# Patient Record
Sex: MALE | Race: ASIAN | NOT HISPANIC OR LATINO | Employment: UNEMPLOYED | URBAN - METROPOLITAN AREA
[De-identification: names, ages, dates, MRNs, and addresses within clinical notes are randomized per-mention and may not be internally consistent; named-entity substitution may affect disease eponyms.]

---

## 2017-02-05 ENCOUNTER — TRANSFERRED RECORDS (OUTPATIENT)
Dept: HEALTH INFORMATION MANAGEMENT | Facility: CLINIC | Age: 42
End: 2017-02-05

## 2017-02-08 ENCOUNTER — TRANSFERRED RECORDS (OUTPATIENT)
Dept: HEALTH INFORMATION MANAGEMENT | Facility: CLINIC | Age: 42
End: 2017-02-08

## 2017-02-08 LAB
ALT SERPL-CCNC: 24 U/L (ref 5–50)
AST SERPL-CCNC: 33 U/L (ref 9–50)
CHOLESTEROL (EXTERNAL): 153 MG/DL
CREATININE (EXTERNAL): 0.93 MG/DL (ref 0.8–1.4)
GLUCOSE (EXTERNAL): 86 MG/DL (ref 70–99)
HDLC SERPL-MCNC: 44 MG/DL
LDL CHOLESTEROL CALCULATED (EXTERNAL): 97 MG/DL
POTASSIUM (EXTERNAL): 4.2 MEQ/L (ref 3.5–5.4)
TRIGLYCERIDES (EXTERNAL): 61 MG/DL
TSH SERPL-ACNC: 1.6 UIU/ML (ref 0.5–4.7)

## 2019-04-01 LAB
ALT SERPL-CCNC: 15 U/L (ref 5–50)
AST SERPL-CCNC: 17 U/L (ref 9–50)
CREATININE (EXTERNAL): 1.09 MG/DL (ref 0.8–1.4)
GFR ESTIMATED (EXTERNAL): 82 ML/MIN/1.73M2
GFR ESTIMATED (IF AFRICAN AMERICAN) (EXTERNAL): 95 ML/MIN/1.73M2
GLUCOSE (EXTERNAL): 93 MG/DL (ref 70–99)
HBA1C MFR BLD: 5.5 % (ref 4.2–5.6)
POTASSIUM (EXTERNAL): 4.6 MEQ/L (ref 3.5–5.4)
TSH SERPL-ACNC: 1.6 UIU/ML (ref 0.4–4.1)

## 2021-03-09 ENCOUNTER — TRANSFERRED RECORDS (OUTPATIENT)
Dept: HEALTH INFORMATION MANAGEMENT | Facility: CLINIC | Age: 46
End: 2021-03-09

## 2021-03-09 LAB
ALT SERPL-CCNC: 16 U/L (ref 5–50)
AST SERPL-CCNC: 19 U/L (ref 9–50)
CHOLESTEROL (EXTERNAL): 152 MG/DL
CREATININE (EXTERNAL): 1.04 MG/DL (ref 0.8–1.4)
GFR ESTIMATED (EXTERNAL): 86 ML/MIN/1.73
GFR ESTIMATED (IF AFRICAN AMERICAN) (EXTERNAL): 99 ML/MIN/1.73
GLUCOSE (EXTERNAL): 91 MG/DL (ref 70–99)
HBA1C MFR BLD: 5.7 % (ref 4.2–5.6)
HDLC SERPL-MCNC: 59 MG/DL
LDL CHOLESTEROL CALCULATED (EXTERNAL): 78 MG/DL
POTASSIUM (EXTERNAL): 4.1 MEQ/L (ref 3.5–5.4)
TRIGLYCERIDES (EXTERNAL): 69 MG/DL
TSH SERPL-ACNC: 1.17 UIU/ML (ref 0.4–4.1)

## 2021-08-06 ENCOUNTER — TRANSFERRED RECORDS (OUTPATIENT)
Dept: HEALTH INFORMATION MANAGEMENT | Facility: CLINIC | Age: 46
End: 2021-08-06

## 2022-05-04 ENCOUNTER — TRANSFERRED RECORDS (OUTPATIENT)
Dept: HEALTH INFORMATION MANAGEMENT | Facility: CLINIC | Age: 47
End: 2022-05-04

## 2022-12-27 ENCOUNTER — OFFICE VISIT (OUTPATIENT)
Dept: FAMILY MEDICINE | Facility: CLINIC | Age: 47
End: 2022-12-27
Payer: MEDICAID

## 2022-12-27 VITALS
RESPIRATION RATE: 18 BRPM | TEMPERATURE: 98 F | OXYGEN SATURATION: 98 % | HEART RATE: 70 BPM | WEIGHT: 108 LBS | BODY MASS INDEX: 20.39 KG/M2 | DIASTOLIC BLOOD PRESSURE: 82 MMHG | SYSTOLIC BLOOD PRESSURE: 126 MMHG | HEIGHT: 61 IN

## 2022-12-27 DIAGNOSIS — Z60.3 LANGUAGE BARRIER: ICD-10-CM

## 2022-12-27 DIAGNOSIS — Z11.4 SCREENING FOR HIV (HUMAN IMMUNODEFICIENCY VIRUS): ICD-10-CM

## 2022-12-27 DIAGNOSIS — Z00.00 ROUTINE GENERAL MEDICAL EXAMINATION AT A HEALTH CARE FACILITY: Primary | ICD-10-CM

## 2022-12-27 DIAGNOSIS — Z12.11 SCREEN FOR COLON CANCER: ICD-10-CM

## 2022-12-27 DIAGNOSIS — R76.8 HEPATITIS C ANTIBODY TEST POSITIVE: ICD-10-CM

## 2022-12-27 DIAGNOSIS — F03.90 DEMENTIA, UNSPECIFIED DEMENTIA SEVERITY, UNSPECIFIED DEMENTIA TYPE, UNSPECIFIED WHETHER BEHAVIORAL, PSYCHOTIC, OR MOOD DISTURBANCE OR ANXIETY (H): ICD-10-CM

## 2022-12-27 DIAGNOSIS — Z11.59 NEED FOR HEPATITIS C SCREENING TEST: ICD-10-CM

## 2022-12-27 DIAGNOSIS — R74.8 ELEVATED CK: ICD-10-CM

## 2022-12-27 DIAGNOSIS — Z13.220 SCREENING FOR HYPERLIPIDEMIA: ICD-10-CM

## 2022-12-27 DIAGNOSIS — Z13.1 SCREENING FOR DIABETES MELLITUS: ICD-10-CM

## 2022-12-27 DIAGNOSIS — B19.20 PCR POSITIVE FOR HEPATITIS C VIRUS: ICD-10-CM

## 2022-12-27 DIAGNOSIS — Z75.8 LANGUAGE BARRIER: ICD-10-CM

## 2022-12-27 DIAGNOSIS — R56.9 SEIZURES (H): ICD-10-CM

## 2022-12-27 DIAGNOSIS — D64.9 ANEMIA, UNSPECIFIED TYPE: ICD-10-CM

## 2022-12-27 LAB
ALBUMIN SERPL BCG-MCNC: 4.5 G/DL (ref 3.5–5.2)
ALP SERPL-CCNC: 111 U/L (ref 40–129)
ALT SERPL W P-5'-P-CCNC: 22 U/L (ref 10–50)
ANION GAP SERPL CALCULATED.3IONS-SCNC: 13 MMOL/L (ref 7–15)
AST SERPL W P-5'-P-CCNC: 35 U/L (ref 10–50)
BILIRUB SERPL-MCNC: 0.4 MG/DL
BUN SERPL-MCNC: 16.5 MG/DL (ref 6–20)
CALCIUM SERPL-MCNC: 9.5 MG/DL (ref 8.6–10)
CHLORIDE SERPL-SCNC: 101 MMOL/L (ref 98–107)
CHOLEST SERPL-MCNC: 172 MG/DL
CK SERPL-CCNC: 72 U/L (ref 39–308)
CREAT SERPL-MCNC: 0.86 MG/DL (ref 0.67–1.17)
DEPRECATED HCO3 PLAS-SCNC: 23 MMOL/L (ref 22–29)
ERYTHROCYTE [DISTWIDTH] IN BLOOD BY AUTOMATED COUNT: 12.5 % (ref 10–15)
GFR SERPL CREATININE-BSD FRML MDRD: >90 ML/MIN/1.73M2
GLUCOSE SERPL-MCNC: 86 MG/DL (ref 70–99)
HBA1C MFR BLD: 5 % (ref 0–5.6)
HCT VFR BLD AUTO: 45.4 % (ref 40–53)
HDLC SERPL-MCNC: 62 MG/DL
HGB BLD-MCNC: 14.3 G/DL (ref 13.3–17.7)
HIV 1+2 AB+HIV1 P24 AG SERPL QL IA: NONREACTIVE
LDLC SERPL CALC-MCNC: 99 MG/DL
MCH RBC QN AUTO: 27.2 PG (ref 26.5–33)
MCHC RBC AUTO-ENTMCNC: 31.5 G/DL (ref 31.5–36.5)
MCV RBC AUTO: 87 FL (ref 78–100)
NONHDLC SERPL-MCNC: 110 MG/DL
PLATELET # BLD AUTO: 168 10E3/UL (ref 150–450)
POTASSIUM SERPL-SCNC: 4.2 MMOL/L (ref 3.4–5.3)
PROT SERPL-MCNC: 8.1 G/DL (ref 6.4–8.3)
RBC # BLD AUTO: 5.25 10E6/UL (ref 4.4–5.9)
SODIUM SERPL-SCNC: 137 MMOL/L (ref 136–145)
TRIGL SERPL-MCNC: 53 MG/DL
WBC # BLD AUTO: 8.2 10E3/UL (ref 4–11)

## 2022-12-27 PROCEDURE — 99386 PREV VISIT NEW AGE 40-64: CPT | Performed by: STUDENT IN AN ORGANIZED HEALTH CARE EDUCATION/TRAINING PROGRAM

## 2022-12-27 PROCEDURE — 80053 COMPREHEN METABOLIC PANEL: CPT | Performed by: STUDENT IN AN ORGANIZED HEALTH CARE EDUCATION/TRAINING PROGRAM

## 2022-12-27 PROCEDURE — 85027 COMPLETE CBC AUTOMATED: CPT | Performed by: STUDENT IN AN ORGANIZED HEALTH CARE EDUCATION/TRAINING PROGRAM

## 2022-12-27 PROCEDURE — 87902 NFCT AGT GNTYP ALYS HEP C: CPT | Performed by: STUDENT IN AN ORGANIZED HEALTH CARE EDUCATION/TRAINING PROGRAM

## 2022-12-27 PROCEDURE — 99000 SPECIMEN HANDLING OFFICE-LAB: CPT | Performed by: STUDENT IN AN ORGANIZED HEALTH CARE EDUCATION/TRAINING PROGRAM

## 2022-12-27 PROCEDURE — 36415 COLL VENOUS BLD VENIPUNCTURE: CPT | Performed by: STUDENT IN AN ORGANIZED HEALTH CARE EDUCATION/TRAINING PROGRAM

## 2022-12-27 PROCEDURE — 82550 ASSAY OF CK (CPK): CPT | Performed by: STUDENT IN AN ORGANIZED HEALTH CARE EDUCATION/TRAINING PROGRAM

## 2022-12-27 PROCEDURE — 87902 NFCT AGT GNTYP ALYS HEP C: CPT | Mod: 90 | Performed by: STUDENT IN AN ORGANIZED HEALTH CARE EDUCATION/TRAINING PROGRAM

## 2022-12-27 PROCEDURE — 80061 LIPID PANEL: CPT | Performed by: STUDENT IN AN ORGANIZED HEALTH CARE EDUCATION/TRAINING PROGRAM

## 2022-12-27 PROCEDURE — 87389 HIV-1 AG W/HIV-1&-2 AB AG IA: CPT | Performed by: STUDENT IN AN ORGANIZED HEALTH CARE EDUCATION/TRAINING PROGRAM

## 2022-12-27 PROCEDURE — 86803 HEPATITIS C AB TEST: CPT | Performed by: STUDENT IN AN ORGANIZED HEALTH CARE EDUCATION/TRAINING PROGRAM

## 2022-12-27 PROCEDURE — 83036 HEMOGLOBIN GLYCOSYLATED A1C: CPT | Performed by: STUDENT IN AN ORGANIZED HEALTH CARE EDUCATION/TRAINING PROGRAM

## 2022-12-27 PROCEDURE — 99214 OFFICE O/P EST MOD 30 MIN: CPT | Mod: 25 | Performed by: STUDENT IN AN ORGANIZED HEALTH CARE EDUCATION/TRAINING PROGRAM

## 2022-12-27 SDOH — SOCIAL STABILITY - SOCIAL INSECURITY: ACCULTURATION DIFFICULTY: Z60.3

## 2022-12-27 ASSESSMENT — ENCOUNTER SYMPTOMS
HEARTBURN: 1
PALPITATIONS: 1
ABDOMINAL PAIN: 1
CHILLS: 0
HEMATOCHEZIA: 0
NERVOUS/ANXIOUS: 1
HEMATURIA: 0
ARTHRALGIAS: 1
CONSTIPATION: 0
SORE THROAT: 0
NAUSEA: 0
WEAKNESS: 1
EYE PAIN: 0
MYALGIAS: 1
FREQUENCY: 0
COUGH: 1
SHORTNESS OF BREATH: 1
PARESTHESIAS: 0
DIZZINESS: 1
DIARRHEA: 0
FEVER: 0
DYSURIA: 0
HEADACHES: 1
JOINT SWELLING: 1

## 2022-12-27 NOTE — PROGRESS NOTES
"SUBJECTIVE:   CC: Heath is an 47 year old who presents for preventative health visit.     Patient has been advised of split billing requirements and indicates understanding: Yes  Healthy Habits:     Getting at least 3 servings of Calcium per day:  Yes    Bi-annual eye exam:  NO    Dental care twice a year:  NO    Sleep apnea or symptoms of sleep apnea:  None    Diet:  Regular (no restrictions)    Frequency of exercise:  None    Taking medications regularly:  Yes    Medication side effects:  Not applicable    PHQ-2 Total Score: 0    Additional concerns today:  No      - accompanied of wife and sister.   - Call wife Maine Fajardo [phone] or sister King [phone]  - Pt just moved here from [place], signed NIK. Doesn't feel well today. He did not sleep and wondered off for the whole week, outside the house. He aleays needs help to be near him and take care of him. When he was not supervised, he wondered off and ended up in the hospital. Was notified by police of his location. This is the second time, the first time he broke his left leg.   - He is not aware of anything that he needs to do, including ADLs, needs help with everything.   - He doesn't eat either.   - Has been like this for 3-4 years. Before that, he was \"normal\". Reports that he had a seizure 3-4 years ago, became unconscious, and then became like this, \"unaware\". Sometimes they would take him to the hospital, sometimes they didn't. Reports that he often would have seizures with a lot of foam in his mouth.  - Not sure if he has ever seen a neurologist.   - Last seizure: unsure  - Was on medications before, not taking them anymore because he ran out. Not able to sleep without medications. Does not know medications.   - Form for medicare (?) and food stamp.   - Would like referral to CHW    - Note from  Region 11/2022 reviewed - dementia unspecified    PSH:  - Had surgeries 3 on back, 2 on abdomen, and 1 on left leg (S/p left klaus).   - Right arm amputated " 2/2 tarah as a teenager.     Today's PHQ-2 Score:   PHQ-2 ( 1999 [employer]) 12/27/2022   Q1: Little interest or pleasure in doing things 0   Q2: Feeling down, depressed or hopeless 0   PHQ-2 Score 0   Q1: Little interest or pleasure in doing things Not at all   Q2: Feeling down, depressed or hopeless Not at all   PHQ-2 Score 0       Have you ever done Advance Care Planning? (For example, a Health Directive, POLST, or a discussion with a medical provider or your loved ones about your wishes): No, advance care planning information given to patient to review.  Patient declined advance care planning discussion at this time.    Social History     Tobacco Use     Smoking status: Not on file     Smokeless tobacco: Not on file   Substance Use Topics     Alcohol use: Not on file     If you drink alcohol do you typically have >3 drinks per day or >7 drinks per week? No    Alcohol Use 12/27/2022   Prescreen: >3 drinks/day or >7 drinks/week? No   Prescreen: >3 drinks/day or >7 drinks/week? -       Last PSA: No results found for: PSA    Reviewed orders with patient. Reviewed health maintenance and updated orders accordingly - Yes  Lab work is in process  Labs reviewed in EPIC    Reviewed and updated as needed this visit by clinical staff    Allergies  Meds   Med Hx            Reviewed and updated as needed this visit by Provider       Med Hx             Review of Systems   Constitutional: Negative for chills and fever.   HENT: Negative for congestion, ear pain, hearing loss and sore throat.    Eyes: Negative for pain and visual disturbance.   Respiratory: Positive for cough and shortness of breath.    Cardiovascular: Positive for palpitations. Negative for chest pain and peripheral edema.   Gastrointestinal: Positive for abdominal pain and heartburn. Negative for constipation, diarrhea, hematochezia and nausea.   Genitourinary: Negative for dysuria, frequency, genital sores, hematuria, impotence, penile discharge and urgency.  "  Musculoskeletal: Positive for arthralgias, joint swelling and myalgias.   Skin: Negative for rash.   Neurological: Positive for dizziness, weakness and headaches. Negative for paresthesias.   Psychiatric/Behavioral: Positive for mood changes. The patient is nervous/anxious.           OBJECTIVE:   /82 (BP Location: Left arm, Patient Position: Sitting, Cuff Size: Adult Small)   Pulse 70   Temp 98  F (36.7  C) (Temporal)   Resp 18   Ht 1.553 m (5' 1.14\")   Wt 49 kg (108 lb)   SpO2 98%   BMI 20.31 kg/m      Physical Exam  General Appearance:  Alert, cooperative, no distress, appears stated age.  Head:  Normocephalic, without obvious abnormality, atraumatic.  Eyes:  Conjunctivae/corneas clear, extraocular movements intact both eyes.  Lungs:  Clear to auscultation bilaterally, respirations unlabored.  Heart:  Regular rate and rhythm, S1 and S2 normal, no murmur, rub or gallop.  Abdomen:  Soft, non-tender, bowel sounds active all four quadrants.   Extremities:  Atraumatic, no cyanosis or edema.  Skin:  Skin color, texture, turgor normal, no rashes or lesions.  Neurologic: No focal deficits.      ASSESSMENT/PLAN:   Heath was seen today for physical.    Diagnoses and all orders for this visit:    Routine general medical examination at a health care facility  -     REVIEW OF HEALTH MAINTENANCE PROTOCOL ORDERS  -     Comprehensive metabolic panel (BMP + Alb, Alk Phos, ALT, AST, Total. Bili, TP); Future  -     Comprehensive metabolic panel (BMP + Alb, Alk Phos, ALT, AST, Total. Bili, TP)    Screen for colon cancer  -     TRESA(EXACT SCIENCES); Future  -     Cancel: Primary Care - Care Coordination Referral; Future  -     Primary Care - Care Coordination Referral; Future    Screening for HIV (human immunodeficiency virus)  -     HIV Antigen Antibody Combo; Future  -     HIV Antigen Antibody Combo    Need for hepatitis C screening test  -     Hepatitis C Screen Reflex to HCV RNA Quant and Genotype; Future  -     " "Hepatitis C Screen Reflex to HCV RNA Quant and Genotype  -     Hepatitis C RNA, Quantitative by PCR with Confirmatory Reflex to Genotyping  -     Hepatitis C High Resolution Genotype    Screening for hyperlipidemia  -     Lipid panel reflex to direct LDL Non-fasting; Future  -     Lipid panel reflex to direct LDL Non-fasting    Elevated CK  Noted in ED visit, recheck today  -     CK total; Future  -     CK total    Anemia, unspecified type  -     CBC with platelets; Future  -     CBC with platelets    Screening for diabetes mellitus  -     Hemoglobin A1c; Future  -     Hemoglobin A1c    Language barrier  -     Cancel: Primary Care - Care Coordination Referral; Future  -     Primary Care - Care Coordination Referral; Future    Dementia, unspecified dementia severity, unspecified dementia type, unspecified whether behavioral, psychotic, or mood disturbance or anxiety (H)  Seizures (H)  Unclear history, language barrier, and poor historians. Pt is not verbal.  Has been like this for 3-4 years. Before that, he was \"normal\". Reports that he had a seizure 3-4 years ago, became unconscious, and then became like this, \"unaware\". Sometimes they would take him to the hospital, sometimes they didn't. Reports that he often would have seizures with a lot of foam in his mouth. Not sure if he has ever seen a neurologist. Last seizure: unsure. Was on medications before, not taking them anymore because he ran out. Not able to sleep without medications. Does not know medications.   - Will bring bottles of meds for me to review  - Likely need referral to Neurologist    Hepatitis C antibody test positive  PCR positive for hepatitis C virus  Detected on labs today. Will refer to GI.   -     Adult GI  Referral - Consult Only; Future        Patient has been advised of split billing requirements and indicates understanding: Yes    Health Maintenance:    STD Screening: declined    Immunizations: defer until records btained    HIV " screening ages 15-65 (USPSTF guidelines, CDC guidelines are 13-64): ordered    Advance Directive:  not on file. Information provided 12/27/2022.    Colorectal Cancer: cologuard ordered    Hep C screening 18-79: ordered    COUNSELING:   Reviewed preventive health counseling, as reflected in patient instructions       Regular exercise       Healthy diet/nutrition       Vision screening       Hearing screening       Advance Care Planning        He has no history on file for tobacco use.            Diana Arceo MD  [facility]

## 2022-12-28 ENCOUNTER — TRANSFERRED RECORDS (OUTPATIENT)
Dept: HEALTH INFORMATION MANAGEMENT | Facility: CLINIC | Age: 47
End: 2022-12-28

## 2022-12-28 ENCOUNTER — PATIENT OUTREACH (OUTPATIENT)
Dept: CARE COORDINATION | Facility: CLINIC | Age: 47
End: 2022-12-28

## 2022-12-28 LAB — HCV AB SERPL QL IA: REACTIVE

## 2022-12-28 ASSESSMENT — ACTIVITIES OF DAILY LIVING (ADL): DEPENDENT_IADLS:: CLEANING;COOKING;MEAL PREPARATION;TRANSPORTATION

## 2022-12-28 NOTE — PROGRESS NOTES
"Clinic Care Coordination Contact:  Community Health Worker Initial Outreach    Reason: CCC CHW Initial Outreach- referral to [facility] service  Referral provider: Dr. Arceo     ID#: [id number]  Agency: [employer]    CHW Initial Information Gathering:  Referral Source: PCP  Preferred Hospital: [facility]  [phone]  Preferred Urgent Care: [facility], [phone]  Current living arrangement:: I live in a private home with family  Type of residence:: Apartment  Informal Support system:: Family, Spouse  Transportation means:: Family, Regular car (Son bring my  to medical appt per pt's wife Maine shared on 12/28/2022.)    Care Mgmt (GEN) screening:   Per questionnaires:   -Fallen 2 or more times in the past years? No per pt's spouse.   -Any fall with injury in the past year? Yes per pt's spouse.     Potential patient outreach discussion:   Attempted #1: Patient was identified as a potential candidate and referred to Clinic Care Coordination Service. CHW call and spoke with patient's wife Maine today, 12- to discuss possible clinic care coordination enrollment. No consent to communication found. Asked to speak with patient.     Maine confirmed that pt have \"speech and mental health problem.\"    Have introduced myself to patient. Clinic care coordination service was described to the patient and immediate needs were discussed. The patient's wife agreed enrollment into [facility] service for patient. CHW explained [facility] monthly outreach follow up and [facility] initial assessment. Pt's wife is aware [facility] team includes CHW, SW, RN and FRW and to connect with [facility] for any additional resources need and RSC with any provider scheduling appt or questions.     No patient medical info discussed today. CHW completed the CHW initial screening and Care mgnt (GEN) screening above with pt's wife through . Pt's wife do not have a pen and unable to write down CHW phone " "number today. Wife confirmed that she will asked [facility] ED for [facility] contact info on 12/30/2022.     Patient's wife stated;   -Household size: 3 people (1 child; 2 adults- pt and wife).  -Heath Crawley, me/wife and child live with one of my son's family at this time. Do not pay for rent due to no income. But all eat separately.   -I (wife) do all the cleaning, cooking, meal preparation and pretty much everything for him.   -My son bring him to his medical appt.   - Heath Crawley been missing \"12/20/2022 and police found in on 12/25/2022. We was injured and we picked him up from the hospital.\"   -I unemployed due to taking care of  Heath.   -Heath Crawley have no pca service.   -Need help apply food stamp program.     Patient accepts CC: Yes. Patient scheduled for assessment with [facility]  [place] on 12/30/2022 at 11:00AM via phone visit. Patient noted desire to discuss apply food stamp program. Note/comment: Pt's spouse is aware [facility] ED will connect with pt at appt date and time. Spouse confirmed.     CHW Plan:   -Pt/spouse attend initial assessment appt on 12/30/2022 with [facility] ED [place] via phone visit.   -[facility] ED assess patient need and place [facility] FRW referral/screening.        "

## 2022-12-29 LAB
HCV RNA SERPL NAA+PROBE-ACNC: ABNORMAL IU/ML
HCV RNA SERPL NAA+PROBE-LOG IU: 5.5 {LOG_IU}/ML

## 2023-01-02 ENCOUNTER — PATIENT OUTREACH (OUTPATIENT)
Dept: NURSING | Facility: CLINIC | Age: 48
End: 2023-01-02
Payer: MEDICAID

## 2023-01-02 ENCOUNTER — TELEPHONE (OUTPATIENT)
Dept: FAMILY MEDICINE | Facility: CLINIC | Age: 48
End: 2023-01-02

## 2023-01-02 ASSESSMENT — ACTIVITIES OF DAILY LIVING (ADL): DEPENDENT_IADLS:: CLEANING;COOKING;MEAL PREPARATION;TRANSPORTATION

## 2023-01-02 NOTE — TELEPHONE ENCOUNTER
RECORDS RECEIVED FROM: Adis Olivera   Appt Date: 2/9/2023   NOTES STATUS DETAILS   OFFICE NOTE from referring provider Internal 12/27/2022 Office visit with Adis   OFFICE NOTES from other specialists N/A    DISCHARGE SUMMARY from hospital N/A    MEDICATION LIST Internal    LIVER BIOSPY (IF APPLICABLE)      PATHOLOGY REPORTS  N/A    IMAGING     ENDOSCOPY (IF AVAILABLE) N/A    COLONOSCOPY (IF AVAILABLE) N/A    ULTRASOUND LIVER N/A    CT OF ABDOMEN N/A    MRI OF LIVER N/A    FIBROSCAN, US ELASTOGRAPHY, FIBROSIS SCAN, MR ELASTOGRAPHY N/A    LABS     HEPATIC PANEL (LIVER PANEL) N/A    BASIC METABOLIC PANEL N/A    COMPLETE METABOLIC PANEL N/A    COMPLETE BLOOD COUNT (CBC) Internal 12/27/2022   INTERNATIONAL NORMALIZED RATIO (INR) N/A    HEPATITIS C ANTIBODY Internal 12/27/2022   HEPATITIS C VIRAL LOAD/PCR N/A 12/27/2022   HEPATITIS C GENOTYPE N/A    HEPATITIS B SURFACE ANTIGEN N/A    HEPATITIS B SURFACE ANTIBODY N/A    HEPATITIS B DNA QUANT LEVEL N/A    HEPATITIS B CORE ANTIBODY N/A

## 2023-01-02 NOTE — TELEPHONE ENCOUNTER
RN called patient's wife via [employer]  to relay 's message below.    Patient's wife verbalized understanding.    RN found patient has upcoming appointment with liver doctor. RN provided the date and time and location to patient's wife.    Appointments in Next    Jan 02, 2023 11:00 AM  Phone Visit with AN CCC SW, VIDEO,   [facility] ([facility] ) [phone]   Jan 24, 2023  1:30 PM  (Arrive by 1:10 PM)  Provider Visit with Diana Arceo MD  [facility] ([facility] ) [phone]   Feb 09, 2023 11:45 AM  LAB with CS LAB  [facility] ([facility] ) [phone]   Feb 09, 2023  1:15 PM  [facility] Liver with Kaleigh Emery PA-C  [facility] ([facility] ) [phone]        Keisha Gatica RN  [facility]

## 2023-01-02 NOTE — TELEPHONE ENCOUNTER
----- Message from Diana Arceo MD sent at 12/29/2022  5:06 PM CST -----  Please call pt's wife or sister (wife Maine Fajardo [phone] or sister King [phone]) about results below:    For hepatitis C was positive.  Will refer him to the liver but this to be treated. They should get a call to schedule this in 1 to 2 days.    For HIV screening.  Cholesterol levels are normal.,  Screening for diabetes, electrolytes, kidney function, liver function were all normal. CK, which is screening to look for muscle breakdown, which was high when pt was seen in the ED, is now normal.

## 2023-01-02 NOTE — PROGRESS NOTES
Clinic Care Coordination Contact    Clinic Care Coordination Contact  OUTREACH    Referral Information:  Referral Source: PCP    Primary Diagnosis: Psychosocial    Chief Complaint   Patient presents with     Clinic Care Coordination - Initial     [facility]     Utilization    Hospital Admissions  0             ED Visits  0             No Show Count (past year)  0                Current as of: 12/30/2022  3:13 PM              Clinical Concerns:  Current Medical Concerns:  There is no problem list on file for this patient.        Current Behavioral Concerns: None listed, however, chart notes indicate that patient was brought to ED at the end of November after being found wandering in [place]. He appeared confused. Unclear if mood disturbance, versus dementia, versus something else.     Education Provided to patient: Introduced self and role.       Health Maintenance Reviewed: Due/Overdue   Health Maintenance Due   Topic Date Due     HEPATITIS B IMMUNIZATION (1 of 3 - 3-dose series) Never done     COVID-19 Vaccine (1) Never done     COLORECTAL CANCER SCREENING  Never done     DTAP/TDAP/TD IMMUNIZATION (1 - Tdap) Never done     INFLUENZA VACCINE (1) Never done     PHQ-2 (once per calendar year)  01/01/2023       Clinical Pathway: None    Medication Management:  Did not review medications.    Functional Status:  Dependent IADLs:: Cleaning, Cooking, Meal Preparation, Transportation (Per pt's wife shared on 12/28/2022; I do all the cleaning, coooking and meal preparation. My son bring my  to medical appt.)    Living Situation:  Current living arrangement:: I live in a private home with family    Lifestyle & Psychosocial Needs: [facility] contacted patient's wife, [place], using [employer] . Introduced self and role. Did not discuss PHI due to no consent to communicate on file. Patient's wife stated that they want SNAP benefits. Provided her the contact information  to [facility] [employer]. One of her children wrote the number down and will provide assistance with calling. Patient's wife then said that they want rent support. Psychiatric asked if they are paying rent at this time. She stated they are not, as they are living with children. Psychiatric explained that they would need to be paying rent to get rent assistance. Wife stated that they want to move to a place on their own eventually, so they will be paying rent at some point. Psychiatric stated they will need to contact the county at that time and advised them that they would not get their rent paid each month, but did state they can apply for section 8 housing if the waitlist is open in their county ([facility] [place] [facility] Provided child with link to check the waitlist. No further questions.      Social Determinants of Health     Tobacco Use: Not on file   Alcohol Use: Not on file   Financial Resource Strain: Not on file   Food Insecurity: Not on file   Transportation Needs: Not on file   Physical Activity: Not on file   Stress: Not on file   Social Connections: Not on file   Intimate Partner Violence: Not on file   Depression: Not at risk     PHQ-2 Score: 0   Housing Stability: Not on file        Transportation means:: Family, Regular car (Son bring my  to medical appt per pt's wife Maine shared on 12/28/2022.)       Resources and Interventions:  Current Resources:               Employment Status: unemployed     Care Plan: No care plan created. Patient's wife declined needing further outreach      Patient/Caregiver understanding: Yes       Future Appointments              In 3 weeks Diana Arceo MD [facility]    In 1 month CS LAB [facility] [place], CS    In 1 month Kaleigh Emery PA-C [facility]          Plan: Patient was not enrolled in Care Coordination due to wife declining needing further outreach at this time. Encouraged  wife to contact care team if additional needs arise.    URSULA Polanco  Primary Care Clinic- Social Work Care Coordinator  [facility] [place] [place] [facility]  Ph: [phone]  1/2/2023 11:59 AM

## 2023-01-03 LAB — HCV GENTYP SERPL NAA+PROBE: NORMAL

## 2023-01-05 ENCOUNTER — TELEPHONE (OUTPATIENT)
Dept: NURSING | Facility: CLINIC | Age: 48
End: 2023-01-05

## 2023-01-05 NOTE — TELEPHONE ENCOUNTER
Please call pt's wife or sister to inform them that I reviewed his medications and that he no longer needs to take aspirin daily.  This was only prescribed because of his hip surgery to prevent blood clots.  Given that he does not have any diabetes or high cholesterol, this is no longer needed.

## 2023-01-06 PROBLEM — S72.002A LEFT DISPLACED FEMORAL NECK FRACTURE (H): Status: ACTIVE | Noted: 2022-09-16

## 2023-01-09 PROBLEM — G40.909 SEIZURE DISORDER (H): Status: ACTIVE | Noted: 2023-01-09

## 2023-01-09 PROBLEM — F02.818 DEMENTIA DUE TO MEDICAL CONDITION WITH BEHAVIORAL DISTURBANCE (H): Status: ACTIVE | Noted: 2023-01-09

## 2023-01-09 PROBLEM — F81.89 NON-VERBAL LEARNING DISORDER: Status: ACTIVE | Noted: 2023-01-09

## 2023-01-09 NOTE — TELEPHONE ENCOUNTER
Contacted patient's wife as patient is non verbal.  Relayed message below from Dr Arceo    Message routed to Dr Arceo to add that patient is non verbal on problem list.    Erlinda Ya RN  [facility]            Dr Arceo  Please call pt's wife or sister to inform them that I reviewed his medications and that he no longer needs to take aspirin daily.  This was only prescribed because of his hip surgery to prevent blood clots.  Given that he does not have any diabetes or high cholesterol, this is no longer needed.

## 2023-01-09 NOTE — TELEPHONE ENCOUNTER
Dx of non-verbal added to problem list, wife and sister reported pt become non-verbal 2/2 long seizure a few years ago. Unclear history.

## 2023-01-13 ENCOUNTER — TELEPHONE (OUTPATIENT)
Dept: FAMILY MEDICINE | Facility: CLINIC | Age: 48
End: 2023-01-13

## 2023-01-13 DIAGNOSIS — R56.9 SEIZURES (H): Primary | ICD-10-CM

## 2023-01-13 DIAGNOSIS — F81.89 NON-VERBAL LEARNING DISORDER: ICD-10-CM

## 2023-01-13 DIAGNOSIS — F03.90 DEMENTIA, UNSPECIFIED DEMENTIA SEVERITY, UNSPECIFIED DEMENTIA TYPE, UNSPECIFIED WHETHER BEHAVIORAL, PSYCHOTIC, OR MOOD DISTURBANCE OR ANXIETY (H): ICD-10-CM

## 2023-01-13 NOTE — TELEPHONE ENCOUNTER
Patient's wife brought in his medications, however medications were the ones that he received from the emergency room, including Tylenol and thiamine.  Medications did not include any seizure medications.  On further discussion with wife, she reports that  patient did have seizure medications, however she does not recall what they are and does not have any access to records from previous doctor, does not remember the name or phone number of previous doctor as well.  Discussed with wife that we will refer patient to neurology. Wife was agreeable.

## 2023-01-17 DIAGNOSIS — B19.20 HEPATITIS C VIRUS INFECTION WITHOUT HEPATIC COMA, UNSPECIFIED CHRONICITY: Primary | ICD-10-CM

## 2023-01-24 ENCOUNTER — LAB (OUTPATIENT)
Dept: FAMILY MEDICINE | Facility: CLINIC | Age: 48
End: 2023-01-24

## 2023-01-24 ENCOUNTER — OFFICE VISIT (OUTPATIENT)
Dept: FAMILY MEDICINE | Facility: CLINIC | Age: 48
End: 2023-01-24
Payer: MEDICAID

## 2023-01-24 VITALS
WEIGHT: 118 LBS | HEIGHT: 61 IN | DIASTOLIC BLOOD PRESSURE: 82 MMHG | TEMPERATURE: 97.3 F | SYSTOLIC BLOOD PRESSURE: 124 MMHG | OXYGEN SATURATION: 98 % | HEART RATE: 80 BPM | BODY MASS INDEX: 22.28 KG/M2 | RESPIRATION RATE: 16 BRPM

## 2023-01-24 DIAGNOSIS — F03.90 DEMENTIA, UNSPECIFIED DEMENTIA SEVERITY, UNSPECIFIED DEMENTIA TYPE, UNSPECIFIED WHETHER BEHAVIORAL, PSYCHOTIC, OR MOOD DISTURBANCE OR ANXIETY (H): Primary | ICD-10-CM

## 2023-01-24 DIAGNOSIS — Z23 NEED FOR DIPHTHERIA-TETANUS-PERTUSSIS (TDAP) VACCINE: ICD-10-CM

## 2023-01-24 DIAGNOSIS — F51.01 PRIMARY INSOMNIA: ICD-10-CM

## 2023-01-24 DIAGNOSIS — R56.9 SEIZURES (H): ICD-10-CM

## 2023-01-24 DIAGNOSIS — Z23 NEEDS FLU SHOT: ICD-10-CM

## 2023-01-24 DIAGNOSIS — F81.89 NON-VERBAL LEARNING DISORDER: ICD-10-CM

## 2023-01-24 DIAGNOSIS — Z23 NEED FOR PROPHYLACTIC VACCINATION AGAINST STREPTOCOCCUS PNEUMONIAE (PNEUMOCOCCUS): ICD-10-CM

## 2023-01-24 DIAGNOSIS — S48.911D AMPUTATION OF RIGHT UPPER EXTREMITY, SUBSEQUENT ENCOUNTER: ICD-10-CM

## 2023-01-24 DIAGNOSIS — Z12.11 SCREEN FOR COLON CANCER: ICD-10-CM

## 2023-01-24 PROCEDURE — 90715 TDAP VACCINE 7 YRS/> IM: CPT | Performed by: STUDENT IN AN ORGANIZED HEALTH CARE EDUCATION/TRAINING PROGRAM

## 2023-01-24 PROCEDURE — 90677 PCV20 VACCINE IM: CPT | Performed by: STUDENT IN AN ORGANIZED HEALTH CARE EDUCATION/TRAINING PROGRAM

## 2023-01-24 PROCEDURE — 90471 IMMUNIZATION ADMIN: CPT | Performed by: STUDENT IN AN ORGANIZED HEALTH CARE EDUCATION/TRAINING PROGRAM

## 2023-01-24 PROCEDURE — 90686 IIV4 VACC NO PRSV 0.5 ML IM: CPT | Performed by: STUDENT IN AN ORGANIZED HEALTH CARE EDUCATION/TRAINING PROGRAM

## 2023-01-24 PROCEDURE — 90472 IMMUNIZATION ADMIN EACH ADD: CPT | Performed by: STUDENT IN AN ORGANIZED HEALTH CARE EDUCATION/TRAINING PROGRAM

## 2023-01-24 PROCEDURE — 99214 OFFICE O/P EST MOD 30 MIN: CPT | Mod: 25 | Performed by: STUDENT IN AN ORGANIZED HEALTH CARE EDUCATION/TRAINING PROGRAM

## 2023-01-24 RX ORDER — TRAZODONE HYDROCHLORIDE 50 MG/1
50 TABLET, FILM COATED ORAL AT BEDTIME
Qty: 90 TABLET | Refills: 3 | Status: SHIPPED | OUTPATIENT
Start: 2023-01-24 | End: 2023-02-27

## 2023-01-24 NOTE — Clinical Note
Please help pt make appt with Neuro for dementia and seizure. Per neuro, Our preferred partners are [facility] Neurology at [phone] and [facility] at [phone].

## 2023-01-24 NOTE — PROGRESS NOTES
Assessment & Plan     Dementia, unspecified dementia severity, unspecified dementia type, unspecified whether behavioral, psychotic, or mood disturbance or anxiety (H)  Seizures (H)  Previously referred to neurology.  We will have team coordinators to help schedule neurology appointment.  -Needs form for [facility] to be completed for benefits.  We will complete and mail to patient or call to .    Screen for colon cancer  - COLOGUARD(EXACT SCIENCES)    Amputation of right upper extremity, subsequent encounter  Chronic, stable.  [place]    Primary insomnia  Chronic, uncontrolled as patient is off this medication.  Wife and sister report that patient was previously on a medication for insomnia, however does not have any records and does not recall what medication this was.  Reports that is becoming a problem as patient needs constant supervision, without falling asleep, wife has to provide constant supervision and not able to sleep.  We will trial trazodone 50 mg nightly.  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  Secondary to a prolonged seizure per wife and sister.  -Completed form to allow speaking to wife and sister regarding patient's health concerns.    Needs flu shot  - INFLUENZA VACCINE IM > 6 MONTHS VALENT IIV4 (AFLURIA/FLUZONE)    Need for prophylactic vaccination against Streptococcus pneumoniae (pneumococcus)  - Pneumococcal 20 Valent Conjugate (Prevnar 20)    Need for diphtheria-tetanus-pertussis (Tdap) vaccine  - TDAP VACCINE (Adacel, Boostrix)      Review of external notes as documented elsewhere in note      Return in about 4 weeks (around 2/21/2023) for Follow up dementia.    Diana Arceo MD  [facility]    Michel Joe is a 48 year old accompanied by his wife and sister, presenting for the following health issues:  office visit (Follow up)      History of  "Present Illness       Reason for visit:  Follow up    He eats 4 or more servings of fruits and vegetables daily.He consumes 0 sweetened beverage(s) daily.He exercises with enough effort to increase his heart rate 9 or less minutes per day.  He exercises with enough effort to increase his heart rate 3 or less days per week.   He is taking medications regularly.       Dementia  Seizure  Insomnia  - Pt is still not doing well, but still chronic problems. Wife not able to rest of take care of herself.   - Pt does not sleep and requires constant supervision. Broke TV recently.   - Was referred to Neurology, has not been able to make an appt yet.     Forms  - Has forms from [facility] to confirm that pt is not able to work.       Review of Systems   Constitutional: Negative for fever and chills.   Respiratory: Negative for cough or shortness of breath.    Cardiovascular: Negative for chest pain or chest pressure.   Gastrointestinal: Negative for nausea, vomiting, diarrhea or abdominal pain.        Objective    /82 (BP Location: Left arm, Patient Position: Sitting, Cuff Size: Adult Regular)   Pulse 80   Temp 97.3  F (36.3  C) (Temporal)   Resp 16   Ht 1.553 m (5' 1.14\")   Wt 53.5 kg (118 lb)   SpO2 98%   BMI 22.19 kg/m    Body mass index is 22.19 kg/m .  Physical Exam   PHYSICAL EXAM  General: Well developed, well nourished.  Skin:  Dry without rash.    Head:  Normocephalic-atraumatic.    Eye:  Normal conjunctivae.     Respiratory:  Normal respiratory effort.   Gastrointestinal:  Non-distended.    Musculoskeletal:  No deformity or edema.  Neurologic: No focal deficits.              "

## 2023-01-31 ENCOUNTER — TELEPHONE (OUTPATIENT)
Dept: FAMILY MEDICINE | Facility: CLINIC | Age: 48
End: 2023-01-31
Payer: MEDICAID

## 2023-02-06 NOTE — TELEPHONE ENCOUNTER
Action 2/6/23 MV 11.49am   Action Taken Imaging request faxed to [facility]    3/1/23 MV 11.04am  Images resolved in PACS         RECORDS RECEIVED FROM: internal   REASON FOR VISIT: seizures   Date of Appt: 3/28/23   NOTES (FOR ALL VISITS) STATUS DETAILS   OFFICE NOTE from referring provider Internal Dr Diana Arceo @ [facility]:  1/24/23 12/27/22   MEDICATION LIST Internal    IMAGING  (FOR ALL VISITS)     EEG Internal [facility]:  3/28/22   CT (HEAD, NECK, SPINE) PACS [facility]:  CT Head 11/2/22

## 2023-02-09 ENCOUNTER — PRE VISIT (OUTPATIENT)
Dept: GASTROENTEROLOGY | Facility: CLINIC | Age: 48
End: 2023-02-09

## 2023-02-09 ENCOUNTER — LAB (OUTPATIENT)
Dept: LAB | Facility: CLINIC | Age: 48
End: 2023-02-09
Payer: COMMERCIAL

## 2023-02-09 DIAGNOSIS — B19.20 HEPATITIS C VIRUS INFECTION WITHOUT HEPATIC COMA, UNSPECIFIED CHRONICITY: ICD-10-CM

## 2023-02-09 LAB
ALBUMIN SERPL BCG-MCNC: 4.5 G/DL (ref 3.5–5.2)
ALP SERPL-CCNC: 102 U/L (ref 40–129)
ALT SERPL W P-5'-P-CCNC: 34 U/L (ref 10–50)
ANION GAP SERPL CALCULATED.3IONS-SCNC: 12 MMOL/L (ref 7–15)
AST SERPL W P-5'-P-CCNC: 38 U/L (ref 10–50)
BILIRUB DIRECT SERPL-MCNC: <0.2 MG/DL (ref 0–0.3)
BILIRUB SERPL-MCNC: 0.4 MG/DL
BUN SERPL-MCNC: 6.9 MG/DL (ref 6–20)
CALCIUM SERPL-MCNC: 8.8 MG/DL (ref 8.6–10)
CHLORIDE SERPL-SCNC: 97 MMOL/L (ref 98–107)
CREAT SERPL-MCNC: 0.95 MG/DL (ref 0.67–1.17)
DEPRECATED HCO3 PLAS-SCNC: 22 MMOL/L (ref 22–29)
ERYTHROCYTE [DISTWIDTH] IN BLOOD BY AUTOMATED COUNT: 12.8 % (ref 10–15)
GFR SERPL CREATININE-BSD FRML MDRD: >90 ML/MIN/1.73M2
GLUCOSE SERPL-MCNC: 132 MG/DL (ref 70–99)
HBV CORE AB SERPL QL IA: NONREACTIVE
HBV SURFACE AB SERPL IA-ACNC: 18.02 M[IU]/ML
HBV SURFACE AB SERPL IA-ACNC: REACTIVE M[IU]/ML
HBV SURFACE AG SERPL QL IA: NONREACTIVE
HCT VFR BLD AUTO: 45.1 % (ref 40–53)
HGB BLD-MCNC: 14.8 G/DL (ref 13.3–17.7)
INR PPP: 1.21 (ref 0.85–1.15)
MCH RBC QN AUTO: 26.7 PG (ref 26.5–33)
MCHC RBC AUTO-ENTMCNC: 32.8 G/DL (ref 31.5–36.5)
MCV RBC AUTO: 81 FL (ref 78–100)
PLATELET # BLD AUTO: 219 10E3/UL (ref 150–450)
POTASSIUM SERPL-SCNC: 4.6 MMOL/L (ref 3.4–5.3)
PROT SERPL-MCNC: 7.6 G/DL (ref 6.4–8.3)
RBC # BLD AUTO: 5.54 10E6/UL (ref 4.4–5.9)
SODIUM SERPL-SCNC: 131 MMOL/L (ref 136–145)
WBC # BLD AUTO: 6.7 10E3/UL (ref 4–11)

## 2023-02-09 PROCEDURE — 36415 COLL VENOUS BLD VENIPUNCTURE: CPT

## 2023-02-09 PROCEDURE — 87340 HEPATITIS B SURFACE AG IA: CPT

## 2023-02-09 PROCEDURE — 86706 HEP B SURFACE ANTIBODY: CPT

## 2023-02-09 PROCEDURE — 80053 COMPREHEN METABOLIC PANEL: CPT

## 2023-02-09 PROCEDURE — 85610 PROTHROMBIN TIME: CPT

## 2023-02-09 PROCEDURE — 85027 COMPLETE CBC AUTOMATED: CPT

## 2023-02-09 PROCEDURE — 86704 HEP B CORE ANTIBODY TOTAL: CPT

## 2023-02-09 PROCEDURE — 82248 BILIRUBIN DIRECT: CPT

## 2023-02-13 NOTE — TELEPHONE ENCOUNTER
DIAGNOSIS: PCR positive for hepatitis C virus    Appt Date: 04.04.2023    NOTES STATUS DETAILS   OFFICE NOTE from referring provider Internal 12.27.2022 Diana Arceo MD   OFFICE NOTES from other specialists     DISCHARGE SUMMARY from hospital     MEDICATION LIST Internal    LIVER BIOSPY (IF APPLICABLE)      PATHOLOGY REPORTS      IMAGING     ENDOSCOPY (IF AVAILABLE)     COLONOSCOPY (IF AVAILABLE)     ULTRASOUND LIVER     CT OF ABDOMEN     MRI OF LIVER     FIBROSCAN, US ELASTOGRAPHY, FIBROSIS SCAN, MR ELASTOGRAPHY     LABS     HEPATIC PANEL (LIVER PANEL) Internal 02.09.2023   BASIC METABOLIC PANEL Internal 02.09.2023   COMPLETE METABOLIC PANEL Internal 02.09.2023   COMPLETE BLOOD COUNT (CBC) Internal 02.09.2023   INTERNATIONAL NORMALIZED RATIO (INR) Internal 02.09.2023   HEPATITIS C ANTIBODY Internal 12.27.2022   HEPATITIS C VIRAL LOAD/PCR     HEPATITIS C GENOTYPE     HEPATITIS B SURFACE ANTIGEN Internal 02.09.2023   HEPATITIS B SURFACE ANTIBODY Internal 02.09.2023   HEPATITIS B DNA QUANT LEVEL     HEPATITIS B CORE ANTIBODY Internal 02.09.2023

## 2023-02-20 ENCOUNTER — PATIENT OUTREACH (OUTPATIENT)
Dept: CARE COORDINATION | Facility: CLINIC | Age: 48
End: 2023-02-20
Payer: COMMERCIAL

## 2023-02-20 NOTE — PROGRESS NOTES
Clinic Care Coordination Contact    Clinic Care Coordination Contact  OUTREACH    Referral Information:  Referral Source: PCP     Clinical Concerns:  Current Medical Concerns:   Patient Active Problem List   Diagnosis     Left displaced femoral neck fracture (H)     Non-verbal learning disorder     Dementia due to medical condition with behavioral disturbance     Seizure disorder (H)     Amputation of right upper extremity, subsequent encounter   Living Situation:  Current living arrangement:: I live in a private home with family      Message received from PCP to consider family support - wife is enrolled with CC  RN CC wasn't clear on pt details, confirmed id during wife call  Pt is schedule to see PCP next week  RN CC will outreach after visit to complete assessment and enroll in program       Patient/Caregiver understanding: Patient verbalized understanding via interpretive services. Engaged in AIDET communication during visit      Outreach Frequency: monthly  Future Appointments              In 1 week Diana Arceo MD [facility]    In 2 weeks SPRS CCC RN [facility]    In 1 month [id number] EEG [facility] Outpatient Clinic [facility]    In 1 month Mitchell Naik MD [facility] Neurology Clinic [facility]    In 1 month Leventhal, Thomas Michael, MD [facility] Hepatology Clinic [facility]          Plan: Patient will schedule and attend recommended follow up visits with speciality providers and primary care provider.  RN CC will call afer visit to assess for CCC support needs

## 2023-02-27 ENCOUNTER — ANCILLARY PROCEDURE (OUTPATIENT)
Dept: GENERAL RADIOLOGY | Facility: CLINIC | Age: 48
End: 2023-02-27
Attending: STUDENT IN AN ORGANIZED HEALTH CARE EDUCATION/TRAINING PROGRAM
Payer: COMMERCIAL

## 2023-02-27 ENCOUNTER — LAB (OUTPATIENT)
Dept: FAMILY MEDICINE | Facility: CLINIC | Age: 48
End: 2023-02-27

## 2023-02-27 ENCOUNTER — OFFICE VISIT (OUTPATIENT)
Dept: FAMILY MEDICINE | Facility: CLINIC | Age: 48
End: 2023-02-27
Payer: COMMERCIAL

## 2023-02-27 VITALS
HEART RATE: 95 BPM | SYSTOLIC BLOOD PRESSURE: 121 MMHG | DIASTOLIC BLOOD PRESSURE: 82 MMHG | RESPIRATION RATE: 16 BRPM | BODY MASS INDEX: 21.72 KG/M2 | WEIGHT: 115.5 LBS | TEMPERATURE: 98.4 F | OXYGEN SATURATION: 97 %

## 2023-02-27 DIAGNOSIS — Z12.11 SCREEN FOR COLON CANCER: ICD-10-CM

## 2023-02-27 DIAGNOSIS — R05.3 CHRONIC COUGH: ICD-10-CM

## 2023-02-27 DIAGNOSIS — R05.3 CHRONIC COUGH: Primary | ICD-10-CM

## 2023-02-27 DIAGNOSIS — F51.01 PRIMARY INSOMNIA: ICD-10-CM

## 2023-02-27 DIAGNOSIS — B19.20 HEPATITIS C VIRUS INFECTION, UNSPECIFIED CHRONICITY: ICD-10-CM

## 2023-02-27 LAB
FLUAV AG SPEC QL IA: NEGATIVE
FLUBV AG SPEC QL IA: NEGATIVE

## 2023-02-27 PROCEDURE — U0003 INFECTIOUS AGENT DETECTION BY NUCLEIC ACID (DNA OR RNA); SEVERE ACUTE RESPIRATORY SYNDROME CORONAVIRUS 2 (SARS-COV-2) (CORONAVIRUS DISEASE [COVID-19]), AMPLIFIED PROBE TECHNIQUE, MAKING USE OF HIGH THROUGHPUT TECHNOLOGIES AS DESCRIBED BY CMS-2020-01-R: HCPCS | Performed by: STUDENT IN AN ORGANIZED HEALTH CARE EDUCATION/TRAINING PROGRAM

## 2023-02-27 PROCEDURE — 87804 INFLUENZA ASSAY W/OPTIC: CPT | Performed by: STUDENT IN AN ORGANIZED HEALTH CARE EDUCATION/TRAINING PROGRAM

## 2023-02-27 PROCEDURE — 99214 OFFICE O/P EST MOD 30 MIN: CPT | Mod: CS | Performed by: STUDENT IN AN ORGANIZED HEALTH CARE EDUCATION/TRAINING PROGRAM

## 2023-02-27 PROCEDURE — U0005 INFEC AGEN DETEC AMPLI PROBE: HCPCS | Performed by: STUDENT IN AN ORGANIZED HEALTH CARE EDUCATION/TRAINING PROGRAM

## 2023-02-27 PROCEDURE — 71046 X-RAY EXAM CHEST 2 VIEWS: CPT | Mod: TC | Performed by: RADIOLOGY

## 2023-02-27 RX ORDER — TRAZODONE HYDROCHLORIDE 50 MG/1
50 TABLET, FILM COATED ORAL AT BEDTIME
Qty: 90 TABLET | Refills: 3 | Status: SHIPPED | OUTPATIENT
Start: 2023-02-27 | End: 2023-04-03

## 2023-02-27 NOTE — PROGRESS NOTES
Assessment & Plan     Chronic cough  Patient's wife reports a chronic cough, usually dry cough.  Vital stable, no fevers, however wife does report that patient sweats a lot at night.  Unclear if this is secondary to temperature.  We will check for COVID flu, TB, x-ray.  - Symptomatic COVID-19 Virus (Coronavirus) by PCR Nose  - Influenza A/B antigen  - XR Chest 2 Views  - Quantiferon TB Gold Plus    Primary insomnia  Chronic, wife reports that patient was on a previous medication, however did not recall the name.  Patient was started on trazodone 50 mg nightly, however wife reports that they were not able to  medication/likely went to the wrong pharmacy.  We will represcribe trazodone and clarified which pharmacy would be going to.  Wife reports understanding.  - traZODone (DESYREL) 50 MG tablet  Dispense: 90 tablet; Refill: 3    Screen for colon cancer  - TRESA(EXACT SCIENCES)    Hepatitis C virus infection, unspecified chronicity  Detected at recent visit.  Patient was referred to GI, however no-show to appointment.  Discussed with wife importance of going to appointment, wife was agreeable.  - Follow-up with GI      Review of external notes as documented elsewhere in note      Return in about 4 weeks (around 3/27/2023) for Follow up cough, insomnia.    Diana Arceo MD  [facility]    Michel Joe is a 48 year old accompanied by his spouse, presenting for the following health issues:  office visit (Follow up on dementia. Sleep issues. Cough.)      History of Present Illness       Reason for visit:  Follow up on demetia    He eats 2-3 servings of fruits and vegetables daily.He consumes 0 sweetened beverage(s) daily.He exercises with enough effort to increase his heart rate 9 or less minutes per day.  He exercises with enough effort to increase his heart rate 3 or less days per week.   He is taking medications regularly.     Cough  - chronic since before coming to [place]  -  cough is non-productive  - night sweats on chest every night  - denies weight loss  - Not sure if he has ever been tested for TB    Review of Systems   Constitutional: Negative for fever and chills.   Respiratory: Negative for cough or shortness of breath.    Cardiovascular: Negative for chest pain or chest pressure.   Gastrointestinal: Negative for nausea, vomiting, diarrhea or abdominal pain.        Objective    /82 (BP Location: Left arm, Patient Position: Sitting, Cuff Size: Adult Regular)   Pulse 95   Temp 98.4  F (36.9  C) (Temporal)   Resp 16   Wt 52.4 kg (115 lb 8 oz)   SpO2 97%   BMI 21.72 kg/m    Body mass index is 21.72 kg/m .  Physical Exam   PHYSICAL EXAM  General: Well developed, well nourished.  Skin:  Dry without rash.    Head:  Normocephalic-atraumatic.    Eye:  Normal conjunctivae.     Respiratory:  Normal respiratory effort.  Lungs clear to auscultation bilaterally.  Gastrointestinal:  Non-distended.    Musculoskeletal:  No deformity or edema.  Right arm S/P amputation.  Neurologic: No focal deficits.

## 2023-02-27 NOTE — PROGRESS NOTES
Dementia, unspecified dementia severity, unspecified dementia type, unspecified whether behavioral, psychotic, or mood disturbance or anxiety (H)  Seizures (H)  Previously referred to neurology.  We will have team coordinators to help schedule neurology appointment.  -Needs form for [facility] to be completed for benefits.  We will complete and mail to patient or call to .     Screen for colon cancer  - COLOGOGRD(EXACT SCIENCES)     Amputation of right upper extremity, subsequent encounter  Chronic, stable.  [place]     Primary insomnia  Chronic, uncontrolled as patient is off this medication.  Wife and sister report that patient was previously on a medication for insomnia, however does not have any records and does not recall what medication this was.  Reports that is becoming a problem as patient needs constant supervision, without falling asleep, wife has to provide constant supervision and not able to sleep.  We will trial trazodone 50 mg nightly.  - traZODone (DESYREL) 50 MG tablet  Dispense: 90 tablet; Refill: 3

## 2023-02-28 LAB — SARS-COV-2 RNA RESP QL NAA+PROBE: NEGATIVE

## 2023-03-01 ENCOUNTER — TELEPHONE (OUTPATIENT)
Dept: FAMILY MEDICINE | Facility: CLINIC | Age: 48
End: 2023-03-01
Payer: COMMERCIAL

## 2023-03-01 NOTE — TELEPHONE ENCOUNTER
----- Message from Diana Arceo MD sent at 2/28/2023  5:15 PM CST -----  Chest x-ray did not show any signs of infection.

## 2023-03-01 NOTE — TELEPHONE ENCOUNTER
----- Message from Diana Arceo MD sent at 2/28/2023  5:13 PM CST -----  COVID and flu test were negative.

## 2023-03-06 ENCOUNTER — PATIENT OUTREACH (OUTPATIENT)
Dept: NURSING | Facility: CLINIC | Age: 48
End: 2023-03-06
Payer: COMMERCIAL

## 2023-03-06 NOTE — LETTER
[facility]  Patient Centered Plan of Care  About Me:        Patient Name:  Heath Crawley    YOB: 1975  Age:         48 year old   Jayesh MRN:    [id number] Telephone Information:  Home Phone [phone]   Mobile Not on file.       Address:  [address] [place] [address] Email address:  No e-mail address on record      Emergency Contact(s)    Name Relationship Lgl Grd Work Phone Home Phone Mobile Phone           Primary language:  [place]     needed? Yes   [place] [employer] [facility]:  [phone] op. 1  Other communication barriers:Language barrier    Preferred Method of Communication:     Current living arrangement: I live in a private home with family    Mobility Status/ Medical Equipment: No data recorded      Health Maintenance  Health Maintenance Reviewed: Due/Overdue        My Access Plan  Medical Emergency 911   Primary Clinic Line [facility] - [phone]   24 Hour Appointment Line [phone] or  [phone]) (toll-free)   24 Hour Nurse Line [phone] (toll-free)   Preferred Urgent Care [facility], [phone]     Preferred Hospital [facility]  [phone]     Preferred Pharmacy [id number] - [place], [place] - [address] AT [facility]     Behavioral Health Crisis Line The National Suicide Prevention Lifeline at 1-895.317.4981 or Text/Call 998             My Care Team Members  Patient Care Team       Relationship Specialty Notifications Start End    Diana Arceo MD PCP - General Family Medicine  12/27/22     Phone: [phone] Fax: [phone]         [address] [place] [address]    Diana Arceo MD Assigned PCP   12/14/22     Phone: [phone] Fax: [phone] [address] [place] [address]    Diana Arceo MD MD Family Medicine  12/30/22     Phone: [phone] Fax: [phone]         [address] [place] [address]  [place] [address]    Kaleigh Emery PA-C Physician Assistant Gastroenterology  12/30/22     Phone: [phone] Fax: [phone]         [address] [facility] [address]    Leventhal, Thomas Michael, MD MD Gastroenterology  2/13/23     Phone: [phone] Fax: [phone]         [address] [facility] [address]    Ronit Sheikh RN Lead Care Coordinator Primary Care - CC Admissions 2/20/23     Lory Acharya Community Health Worker Primary Care - CC Admissions 2/20/23     Phone: [phone] Fax: [phone]         [address] [place] [address]            My Care Plans  Self Management and Treatment Plan  Care Plan  Care Plan: Medical     Problem: HP GENERAL PROBLEM     Goal: I will have a better understanding and plan of care for seizure. congnitive deficit  within 3-4 months     Start Date: 3/6/2023    This Visit's Progress: 20%    Note:        Barriers: language, knowledge deficit   Strengths: family support  Date to Achieve By:  Patient expressed understanding of goal: yes  Action steps to achieve this goal:  1. I will  attend visit with Neurology   Future Appointments   Date Time Provider Department Center          3/28/2023 11:30 AM [facility]   3/28/2023  2:30 PM Mitchell Naik MD [facility]   2. I will update Care coordination team during next outreach  3. I will report to my Community Health Worker if any additional resources or support needed.              Goal: I will have a better understanding and plan of care for liver within 3-4 months     Start Date: 3/6/2023    This Visit's Progress: 20%    Note:        Barriers: language, knowledge deficit   Strengths: family support  Date to Achieve By:  Patient expressed understanding of goal: yes  Action steps to achieve this goal:  1. I will  attend visit    Future Appointments   Date Time Provider Department Center                               4/4/2023  8:30 AM Leventhal, Thomas Michael, MD [facility]   4/11/2023  11:00 AM SPRS CCC RN [facility]       2. I will update Care coordination team during next outreach  3. I will report to my Community Health Worker if any additional resources or support needed.              Goal: I  will complete my overdue health maintenance. - cologard test     Start Date: 3/6/2023    This Visit's Progress: 10%    Note:         Patient expressed understanding of goal: Yes  Action steps to achieve this goal:  1. II will complete cologard kit when I receive in mail and return   2. I will notify my care team once health maintenance item(s) are completed.   3. I will contact my Care Coordination staff or care team with any questions or concerns I may have.                        Care Plan: Transportation     Problem: Lack of transportation     Goal: Establish reliable transportation     Note:      Goal Statement- I would like to learn how to schedule transportation for my medical visit within 3-4 months     Action steps to achieve this goal  1.  I will work with the Community Health Worker to learn the process and support to empowering me and my family to scheduling transportation for upcoming medical visits  2.I will report to my Community Health Worker if any additional resources or support needed.        Date goal set:                        Care Plan: County Support     Problem: HP GENERAL PROBLEM     Goal: I will apply for [facility] for PCA and other services and have determination of beneifts with in 3-4 months     Start Date: 3/6/2023    This Visit's Progress: 10%    Note:     Barriers: language, access  Strengths: family support   Patient expressed understanding of goal: yes  Action steps to achieve this goal:  1. I will complete application and assessment with [facility]   [facility]. [facility] follow up or next steps   3. I will report to my Community Health Worker if any additional resources or support needed.                             Action Plans on File:                        Advance Care Plans/Directives Type:   No data recorded    My Medical and Care Information  Problem List   Patient Active Problem List   Diagnosis     Left displaced femoral neck fracture (H)     Non-verbal learning disorder     Dementia due to medical condition with behavioral disturbance     Seizure disorder (H)     Amputation of right upper extremity, subsequent encounter     Hepatitis C virus infection, unspecified chronicity      Current Medications and Allergies:  ,  Current Outpatient Medications   Medication     traZODone (DESYREL) 50 MG tablet     No current facility-administered medications for this visit.         Care Coordination Start Date: 2/20/2023   Frequency of Care Coordination: monthly     Form Last Updated: 03/06/2023

## 2023-03-06 NOTE — PROGRESS NOTES
Clinic Care Coordination Contact    Follow Up Progress Note      Assessment:  RN CC outreach with support of interpretive services,  spoke with patient wife    for status update    Wife notes that he continues to have sleep concern, has follow up with PCP to address - note that visit is scheduled on same day as neurology visit , RN CC rescheduled visit to support compliance     Family has not received cologard kit in mail , will discuss at next outreach if not completed and if any questions     Spouse states that they completed application for [facility], but have not heard about when they are coming out to assess patient. Set up call next week with [facility] to support next steps    Patient has several upcoming visit, spouse notes that they ask around for rides, put do not have any reliable transportation - set goal   Will have [facility] outreach to review ride set up coverage and teaching for set up        Care Gaps:    Health Maintenance Due   Topic Date Due     HEPATITIS B IMMUNIZATION (1 of 3 - 3-dose series) Never done     COVID-19 Vaccine (1) Never done     COLORECTAL CANCER SCREENING  Never done       Care Gap Goal set: Yes    Care Plans  Care Plan: Medical     Problem: HP GENERAL PROBLEM     Goal: I will have a better understanding and plan of care for seizure. congnitive deficit  within 3-4 months     Start Date: 3/6/2023    This Visit's Progress: 20%    Note:        Barriers: language, knowledge deficit   Strengths: family support  Date to Achieve By:  Patient expressed understanding of goal: yes  Action steps to achieve this goal:  1. I will  attend visit with Neurology   Future Appointments   Date Time Provider Department Center          3/28/2023 11:30 AM [facility]   3/28/2023  2:30 PM Mitchell Naik MD [facility]   2. I will update Care coordination team during next outreach  3. I will report to my Community Health Worker if any additional resources or support needed.               Goal: I will have a better understanding and plan of care for liver within 3-4 months     Start Date: 3/6/2023    This Visit's Progress: 20%    Note:        Barriers: language, knowledge deficit   Strengths: family support  Date to Achieve By:  Patient expressed understanding of goal: yes  Action steps to achieve this goal:  1. I will  attend visit    Future Appointments   Date Time Provider Department Center                               4/4/2023  8:30 AM Leventhal, Thomas Michael, MD [facility]   4/11/2023 11:00 AM SPRS CCC RN [facility]       2. I will update Care coordination team during next outreach  3. I will report to my Community Health Worker if any additional resources or support needed.              Goal: I  will complete my overdue health maintenance. - cologard test     Start Date: 3/6/2023    This Visit's Progress: 10%    Note:         Patient expressed understanding of goal: Yes  Action steps to achieve this goal:  1. II will complete cologard kit when I receive in mail and return   2. I will notify my care team once health maintenance item(s) are completed.   3. I will contact my Care Coordination staff or care team with any questions or concerns I may have.                        Care Plan: Transportation     Problem: Lack of transportation     Goal: Establish reliable transportation     Note:      Goal Statement- I would like to learn how to schedule transportation for my medical visit within 3-4 months     Action steps to achieve this goal  1.  I will work with the Community Health Worker to learn the process and support to empowering me and my family to scheduling transportation for upcoming medical visits  2.I will report to my Community Health Worker if any additional resources or support needed.        Date goal set:                        Care Plan: [facility] Support     Problem: HP GENERAL PROBLEM     Goal: I will apply for [facility] support for PCA and other services and have  determination of beneifts with in 3-4 months     Start Date: 3/6/2023    This Visit's Progress: 10%    Note:     Barriers: language, access  Strengths: family support   Patient expressed understanding of goal: yes  Action steps to achieve this goal:  1. I will complete application and assessment with county   2. I will work withSW CC for any follow up or next steps   3. I will report to my Community Health Worker if any additional resources or support needed.                            Intervention/Education provided during outreach: reschedule PCP visit and set up visit and task for CCC team follow up      Plan: Patient will schedule and attend recommended follow up visits with speciality providers and primary care provider.  Community Health Worker to outreach per standard work and updated on goal progression      RN CC will outreach in 4-6 weeks to support ongoing recommendations and plan of care will be available sooner if needed.

## 2023-03-08 ENCOUNTER — PATIENT OUTREACH (OUTPATIENT)
Dept: CARE COORDINATION | Facility: CLINIC | Age: 48
End: 2023-03-08
Payer: COMMERCIAL

## 2023-03-08 NOTE — PROGRESS NOTES
3/8/2023  Clinic Care Coordination Contact  Care Team Conversations    Call: [employer]  Member Line:[phone]  Provider Line [phone]  RE: Appts 3-28-23, 4-3-23, 4-4-23        Rep: Vikki                Provided member ID, verified patient demographics, type of appt, destination address appointment date and time.    appt on 3-28-23 at 11:15am arrival time for 11:30apm appt to [address] [place] [address]   [employer] [phone]   between: 10:45am-10:55am  2 passengers  Will call   -----------------------------------------------------------------------------------------  appt on 4-3-23 at 2:40pm  arrival time for 3pm appt to [facility]  [employer] [phone]   between:2pm -2:35pm  2 passengers  Will call   -----------------------------------------------------------------------------------------  appt on 4-4-23 at 8:15am arrival time for 8:30am appt to [address] [place] [address]  [employer] [phone]   between: 7:45am -8am  2 passengers  Will call     Routed to CC RN review action steps and PCP as DAVID    CC RN follow up on 4-11-23  CHW Follow up: Monthly  CHW Plan: Follow up on goals  CHW Next Follow Up: 4-5-23    Lory Acharya  Community Health Worker  [facility] Care Coordination  [email or web address]  [email or web address]   Office: [phone]  Fax: [phone]

## 2023-03-14 ENCOUNTER — PATIENT OUTREACH (OUTPATIENT)
Dept: NURSING | Facility: CLINIC | Age: 48
End: 2023-03-14
Payer: COMMERCIAL

## 2023-03-14 NOTE — PROGRESS NOTES
Clinic Care Coordination Contact    Follow Up Progress Note      Assessment: CC SW assist pt with calling and connecting with [facility] to check on PCA application status    Care Gaps:    Health Maintenance Due   Topic Date Due     HEPATITIS B IMMUNIZATION (1 of 3 - 3-dose series) Never done     COVID-19 Vaccine (1) Never done     COLORECTAL CANCER SCREENING  Never done       Postponed to a later date     Care Plans  Care Plan: Medical     Problem: HP GENERAL PROBLEM     Goal: I will have a better understanding and plan of care for seizure. congnitive deficit  within 3-4 months     Start Date: 3/6/2023    This Visit's Progress: 20%    Note:        Barriers: language, knowledge deficit   Strengths: family support  Date to Achieve By:  Patient expressed understanding of goal: yes  Action steps to achieve this goal:  1. I will  attend visit with Neurology   Future Appointments   Date Time Provider Department [place]          3/28/2023 11:30 AM [id number]   3/28/2023  2:30 PM Mitchell Naik MD [facility]   2. I will update Care coordination team during next outreach  3. I will report to my Community Health Worker if any additional resources or support needed.              Goal: I will have a better understanding and plan of care for liver within 3-4 months     Start Date: 3/6/2023    This Visit's Progress: 20%    Note:        Barriers: language, knowledge deficit   Strengths: family support  Date to Achieve By:  Patient expressed understanding of goal: yes  Action steps to achieve this goal:  1. I will  attend visit    Future Appointments   Date Time Provider Department [place]                               4/4/2023  8:30 AM Leventhal, Thomas Michael, MD [facility]   4/11/2023 11:00 AM [facility] CCC RN [facility]       2. I will update Care coordination team during next outreach  3. I will report to my Community Health Worker if any additional resources or support needed.              Goal: I  will  complete my overdue health maintenance. - cologard test     Start Date: 3/6/2023    This Visit's Progress: 10%    Note:         Patient expressed understanding of goal: Yes  Action steps to achieve this goal:  1. II will complete cologard kit when I receive in mail and return   2. I will notify my care team once health maintenance item(s) are completed.   3. I will contact my Care Coordination staff or care team with any questions or concerns I may have.                        Care Plan: Transportation     Problem: Lack of transportation     Goal: I want to attend 80% of scheduled medical appointments in month of March and April 2023.     Start Date: 3/8/2023 Expected End Date: 4/28/2023    This Visit's Progress: 90%    Note:     Action steps to achieve this goal  1.  I will wait for [employer] to  for appt on 3-28-23 at 11:15am arrival time for 11:30apm appt to [address] [place] [address]   [employer] [phone]   between: 10:45am-10:55am  2 passengers  Will call   -----------------------------------------------------------------------------------------  appt on 4-3-23 at 2:40pm  arrival time for 3pm appt to [facility]  [employer] [phone]   between:2pm -2:35pm  2 passengers  Will call   -----------------------------------------------------------------------------------------  appt on 4-4-23 at 8:15am arrival time for 8:30am appt to [address] [place] [address] [facility]  [employer] [phone]   between: 7:45am -8am  2 passengers  Will call     Unable to schedule for 4-11-23 have to wait until end of March or April to call.                          Care Plan: [facility] Support     Problem: HP GENERAL PROBLEM     Goal: I will apply for [facility] support for PCA and other services and have determination of beneifts with in 3-4 months     Start Date: 3/6/2023    This Visit's Progress: 10%    Note:     Barriers: language, access  Strengths: family support   Patient  "expressed understanding of goal: yes  Action steps to achieve this goal:  1. I will complete application and assessment with [facility]   2. I will work withSW CC for any follow up or next steps   3. I will report to my Community Health Worker if any additional resources or support needed.                          CC ED called and spoke to pt and his spouse this morning via  line. SW introduced herself and explained that she was calling to follow up with them regarding PCA services. Pt's spouse confirmed and explained that a referral was placed for PCA services, but they had not heard anything from the [facility] and wanted to make sure that they received the application. ED dialed out and connected the family with the [facility] and spoke with Tawn. Tawn looked up pt in the [facility] system and notified the family that they did not have an application in for PCA services. Tawn let them know that they could complete the intake with her on the phone and spouse confirmed that they would like to do that. Tawn and pt's spouse completed the intake and Tawn let her know that the was going to put them down on the waitlist for CADI & PCA. Stacie explained that CADI waiver would involve pt being \"SMRTd\" (his case would be reviewed by the [facility] Medical Review Team to determine if he would be certified disabled). Stacie passed her phone number to the family and let them know that if they had any questions they could give her a call.    Intervention/Education provided during outreach: CADI waiver, PCA, SMRT    Plan: Pt and family to wait to hear from [facility] regarding his PCA/CADI services    Care Coordinator will follow up in 4 weeks per standard pt outreach    URSULA Wei   Social Work Care Coordinator   [facility]    [phone]       "

## 2023-03-28 ENCOUNTER — PRE VISIT (OUTPATIENT)
Dept: NEUROLOGY | Facility: CLINIC | Age: 48
End: 2023-03-28

## 2023-04-03 ENCOUNTER — OFFICE VISIT (OUTPATIENT)
Dept: FAMILY MEDICINE | Facility: CLINIC | Age: 48
End: 2023-04-03
Payer: COMMERCIAL

## 2023-04-03 VITALS
DIASTOLIC BLOOD PRESSURE: 63 MMHG | RESPIRATION RATE: 16 BRPM | HEIGHT: 61 IN | OXYGEN SATURATION: 96 % | WEIGHT: 109.5 LBS | HEART RATE: 64 BPM | SYSTOLIC BLOOD PRESSURE: 98 MMHG | TEMPERATURE: 97.8 F | BODY MASS INDEX: 20.67 KG/M2

## 2023-04-03 DIAGNOSIS — R45.1 AGITATION: Primary | ICD-10-CM

## 2023-04-03 DIAGNOSIS — R05.3 CHRONIC COUGH: ICD-10-CM

## 2023-04-03 DIAGNOSIS — F51.01 PRIMARY INSOMNIA: ICD-10-CM

## 2023-04-03 PROCEDURE — 99214 OFFICE O/P EST MOD 30 MIN: CPT | Performed by: STUDENT IN AN ORGANIZED HEALTH CARE EDUCATION/TRAINING PROGRAM

## 2023-04-03 RX ORDER — HYDROCHLOROTHIAZIDE 25 MG/1
25 TABLET ORAL 3 TIMES DAILY PRN
Qty: 60 TABLET | Refills: 1 | Status: SHIPPED | OUTPATIENT
Start: 2023-04-03 | End: 2023-05-16

## 2023-04-03 RX ORDER — TRAZODONE HYDROCHLORIDE 50 MG/1
100 TABLET, FILM COATED ORAL AT BEDTIME
Qty: 90 TABLET | Refills: 3 | Status: SHIPPED | OUTPATIENT
Start: 2023-04-03 | End: 2023-04-25

## 2023-04-03 NOTE — PROGRESS NOTES
Assessment & Plan       Primary insomnia  Chronic, uncontrolled.  We will increase trazodone to 100 mg nightly.  Refer to mental health.  - Adult Mental Health  Referral  - traZODone (DESYREL) 50 MG tablet  Dispense: 90 tablet; Refill: 3    Agitation  Psychosis/hallucinations?  Poor historian, unclear if patient is having hallucinations or psychosis.  Wife reports that patient will wake up in the middle of the night and start screaming and yelling, being agitated.  Hard for her and his sister to calm her down given that it seems that he does understand that there is seen.  Patient appears very calm in clinic as usual.  Will prescribe Atarax for agitation as needed.  - hydrochlorothiazide (HYDRODIURIL) 25 MG tablet  Dispense: 60 tablet; Refill: 1  -Referred to mental    Chronic cough  Chronic, chest x-ray did not show any signs of infection.  Negative COVID and flu test.  TB test was somehow not completed.  We will recheck today.  Offered referral to pulmonology, wife declined at this time given that patient has multiple concerns and she is feeling very overwhelmed.  Vitals are stable.  Lungs are clear to auscultation.  - Quantiferon TB Gold Plus  - Quantiferon TB Gold Plus      Review of external notes as documented elsewhere in note      Diana Arceo MD  [facility]    Michel Joe is a 48 year old, presenting for the following health issues:  office visit (Follow up on cough and insomnia. )        4/3/2023     2:46 PM   Additional Questions   Roomed by    Accompanied by wife     History of Present Illness       Reason for visit:  Follow up on cough and isomnia    He eats 2-3 servings of fruits and vegetables daily.He consumes 0 sweetened beverage(s) daily.He exercises with enough effort to increase his heart rate 9 or less minutes per day.  He exercises with enough effort to increase his heart rate 3 or less days per week.   He is taking medications regularly.       Chronic  cough  - Patient's wife reports a chronic cough, usually dry cough.  wife does report that patient sweats a lot at night.  Unclear if this is secondary to temperature.  - Symptomatic COVID-19 Virus (Coronavirus) by PCR Nose - negative  - Influenza A/B antigen - negative  - XR Chest 2- no sign of active infection  - Quantiferon TB Gold Plus - was not done?  - Does not want to see Pulm yet, has a lot to do.      Primary insomnia  Psychosis?  - Chronic, wife reports that patient was on a previous medication, however did not recall the name.  Patient was started on trazodone 50 mg nightly reports that has not improved at all.   - when wakes up, screams, yelling laughing, gets irritated. Very difficult to deal with. Does not seem to understand.   - Applying for PCA, still waiting on approval. Is wondering if there is anything else she can do.   - Not a threat to himself or anyone else.      Screen for colon cancer  - declined      Answers for HPI/ROS submitted by the patient on 4/3/2023  What is the reason for your visit today? : follow up on cough and isomnia  How many servings of fruits and vegetables do you eat daily?: 2-3  On average, how many sweetened beverages do you drink each day (Examples: soda, juice, sweet tea, etc.  Do NOT count diet or artificially sweetened beverages)?: 0  How many minutes a day do you exercise enough to make your heart beat faster?: 9 or less  How many days a week do you exercise enough to make your heart beat faster?: 3 or less  How many days per week do you miss taking your medication?: 0          Review of Systems   Constitutional: Negative for fever and chills.   Respiratory: Negative for cough or shortness of breath.    Cardiovascular: Negative for chest pain or chest pressure.   Gastrointestinal: Negative for nausea, vomiting, diarrhea or abdominal pain.        Objective    BP 98/63 (BP Location: Left arm, Patient Position: Sitting, Cuff Size: Adult Regular)   Pulse 64   Temp 97.8  " F (36.6  C) (Temporal)   Resp 16   Ht 1.553 m (5' 1.14\")   Wt 49.7 kg (109 lb 8 oz)   SpO2 96%   BMI 20.59 kg/m    Body mass index is 20.59 kg/m .  Physical Exam   PHYSICAL EXAM  General: Well developed, well nourished.  Skin:  Dry without rash.    Head:  Normocephalic-atraumatic.    Eye:  Normal conjunctivae.     Respiratory:  Normal respiratory effort.  Lungs clear to auscultation bilaterally.  Gastrointestinal:  Non-distended.    Musculoskeletal:  No deformity or edema.  Neurologic: No focal deficits.          "

## 2023-04-04 ENCOUNTER — PRE VISIT (OUTPATIENT)
Dept: GASTROENTEROLOGY | Facility: CLINIC | Age: 48
End: 2023-04-04
Payer: COMMERCIAL

## 2023-04-04 ENCOUNTER — PATIENT OUTREACH (OUTPATIENT)
Dept: CARE COORDINATION | Facility: CLINIC | Age: 48
End: 2023-04-04
Payer: COMMERCIAL

## 2023-04-04 ENCOUNTER — OFFICE VISIT (OUTPATIENT)
Dept: GASTROENTEROLOGY | Facility: CLINIC | Age: 48
End: 2023-04-04
Attending: STUDENT IN AN ORGANIZED HEALTH CARE EDUCATION/TRAINING PROGRAM
Payer: COMMERCIAL

## 2023-04-04 VITALS
HEART RATE: 58 BPM | DIASTOLIC BLOOD PRESSURE: 75 MMHG | OXYGEN SATURATION: 96 % | BODY MASS INDEX: 20.91 KG/M2 | WEIGHT: 111.2 LBS | SYSTOLIC BLOOD PRESSURE: 108 MMHG

## 2023-04-04 DIAGNOSIS — B19.20 PCR POSITIVE FOR HEPATITIS C VIRUS: ICD-10-CM

## 2023-04-04 DIAGNOSIS — F10.21 ALCOHOL USE DISORDER, SEVERE, IN SUSTAINED REMISSION (H): ICD-10-CM

## 2023-04-04 DIAGNOSIS — B18.2 CHRONIC HEPATITIS C WITHOUT HEPATIC COMA (H): Primary | ICD-10-CM

## 2023-04-04 PROCEDURE — G0463 HOSPITAL OUTPT CLINIC VISIT: HCPCS | Performed by: INTERNAL MEDICINE

## 2023-04-04 PROCEDURE — 99204 OFFICE O/P NEW MOD 45 MIN: CPT | Mod: GC | Performed by: INTERNAL MEDICINE

## 2023-04-04 NOTE — PROGRESS NOTES
4/4/2023  Clinic Care Coordination Contact  [facility]/Eloy Haney : Remigio ID# [id number]    Clinical Data: Care Coordinator Outreach  Outreach attempted x 1. No VM on patient's voicemail with call back information and requested return call.  Plan: Care Coordinator  will try to reach patient again in 10 business days.    CHW follow up: 4-12-23    Lory Acharya  Community Health Worker  [facility] Care Coordination  [email or web address]   Office: [phone]  Fax: [phone]
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
## 2023-04-04 NOTE — Clinical Note
Gila,  Saw Heath in clinic with his wife today.  We will get an ultrasound to assess for cirrhosis and plan on treatment of chronic hep C  Wife seems VERY overwhelmed with his needs.  I know he has a lot of pending assessments, but he and his wife may benefit from SW working to get him a [facility]-based .  I dont know - just a challenging case.  TL
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
## 2023-04-04 NOTE — PROGRESS NOTES
Date of Service: 4/4/2023     Subjective:            Heath Crawley is a 48 year old male presenting for evaluation of liver disease    History of Present Illness   Heath Crawley is a 48 year old male with past medical history of poly trauma in , history of severe alcohol use disorder, history of seizures with resultant change in baseline cognition who was found to have chronic hepatitis C.     Due to language barrier, an  was present during the history-taking and subsequent discussion (and for part of the physical exam) with this patient.     Per review of the chart and limited history we were able to obtain: no overt noted risk factors for infection with hepatitis C. Not aware of blood transfusions with war injuries, but given extent, would not be surprising.   Noted history of heavy daily alcohol consumption until 2013, but none since that time.    He is minimally verbal at baseline, no overt edema    PMHx, SurgHx, FamHx, Med History reviewed and agree with resident note.    Exam: no asterixis, no palmar erythema, no spider angiomas    A/P:  - Abd US complete with elastography to evaluate for cirrhosis and changes of portal hypertension  - Based on this result will order treatment for chronic hepatitis C    Follow Up:  To be determined     Thank you very much for the opportunity to participate in the care of this patient.  If you have any further questions, please don't hesitate to contact our office.    Thomas M. Leventhal, M.D.   of Medicine  Advanced & Transplant Hepatology  [facility]

## 2023-04-10 NOTE — PROGRESS NOTES
Clinic Care Coordination Contact    Follow Up Progress Note      Assessment: RN CC outreach with support of interpretive services,  spoke with patient for status update  Patient recently was seen by PCP and Gastro with recommendations for ultrasound  RN CC outreach to imaging scheduling to support set up visit for next week  Wife will work with PCA for visit details - will  Send message to support transportation set up if needed     Future Appointments   Date Time Provider Department [place]   4/17/2023  6:50 AM [facility]   4/25/2023 12:00 PM Adrian Dyson MD [facility]   5/16/2023 11:30 AM [address]   5/16/2023  4:30 PM Mitchell Naik MD [facility]         Care Gaps:    Health Maintenance Due   Topic Date Due     HEPATITIS B IMMUNIZATION (1 of 3 - 3-dose series) Never done     COVID-19 Vaccine (1) Never done     COLORECTAL CANCER SCREENING  Never done        Care Plans  Care Plan: Medical     Problem: HP GENERAL PROBLEM     Goal: I will have a better understanding and plan of care for seizure. congnitive deficit  within 3-4 months     Start Date: 3/6/2023    This Visit's Progress: 20%    Note:        Barriers: language, knowledge deficit   Strengths: family support  Date to Achieve By:  Patient expressed understanding of goal: yes  Action steps to achieve this goal:  1. I will  attend visit with Neurology   Future Appointments   Date Time Provider Department [place]          3/28/2023 11:30 AM [address]   3/28/2023  2:30 PM Mitchell Naik MD [facility]   2. I will update Care coordination team during next outreach  3. I will report to my Community Health Worker if any additional resources or support needed.              Goal: I will have a better understanding and plan of care for liver within 3-4 months     Start Date: 3/6/2023    This Visit's Progress: 20%    Note:        Barriers: language, knowledge deficit   Strengths: family support  Date to Achieve  By:  Patient expressed understanding of goal: yes  Action steps to achieve this goal:  1. I will  attend visit    Future Appointments   Date Time Provider Department Center                               4/4/2023  8:30 AM Leventhal, Thomas Michael, MD [facility]   4/11/2023 11:00 AM [facility] CCC RN [facility]       2. I will update Care coordination team during next outreach  3. I will report to my Community Health Worker if any additional resources or support needed.              Goal: I  will complete my overdue health maintenance. - cologard test     Start Date: 3/6/2023    This Visit's Progress: 10%    Note:         Patient expressed understanding of goal: Yes  Action steps to achieve this goal:  1. II will complete cologard kit when I receive in mail and return   2. I will notify my care team once health maintenance item(s) are completed.   3. I will contact my Care Coordination staff or care team with any questions or concerns I may have.                        Care Plan: Transportation     Problem: Lack of transportation     Goal: I want to attend 80% of scheduled medical appointments in month of March and April 2023.     Start Date: 3/8/2023 Expected End Date: 4/28/2023    This Visit's Progress: 90%    Note:     Action steps to achieve this goal  1.  I will wait for [employer] to  for appt on 3-28-23 at 11:15am arrival time for 11:30apm appt to [address] [place] [address]   [employer] [phone]   between: 10:45am-10:55am  2 passengers  Will call   -----------------------------------------------------------------------------------------  appt on 4-3-23 at 2:40pm  arrival time for 3pm appt to [facility]  [employer] [phone]   between:2pm -2:35pm  2 passengers  Will call   -----------------------------------------------------------------------------------------  appt on 4-4-23 at 8:15am arrival time for 8:30am appt to [facility]  [employer] [phone]   between: 7:45am -8am  2 passengers  Will call     Unable to schedule for 4-11-23 have to wait until end of March or April to call.                          Care Plan: County Support     Problem: HP GENERAL PROBLEM     Goal: I will apply for [facility] for PCA and other services and have determination of beneifts with in 3-4 months     Start Date: 3/6/2023    This Visit's Progress: 10%    Note:     Barriers: language, access  Strengths: family support   Patient expressed understanding of goal: yes  Action steps to achieve this goal:  1. I will complete application and assessment with [facility]   [facility]. [facility]   3. I will report to my Community Health Worker if any additional resources or support needed.                            Intervention/Education provided during outreach: schedule imaging          Plan: Patient will schedule and attend recommended follow up visits with speciality providers and primary care provider.  report to my Community Health Worker if any additional resources or support needed.  Community Health Worker to outreach per standard work and updated on goal progression

## 2023-04-11 ENCOUNTER — PATIENT OUTREACH (OUTPATIENT)
Dept: NURSING | Facility: CLINIC | Age: 48
End: 2023-04-11
Payer: COMMERCIAL

## 2023-04-12 ENCOUNTER — PATIENT OUTREACH (OUTPATIENT)
Dept: CARE COORDINATION | Facility: CLINIC | Age: 48
End: 2023-04-12
Payer: COMMERCIAL

## 2023-04-12 NOTE — PROGRESS NOTES
4/12/2023  Clinic Care Coordination Contact  Community Health Worker Follow Up    Intervention and Education during outreach:   [place] :  Way   ID: [phone]  Called and spoke to patient's wife and informed of transportation pickup on 4-17-23 and 4-25-23   Wife confirmed rides for appt 4-17-23   [employer] [phone]   between: 5:50am-6am  2 passengers  Will call   --------------------------------------------------  appt for 4-25-23 at 11:20am to [facility]  [employer] [phone]   between: 11:00 11:15am   2 passengers  Will call     Wife reported:  -still waiting to hear from [facility] to schedule PCA assessment    Routed to CC SW and CC RN review action steps and goals    CHW Follow up: Monthly  CHW Plan: Follow up on goal (s)  CHW Next Follow Up: 5-19-23    Lory Acharya  Community Health Worker  [facility] Care Coordination  [email or web address]   Office: [phone]  Fax: [phone]  Clinic Care Coordination Contact    Community Health Worker Follow Up    Care Gaps:     Health Maintenance Due   Topic Date Due     HEPATITIS B IMMUNIZATION (1 of 3 - 3-dose series) Never done     COVID-19 Vaccine (1) Never done     COLORECTAL CANCER SCREENING  Never done       Care Gap Goal set: Yes and Patient has appt with PCP on 4-25-23 will discuss and review care gaps.     Care Plan:   Care Plan: Medical     Problem: HP GENERAL PROBLEM     Goal: I will have a better understanding and plan of care for seizure. congnitive deficit  within 3-4 months     Start Date: 3/6/2023    This Visit's Progress: 20%    Note:        Barriers: language, knowledge deficit   Strengths: family support  Date to Achieve By:  Patient expressed understanding of goal: yes  Action steps to achieve this goal:  1. I will  attend visit with Neurology   Future Appointments   Date Time Provider Department Center          3/28/2023 11:30 AM  [id number]   3/28/2023  2:30 PM Mitchell Naik MD [facility]   2. I will update Care coordination team during next outreach  3. I will report to my Community Health Worker if any additional resources or support needed.              Goal: I will have a better understanding and plan of care for liver within 3-4 months     Start Date: 3/6/2023    This Visit's Progress: 60% Recent Progress: 20%    Note:        Barriers: language, knowledge deficit   Strengths: family support  Date to Achieve By:  Patient expressed understanding of goal: yes  Action steps to achieve this goal:  1. I will  attend visit  - ultrasound   April 17 th at [facility]     2. I will update Care coordination team during next outreach  3. I will report to my Community Health Worker if any additional resources or support needed.              Goal: I  will complete my overdue health maintenance. - cologard test     Start Date: 3/6/2023    This Visit's Progress: 10%    Note:         Patient expressed understanding of goal: Yes  Action steps to achieve this goal:  1. II will complete cologard kit when I receive in mail and return   2. I will notify my care team once health maintenance item(s) are completed.   3. I will contact my Care Coordination staff or care team with any questions or concerns I may have.                        Care Plan: Transportation     Problem: Lack of transportation     Goal: I want support to attend scheduled medical appointments for March and April 2023.     Start Date: 3/8/2023 Expected End Date: 5/31/2023    This Visit's Progress: 70% Recent Progress: 90%    Note:     Action steps to achieve this goal  1.  Wife will call to CHW for support temporarily until he has some one to support with setting up medical ride thru [facility] [phone].  Updated 4-12-23 [place]  -----------------------------------------------------------------------------------------  appt on 4-17-23 at 6:35am  arrival time for 6:50am appt to [facility] center  [address] [address]  [employer] [phone]   between: 5:50am-6am  2 passengers (wife and patient)  Requested for [place] corne[place]   Will call   -----------------------------------------------------------------------------------------  appt on 4-25-23 at 11:20am arrival time for 11:40am appt to [address] [address]   [employer] [phone]   between: 11-11:15am  2 passengers  Will call                             Care Plan: County Support     Problem: HP GENERAL PROBLEM     Goal: I will apply for [facility] for PCA and other services and have determination of benefits with in 3-4 months     Start Date: 3/6/2023 Expected End Date: 5/31/2023    This Visit's Progress: 20% Recent Progress: 10%    Note:     Barriers: language, access  Strengths: family support   Patient expressed understanding of goal: yes  Action steps to achieve this goal:  1.Wife still waiting for [facility] to call to schedule PCA assessment.  2. Wife will work with [facility] for any follow up or next steps   3. I will report to my Community Health Worker if any additional resources or support needed.  Updated 4-12-23 [place]

## 2023-04-12 NOTE — PROGRESS NOTES
4/12/2023  Clinic Care Coordination Contact  Care Team Conversations    Call: [employer]  Provider Line [phone]  RE: Appt 4-17-23  at 6:35am arrival appt 6:50am Imaging center   Address: [address] [place] [id number]    Rep: Pat              Provided member ID, verified patient demographics, type of appt, destination address appointment date and time.    appt for 4-17-23 at   [email or web address] [phone]   between: 5:50am-6am  2 passengers  Will call     appt for 4-25-23 at 11:40am  [email or web address] [phone]   between: 11: 11;15am   2 passengers  Will call

## 2023-04-17 ENCOUNTER — ANCILLARY PROCEDURE (OUTPATIENT)
Dept: ULTRASOUND IMAGING | Facility: CLINIC | Age: 48
End: 2023-04-17
Attending: INTERNAL MEDICINE
Payer: COMMERCIAL

## 2023-04-17 DIAGNOSIS — B18.2 CHRONIC HEPATITIS C WITHOUT HEPATIC COMA (H): ICD-10-CM

## 2023-04-17 PROCEDURE — 76981 USE PARENCHYMA: CPT | Mod: GC | Performed by: RADIOLOGY

## 2023-04-21 ENCOUNTER — TELEPHONE (OUTPATIENT)
Dept: GASTROENTEROLOGY | Facility: CLINIC | Age: 48
End: 2023-04-21
Payer: COMMERCIAL

## 2023-04-21 DIAGNOSIS — B19.20 HEPATITIS C VIRUS INFECTION, UNSPECIFIED CHRONICITY: Primary | ICD-10-CM

## 2023-04-21 NOTE — TELEPHONE ENCOUNTER
PA Initiation    Medication: Mavyret  Insurance Company: [employer] - Phone [phone] Fax [phone]  Pharmacy Filling the Rx: [facility] - [place], [place] - [facility] [address]  Filling Pharmacy Phone: [phone]  Filling Pharmacy Fax: [phone]  Start Date: 4/21/2023

## 2023-04-25 ENCOUNTER — OFFICE VISIT (OUTPATIENT)
Dept: FAMILY MEDICINE | Facility: CLINIC | Age: 48
End: 2023-04-25
Payer: COMMERCIAL

## 2023-04-25 ENCOUNTER — PATIENT OUTREACH (OUTPATIENT)
Dept: CARE COORDINATION | Facility: CLINIC | Age: 48
End: 2023-04-25

## 2023-04-25 VITALS
SYSTOLIC BLOOD PRESSURE: 110 MMHG | RESPIRATION RATE: 16 BRPM | BODY MASS INDEX: 20.73 KG/M2 | HEART RATE: 65 BPM | HEIGHT: 61 IN | WEIGHT: 109.8 LBS | TEMPERATURE: 97.6 F | DIASTOLIC BLOOD PRESSURE: 80 MMHG | OXYGEN SATURATION: 99 %

## 2023-04-25 DIAGNOSIS — F81.89 NON-VERBAL LEARNING DISORDER: ICD-10-CM

## 2023-04-25 DIAGNOSIS — G40.909 SEIZURE DISORDER (H): ICD-10-CM

## 2023-04-25 DIAGNOSIS — B19.20 HEPATITIS C VIRUS INFECTION, UNSPECIFIED CHRONICITY: ICD-10-CM

## 2023-04-25 DIAGNOSIS — F02.818 DEMENTIA DUE TO MEDICAL CONDITION WITH BEHAVIORAL DISTURBANCE (H): ICD-10-CM

## 2023-04-25 DIAGNOSIS — S48.911D AMPUTATION OF RIGHT UPPER EXTREMITY, SUBSEQUENT ENCOUNTER: ICD-10-CM

## 2023-04-25 DIAGNOSIS — G47.00 INSOMNIA, UNSPECIFIED TYPE: Primary | ICD-10-CM

## 2023-04-25 PROCEDURE — 99214 OFFICE O/P EST MOD 30 MIN: CPT | Performed by: FAMILY MEDICINE

## 2023-04-25 RX ORDER — HYDROXYZINE HYDROCHLORIDE 25 MG/1
25-50 TABLET, FILM COATED ORAL 3 TIMES DAILY PRN
Qty: 90 TABLET | Refills: 3 | Status: SHIPPED | OUTPATIENT
Start: 2023-04-25 | End: 2023-09-15

## 2023-04-25 NOTE — PROGRESS NOTES
"OFFICE VISIT    Assessment/Plan:     Patient Instructions:    -STOP taking the trazodone.   -Start taking the hydroxyzine as prescribed.   -You were referred to the psychiatrist. If you do not hear from the specialist to schedule an appointment within a week's time from today, please call the [facility] and speak with the specialty  to help you schedule the appointment to see the specialist.  Depending on the specialist availability, it may be a number of weeks prior to your scheduled appointment.    -Someone from the [facility] will call to talk with you about the neurology appointment and the [facility] contact information.     Please seek immediate medical attention (go to the emergency room or urgent care) for the following reasons: worsening symptoms, or any concerning changes.    Please return to clinic in 1-2 months for a general physical, or sooner as needed.    Heath was seen today for establish care and insomnia.  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 Failed trazodone up to 100mg at bedtime, so will discontinue today. Plan to initiate hydroxyzine as below. Patient denies hallucinations, though wife states that he has hallucinations. She is not able to clearly define the episodes of hallucinations to provider today in clinic, though. Consider initiating atypical antipsychotic medication if symptoms not improving. Referral placed to psychiatry as \"priority\" today in clinic. Considering if the seizures are contributing to the dementia type changes.    -     Adult Mental Health  Referral; Future  -     hydrOXYzine (ATARAX) 25 MG tablet; Take 1-2 tablets (25-50 mg) by mouth 3 times daily as needed for itching or anxiety (for sleep, agitation)    Seizure disorder (H): based on descriptions, it sounds like patient has one seizure weekly, " lasting less than 8 minutes each. Reviewed red flag signs.  Has an appointment with neurology on 5/16/2023. Message sent to care coordination team to see if they would be able to get patient in to be seen sooner as he is having seizures weekly. Patient had been referred to neurology before, though it does not appear that he had been seen prior to this visit.     Amputation of right upper extremity, subsequent encounter: stable. Continue to monitor.     Hepatitis C virus infection, unspecified chronicity: on mavyret. Managed by specialist.       The diagnoses, treatment options, risk, benefits, and recommendations were reviewed with patient/guardian.  Questions were answered to patient's/guardian satisfaction.  Red flag signs were reviewed.  Patient/guardian is in agreement with above plan.      Subjective: 48 year old male with history of dementia with behavioral disturbance, seizure disorder, hepatitis C infection on Mavyret, amputation of right upper extremity who presents to clinic for the following complaints:   Patient presents with:  Establish Care  Insomnia    Insomnia: Patient had been seen on 04/03/2023 for primary insomnia that was uncontrolled. The trazodone dose was increased to 100mg nightly and was referred to mental health. Patient had also endorsed agitation and there were questions of psychosis/hallucinations.     Patient answers some questions and the wife is the one who answers most of the questions.     Since starting the trazodone 100mg nightly, the sleep has been difficult, still. He is up and talking with himself and laughing to himself, per is wife. Patient states that he feels fine. He states that he does not hearing/see things that other people do not. Though the wife states that he does seem to hear and see things that others do not tend to see or hear. When asked to clarify and give examples, wife states that he talks about the past, usually. These symptoms have been going on for a long  "time now, maybe for years now, per the wife.     Seizure disorder: not on any medications for this. Last seizure was last week and the seizures occur once a week. When these episodes occur, patient becomes sweaty and gets \"hypertension\" and episodes can happen after he eats pork. During episodes, patient is not awake. After \"a while,\" he will wake up. There is shaking noted and unconcsciousness noted with these episodes.  Discussed that if patient has episodes that lasts for more than 8 minutes, he should go to the ER. Wife states that the episodes do not last that long, though they know to seek medical attention if symptoms last long. They have an appointment with neurology on May 16th. Referral placed to care coordination team for assistance.     Right arm amputation: stable. No prosthetic.     PCA assessment: wondering where they are in the process. Discussed that this is managed by [facility]. They do not have the contact information. Referral placed to care coordination team for assistance.     Patient presents with wife  A professional Blinkbuggy telephone  was utilized for the office visit.     The 10 point review of system is negative except as stated in the HPI.    Allergies were reviewed and updated.    Objective:   /80   Pulse 65   Temp 97.6  F (36.4  C) (Oral)   Resp 16   Ht 1.553 m (5' 1.14\")   Wt 49.8 kg (109 lb 12.8 oz)   SpO2 99%   BMI 20.65 kg/m    General: Active, alert, nontoxic-appearing.  Patient appears anxious. He is jittery and has difficulty sitting still. Answers questions in short phrases. Does maintain some eye contact.   HEENT: Normocephalic, atraumatic.  Pupils are equal and round.  Sclera is clear.  Normal external ears. Nares patent.  Moist mucous membranes.    Cardiac: capillary refill < 3 seconds.   Respiratory/chest:  Breathing is not labored.  No accessory muscle usage.  Extremities: Amputation of right arm. Otherwise, voluntary movements " intact.  Integumentary: No concerning rash or skin changes appreciated.        Adrian Dyson MD  [facility] [place] [address]  Phone: [phone]  Fax: [phone]    4/25/2023  2:16 PM            Current Outpatient Medications   Medication     glecaprevir-pibrentasvir (MAVYRET) 100-40 MG per tablet     hydrochlorothiazide (HYDRODIURIL) 25 MG tablet     hydrOXYzine (ATARAX) 25 MG tablet     No current facility-administered medications for this visit.       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  Added automatically from request for surgery [id number]         No family history on file.    No past surgical history on file.     Social History     Socioeconomic History     Marital status: Single     Spouse name: Not on file     Number of children: Not on file     Years of education: Not on file     Highest education level: Not on file   Occupational History     Not on file   Tobacco Use     Smoking status: Former     Types: Cigarettes     Passive exposure: Never     Smokeless tobacco: Former   Vaping Use     Vaping status: Not on file   Substance and Sexual Activity     Alcohol use: Not on file     Drug use: Not on file     Sexual activity: Not on file   Other Topics Concern     Not on file   Social History Narrative      Not on file     Social Determinants of Health     Financial Resource Strain: Not on file   Food Insecurity: Not on file   Transportation Needs: Not on file   Physical Activity: Not on file   Stress: Not on file   Social Connections: Not on file   Intimate Partner Violence: Not on file   Housing Stability: Not on file

## 2023-04-25 NOTE — PROGRESS NOTES
4/25/2023  Clinic Care Coordination Contact  Community Health Worker Follow Up    Intervention and Education during outreach:   [place] :  [facility]  ID: [phone]  Called and spoke to patient's wife and reminded of appt today with Dr Adrian Dyson at 11:40am and [facility] will  between 11-11:15am   Wife confirmed transportation .  Wife asking about the status of his social security benefit status.  She wants to know the status she has been waiting for a long time and have not received the benefit.    Explained to patient's wife that  is transferring care to Dr Adrian Dyson at  [facility] as of today and that CCC team at [facility] will follow up with him to address his concerns.    Routed to CCC Team at [facility] to follow up with patient and support to clarify status of his social security application.    No further outreach from CCC Team at [facility]    Lory Acharya  Community Health Worker  [facility] Care Coordination  [email or web address]   Office: [phone]  Fax: [phone]

## 2023-04-25 NOTE — PATIENT INSTRUCTIONS
-Thank you for choosing the [facility].  -It was a pleasure to see you today.  -Please take a look at the information below for more specific details regarding the treatment plan and recommendations.  -In this after visit summary is a list of your medications and specific instructions.  Please review this carefully as there may be changes made to your medication list.  -If there are any particular questions or concerns, please feel free to reach out to Dr. Dyson.  -If any labs have been completed, we will reach out to you about results.  If the results are normal or not concerning, a letter or TalkPlushart message will be sent to you.  If any follow-up is needed, either Dr. Dyson or the nurse will give you a call.  If you have not heard regarding results after 2 weeks, please reach out to the clinic.    Patient Instructions:    -STOP taking the trazodone.   -Start taking the hydroxyzine as prescribed.   -You were referred to the psychiatrist. If you do not hear from the specialist to schedule an appointment within a week's time from today, please call the [facility] and speak with the specialty  to help you schedule the appointment to see the specialist.  Depending on the specialist availability, it may be a number of weeks prior to your scheduled appointment.      -Someone from the [facility] will call to talk with you about the neurology appointment and the [facility] contact information.       Please seek immediate medical attention (go to the emergency room or urgent care) for the following reasons: worsening symptoms, or any concerning changes.    Please return to clinic in 1-2 months for a general physical, or sooner as needed.      --------------------------------------------------------------------------------------------------------------------    -We are always looking for ways to improve.  You may be selected to receive a survey regarding your visit today.  We encourage  you to complete the survey and provide specific, constructive feedback to help us improve our processes.  Thank you for your time!  -Please review the contact information listed on the after visit summary and in the electronic chart.  Below is the phone number that we have on file.  If there are any changes that are needed to be made, please reach out to the clinic.  [phone] (home)

## 2023-04-25 NOTE — PROGRESS NOTES
4/25/2023  Clinic Care Coordination Contact  Care Team Conversations    Patient transferred care to [facility] as of 4-25-23.  New PCP Dr Adrian Dyson    No further outreach from CCC Team at [facility]    Routed to [facility] CCC team to follow up and support patient.    Routed to PCP and CC RN as DAVID Acharya  Community Health Worker  [facility] Care Coordination  [email or web address]   Office: [phone]  Fax: [phone]

## 2023-04-25 NOTE — TELEPHONE ENCOUNTER
Tomy Henley,   Thank you for the update. I added patient on my schedule to complete initial assessment with me this Friday, 4/28/2023.

## 2023-04-26 NOTE — TELEPHONE ENCOUNTER
Prior Authorization Approval    Authorization Effective Date: 3/22/2023  Authorization Expiration Date: 6/16/2023  Medication: Mavyret  Approved Dose/Quantity: 8 weeks  Reference #: [id number]   Insurance Company: [employer] - Phone [phone] Fax [phone]  Expected CoPay: $3     CoPay Card Available: No    Foundation Assistance Needed: No  Which Pharmacy is filling the prescription (Not needed for infusion/clinic administered): [facility] - [place] [address]  Pharmacy Notified: Yes  Patient Notified: Yes    Del 4/27PM

## 2023-04-28 ENCOUNTER — CARE COORDINATION (OUTPATIENT)
Dept: GASTROENTEROLOGY | Facility: CLINIC | Age: 48
End: 2023-04-28

## 2023-04-28 ENCOUNTER — PATIENT OUTREACH (OUTPATIENT)
Dept: NURSING | Facility: CLINIC | Age: 48
End: 2023-04-28
Payer: COMMERCIAL

## 2023-04-28 DIAGNOSIS — B19.20 HEPATITIS C VIRUS INFECTION, UNSPECIFIED CHRONICITY: Primary | ICD-10-CM

## 2023-04-28 ASSESSMENT — ACTIVITIES OF DAILY LIVING (ADL)
DEPENDENT_IADLS:: CLEANING;COOKING;LAUNDRY;SHOPPING;MEAL PREPARATION;MEDICATION MANAGEMENT;MONEY MANAGEMENT;TRANSPORTATION;INCONTINENCE

## 2023-04-28 NOTE — PROGRESS NOTES
Connected with patient for f/u on Hep C treatment delivery/start status. Patient received their Mavyret medication and are ready to start treatment. Patient will be on Hep C treatment for 8 weeks. Patient reports no recent changes in health, hospitalizations or recent changes in medications. Patient did discuss with a pharmacist. Reviewed the following Hep C POC and education with patient.     Hepatitis C Treatment  Treatment: Mavyret x 8 weeks  Genotype: 6m  Stage Fibrosis:  Elastography ruled out advanced chronic liver disease  Previous Treatment Outcome: Naive    Please have labs drawn as close to the date indicated at an [facility].    Start Date: 04/27/23    Week 8-End of Treatment: 6/22/2023    3 Months Post Treatment  HCV RNA Quant Lab due: 9/20/2023  Please have this lab drawn on the specified date of 9/20/2023 or within a week after. This final lab will determine if you have cleared the Hepatitis C virus with Mavyret treatment. Without this final lab, we will be unable to determine if treatment was successful. You do not need to fast for this lab.             Educational information to patient on Hep C treatment:     -Contact the [facility] Hepatology clinic and speak with clinical RN prior to starting any new prescribed or OTC medications.   -Take medications exactly as prescribed, do not change dose or stop taking without consulting your provider.   - Take Medication one time each day with food  - If you miss a dose of medication, then take it as soon as you remember on the same day. If not remembered on the same day, then skip the dose and take the next dose at the usual time. Do not take more than the recommended dose. Contact the clinic if you miss a dose.    Please contact the pharmacy 1-2 weeks prior to needing a medication refill.      Side Effects  The most common side effects of Hep C medication treatment can include:  -tiredness  -headache  -nausea  Notify the clinic of any side effects  that bother you or that do not go away.    Contact clinic for rash, itching or unmanageable nausea.     How to store Hep C Treatment Medications  -Store Medication at room temperature below 86 degrees F  -Keep Medication in it's original container  -Do not use Medication if the seal is broken or missing     General information  It is not known if treatment will prevent you from infecting another person or reinfecting yourself with the hepatitis C virus during treatment. It is best that as soon as you start treatment to buy a new toothbrush, disposable razors (if you use a rotating shaver you do not need to buy a new one) and nail clippers. If you wear dentures, you should soak your dentures in 70-90% Isopropyl solution for 5 minutes one time within the first week of starting treatment. After dentures are done soaking, rinse your dentures off thoroughly with water. If you check your blood sugar at home, please dispose of the fingerstick needle after each use and DO NOT REUSE the insulin needles. These items should not be shared with anyone.          If you have any questions, please contact the main clinic at [phone] or your clinical RN at [phone]. We appreciate you choosing the [facility] clinic for your treatment. Patient agrees to Hep C treatment POC and verbalizes understanding. Patient will receive a copy of treatment plan in the mail, address verified with patient. Patient has no further questions or concerns. Hep C care team updated on patient status.    Batsheva Azevedo RN Care Coordinator  [facility]  Hepatology Clinic/Specialty Program

## 2023-04-28 NOTE — LETTER
[facility]  Patient Centered Plan of Care  About Me:        Patient Name:  Heath Crawley    YOB: 1975  Age:         48 year old   Jayesh MRN:    [id number] Telephone Information:  Home Phone [phone]   Mobile [phone]       Address:  [address]  [place] [address] Email address:  No e-mail address on record      Emergency Contact(s)    Name Relationship Lgl Grd Work Phone Home Phone Mobile Phone   Lorie. JEANNE HAYWOOD. Spouse    [phone]           Primary language:  [place]     needed? Yes   [facility] [employer]:  [phone] op. 1  Other communication barriers:Language barrier; Physical impairment; Lack of coping    Preferred Method of Communication:     Current living arrangement: I live in a private home with family    Mobility Status/ Medical Equipment: Dependent/Assisted by Another        Health Maintenance  Health Maintenance Reviewed: Not assessed      My Access Plan  Medical Emergency 911   Primary Clinic Line [facility] [phone]   24 Hour Appointment Line [phone] or  [phone]) (toll-free)   24 Hour Nurse Line [phone] (toll-free)   Preferred Urgent Care [facility] [phone]       Preferred Hospital [facility]  [phone]       Preferred Pharmacy [id number] - [place], [place] - [address] AT [facility]     Behavioral Health Crisis Line The National Suicide Prevention Lifeline at 1-362.298.5792 or Text/Call 618             My Care Team Members  Patient Care Team         Relationship Specialty Notifications Start End    Adrian Dyson MD PCP - General Family Medicine All results, Admissions 4/25/23     Phone: [phone] Fax: [phone]         [address] [facility] [address]    Diana Arceo MD Assigned PCP   12/14/22     Phone: [phone] Fax: [phone]         [address] [place]  [address]    Diana Arceo MD MD Family Medicine  12/30/22     Phone: [phone] Fax: [phone]         [address] [place] [address]    Kaleigh Emery PA-C Physician Assistant Gastroenterology  12/30/22     Phone: [phone] Fax: [phone]         [address] [place] [address]    Leventhal, Thomas Michael, MD MD Gastroenterology  2/13/23     Phone: [phone] Fax: [phone]         [address] [place] [address]    [place] Worker   3/14/23     [place]     Phone: [phone]         Leventhal, Thomas Michael, MD Assigned Gastroenterology Provider   4/15/23     Phone: [phone] Fax: [phone]         [address] [facility] [address]    Misbah Mullins Community Health Worker Primary Care - CC Admissions 4/28/23     Phone: [phone] Fax: [phone]         [address] [place] [address]    Oma Chaparro, RN Lead Care Coordinator Primary Care - CC Admissions 4/28/23               My Care Plans  Self Management and Treatment Plan  Care Plan  Care Plan: Neurology       Problem: Seizure episodes       Goal: Patient will attend his neurology appointment in the next 12 months.       Start Date: 4/28/2023 Expected End Date: 4/28/2023    Note:     Barriers: language barrier, low literacy, noncompliance, and lack of knowledge how to navigate complex health care system  Strengths: motivated to attend appt  Patient expressed understanding of goal: Yes    Action steps to achieve this goal:  1. Spouse will answer my phone when I am contacted to schedule my appointment.  2. Caregiver will bring patient to his initial neurology appointment as scheduled on 5/16/2023 at 11:15am for EEG and 4:15pm with Mitchell Naik.  3. Caregiver will schedule a follow up appointment with my neurologist if it is recommended to do so while I am at the clinic.  4. Caregiver will follow up with CCC regarding this goal at each outreach until it is completed.     Mitchell Schuster  MD [place]   [facility] NEUROLOGY - 3rd Floor   3H    Department Address: [address] [place] [address] 3rd Floor [facility] [address]   Department Phone: [phone]                             Care Plan: Lily Assessment       Problem: Imparied mobility       Goal: I would like to explore if I could qualify for PCA service, CADI, SMRT in the next 6 months.       Start Date: 4/28/2023 Expected End Date: 10/28/2023    Recent Progress: 20%    Note:     Barriers: lanaguage  Strengths: Accepting of support  Patient expressed understanding of goal: Yes    Action steps to achieve this goal:  1. I will answer my phone when I am contacted by the PCA  to schedule my assessment.  2. I will request my family and supportive friends to be present for my assessment.  3. I will follow up with CCC in the next month regarding the progress made on this goal.    Note: Referral for a PCA assessment placed on 3/14/2023. Contact [facility] at [phone] for updates on the status of the referral.                                Action Plans on File:                       Advance Care Plans/Directives Type:   No data recorded    My Medical and Care Information  Problem List   Patient Active Problem List   Diagnosis     Left displaced femoral neck fracture (H)     Non-verbal learning disorder     Dementia due to medical condition with behavioral disturbance (H)     Seizure disorder (H)     Amputation of right upper extremity, subsequent encounter     Hepatitis C virus infection, unspecified chronicity     Insomnia, unspecified type      Current Medications and Allergies:  See printed Medication Report.    Care Coordination Start Date: 2/20/2023   Frequency of Care Coordination: 6 weeks       Form Last Updated: 05/01/2023

## 2023-04-28 NOTE — PROGRESS NOTES
Attempted to reach patient to check in and obtain start date of Mavyret, no answer, unable to leave message. Will try again later.

## 2023-04-28 NOTE — PROGRESS NOTES
Clinic Care Coordination Contact    Clinic Care Coordination Contact  OUTREACH    Referral Information:  Referral Source: PCP    Primary Diagnosis: Neurological Disorders    Chief Complaint   Patient presents with     Clinic Care Coordination - Initial     Clinic Utilization  Difficulty keeping appointments:: Yes  Compliance Concerns: Yes  No-Show Concerns: Yes  No PCP office visit in Past Year: No  Utilization    Hospital Admissions  0             ED Visits  0             No Show Count (past year)  10                Current as of: 4/28/2023 11:18 PM            Clinical Concerns:    CCRN spoke with patient and spouse today. Patient was only able to say yes or no. Spouse states patient only able to speak some words and understand simple instructions. Has consent to communicate on file for spouse. Spouse and patient agreed to come see CCRN in clinic for further assessment. States they moved from [place] to [place] last week.     PCA service and HUSEYIN JOSE  - ED from [facility] assisted patient called in PCA assessment referral on 3/14/2023.   - Educated patient and spouse it can take up to 3-4 months or longer.   - Spouse verbalized frustration of long wait for PCA assessment. Spouse states they had requested since last year. Per chart review, confirmed by CCSW at [facility] that there's no record of the request for PCA assessment on 3/14/2023 so CCSW assisted patient requested PCA assessment on 3/14/2023.     Mental Health  - referral placed on 4/3/2023  - may need to referral to [facility] for neuro-psych eval- may need a new order for neuro-psych evaluation.  - would benefit from [facility] worker for spouse to assist with community resources. Spouse agreed to [facility] service to assist family with community resources due to new to the [place].   - CHW to place [facility] service for spouse with [facility]     Neurology - Seizures   - reports seizure episode daily   - scheduled on 5/16/2023. Unable to schedule sooner appt.   - Paitent is not on  any seizure meds.     Pain  Pain (GOAL):: No  Health Maintenance Reviewed: Not assessed  Clinical Pathway: None    Medication Management:  Medication review status: Medications reviewed and no changes reported per patient.           Functional Status:  Dependent ADLs:: Bathing, Dressing, Grooming, Toileting, Transfers  Dependent IADLs:: Cleaning, Cooking, Laundry, Shopping, Meal Preparation, Medication Management, Money Management, Transportation, Incontinence  Bed or wheelchair confined:: No  Mobility Status: Dependent/Assisted by Another  Fallen 2 or more times in the past year?: No  Any fall with injury in the past year?: No    Living Situation:  Current living arrangement:: I live in a private home with family  Type of residence:: Apartment    Lifestyle & Psychosocial Needs:    Social Determinants of Health     Tobacco Use: Medium Risk (4/25/2023)    Patient History      Smoking Tobacco Use: Former      Smokeless Tobacco Use: Former      Passive Exposure: Never   Alcohol Use: Not on file   Financial Resource Strain: Not on file   Food Insecurity: Not on file   Transportation Needs: Not on file   Physical Activity: Not on file   Stress: Not on file   Social Connections: Not on file   Intimate Partner Violence: Not on file   Depression: Not at risk (1/24/2023)    PHQ-2      PHQ-2 Score: 0   Housing Stability: Not on file     Diet:: Regular  Inadequate nutrition (GOAL):: No  Tube Feeding: No  Inadequate activity/exercise (GOAL):: No  Significant changes in sleep pattern (GOAL): No  Transportation means:: Medical transport     Nondenominational or spiritual beliefs that impact treatment:: No  Mental health DX:: No  Mental health management concern (GOAL):: No  Chemical Dependency Status: No Current Concerns  Informal Support system:: Mera based, Family, Spouse      Resources and Interventions:  Current Resources:      Community Resources: [employer] Programs, [employer] Worker  Supplies Currently Used at Home: None  Equipment  Currently Used at Home: none  Employment Status: disabled     Advance Care Plan/Directive  Advanced Care Plans/Directives on file:: No  Advanced Care Plan/Directive Status: Declined Further Information    Referrals Placed: None     Care Plan:      Outreach Frequency: 6 weeks  Future Appointments              In 1 week SPRO CCC RN [facility] AURELIO Cardoza SPRO    In 2 weeks  NEEDED; [id number] EEG [facility]    In 2 weeks Mitchell Naik MD;  NEEDED [facility] Neurology [facility]          Plan:   1) CHW to follow up on PCA service status with next outreach  2) CHW to set up ride for ride for neurology appt on 5/16/2023.   3) CHW to place [facility] service referral to [facility] for spouse  4) CHW to fax over neuro-psych eval referral to [facility] once new order placed by PCP.

## 2023-05-03 ENCOUNTER — PATIENT OUTREACH (OUTPATIENT)
Dept: CARE COORDINATION | Facility: CLINIC | Age: 48
End: 2023-05-03
Payer: COMMERCIAL

## 2023-05-03 DIAGNOSIS — F81.89 NON-VERBAL LEARNING DISORDER: ICD-10-CM

## 2023-05-03 DIAGNOSIS — F02.818 DEMENTIA DUE TO MEDICAL CONDITION WITH BEHAVIORAL DISTURBANCE (H): Primary | ICD-10-CM

## 2023-05-03 DIAGNOSIS — G40.909 SEIZURE DISORDER (H): ICD-10-CM

## 2023-05-03 DIAGNOSIS — G47.00 INSOMNIA, UNSPECIFIED TYPE: ICD-10-CM

## 2023-05-03 NOTE — TELEPHONE ENCOUNTER
Tomy Dyson,   Can you please place a referral for neuro-psych eval to [facility] to evaluation to determine the level of care needed for an individual.    Thank you,  Oma

## 2023-05-03 NOTE — PROGRESS NOTES
Clinic Care Coordination Contact    Community Health Worker Follow Up    Care Gaps:   Health Maintenance Due   Topic Date Due     HEPATITIS B IMMUNIZATION (1 of 3 - 3-dose series) Never done     COVID-19 Vaccine (1) Never done     COLORECTAL CANCER SCREENING  Never done     PCP to address other care gaps with patient at the next follow up visit.    Care Plan:   Care Plan: Neurology     Problem: Seizure episodes     Goal: Patient will attend his neurology appointment in the next 12 months.     Start Date: 4/28/2023 Expected End Date: 4/28/2024    This Visit's Progress: 20%    Note:     Barriers: language barrier, low literacy, noncompliance, and lack of knowledge how to navigate complex health care system  Strengths: motivated to attend appt  Patient expressed understanding of goal: Yes    Action steps to achieve this goal:  1. Spouse will answer my phone when I am contacted to schedule my appointment.  2. Caregiver will bring patient to his initial neurology appointment as scheduled on 5/16/2023 at 11:15am for EEG and 4:15pm with Mitchell Naik.(Transportation requested to [place]).   3. Caregiver will schedule a follow up appointment with my neurologist if it is recommended to do so while I am at the clinic.  4. Caregiver will follow up with CCC regarding this goal at each outreach until it is completed.     Mitchell Naik MD   [facility] [place] [place] NEUROLOGY - 3rd Floor       Department Address: [address] [place] [id number]   Department Phone: [phone]                       Care Plan: [employer] Assessment     Problem: Imparied mobility     Goal: I would like to explore if I could qualify for PCA service, CADI, SMRT in the next 6 months.     Start Date: 4/28/2023 Expected End Date: 10/28/2023    This Visit's Progress: 20% Recent Progress: 20%    Note:     Barriers: lanaguage  Strengths: Accepting of support  Patient expressed understanding of goal: Yes    Action steps to achieve this  goal:  1. I will answer my phone when I am contacted by the PCA  to schedule my assessment.  2. I will request my family and supportive friends to be present for my assessment.  3. I will follow up with CCC in the next month regarding the progress made on this goal.    Note: Referral for a PCA assessment placed on 3/14/2023. Contact [facility] at [phone] for updates on the status of the referral.                     Intervention and Education during outreach:  -CHW attempted to call [facility] main line and it didn't go through and could not leave a voice mail. CHW then called Kushal Salcedo's direct line and was informed that [facility] system is down all morning. Kushal took down information and will call CHW back with updates.   -CHW reminded patient of upcoming appointment and assisted with transportation.   -CHW sent referral to [facility] for [facility] services for spouse via secure email at [email or web address] and secured email was read.   -Patient was informed to call with questions or concerns.     CHW Next Outreach: In one month.

## 2023-05-04 NOTE — TELEPHONE ENCOUNTER
Orders placed as requested.  Patient has an appointment with neurology on 5/16/2023 and psychiatry on 10/05/2023.  Closing encounter.    Adrian Dyson MD  [facility]  5/4/2023  2:51 PM    Tin was seen today for clinic care coordination - follow-up.    Diagnoses and all orders for this visit:    Dementia due to medical condition with behavioral disturbance (H)  -     Adult Neuropsychology Referral; Future    Seizure disorder (H)  -     Adult Neuropsychology Referral; Future    Non-verbal learning disorder  -     Adult Neuropsychology Referral; Future    Insomnia, unspecified type  -     Adult Neuropsychology Referral; Future

## 2023-05-09 ENCOUNTER — PATIENT OUTREACH (OUTPATIENT)
Dept: NURSING | Facility: CLINIC | Age: 48
End: 2023-05-09
Payer: COMMERCIAL

## 2023-05-09 NOTE — PROGRESS NOTES
Clinic Care Coordination Contact    Follow Up Progress Note      Assessment: CCRN sent a message to PCP today to provide a support letter requesting sooner PCA assessment due to safety concern if provider agrees.    Plan: CHW to follow up on PCA assessment status with next outreach.

## 2023-05-16 ENCOUNTER — OFFICE VISIT (OUTPATIENT)
Dept: NEUROLOGY | Facility: CLINIC | Age: 48
End: 2023-05-16
Attending: STUDENT IN AN ORGANIZED HEALTH CARE EDUCATION/TRAINING PROGRAM
Payer: COMMERCIAL

## 2023-05-16 ENCOUNTER — ANCILLARY PROCEDURE (OUTPATIENT)
Dept: NEUROLOGY | Facility: CLINIC | Age: 48
End: 2023-05-16
Attending: PSYCHIATRY & NEUROLOGY
Payer: COMMERCIAL

## 2023-05-16 ENCOUNTER — TELEPHONE (OUTPATIENT)
Dept: FAMILY MEDICINE | Facility: CLINIC | Age: 48
End: 2023-05-16

## 2023-05-16 VITALS — DIASTOLIC BLOOD PRESSURE: 60 MMHG | OXYGEN SATURATION: 96 % | SYSTOLIC BLOOD PRESSURE: 102 MMHG | HEART RATE: 73 BPM

## 2023-05-16 DIAGNOSIS — F03.90 DEMENTIA, UNSPECIFIED DEMENTIA SEVERITY, UNSPECIFIED DEMENTIA TYPE, UNSPECIFIED WHETHER BEHAVIORAL, PSYCHOTIC, OR MOOD DISTURBANCE OR ANXIETY (H): ICD-10-CM

## 2023-05-16 DIAGNOSIS — F81.89 NON-VERBAL LEARNING DISORDER: ICD-10-CM

## 2023-05-16 DIAGNOSIS — R56.9 SEIZURES (H): ICD-10-CM

## 2023-05-16 DIAGNOSIS — R56.9 SEIZURE (H): ICD-10-CM

## 2023-05-16 DIAGNOSIS — G40.219 PARTIAL EPILEPSY WITH IMPAIRMENT OF CONSCIOUSNESS, WITH INTRACTABLE EPILEPSY (H): Primary | ICD-10-CM

## 2023-05-16 PROCEDURE — 95718 EEG PHYS/QHP 2-12 HR W/VEEG: CPT | Performed by: PSYCHIATRY & NEUROLOGY

## 2023-05-16 PROCEDURE — 99204 OFFICE O/P NEW MOD 45 MIN: CPT | Mod: 25 | Performed by: PSYCHIATRY & NEUROLOGY

## 2023-05-16 PROCEDURE — 95700 EEG CONT REC W/VID EEG TECH: CPT | Performed by: PSYCHIATRY & NEUROLOGY

## 2023-05-16 PROCEDURE — 95713 VEEG 2-12 HR CONT MNTR: CPT | Performed by: PSYCHIATRY & NEUROLOGY

## 2023-05-16 RX ORDER — ACETAMINOPHEN 325 MG/1
TABLET ORAL
COMMUNITY
Start: 2022-11-07 | End: 2024-03-11

## 2023-05-16 RX ORDER — LANOLIN ALCOHOL/MO/W.PET/CERES
100 CREAM (GRAM) TOPICAL DAILY
COMMUNITY
Start: 2022-11-07 | End: 2024-03-11

## 2023-05-16 RX ORDER — DIVALPROEX SODIUM 500 MG/1
500 TABLET, DELAYED RELEASE ORAL 2 TIMES DAILY
Qty: 60 TABLET | Refills: 11 | Status: SHIPPED | OUTPATIENT
Start: 2023-05-16 | End: 2023-10-03

## 2023-05-16 ASSESSMENT — PAIN SCALES - GENERAL: PAINLEVEL: NO PAIN (0)

## 2023-05-16 NOTE — TELEPHONE ENCOUNTER
Hydrochlorothiazide 3 times daily as needed for agitation ordered in error.  PCP has replaced order with hydroxyzine twice daily as needed.
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
## 2023-06-06 ENCOUNTER — PATIENT OUTREACH (OUTPATIENT)
Dept: CARE COORDINATION | Facility: CLINIC | Age: 48
End: 2023-06-06
Payer: COMMERCIAL

## 2023-06-06 NOTE — LETTER
[facility]  Patient Centered Plan of Care  About Me:        Patient Name:  Heath Crawley    YOB: 1975  Age:         48 year old   Jayesh MRN:    [id number] Telephone Information:  Home Phone [phone]   Mobile [phone]       Address:  [address]  [place] [address] Email address:  No e-mail address on record      Emergency Contact(s)    Name Relationship Lgl Grd Work Phone Home Phone Mobile Phone   Lorie. JEANNE HAYWOOD. Spouse    [phone]           Primary language:  [place]     needed? Yes   [facility]:  [phone] op. 1  Other communication barriers:Language barrier; Physical impairment; Lack of coping    Preferred Method of Communication:     Current living arrangement: I live in a private home with family    Mobility Status/ Medical Equipment: Dependent/Assisted by Another        Health Maintenance  Health Maintenance Reviewed: Not assessed      My Access Plan  Medical Emergency 911   Primary Clinic Line [facility] [phone]   24 Hour Appointment Line [phone] or  [phone] ([phone]) (toll-free)   24 Hour Nurse Line [phone] (toll-free)   Preferred Urgent Care [facility] [phone]       Preferred Hospital [facility]  [phone]       Preferred Pharmacy [id number] - [place], [place] - [address] AT [facility]     Behavioral Health Crisis Line The National Suicide Prevention Lifeline at 1-196.918.9664 or Text/Call 738             My Care Team Members  Patient Care Team         Relationship Specialty Notifications Start End    Adrian Dyson MD PCP - General Family Medicine All results, Admissions 4/25/23     Phone: [phone] Fax: [phone]         [address] [facility] [address]    Diana Arceo MD Assigned PCP   12/14/22     Phone: [phone] Fax: [phone]         [address] [place]  [address]    Diana Arceo MD MD Family Medicine  12/30/22     Phone: [phone] Fax: [phone]         [address] [place] [address]    Kaleigh Emery PA-C Physician Assistant Gastroenterology  12/30/22     Phone: [phone] Fax: [phone]         [address] [place] [address]    Leventhal, Thomas Michael, MD MD Gastroenterology  2/13/23     Phone: [phone] Fax: [phone]         [address] [place] [address]    [place] Worker   3/14/23     [place]     Phone: [phone]         Leventhal, Thomas Michael, MD Assigned Gastroenterology Provider   4/15/23     Phone: [phone] Fax: [phone]         [address] [place] [address]    Misbah Mullins Community Health Worker Primary Care - [facility] Admissions 4/28/23     Phone: [phone] Fax: [phone]         [address] [place] [address]    Oma Chaparro, RN Lead Care Coordinator Primary Care - [facility] Admissions 4/28/23     Mitchell Naik MD Assigned Neuroscience Provider   5/27/23     Phone: [phone] Fax: [phone]         [address] [place] [address]              My Care Plans  Self Management and Treatment Plan  Care Plan  Care Plan: Neurology       Problem: Seizure episodes       Goal: Patient will attend his neurology appointment in the next 12 months.       Start Date: 4/28/2023 Expected End Date: 4/28/2024    Recent Progress: 20%    Note:     Barriers: language barrier, low literacy, noncompliance, and lack of knowledge how to navigate complex health care system  Strengths: motivated to attend appt  Patient expressed understanding of goal: Yes    Action steps to achieve this goal:  1. Spouse will answer my phone when I am contacted to schedule my appointment.  2. Caregiver will bring patient to his initial neurology appointment as scheduled on 5/16/2023 at 11:15am for EEG and 4:15pm with Mitchell Naik.(Transportation requested to [employer]). (  Completed)  3. Caregiver will schedule a follow up appointment with my neurologist if it is recommended to do so while I am at the clinic.  4. Caregiver will follow up with CCC regarding this goal at each outreach until it is completed.     Mitchell Naik MD   [facility] - 3rd [facility]   3H    Department Address: [address] [place] [address] 3rd [facility] [address]   Department Phone: [phone]                             Care Plan: Lily Assessment       Problem: Imparied mobility       Goal: I would like to explore if I could qualify for PCA service, CADI, SMRT in the next 6 months.       Start Date: 4/28/2023 Expected End Date: 10/28/2023    Recent Progress: 20%    Note:     Barriers: vladimir  Strengths: Accepting of support  Patient expressed understanding of goal: Yes    Action steps to achieve this goal:  1. I will answer my phone when I am contacted by the PCA  to schedule my assessment.  2. I will request my family and supportive friends to be present for my assessment.  3. I will follow up with CCC in the next month regarding the progress made on this goal.  4. Dr Dyson provided a support letter for PCA assessment on 6/6/2023.     Note: Referral for a PCA assessment placed on 3/14/2023. Contact [facility] at [phone] for updates on the status of the referral.   6/6/2023 - Misbah left a voicemail for the [facility] to check on status.                                Action Plans on File:                       Advance Care Plans/Directives Type:   No data recorded    My Medical and Care Information  Problem List   Patient Active Problem List   Diagnosis     Left displaced femoral neck fracture (H)     Non-verbal learning disorder     Dementia due to medical condition with behavioral disturbance (H)     Seizure disorder (H)     Amputation of right upper extremity, subsequent encounter     Hepatitis C virus infection, unspecified chronicity     Insomnia, unspecified type       Current Medications and Allergies:  See printed Medication Report.    Care Coordination Start Date: 2/20/2023   Frequency of Care Coordination: 6 weeks       Form Last Updated: 06/07/2023

## 2023-06-06 NOTE — PROGRESS NOTES
Clinic Care Coordination Contact    Follow Up Progress Note      Assessment: CCRN spoke with spouse. Today via phone. Review goals with spouse and follow up needed.     PCA assessment   - Misbah, MONIQUEW left a voicemail today with the [facility] to check on status.   - CCRN sent a message to PCP on 5/9/23 requesting a support letter for PCA assessment, pending.   - CCRN again sent a message to PCP today for updates.   - Dr Dyson provided a support letter for PCA assessment today. CHW till fax over to the [facility].     Neurology  - attended appt with his nephrologist on 5/16/2023 and to return in 2 months  - follow-up scheduled on 8/1/2023 at 1:45pm with Mitchell Naik.   - Confirmed with spouse today that patient has been starting on Divalproex 500mg BID.     Psychiatry   - scheduled on 10/5/2023 at 8:30am with Annabella Jurado NP    Follow up with PCP  - Per chart review, patient last seen by PCP on 4/25/2023. PCP recommended to return in 1-2 months.   - Agreed to schedule follow up appt. CCRN assisted patient scheduled follow up appt with PCP on 6/26/2023 at 11:40am.     Care Gaps:    Health Maintenance Due   Topic Date Due     HEPATITIS B IMMUNIZATION (1 of 3 - 3-dose series) Never done     COVID-19 Vaccine (1) Never done     COLORECTAL CANCER SCREENING  Never done       Patient will address overdue caregaps with PCP on 6/26/2023.    Care Plans  Care Plan: Neurology     Problem: Seizure episodes     Goal: Patient will attend his neurology appointment in the next 12 months.     Start Date: 4/28/2023 Expected End Date: 4/28/2024    Recent Progress: 20%    Note:     Barriers: language barrier, low literacy, noncompliance, and lack of knowledge how to navigate complex health care system  Strengths: motivated to attend appt  Patient expressed understanding of goal: Yes    Action steps to achieve this goal:  1. Spouse will answer my phone when I am contacted to schedule my appointment.  2. Caregiver will bring patient to his  initial neurology appointment as scheduled on 5/16/2023 at 11:15am for EEG and 4:15pm with Mitchell Naik.(Transportation requested to [employer]). ( Completed)  3. Caregiver will schedule a follow up appointment with my neurologist if it is recommended to do so while I am at the clinic.  4. Caregiver will follow up with [facility] regarding this goal at each outreach until it is completed.     Mitchell Naik MD   [facility] - 3rd Floor   3H    Department Address: [address] [place] [address] 3rd [facility] [address]   Department Phone: [phone]                       Care Plan: [facility] Assessment     Problem: Imparied mobility     Goal: I would like to explore if I could qualify for PCA service, CADI, SMRT in the next 6 months.     Start Date: 4/28/2023 Expected End Date: 10/28/2023    Recent Progress: 20%    Note:     Barriers: vladimir  Strengths: Accepting of support  Patient expressed understanding of goal: Yes    Action steps to achieve this goal:  1. I will answer my phone when I am contacted by the PCA  to schedule my assessment.  2. I will request my family and supportive friends to be present for my assessment.  3. I will follow up with [facility] in the next month regarding the progress made on this goal.  4. Dr Dyson provided a support letter for PCA assessment on 6/6/2023.     Note: Referral for a PCA assessment placed on 3/14/2023. Contact [facility] at [phone] for updates on the status of the referral.   6/6/2023 - Misbah left a voicemail for the [facility] to check on status.                        Outreach Frequency: 6 weeks    Plan:  1) CHW to fax over PCA support letter to the [facility] in the next 1-2 days.   2) CHW to follow up on PCA referral status with next outreach.   3) Patient will attend his follow-up appointment with PCP.

## 2023-06-06 NOTE — TELEPHONE ENCOUNTER
Letter completed, signed, and given to Oma (care coordination).    Adrian Dyson MD  [facility] [place] [address]  Phone: [phone]  Fax: [phone]    6/6/2023  9:53 AM

## 2023-06-06 NOTE — LETTER
Re: Heath Crawley  : 1975    To whom it may concern:    I am Dr. Adrian Dyson and Mr. Crawley has been under my care.  Due to the underlying conditions below, it is in my medical opinion that patient should receive PCA services.  In addition, due to the severity of the symptoms, it is requested that PCA services evaluation be completed sooner.    Patient Active Problem List    Diagnosis Date Noted    Insomnia, unspecified type 2023     Priority: Medium    Hepatitis C virus infection, unspecified chronicity 2023     Priority: Medium    Amputation of right upper extremity, subsequent encounter 2023     Priority: Medium    Non-verbal learning disorder 2023     Priority: Medium    Dementia due to medical condition with behavioral disturbance (H) 2023     Priority: Medium    Seizure disorder (H) 2023     Priority: Medium     Per wife report, not on meds        Left displaced femoral neck fracture (H) 2022     Priority: Medium     Formatting of this note might be different from the original.  Added automatically from request for surgery [id number]         Please let me know if there are any questions or concerns.          Adrian Dyson MD  [facility] [place] [address]  Phone: [phone]  Fax: [phone]    2023  9:51 AM

## 2023-07-06 ENCOUNTER — PATIENT OUTREACH (OUTPATIENT)
Dept: CARE COORDINATION | Facility: CLINIC | Age: 48
End: 2023-07-06
Payer: COMMERCIAL

## 2023-07-06 NOTE — PROGRESS NOTES
Clinic Care Coordination Contact    Community Health Worker Follow Up    Care Gaps:   Health Maintenance Due   Topic Date Due     HEPATITIS B IMMUNIZATION (1 of 3 - 3-dose series) Never done     COVID-19 Vaccine (1) Never done     COLORECTAL CANCER SCREENING  Never done     PCP to discuss other care gaps with patient at the next follow up visit.     Care Plan:   Care Plan: Neurology     Problem: Seizure episodes     Goal: Patient will attend his neurology appointment in the next 12 months.     Start Date: 4/28/2023 Expected End Date: 4/28/2024    This Visit's Progress: 30% Recent Progress: 20%    Note:     Barriers: language barrier, low literacy, noncompliance, and lack of knowledge how to navigate complex health care system  Strengths: motivated to attend appt  Patient expressed understanding of goal: Yes    Action steps to achieve this goal:  1. Spouse will answer my phone when I am contacted to schedule my appointment.  2. Caregiver will bring patient to his initial neurology appointment as scheduled on 5/16/2023 at 11:15am for EEG and 4:15pm with Mitchell Naik.(Transportation requested to [employer]). ( Completed)  3. Patient will attend follow up appointment with neurologist as scheduled on 8/01/2023 at 1:45 PM. [phone]  4. Caregiver will follow up with CCC regarding this goal at each outreach until it is completed.     Mitchell Naik MD   [facility] [place] NEUROLOGY - 3rd Floor       Department Address: [address] [place] [address]   Department Phone: [phone]                       Care Plan: [employer] Assessment     Problem: Imparied mobility     Goal: I would like to explore if I could qualify for PCA service, CADI, SMRT in the next 6 months.     Start Date: 4/28/2023 Expected End Date: 10/28/2023    This Visit's Progress: 30% Recent Progress: 20%    Note:     Barriers: lanaguage  Strengths: Accepting of support  Patient expressed  understanding of goal: Yes    Action steps to achieve this goal:  1. I will answer my phone when I am contacted by the PCA  to schedule my assessment.  2. I will request my family and supportive friends to be present for my assessment.  3. I will follow up with [facility] in the next month regarding the progress made on this goal.  4. Dr Dyson provided a support letter for PCA assessment on 6/6/2023.     Note: Per [facility] that patient is in line to be assigned an  and the wait time is about 3 more weeks.                    Intervention and Education during outreach:  -Patient's PCA referral is still pending with [facility] and in line to be assigned an  and the wait time is about 3 weeks for assessment.   -Patient and family are receiving [facility] benefits.   -Patient and family were informed to call with questions or concerns.     CHW Next Outreach: In one month.

## 2023-07-17 ENCOUNTER — PATIENT OUTREACH (OUTPATIENT)
Dept: CARE COORDINATION | Facility: CLINIC | Age: 48
End: 2023-07-17
Payer: COMMERCIAL

## 2023-07-18 NOTE — PROGRESS NOTES
Clinic Care Coordination Contact  Care Coordination Clinician Chart Review    Situation: Patient chart reviewed by Care Coordinator.       Background: Care Coordination Program started: 2/20/2023. Initial assessment completed and patient-centered care plan(s) were developed with participation from patient. Lead CC handed patient off to CHW for continued outreaches.       Assessment: Per chart review, patient outreach completed by CC CHW on 7/6/2023.  Patient is actively working to accomplish goal(s). Patient's goal(s) appropriate and relevant at this time. Patient is not due for updated Plan of Care.  Assessments will be completed annually or as needed/with change of patient status.      Care Plan: Neurology     Problem: Seizure episodes     Goal: Patient will attend his neurology appointment in the next 12 months.     Start Date: 4/28/2023 Expected End Date: 4/28/2024    This Visit's Progress: 30% Recent Progress: 20%    Note:     Barriers: language barrier, low literacy, noncompliance, and lack of knowledge how to navigate complex health care system  Strengths: motivated to attend appt  Patient expressed understanding of goal: Yes    Action steps to achieve this goal:  1. Spouse will answer my phone when I am contacted to schedule my appointment.  2. Caregiver will bring patient to his initial neurology appointment as scheduled on 5/16/2023 at 11:15am for EEG and 4:15pm with Mitchell Naik.(Transportation requested to [employer]). ( Completed)  3. Patient will attend follow up appointment with neurologist as scheduled on 8/01/2023 at 1:45 PM. [phone]  4. Caregiver will follow up with CCC regarding this goal at each outreach until it is completed.     Mitchell Naik MD   [facility] - 3rd Floor       Department Address: [address] [place] [address]   Department Phone: [phone]                       Care Plan: [employer]  Assessment     Problem: Imparied mobility     Goal: I would like to explore if I could qualify for PCA service, CADI, SMRT in the next 6 months.     Start Date: 4/28/2023 Expected End Date: 10/28/2023    This Visit's Progress: 30% Recent Progress: 20%    Note:     Barriers: lanaguage  Strengths: Accepting of support  Patient expressed understanding of goal: Yes    Action steps to achieve this goal:  1. I will answer my phone when I am contacted by the PCA  to schedule my assessment.  2. I will request my family and supportive friends to be present for my assessment.  3. I will follow up with CCC in the next month regarding the progress made on this goal.  4. Dr Dyson provided a support letter for PCA assessment on 6/6/2023.     Note: Per [facility] that patient is in line to be assigned an  and the wait time is about 3 more weeks.                           Plan/Recommendations: The patient will continue working with Care Coordination to achieve goal(s) as above. CHW will continue outreaches at minimum every 30 days and will involve Lead CC as needed or if patient is ready to move to Maintenance. Lead CC will continue to monitor CHW outreaches and patient's progress to goal(s) every 6 weeks.     Plan of Care updated and sent to patient: No

## 2023-08-08 ENCOUNTER — PATIENT OUTREACH (OUTPATIENT)
Dept: CARE COORDINATION | Facility: CLINIC | Age: 48
End: 2023-08-08
Payer: COMMERCIAL

## 2023-08-08 NOTE — PROGRESS NOTES
Clinic Care Coordination Contact  Community Health Worker Follow Up    Care Gaps:   Health Maintenance Due   Topic Date Due    HEPATITIS B IMMUNIZATION (1 of 3 - 3-dose series) Never done    COVID-19 Vaccine (1) Never done    COLORECTAL CANCER SCREENING  Never done    HEPATITIS A IMMUNIZATION (1 of 2 - Risk 2-dose series) Never done     PCP to address other medical care gaps with patient at the next follow up visit.    Care Plan:   Care Plan: Neurology       Problem: Seizure episodes       Goal: Patient will attend his neurology appointment in the next 12 months.       Start Date: 4/28/2023 Expected End Date: 4/28/2024    This Visit's Progress: 40% Recent Progress: 30%    Note:     Barriers: language barrier, low literacy, noncompliance, and lack of knowledge how to navigate complex health care system  Strengths: motivated to attend appt  Patient expressed understanding of goal: Yes    Action steps to achieve this goal:  1. I will attend my follow up appointment with neurologist as rescheduled on 10/03/2023 at 4:15 PM.  2. My wife will answer the phone when  calls for .  3. I will update CCC at outreach.     Mitchell Naik MD   [facility] [place] NEUROLOGY - 3rd Floor       Department Address: [address] [place] [address]   Department Phone: [phone]                             Care Plan: [employer] Assessment       Problem: Imparied mobility                 Intervention and Education during outreach:  -Patient was assessed by [employer] for PCA and approved 6 hours per day. Patient is established with PCA agency, receiving services and PCA's info added to patient's care team.   -Patient was no showed for neurology and PCP appointments and CHW assisted with rescheduled.   -Patient and family are receiving [facility] Rental Assistant for $800 per month and SNAP for $600 per month for family of 3.  -Patient is receiving helps and applied for citizenship.   -Patient was  informed to call with questions or concerns.     CHW Next Outreach: In one month.

## 2023-08-24 ENCOUNTER — TELEPHONE (OUTPATIENT)
Dept: FAMILY MEDICINE | Facility: CLINIC | Age: 48
End: 2023-08-24
Payer: COMMERCIAL

## 2023-08-24 NOTE — TELEPHONE ENCOUNTER
Forms/Letter Request    Type of form/letter:  Medical Opinion     Have you been seen for this request: No    Do we have the form/letter: Yes: Placed in Dr. Dyson's blue folder    Who is the form from? Patient    Where did/will the form come from? Patient or family brought in       When is form/letter needed by: 8/31/2023    How would you like the form/letter returned:     Patient Notified form requests are processed in 3-5 business days:Yes    Okay to leave a detailed message?: Yes at Home number on file [phone] (home)

## 2023-08-30 ENCOUNTER — PATIENT OUTREACH (OUTPATIENT)
Dept: CARE COORDINATION | Facility: CLINIC | Age: 48
End: 2023-08-30
Payer: COMMERCIAL

## 2023-08-30 NOTE — LETTER
[facility]  Patient Centered Plan of Care  About Me:        Patient Name:  Heath Crawley    YOB: 1975  Age:         48 year old   Jayesh MRN:    [id number] Telephone Information:  Home Phone [phone]   Mobile [phone]       Address:  [address]  [place] [address] Email address:  No e-mail address on record      Emergency Contact(s)    Name Relationship Lgl Grd Work Phone Home Phone Mobile Phone   Lorie. JEANNE HAYWOOD. Spouse    [phone]           Primary language:  [place]     needed? Yes   [facility]:  [phone] op. 1  Other communication barriers:Language barrier; Physical impairment; Lack of coping    Preferred Method of Communication:     Current living arrangement: I live in a private home with family    Mobility Status/ Medical Equipment: Dependent/Assisted by Another        Health Maintenance  Health Maintenance Reviewed: Not assessed      My Access Plan  Medical Emergency 911   Primary Clinic Line [facility] [phone]   24 Hour Appointment Line [phone] or  [phone] ([phone]) (toll-free)   24 Hour Nurse Line [phone] (toll-free)   Preferred Urgent Care [facility] [phone]     Preferred Hospital [facility]  [phone]     Preferred Pharmacy [id number] - [place], [place] - [address] AT [facility]     Behavioral Health Crisis Line The National Suicide Prevention Lifeline at 1-426.241.2078 or Text/Call 298             My Care Team Members  Patient Care Team         Relationship Specialty Notifications Start End    Adrian Dyson MD PCP - General Family Medicine All results, Admissions 4/25/23     Phone: [phone] Fax: [phone]         [address] [facility] [address]    Diana Arceo MD Assigned PCP   12/14/22     Phone: [phone] Fax: [phone]         [address] [place]  [address]    Diana Arceo MD MD Family Medicine  12/30/22     Phone: [phone] Fax: [phone]         [address] [place] [address]    Kaleigh Emery PA-C Physician Assistant Gastroenterology  12/30/22     Phone: [phone] Fax: [phone]         [address] [place] [address]    Leventhal, Thomas Michael, MD MD Gastroenterology  2/13/23     Phone: [phone] Fax: [phone]         [address] [place] [address]    [place] Worker   3/14/23     [employer]    Phone: [phone]         Leventhal, Thomas Michael, MD Assigned Gastroenterology Provider   4/15/23     Phone: [phone] Fax: [phone]         [address] [place] [address]    Misbah Mullins Community Health Worker Primary Care - CC Admissions 4/28/23     Phone: [phone] Fax: [phone]         [address] [place] [address]    Oma Chaparro RN Lead Care Coordinator Primary Care - CC Admissions 4/28/23     Mitchell Naik MD Assigned Neuroscience Provider   5/27/23     Phone: [phone] Fax: [phone]         [address] [place] [address] [address] [facility] [address]    Randy Hawthorne Personal Care Attendant PCA   8/4/23     PCA: 6 hours per day.    Phone: [phone]                   My Care Plans  Self Management and Treatment Plan  Care Plan  Care Plan: Neurology       Problem: Seizure episodes       Goal: Patient will attend his neurology appointment in the next 12 months.       Start Date: 4/28/2023 Expected End Date: 4/28/2024    This Visit's Progress: 40% Recent Progress: 30%    Note:     Barriers: language barrier, low literacy, noncompliance, and lack of knowledge how to navigate complex health care system  Strengths: motivated to attend appt  Patient expressed understanding of goal: Yes    Action steps to achieve this goal:  1. I will attend my follow up appointment with neurologist as rescheduled on 10/03/2023 at 4:15 PM.  2. My wife will answer the phone when transportation   calls for .  3. I will update CCC at outreach.     Mitchell Naik MD   [facility] [place] NEUROLOGY - 3rd Floor   3H    Department Address: [address] [place] [address] 3rd Floor [facility] [address]   Department Phone: [phone]                                Action Plans on File:                       Advance Care Plans/Directives Type:   No data recorded    My Medical and Care Information  Problem List   Patient Active Problem List   Diagnosis    Left displaced femoral neck fracture (H)    Non-verbal learning disorder    Dementia due to medical condition with behavioral disturbance (H)    Seizure disorder (H)    Amputation of right upper extremity, subsequent encounter    Hepatitis C virus infection, unspecified chronicity    Insomnia, unspecified type      Current Medications and Allergies:  See printed Medication Report.    Care Coordination Start Date: 2/20/2023   Frequency of Care Coordination: 6 weeks     Form Last Updated: 08/30/2023

## 2023-08-30 NOTE — TELEPHONE ENCOUNTER
Patient calls back inquiring about status of form request. Writer inform caller message was sent to provider- awaiting for response. Writer will send provider another message. Caller verbalizes understanding.     Diana Mckniney,   [facility]  August 30, 2023 11:45 AM

## 2023-08-30 NOTE — PROGRESS NOTES
Clinic Care Coordination Contact  Care Coordination Clinician Chart Review    Situation: Patient chart reviewed by Care Coordinator.       Background: Care Coordination Program started: 2/20/2023. Initial assessment completed and patient-centered care plan(s) were developed with participation from patient. Lead CC handed patient off to CHW for continued outreaches.       Assessment: Per chart review, patient outreach completed by CC CHW on 9/8/2023.  Patient is actively working to accomplish goal(s). Patient's goal(s) appropriate and relevant at this time. Patient is due for updated Plan of Care.  Assessments will be completed annually or as needed/with change of patient status. Patient is scheduled to with neurologist on 10/3/2023. CHW to setup transportation for neurology appt on 10/3/2023      Care Plan: Neurology       Problem: Seizure episodes       Goal: Patient will attend his neurology appointment in the next 12 months.       Start Date: 4/28/2023 Expected End Date: 4/28/2024    This Visit's Progress: 40% Recent Progress: 30%    Note:     Barriers: language barrier, low literacy, noncompliance, and lack of knowledge how to navigate complex health care system  Strengths: motivated to attend appt  Patient expressed understanding of goal: Yes    Action steps to achieve this goal:  1. I will attend my follow up appointment with neurologist as rescheduled on 10/03/2023 at 4:15 PM.  2. My wife will answer the phone when  calls for .  3. I will update CCC at outreach.     Mitchell Naik MD   [facility] [place] 3rd [place]       Department Address: [address] [place] [address]   Department Phone: [phone]                                  Plan/Recommendations: The patient will continue working with Care Coordination to achieve goal(s) as above. CHW will continue outreaches at minimum every 30 days and will involve Lead CC as needed or if patient is ready  to move to Maintenance. Lead CC will continue to monitor CHW outreaches and patient's progress to goal(s) every 6 weeks.     Plan of Care updated and sent to patient: Yes, via mail

## 2023-09-07 ENCOUNTER — PATIENT OUTREACH (OUTPATIENT)
Dept: CARE COORDINATION | Facility: CLINIC | Age: 48
End: 2023-09-07
Payer: COMMERCIAL

## 2023-09-07 NOTE — PROGRESS NOTES
Clinic Care Coordination Contact  Community Health Worker Follow Up    Care Gaps:   Health Maintenance Due   Topic Date Due    HEPATITIS B IMMUNIZATION (1 of 3 - 3-dose series) Never done    COVID-19 Vaccine (1) Never done    COLORECTAL CANCER SCREENING  Never done    HEPATITIS A IMMUNIZATION (1 of 2 - Risk 2-dose series) Never done    INFLUENZA VACCINE (1) 09/01/2023     PCP to address other care gaps with patient at the next follow up visit.    Care Plan:   Care Plan: Neurology       Problem: Seizure episodes       Goal: Patient will attend his neurology appointment in the next 12 months.       Start Date: 4/28/2023 Expected End Date: 4/28/2024    This Visit's Progress: 50% Recent Progress: 40%    Note:     Barriers: language barrier, low literacy, noncompliance, and lack of knowledge how to navigate complex health care system  Strengths: motivated to attend appt  Patient expressed understanding of goal: Yes    Action steps to achieve this goal:  1. I will attend my follow up appointment with neurologist as rescheduled on 10/03/2023 at 4:15 PM. (Transportation arranged with [employer]).  2. My wife will answer the phone when  calls for .  3. I will update CCC at outreach.     Mitchell Naik MD   [facility] [place] NEUROLOGY - 3rd Floor   3H    Department Address: [address] [place] [address] [address] [facility] [address]   Department Phone: [phone]                           Intervention and Education during outreach:  -CHW reminded patient of upcoming appointments and arranged transportation with [employer].  -Patient and family are receiving [facility] SNAP $600 for family of 3 and rental assistant for $800 per month.   -Patient is receiving [facility] services through [facility] and [employer] worker is assisting with citizenship process and pending.   -Patient is also receiving PCA services with no change.   -No additional coordination assistance needed at this time and patient and  caregiver were informed to call with questions or concerns.     CHW Next Outreach: In one month.

## 2023-09-11 NOTE — TELEPHONE ENCOUNTER
Author attempted to reach the patient. No answer/no voicemail.     Unable to notify that form is ready for  at N . Form mailed to patient to avoid delaying delivery.     Copied to EHR scanning.

## 2023-09-15 ENCOUNTER — OFFICE VISIT (OUTPATIENT)
Dept: FAMILY MEDICINE | Facility: CLINIC | Age: 48
End: 2023-09-15
Payer: COMMERCIAL

## 2023-09-15 VITALS
WEIGHT: 119.7 LBS | BODY MASS INDEX: 22.6 KG/M2 | HEART RATE: 64 BPM | HEIGHT: 61 IN | OXYGEN SATURATION: 98 % | TEMPERATURE: 98 F | DIASTOLIC BLOOD PRESSURE: 88 MMHG | SYSTOLIC BLOOD PRESSURE: 120 MMHG | RESPIRATION RATE: 12 BRPM

## 2023-09-15 DIAGNOSIS — J06.9 VIRAL URI: ICD-10-CM

## 2023-09-15 DIAGNOSIS — G47.00 INSOMNIA, UNSPECIFIED TYPE: ICD-10-CM

## 2023-09-15 DIAGNOSIS — R05.1 ACUTE COUGH: ICD-10-CM

## 2023-09-15 DIAGNOSIS — G40.909 SEIZURE DISORDER (H): ICD-10-CM

## 2023-09-15 DIAGNOSIS — F81.89 NON-VERBAL LEARNING DISORDER: ICD-10-CM

## 2023-09-15 DIAGNOSIS — F02.818 DEMENTIA DUE TO MEDICAL CONDITION WITH BEHAVIORAL DISTURBANCE (H): Primary | ICD-10-CM

## 2023-09-15 LAB — SARS-COV-2 RNA RESP QL NAA+PROBE: NEGATIVE

## 2023-09-15 PROCEDURE — 99214 OFFICE O/P EST MOD 30 MIN: CPT | Performed by: FAMILY MEDICINE

## 2023-09-15 PROCEDURE — 87635 SARS-COV-2 COVID-19 AMP PRB: CPT | Performed by: FAMILY MEDICINE

## 2023-09-15 RX ORDER — HYDROXYZINE HYDROCHLORIDE 25 MG/1
25-50 TABLET, FILM COATED ORAL 3 TIMES DAILY PRN
Qty: 180 TABLET | Refills: 3 | Status: SHIPPED | OUTPATIENT
Start: 2023-09-15 | End: 2024-01-02

## 2023-09-15 NOTE — PATIENT INSTRUCTIONS
-Thank you for choosing the [facility].  -It was a pleasure to see you today.  -Please take a look at the information below for more specific details regarding the treatment plan and recommendations.  -At the end of this after visit summary is a list of your medications and specific instructions.  Please review this carefully as there may be changes made to your medication list.  -If there are any particular questions or concerns, please feel free to reach out to Dr. Dyson.  -If any labs have been completed, we will reach out to you about results.  If the results are normal or not concerning, a letter or MyChart message will be sent to you.  If any follow-up is needed, either Dr. Dyson or the nurse will give you a call.  If you have not heard regarding results after 2 weeks, please reach out to the clinic.    Patient Instructions:    -The COVID test is in process. If test is positive, Dr. Dyson will send a prescription for a medication to treat for the infection.  -Avoid direct contact with others as best you can. Work remotely from home or attend school virtually if you can.   -A negative COVID test is NOT needed (for most people) as tests can remain positive for months even though the virus is dead and can no longer infect others.     Quarantine Recommendations:   If you test positive for COVID-19, stay home for at least 5 days and isolate from others in your home.You are likely most infectious during these first 5 days:  -Wear a high-quality mask if you must be around others at home and in public.  -Do not go places where you are unable to wear a mask.   -Do not travel.  -Stay home and separate from others as much as possible.  -Use a separate bathroom, if possible.  -Take steps to improve ventilation at home, if possible.  -Don t share personal household items, like cups, towels, and utensils.  -Monitor your symptoms. If you have an emergency warning sign (like trouble breathing), seek  emergency medical care immediately.    If you had symptoms and your symptoms are improving, you may end isolation after day 5 if:  -You are fever-free for 24 hours (without the use of fever-reducing medication).    If your symptoms are not improving, continue to isolate until:  -You are fever-free for 24 hours (without the use of fever-reducing medication).  -Your symptoms are improving.    If you had symptoms and had moderate illness (you experienced shortness of breath or had difficulty breathing):  -You need to isolate through day 10.    Or you experienced severe illness (you were hospitalized) or have a weakened immune system:  -You need to isolate through day 10.  -Consult your doctor before ending isolation.  -Ending isolation without a viral test may not be an option for you.    Self cares:  -Mixing a spoonful of honey and warm water and drinking that throughout the day can be helpful to reduce the cough.  -Use lozenges/cough drops as needed. Cepacol is a good option.   -It is recommended that you stay well-hydrated to help you recover.  Try to drink 64 ounces of water each day.  -Eat a balanced diet.  -Coughs can last an average of 2-3 weeks due to the presence of drainage in the back of the throat resulting in irritation.  The cough will improve gradually over this timeframe.  Seek medical attention if the cough persists or worsens.  -The change in or loss of sense of smell may linger for weeks to months and is not generally used as an indicator to continue or discontinue isolation.    -You may take acetaminophen/Tylenol (up to 1000 mg once every 8 hours as needed) and ibuprofen/Advil (up to 600 mg once every 6 hours as needed) to help with any fevers and discomforts.  Avoid chronic long-term usage of these medications.      Please seek immediate medical attention (go to the emergency room or urgent care) for the following reasons: worsening symptoms, significant coughing, shortness of breath or trouble  breathing, wheezing, persistent fevers, unable to eat/drink, dizziness/lightheadedness, confusion, or any concerning changes.    Please return to clinic in 7-10 days if not improving, or sooner as needed.        --------------------------------------------------------------------------------------------------------------------    -We are always looking for ways to improve.  You may be selected to receive a survey regarding your visit today.  We encourage you to complete the survey and provide specific, constructive feedback to help us improve our processes.  Thank you for your time!  -Please review the contact information listed on the after visit summary and in the electronic chart.  Below is the phone number that we have on file.  If there are any changes that are needed to be made, please reach out to the clinic.  [phone] (home)

## 2023-09-15 NOTE — PROGRESS NOTES
OFFICE VISIT    Assessment/Plan:     Patient Instructions:    -The COVID test is in process. If test is positive, Dr. Dyson will send a prescription for a medication to treat for the infection.  -Avoid direct contact with others as best you can. Work remotely from home or attend school virtually if you can.   -A negative COVID test is NOT needed (for most people) as tests can remain positive for months even though the virus is dead and can no longer infect others.     Quarantine Recommendations:   If you test positive for COVID-19, stay home for at least 5 days and isolate from others in your home.You are likely most infectious during these first 5 days:  -Wear a high-quality mask if you must be around others at home and in public.  -Do not go places where you are unable to wear a mask.   -Do not travel.  -Stay home and separate from others as much as possible.  -Use a separate bathroom, if possible.  -Take steps to improve ventilation at home, if possible.  -Don t share personal household items, like cups, towels, and utensils.  -Monitor your symptoms. If you have an emergency warning sign (like trouble breathing), seek emergency medical care immediately.    If you had symptoms and your symptoms are improving, you may end isolation after day 5 if:  -You are fever-free for 24 hours (without the use of fever-reducing medication).    If your symptoms are not improving, continue to isolate until:  -You are fever-free for 24 hours (without the use of fever-reducing medication).  -Your symptoms are improving.    If you had symptoms and had moderate illness (you experienced shortness of breath or had difficulty breathing):  -You need to isolate through day 10.    Or you experienced severe illness (you were hospitalized) or have a weakened immune system:  -You need to isolate through day 10.  -Consult your doctor before ending isolation.  -Ending isolation without a viral test may not be an option for you.    Self  cares:  -Mixing a spoonful of honey and warm water and drinking that throughout the day can be helpful to reduce the cough.  -Use lozenges/cough drops as needed. Cepacol is a good option.   -It is recommended that you stay well-hydrated to help you recover.  Try to drink 64 ounces of water each day.  -Eat a balanced diet.  -Coughs can last an average of 2-3 weeks due to the presence of drainage in the back of the throat resulting in irritation.  The cough will improve gradually over this timeframe.  Seek medical attention if the cough persists or worsens.  -The change in or loss of sense of smell may linger for weeks to months and is not generally used as an indicator to continue or discontinue isolation.    -You may take acetaminophen/Tylenol (up to 1000 mg once every 8 hours as needed) and ibuprofen/Advil (up to 600 mg once every 6 hours as needed) to help with any fevers and discomforts.  Avoid chronic long-term usage of these medications.      Please seek immediate medical attention (go to the emergency room or urgent care) for the following reasons: worsening symptoms, significant coughing, shortness of breath or trouble breathing, wheezing, persistent fevers, unable to eat/drink, dizziness/lightheadedness, confusion, or any concerning changes.    Please return to clinic in 7-10 days if not improving, or sooner as needed.      Heath was seen today for follow up .  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  He continues to have hallucinations, though has been acting better.  Patient usually takes 1 tablet twice daily to help control the symptoms.  Patient will see psychiatry in October as scheduled.  -     hydrOXYzine (ATARAX) 25 MG tablet; Take 1-2 tablets (25-50 mg) by mouth 3 times daily as needed for itching or anxiety (for sleep,  agitation)    Seizure disorder: Taking Depakote as prescribed.  Patient to follow-up with neurology as scheduled.    Viral URI  Acute cough: no contraindicating medications or medications that need adjustment.  eGFR was >90 from 02/09/2023.  Patient would qualify for the full dose of the Paxlovid medication if COVID-19 test is positive.  -     Symptomatic COVID-19 Virus (Coronavirus) by PCR        Return in about 3 months (around 12/15/2023) for Medication follow up.    The diagnoses, treatment options, risk, benefits, and recommendations were reviewed with patient/guardian.  Questions were answered to patient's/guardian satisfaction.  Red flag signs were reviewed.  Patient/guardian is in agreement with above plan.      Subjective: 48 year old male with history of  dementia with behavioral disturbance, seizure disorder, hepatitis C infection on [place], amputation of right upper extremity who presents to clinic for the following complaints:   Patient presents with:  follow up     Patient was last seen on 4/25/2023 and at that time, patient had not had good response to the trazodone 100 mg, so the medication was discontinued.  Patient was started on hydroxyzine 25 mg, take 1-2 tablets by mouth 3 times daily as needed for itching, anxiety, sleep.  Patient was also referred to psychiatry for further assessment and support.  It was reported at the neurology visit that patient's did not think that the hydroxyzine had been sufficient to help her sleep and mood stabilization.  Patient has an appointment scheduled with psychiatry on 10/5/2023.    Seizures: Patient was reported to have frequent seizure like episodes.  He was seen by neurology on 5/16/2023 and was started on Depakote.  Patient has an appointment to follow-up with neurology on 10/03/2023.  Since starting the depakote, the seizures have been okay overall.  No problems or side effects on Depakote noted.  They do have a follow-up with neurology.    Since then,  "patient states that he has no questions or concerns at this time. Most of the history is obtained from patient's wife.  The hydroxyzine is reported to help and he is feeling better. He ran out of the medications recently and they would like to continue on the hydroxyzine. He typically would take the hydroxyzine 25mg tablet twice daily. He sill hearing things and will tell his wife, so this is unchanged.     He is taking the depakote twice daily as prescribed.     Coughing: noted the last two days. He otherwise feels fine.  No other symptoms endorsed. Offered COVID 19 test and they accept.     Right arm amputation noted.  Unchanged.      A professional [employer] telephone  was utilized for the office visit.     The 10 point review of system is negative except as stated in the HPI.    Allergies were reviewed and updated.    Answers submitted by the patient for this visit:  General Questionnaire (Submitted on 9/15/2023)  Chief Complaint: Chronic problems general questions HPI Form  What is the reason for your visit today? : follow up  How many servings of fruits and vegetables do you eat daily?: 0-1  On average, how many sweetened beverages do you drink each day (Examples: soda, juice, sweet tea, etc.  Do NOT count diet or artificially sweetened beverages)?: 0  How many minutes a day do you exercise enough to make your heart beat faster?: 9 or less  How many days a week do you exercise enough to make your heart beat faster?: 3 or less  How many days per week do you miss taking your medication?: 0      Objective:   /88 (BP Location: Left arm, Patient Position: Sitting, Cuff Size: Adult Regular)   Pulse 64   Temp 98  F (36.7  C) (Oral)   Resp 12   Ht 1.553 m (5' 1.14\")   Wt 54.3 kg (119 lb 11.2 oz)   SpO2 98%   BMI 22.51 kg/m    General: Active, alert, nontoxic-appearing.  No acute distress.  HEENT: Normocephalic, atraumatic.  Pupils are equal and round.  Sclera is clear.  Ears: Normal " external ear.  Nasal discharge is noted.  Oropharynx: moist mucous membranes. Erythema of the posterior oropharynx is present. Tonsils do have erythematous, no exudates. No masses, fluctuance, crepitus is noted of the neck.  Cervical lymphadenopathy is noted to palpation.   Cardiac: RRR.  S1, S2 present.  No murmurs, rubs, or gallops.  Respiratory/chest: Clear to auscultation bilaterally.  No wheezes, rales, rhonchi.  Breathing is not labored.  No accessory muscle usage.  Extremities: Voluntary movements intact.  Integumentary: No concerning rash or skin changes appreciated.        Adrian Dyson MD  [facility] [place] [address]  Phone: [phone]  Fax: [phone]    9/15/2023  2:57 PM            Current Outpatient Medications   Medication    acetaminophen (TYLENOL) 325 MG tablet    divalproex sodium delayed-release (DEPAKOTE) 500 MG DR tablet    hydrOXYzine (ATARAX) 25 MG tablet    thiamine (B-1) 100 MG tablet     No current facility-administered medications for this visit.       No Known Allergies    Patient Active Problem List    Diagnosis Date Noted    Insomnia, unspecified type 04/25/2023     Priority: Medium    Hepatitis C virus infection, unspecified chronicity 02/27/2023     Priority: Medium    Amputation of right upper extremity, subsequent encounter 01/24/2023     Priority: Medium    Non-verbal learning disorder 01/09/2023     Priority: Medium    Dementia due to medical condition with behavioral disturbance (H) 01/09/2023     Priority: Medium    Seizure disorder (H) 01/09/2023     Priority: Medium     Per wife report, not on meds      Left displaced femoral neck fracture (H) 09/16/2022     Priority: Medium     Formatting of this note might be different from the original.  Added automatically from request for surgery [id number]         No family history on file.    No past surgical history on file.     Social History     Socioeconomic History    Marital status: Single      Spouse name: Not on file    Number of children: Not on file    Years of education: Not on file    Highest education level: Not on file   Occupational History    Not on file   Tobacco Use    Smoking status: Some Days     Types: Cigarettes     Passive exposure: Current    Smokeless tobacco: Former   Vaping Use    Vaping Use: Never used   Substance and Sexual Activity    Alcohol use: Not on file    Drug use: Not on file    Sexual activity: Not on file   Other Topics Concern    Not on file   Social History Narrative    Not on file     Social Determinants of Health     Financial Resource Strain: Not on file   Food Insecurity: Not on file   Transportation Needs: Not on file   Physical Activity: Not on file   Stress: Not on file   Social Connections: Not on file   Intimate Partner Violence: Not on file   Housing Stability: Not on file

## 2023-09-21 ENCOUNTER — TELEPHONE (OUTPATIENT)
Dept: FAMILY MEDICINE | Facility: CLINIC | Age: 48
End: 2023-09-21
Payer: COMMERCIAL

## 2023-09-21 NOTE — TELEPHONE ENCOUNTER
Result sent to address on file via standard mail per provider recommendation below.      GÓMEZ BurdickN, RN  [facility]    Adrian Dyson MD [facility] [place]  Nurse: please send result note to patient.      Bri Crawley,    I hope you have been well since out last visit. Below are the results from the testing completed at the visit.    The COVID test was negative.  Please treat this as you were a common cold illness.    Dr. Dyson recommends that you continue on the plan as discussed in clinic.    If there are any questions or concerns, please call the clinic or schedule an appointment for follow up.    Best wishes,          Adrian Dyson MD  [facility]  9/18/2023  2:05 PM

## 2023-09-21 NOTE — LETTER
September 21, 2023    Heath Crawley  [address]   [place] [address]    Bri Crawley,    I hope you have been well since out last visit. Below are the results from the testing completed at the visit.    The COVID test was negative.  Please treat this as you were a common cold illness.    Dr. Dyson recommends that you continue on the plan as discussed in clinic.    Resulted Orders   Symptomatic COVID-19 Virus (Coronavirus) by PCR Nose   Result Value Ref Range    SARS CoV2 PCR Negative Negative      Comment:      NEGATIVE: SARS-CoV-2 (COVID-19) RNA not detected, presumed negative.    Narrative    Testing was performed using the Xpert Xpress SARS-CoV-2 Assay on the Cepheid Gene-Xpert Instrument Systems. Additional information about this Emergency Use Authorization (EUA) assay can be found via the Lab Guide. This test should be ordered for the detection of SARS-CoV-2 in individuals who meet SARS-CoV-2 clinical and/or epidemiological criteria as well as from individuals without symptoms or other reasons to suspect COVID-19. Test performance for asymptomatic patients has only been established in anterior nasal swab specimens. This test is for in vitro diagnostic use under the FDA EUA for laboratories certified under CLIA to perform high complexity testing. This test has not been FDA cleared or approved. A negative result does not rule out the presence of PCR inhibitors in the specimen or target RNA concentration below the limit of detection for the assay. The possibility of a false negative should be considered if the patient's recent exposure or clinical presentation suggests COVID-19. This test was validated by [facility] [employer]. These Laboratories are certified under the Clinical Laboratory Improvement Amendments (CLIA) as qualified to perform high complexity testing.         If there are any questions or concerns, please call the clinic or schedule an appointment for follow up.    Best  Adrian cabrera MD  [facility]  9/18/2023  2:05 PM

## 2023-09-21 NOTE — TELEPHONE ENCOUNTER
----- Message from Adrian Dyson MD sent at 9/18/2023  2:05 PM CDT -----  Nurse: please send result note to patient.       Bri Crawley,    I hope you have been well since out last visit. Below are the results from the testing completed at the visit.     The COVID test was negative.  Please treat this as you were a common cold illness.    Dr. Dyson recommends that you continue on the plan as discussed in clinic.    If there are any questions or concerns, please call the clinic or schedule an appointment for follow up.     Best wishes,           Adrian Dyson MD  [facility]  9/18/2023  2:05 PM

## 2023-10-03 ENCOUNTER — OFFICE VISIT (OUTPATIENT)
Dept: NEUROLOGY | Facility: CLINIC | Age: 48
End: 2023-10-03
Payer: COMMERCIAL

## 2023-10-03 VITALS
HEART RATE: 63 BPM | DIASTOLIC BLOOD PRESSURE: 86 MMHG | SYSTOLIC BLOOD PRESSURE: 125 MMHG | BODY MASS INDEX: 22.53 KG/M2 | OXYGEN SATURATION: 97 % | RESPIRATION RATE: 12 BRPM | WEIGHT: 119.8 LBS

## 2023-10-03 DIAGNOSIS — G40.219 PARTIAL EPILEPSY WITH IMPAIRMENT OF CONSCIOUSNESS, WITH INTRACTABLE EPILEPSY (H): ICD-10-CM

## 2023-10-03 PROCEDURE — 99214 OFFICE O/P EST MOD 30 MIN: CPT | Performed by: PSYCHIATRY & NEUROLOGY

## 2023-10-03 RX ORDER — DIVALPROEX SODIUM 500 MG/1
500 TABLET, DELAYED RELEASE ORAL 2 TIMES DAILY
Qty: 60 TABLET | Refills: 11 | Status: SHIPPED | OUTPATIENT
Start: 2023-10-03 | End: 2024-03-11

## 2023-10-03 ASSESSMENT — PAIN SCALES - GENERAL: PAINLEVEL: NO PAIN (0)

## 2023-10-03 NOTE — LETTER
10/3/2023       RE: Heath Crawley  [address]  [place] [address]     Dear Colleague,    Thank you for referring your patient, Heath Crawley, to the [facility]. Please see a copy of my visit note below.      [facility]:  RETURN VISIT          Service Date: 10/03/2023    I spent 34 minutes in this patient care, with 20 minutes in direct patient contact, and 14 minutes in chart review and document preparation on the day of the visit.         Again, thank you for allowing me to participate in the care of your patient.      Sincerely,    Mitchell Naik MD

## 2023-10-03 NOTE — NURSING NOTE
Chief Complaint   Patient presents with    RECHECK     /86 (BP Location: Left arm, Patient Position: Sitting, Cuff Size: Adult Small)   Pulse 63   Resp 12   Wt 54.3 kg (119 lb 12.8 oz)   SpO2 97%   BMI 22.53 kg/m      MISTY RIDDLE
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
## 2023-10-11 ENCOUNTER — PATIENT OUTREACH (OUTPATIENT)
Dept: CARE COORDINATION | Facility: CLINIC | Age: 48
End: 2023-10-11
Payer: COMMERCIAL

## 2023-10-16 ENCOUNTER — PATIENT OUTREACH (OUTPATIENT)
Dept: CARE COORDINATION | Facility: CLINIC | Age: 48
End: 2023-10-16
Payer: COMMERCIAL

## 2023-10-16 NOTE — PROGRESS NOTES
Clinic Care Coordination Contact  Community Health Worker Follow Up    Care Gaps:   Health Maintenance Due   Topic Date Due    NICOTINE/TOBACCO CESSATION COUNSELING Q 1 YR  Never done    HEPATITIS B IMMUNIZATION (1 of 3 - 3-dose series) Never done    COVID-19 Vaccine (1) Never done    COLORECTAL CANCER SCREENING  Never done    HEPATITIS A IMMUNIZATION (1 of 2 - Risk 2-dose series) Never done    INFLUENZA VACCINE (1) 09/01/2023     Scheduled in January 2024 for preventive care visit.      Care Plan:   Care Plan: Neurology       Problem: Seizure episodes       Goal: Patient will attend his neurology appointment in the next 12 months.       Start Date: 4/28/2023 Expected End Date: 4/28/2024    Recent Progress: 50%    Note:     Barriers: language barrier, low literacy, noncompliance, and lack of knowledge how to navigate complex health care system  Strengths: motivated to attend appt  Patient expressed understanding of goal: Yes    Action steps to achieve this goal:  1. I will attend my follow up appointment with neurologist as rescheduled on 10/03/2023 at 4:15 PM. (Transportation arranged with [employer]). COMPLETED  2. I will attend my 3 months neurology appointment on 1/9/2023 at 3:45pm with Dr Mitchell Naik.   3. My wife will answer the phone when  calls for .  4. I will update CCC at outreach.     Mitchell Naik MD   [facility] NEUROLOGY - 3rd Floor       Department Address: [address] [place] [id number]   Department Phone: [phone]                           Intervention and Education during outreach:  -Patient declined eye and dental appointments but agreed for physical appointment with PCP.  -Patient did not pass for 1st time citizenship interview and waiting for 2nd interview.   -Patient will apply for SSI once become a [place] citizen.     [facility] Next Outreach: In one month.

## 2023-10-16 NOTE — PROGRESS NOTES
Clinic Care Coordination Contact  Care Coordination Clinician Chart Review    Situation: Patient chart reviewed by Care Coordinator.       Background: Care Coordination Program started: 2/20/2023. Initial assessment completed and patient-centered care plan(s) were developed with participation from patient. Lead CC handed patient off to CHW for continued outreaches.       Assessment: Per chart review, patient outreach completed by CC CHW on 9/7/2023.  Patient is actively working to accomplish goal(s). Patient's goal(s) appropriate and relevant at this time. Patient is not due for updated Plan of Care.  Assessments will be completed annually or as needed/with change of patient status. Patient attended his neurology appt on 10/3/2023 and 3 months follow up appointment scheduled on 1/9/2023 at 4:45pm. CHW to assist patient with dental and eye exam appts if patient agrees with upcoming outreach scheduled on 10/12/2023.       Care Plan: Neurology       Problem: Seizure episodes       Goal: Patient will attend his neurology appointment in the next 12 months.       Start Date: 4/28/2023 Expected End Date: 4/28/2024    Recent Progress: 50%    Note:     Barriers: language barrier, low literacy, noncompliance, and lack of knowledge how to navigate complex health care system  Strengths: motivated to attend appt  Patient expressed understanding of goal: Yes    Action steps to achieve this goal:  1. I will attend my follow up appointment with neurologist as rescheduled on 10/03/2023 at 4:15 PM. (Transportation arranged with [email or web address]). COMPLETED  2. I will attend my 3 months neurology appointment on 1/9/2023 at 3:45pm with Dr Mitchell Naik.   3. My wife will answer the phone when  calls for .  4. I will update CCC at outreach.     Mitchell Naik MD   [facility] - 3rd [facility]   3H    Department Address: [address] [place] [address]   Department Phone: [phone]                                   Plan/Recommendations: The patient will continue working with Care Coordination to achieve goal(s) as above. CHW will continue outreaches at minimum every 30 days and will involve Lead CC as needed or if patient is ready to move to Maintenance. Lead CC will continue to monitor CHW outreaches and patient's progress to goal(s) every 6 weeks.     Plan of Care updated and sent to patient: No

## 2023-11-22 ENCOUNTER — PATIENT OUTREACH (OUTPATIENT)
Dept: CARE COORDINATION | Facility: CLINIC | Age: 48
End: 2023-11-22
Payer: COMMERCIAL

## 2023-11-22 NOTE — PROGRESS NOTES
Clinic Care Coordination Contact  Community Health Worker Follow Up    Care Gaps:   Health Maintenance Due   Topic Date Due    NICOTINE/TOBACCO CESSATION COUNSELING Q 1 YR  Never done    HEPATITIS B IMMUNIZATION (1 of 3 - 3-dose series) Never done    COVID-19 Vaccine (1) Never done    COLORECTAL CANCER SCREENING  Never done    HEPATITIS A IMMUNIZATION (1 of 2 - Risk 2-dose series) Never done    INFLUENZA VACCINE (1) 09/01/2023     Scheduled for preventive care with PCP in January 2024.      Care Plan:   Care Plan: Neurology       Problem: Seizure episodes       Goal: Patient will attend his neurology appointment in the next 12 months.       Start Date: 4/28/2023 Expected End Date: 4/28/2024    This Visit's Progress: 60% Recent Progress: 50%    Note:     Barriers: language barrier, low literacy, noncompliance, and lack of knowledge how to navigate complex health care system  Strengths: motivated to attend appt  Patient expressed understanding of goal: Yes    Action steps to achieve this goal:  1. I will attend my follow up appointment with neurologist as rescheduled on 10/03/2023 at 4:15 PM. (Transportation arranged with [email or web address]). COMPLETED  2. I will attend my 3 months neurology appointment on 1/9/2023 at 3:45pm with Dr Mitchell Naik.   3. My wife will answer the phone when  calls for .  4. I will update CCC at outreach.     Mitchell Naik MD   [facility] - 3rd [facility]       Department Address: [address] [place] [address]   Department Phone: [phone]                           Intervention and Education during outreach:  -Patient will follow up with neurologist in January 2024 and CHW will assist with transportation at the next follow up outreach.   -Patient is receiving PCA services and [facility] services.   -Patient was informed to call with questions or concerns.     CHW Next Outreach: In one month.

## 2023-11-27 ENCOUNTER — PATIENT OUTREACH (OUTPATIENT)
Dept: CARE COORDINATION | Facility: CLINIC | Age: 48
End: 2023-11-27
Payer: COMMERCIAL

## 2023-11-27 NOTE — PROGRESS NOTES
Clinic Care Coordination Contact  Care Coordination Clinician Chart Review    Situation: Patient chart reviewed by Care Coordinator.       Background: Care Coordination Program started: 2/20/2023. Initial assessment completed and patient-centered care plan(s) were developed with participation from patient. Lead CC handed patient off to CHW for continued outreaches.       Assessment: Per chart review, patient outreach completed by CC CHW on 11/22/2023.  Patient is actively working to accomplish goal(s). Patient's goal(s) appropriate and relevant at this time. Patient is due for updated Plan of Care.  Assessments will be completed annually or as needed/with change of patient status. Patient is scheduled for preventative with PCP on 1/22024 and neurology on 1/9/2024. Goal up to date.       Care Plan: Neurology       Problem: Seizure episodes       Goal: Patient will attend his neurology appointment in the next 12 months.       Start Date: 4/28/2023 Expected End Date: 4/28/2024    This Visit's Progress: 60% Recent Progress: 50%    Note:     Barriers: language barrier, low literacy, noncompliance, and lack of knowledge how to navigate complex health care system  Strengths: motivated to attend appt  Patient expressed understanding of goal: Yes    Action steps to achieve this goal:  1. I will attend my follow up appointment with neurologist as rescheduled on 10/03/2023 at 4:15 PM. (Transportation arranged with [employer]). COMPLETED  2. I will attend my 3 months neurology appointment on 1/9/2023 at 3:45pm with Dr Mitchell Naik.   3. My wife will answer the phone when  calls for .  4. I will update CCC at outreach.     Mitchell Naik MD   [facility] - [address] [place] [address]    Department Address: [address] [place] [address]   Department Phone: [phone]                                  Plan/Recommendations: The patient will continue working with Care  Coordination to achieve goal(s) as above. CHW will continue outreaches at minimum every 30 days and will involve Lead CC as needed or if patient is ready to move to Maintenance. Lead CC will continue to monitor CHW outreaches and patient's progress to goal(s) every 6 weeks.     Plan of Care updated and sent to patient: Yes, via mail

## 2023-11-27 NOTE — LETTER
[facility]  Patient Centered Plan of Care  About Me:        Patient Name:  Heath Crawley    YOB: 1975  Age:         48 year old   Jayesh MRN:    [id number] Telephone Information:  Home Phone [phone]   Mobile [phone]       Address:  [address]  [place] [address] Email address:  No e-mail address on record      Emergency Contact(s)    Name Relationship Lgl Grd Work Phone Home Phone Mobile Phone   Lorie. JEANNE HAYWOOD. Spouse    [phone]           Primary language:  [place]     needed? Yes   [facility]:  [phone] op. 1  Other communication barriers:Language barrier; Physical impairment; Lack of coping    Preferred Method of Communication:     Current living arrangement: I live in a private home with family    Mobility Status/ Medical Equipment: Dependent/Assisted by Another        Health Maintenance  Health Maintenance Reviewed: Not assessed      My Access Plan  Medical Emergency 911   Primary Clinic Line [facility] [phone]   24 Hour Appointment Line [phone] or  [phone] ([phone]) (toll-free)   24 Hour Nurse Line [phone] (toll-free)   Preferred Urgent Care [facility] [phone]     Preferred Hospital [facility]  [phone]     Preferred Pharmacy [id number] - [place], [place] - [address] AT [facility]     Behavioral Health Crisis Line The National Suicide Prevention Lifeline at 1-551.893.4710 or Text/Call 318           My Care Team Members  Patient Care Team         Relationship Specialty Notifications Start End    Adrian Dyson MD PCP - General Family Medicine All results, Admissions 4/25/23     Phone: [phone] Fax: [phone]         [address] [place] [address]    Diana Arceo MD Assigned PCP   12/14/22     Phone: [phone] Fax: [phone]         [address] [place] [address]     Diana Arceo MD MD Family Medicine  12/30/22     Phone: [phone] Fax: [phone]         [address] [place] [address]    Kaleigh Emery PA-C Physician Assistant Gastroenterology  12/30/22     Phone: [phone] Fax: [phone]         [address] [place] [address]    Leventhal, Thomas Michael, MD MD Gastroenterology  2/13/23     Phone: [phone] Fax: [phone]         [address] [place] [address]    [facility] Worker   3/14/23     [employer]    Phone: [phone]         Leventhal, Thomas Michael, MD Assigned Gastroenterology Provider   4/15/23     Phone: [phone] Fax: [phone]         [address] [place] [address]    Misbah Mullins CHW Community Health Worker Primary Care - CC Admissions 4/28/23     Phone: [phone] Fax: [phone]         [address] [place] [address]    Oma Chaparro RN Lead Care Coordinator Primary Care - CC Admissions 4/28/23     Mitchell Naik MD Assigned Neuroscience Provider   5/27/23     Phone: [phone] Fax: [phone]         [address] [place] [address]    Randy Nu Personal Care Attendant PCA   8/4/23     PCA: 6 hours per day.    Phone: [phone]         Katherin Josue [facility] worker   8/9/23     ARM worker from [facility].    Phone: [phone] Fax: [phone]        [facility] [place], [address] [place], [place]   [address] Office:   [phone] Fax:   [phone]                My Care Plans  Self Management and Treatment Plan    Care Plan  Care Plan: Neurology       Problem: Seizure episodes       Goal: Patient will attend his neurology appointment in the next 12 months.       Start Date: 4/28/2023 Expected End Date: 4/28/2024    This Visit's Progress: 60% Recent Progress: 50%    Note:     Barriers: language barrier, low literacy, noncompliance, and lack of knowledge how to navigate complex health care system  Strengths: motivated to attend  appt  Patient expressed understanding of goal: Yes    Action steps to achieve this goal:  1. I will attend my follow up appointment with neurologist as rescheduled on 10/03/2023 at 4:15 PM. (Transportation arranged with [employer]). COMPLETED  2. I will attend my 3 months neurology appointment on 1/9/2023 at 3:45pm with Dr Mitchell Naik.   3. My wife will answer the phone when  calls for .  4. I will update CCC at outreach.     Mtichell Naik MD   [facility] 3rd [facility]       Department Address: [address] [place] [address]   Department Phone: [phone]                               Action Plans on File:                       Advance Care Plans/Directives:   Advanced Care Plan/Directives on file:   No    Discussed with patient/caregiver(s): No data recorded           My Medical and Care Information  Problem List   Patient Active Problem List   Diagnosis    Left displaced femoral neck fracture (H)    Non-verbal learning disorder    Dementia due to medical condition with behavioral disturbance (H)    Seizure disorder (H)    Amputation of right upper extremity, subsequent encounter    Hepatitis C virus infection, unspecified chronicity    Insomnia, unspecified type      Current Medications and Allergies:  See printed Medication Report.    Care Coordination Start Date: 2/20/2023   Frequency of Care Coordination: 6 weeks, more frequently as needed     Form Last Updated: 11/27/2023

## 2023-12-21 ENCOUNTER — PATIENT OUTREACH (OUTPATIENT)
Dept: CARE COORDINATION | Facility: CLINIC | Age: 48
End: 2023-12-21
Payer: COMMERCIAL

## 2023-12-21 NOTE — PROGRESS NOTES
Clinic Care Coordination Contact  Community Health Worker Follow Up    Care Gaps:   Health Maintenance Due   Topic Date Due    NICOTINE/TOBACCO CESSATION COUNSELING Q 1 YR  Never done    HEPATITIS B IMMUNIZATION (1 of 3 - 3-dose series) Never done    COVID-19 Vaccine (1) Never done    COLORECTAL CANCER SCREENING  Never done    HEPATITIS A IMMUNIZATION (1 of 2 - Risk 2-dose series) Never done    INFLUENZA VACCINE (1) 09/01/2023    YEARLY PREVENTIVE VISIT  12/27/2023     Scheduled on 1/02/2024 for preventive care visit      Care Plan:   Care Plan: Neurology       Problem: Seizure episodes       Goal: Patient will attend his neurology appointment in the next 12 months.       Start Date: 4/28/2023 Expected End Date: 4/28/2024    This Visit's Progress: 70% Recent Progress: 60%    Note:     Barriers: language barrier, low literacy, noncompliance, and lack of knowledge how to navigate complex health care system  Strengths: motivated to attend appt  Patient expressed understanding of goal: Yes    Action steps to achieve this goal:  1. I will attend my follow up appointment with neurologist as rescheduled on 10/03/2023 at 4:15 PM. (Transportation arranged with [employer]). COMPLETED  2. I will attend my 3 months neurology appointment on 1/9/2023 at 3:45pm with Dr Mitchell Naik. Transportation arranged with [employer].  3. My wife will answer the phone when  calls for .  4. I will update CCC at outreach.     Mitchell Naik MD   [facility] - 3rd Floor   3H    Department Address: [address] [place] [address]   Department Phone: [phone]                           Intervention and Education during outreach:  -CHW reminded patient of coming appointments and transportation arranged for all appointments.   -Patient continues receiving PCA services and County benefits with no change.   -Patient is out of medications, CHW called [facility] and  requested for more refills and delivery. Per pharmacy, it will refill them both and will probably deliver them either tomorrow or Tuesday.   -Patient was informed to call with questions or concerns.     CHW Next Outreach: In one month.

## 2024-01-02 ENCOUNTER — OFFICE VISIT (OUTPATIENT)
Dept: FAMILY MEDICINE | Facility: CLINIC | Age: 49
End: 2024-01-02
Payer: COMMERCIAL

## 2024-01-02 ENCOUNTER — LAB (OUTPATIENT)
Dept: FAMILY MEDICINE | Facility: CLINIC | Age: 49
End: 2024-01-02

## 2024-01-02 VITALS
WEIGHT: 113 LBS | SYSTOLIC BLOOD PRESSURE: 126 MMHG | TEMPERATURE: 98.5 F | BODY MASS INDEX: 22.19 KG/M2 | RESPIRATION RATE: 16 BRPM | OXYGEN SATURATION: 97 % | DIASTOLIC BLOOD PRESSURE: 83 MMHG | HEART RATE: 72 BPM | HEIGHT: 60 IN

## 2024-01-02 DIAGNOSIS — B18.2 CHRONIC HEPATITIS C WITHOUT HEPATIC COMA (H): ICD-10-CM

## 2024-01-02 DIAGNOSIS — Z01.84 IMMUNITY STATUS TESTING: ICD-10-CM

## 2024-01-02 DIAGNOSIS — Z12.11 SCREEN FOR COLON CANCER: ICD-10-CM

## 2024-01-02 DIAGNOSIS — G40.219 PARTIAL EPILEPSY WITH IMPAIRMENT OF CONSCIOUSNESS, WITH INTRACTABLE EPILEPSY (H): ICD-10-CM

## 2024-01-02 DIAGNOSIS — Z23 NEED FOR VACCINATION: ICD-10-CM

## 2024-01-02 DIAGNOSIS — G40.909 SEIZURE DISORDER (H): ICD-10-CM

## 2024-01-02 DIAGNOSIS — F10.21 ALCOHOL USE DISORDER, SEVERE, IN SUSTAINED REMISSION (H): ICD-10-CM

## 2024-01-02 DIAGNOSIS — F02.818 DEMENTIA DUE TO MEDICAL CONDITION WITH BEHAVIORAL DISTURBANCE (H): ICD-10-CM

## 2024-01-02 DIAGNOSIS — S48.911D AMPUTATION OF RIGHT UPPER EXTREMITY, SUBSEQUENT ENCOUNTER: ICD-10-CM

## 2024-01-02 DIAGNOSIS — B19.20 HEPATITIS C VIRUS INFECTION, UNSPECIFIED CHRONICITY: ICD-10-CM

## 2024-01-02 DIAGNOSIS — Z00.00 ENCOUNTER FOR ANNUAL PHYSICAL EXAM: Primary | ICD-10-CM

## 2024-01-02 PROCEDURE — 99214 OFFICE O/P EST MOD 30 MIN: CPT | Mod: 25 | Performed by: FAMILY MEDICINE

## 2024-01-02 PROCEDURE — 99396 PREV VISIT EST AGE 40-64: CPT | Performed by: FAMILY MEDICINE

## 2024-01-02 RX ORDER — DIVALPROEX SODIUM 500 MG/1
500 TABLET, DELAYED RELEASE ORAL 2 TIMES DAILY
Qty: 60 TABLET | Refills: 11 | Status: CANCELLED | OUTPATIENT
Start: 2024-01-02

## 2024-01-02 ASSESSMENT — ENCOUNTER SYMPTOMS
DYSURIA: 0
HEADACHES: 0
SHORTNESS OF BREATH: 0
MYALGIAS: 0
HEMATOCHEZIA: 0
COUGH: 0
DIARRHEA: 0
WEAKNESS: 0
JOINT SWELLING: 0
PARESTHESIAS: 0
CHILLS: 0
EYE PAIN: 0
NERVOUS/ANXIOUS: 0
FREQUENCY: 1
HEMATURIA: 0
FEVER: 0
CONSTIPATION: 0
HEARTBURN: 0
NAUSEA: 0
SORE THROAT: 0
PALPITATIONS: 0
ARTHRALGIAS: 0
DIZZINESS: 0
ABDOMINAL PAIN: 0

## 2024-01-02 NOTE — PROGRESS NOTES
SUBJECTIVE:   Heath is a 49 year old, presenting for the following:  Physical     Recheck Medication: Need refills on med. Son states med doesn't help. son reports when taking divalproex sodium delayed-release (DEPAKOTE), he can't sleep well at night and having poor appetite, they wonder if they can get new different kind of med.  Refills of the medication are available through 10/2024. Discussed refill process. Son states that patient has not missed any doses over the last two weeks. He also has a hard time sleeping at night and energy is reduced. The medication also reduced the appetite. Discussed that they an appointment with the neurologist on 1/9/2024 and they should bring this up with the neurologist at that time.       A professional [place] telephone  was utilized for the office visit.        1/2/2024     9:29 AM   Additional Questions   Roomed by soila rebollar MA   Accompanied by son       Healthy Habits:     Getting at least 3 servings of Calcium per day:  Yes    Bi-annual eye exam:  NO    Dental care twice a year:  NO    Sleep apnea or symptoms of sleep apnea:  None    Diet:  Regular (no restrictions)    Frequency of exercise:  None    Additional concerns today:  No    -Reviewed healthy eating and exercise.  -Skin cancer screening: No concerning skin changes expressed by patient.  -Smoking status:   Tobacco Use      Smoking status: Former        Types: Cigarettes        Passive exposure: Past      Smokeless tobacco: Former      -Family history: CAD, HTN, DM: none  Family History   Problem Relation Age of Onset    Coronary Artery Disease No family hx of     Diabetes No family hx of     Hypertension No family hx of        Preventative health recommendations, evaluation options, and risk/benefits of each were discussed with patient. Accepted recommendations were ordered. Otherwise, patient declined.  Health Maintenance Due   Topic Date Due    COVID-19 Vaccine (1) Never done    COLORECTAL CANCER  SCREENING  Never done    HEPATITIS A IMMUNIZATION (1 of 2 - Risk 2-dose series) Never done    INFLUENZA VACCINE (1) 09/01/2023    ANNUAL REVIEW OF HM ORDERS  12/27/2023    PHQ-2 (once per calendar year)  01/01/2024    YEARLY PREVENTIVE VISIT  12/27/2023     Patient is immune to hepatitis B based on titers from 2/9/2023.    Patient has a history of hepatitis C.  Hep C antibody was positive on 12/27/2022 and hep C RNA quantitative was also noted to be positive.  Patient has seen the gastroenterologist with the most recent appointment on 4/4/2023.  It does appear that patient had received the Mavyret medication (8-week course) based on notes from 4/28/2023, though unclear if patient actually started and completed the medication.  Patient would be due for retesting of hepatitis C RNA quant labs on 09/20/2023.  It does not appear that this lab was completed, so plan to complete today.    Uncertain if patient had complete the COVID 19 primary series. Discussed and they would like the updated COVID vaccine today.     Hepatitis C High Resolution Genotype  Order: [id number]  Status: Final result       Visible to patient: No (inaccessible in [employer])       Dx: Need for hepatitis C screening test    0 Result Notes      Component 1 yr ago   Hepatitis C High Resolution 6m   Comment: INTERPRETIVE INFORMATION: Hepatitis C High Resolution  Genotype          -Immunizations due were reviewed.    Immunization History   Administered Date(s) Administered    Influenza Vaccine >6 months,quad, PF 01/24/2023    Pneumococcal 20 valent Conjugate (Prevnar 20) 01/24/2023    TDAP (Adacel,Boostrix) 01/24/2023       -Labs: Laboratory recommendations reviewed with patient.    -Colon cancer screening: Last colonoscopy: Never completed.  Cologuard was ordered in 02/27/2023, though does not appear to have been completed.  Son states that patient had already had colon cancer screening, though is not sure when it was nor what type of screening it was.  "Reviewed recommendations to complete stool screening at this time.       Social History     Tobacco Use    Smoking status: Former     Types: Cigarettes     Passive exposure: Past    Smokeless tobacco: Former   Substance Use Topics    Alcohol use: Not on file           1/2/2024     9:28 AM   Alcohol Use   Prescreen: >3 drinks/day or >7 drinks/week? No     Last PSA: No results found for: \"PSA\"    Reviewed orders with patient. Reviewed health maintenance and updated orders accordingly - Yes.     Reviewed and updated as needed this visit by clinical staff   Tobacco  Allergies  Meds     Chuy Snell          Reviewed and updated as needed this visit by Provider         Chuy Snell         No past medical history on file.   No past surgical history on file.    Review of Systems   Constitutional:  Negative for chills and fever.   HENT:  Negative for congestion, ear pain, hearing loss and sore throat.    Eyes:  Negative for pain and visual disturbance.   Respiratory:  Negative for cough and shortness of breath.    Cardiovascular:  Negative for chest pain, palpitations and peripheral edema.   Gastrointestinal:  Negative for abdominal pain, constipation, diarrhea, heartburn, hematochezia and nausea.   Genitourinary:  Positive for frequency. Negative for dysuria, genital sores, hematuria, impotence, penile discharge and urgency.   Musculoskeletal:  Negative for arthralgias, joint swelling and myalgias.   Skin:  Negative for rash.   Neurological:  Negative for dizziness, weakness, headaches and paresthesias.   Psychiatric/Behavioral:  Negative for mood changes. The patient is not nervous/anxious.        OBJECTIVE:   /83   Pulse 72   Temp 98.5  F (36.9  C) (Oral)   Resp 16   Ht 1.535 m (5' 0.43\")   Wt 51.3 kg (113 lb)   SpO2 97%   BMI 21.75 kg/m      Physical Exam  GENERAL: healthy, alert and no distress. Patient does not speak during office visit. Son communicates for patient.   EYES: Eyes grossly normal to inspection, " PERRL and conjunctivae and sclerae normal  HENT: ear canals and TM's normal, nose and mouth without ulcers or lesions  NECK: no adenopathy, no asymmetry, masses, or scars and thyroid normal to palpation  RESP: lungs clear to auscultation - no rales, rhonchi or wheezes  CV: regular rate and rhythm, normal S1 S2, no S3 or S4, no murmur, click or rub, no peripheral edema and peripheral pulses strong  ABDOMEN: soft, nontender, no hepatosplenomegaly, no masses and bowel sounds normal  MS: Right arm amputation. Otherwise, no gross musculoskeletal defects noted, no edema  SKIN: no suspicious lesions or rashes  NEURO: Normal strength and tone, mentation intact and speech normal  PSYCH: mentation appears normal, affect normal/bright    Diagnostic Test Results:  Labs reviewed in Epic    ASSESSMENT/PLAN:     Patient Instructions:    -Continue taking the medications as prescribed.  -Please  more of the depakote medication every month.  There should be enough medication to last you through October 2024.  -Please see the neurologist on 1/9/2024 as scheduled.    -Continue to eat well.  Try to increase your servings of calcium as this can help your bones stay strong and healthy.  Follow a nutrition plan patient fruits and vegetables and low in fats and cholesterol.    -Be sure to eat 5-7 servings of fruits and vegetables each day.  -Find ways to stay active.  Try to get 150 minutes of moderate activity (where you are breathing faster and slightly sweating) each week.  -Try to maintain a body mass index (BMI) of 18.5-25 as this is considered a healthier weight range.  -Brush your teeth twice daily.  See a dentist every 6-12 months.  -Be sure to use sunblock with SPF 15 or greater when going outside for extended periods of time.  Sunblock should be used even when it is a cloudy day.  Do intermittent skin checks for any concerning skin changes.  Wearing a wide brimmed hat and sunglasses can also be helpful to protect your skin  from the sun.  -Monitor for any abnormal skin changes (such as new moles/spots, painful moles, changes in your old moles, wounds that will not heal, multiple colors noted in one lesion, lesions that are asymmetric or not circular, or anything that is concerning for you). If any of these are noted, please schedule an appointment to be seen.     -It is generally recommended for you to complete a health care directive or living will. These documents will be able to reflect your wishes and desire in the case that you are unable to express them yourself. Please let Dr. Dyson know if you would like some assistance with this process.    -For the preventive health procedure (such as colonoscopy, mammogram, etc) order from today, if you do not hear within a week's time from the specialist to schedule an appointment, please call the [facility] and speak with the specialty  to help you schedule the appointment. Depending on the specialist availability, it may be a number of weeks prior to your scheduled appointment.    Please seek immediate medical attention (go to the emergency room or urgent care) for the following reasons: worsening symptoms, or any concerning changes.      Heath was seen today for physical and recheck medication.  Diagnoses and all orders for this visit:    Encounter for annual physical exam  Partial epilepsy with impairment of consciousness, with intractable epilepsy (H)  Seizure disorder (H)  Dementia due to medical condition with behavioral disturbance (H)  Alcohol use disorder, severe, in sustained remission (H)  Amputation of right upper extremity, subsequent encounter: reported that antiseizure medications not helping and causing some GI side effects. Seeing neurology soon. Will defer to neurology for management. Recommended labs and vaccinations, though patient ultimately would not allow nursing staff to complete these. Discussed potentially completing labs at the hospital. Will defer to  patient/family on ultimate decision on completion of lab and immunizations.   -     Cancel: Hemoglobin A1c; Future  -     Basic metabolic panel; Future  -     CBC with Platelets & Differential; Future  -     Hepatic function panel; Future  -     Lipid panel reflex to direct LDL Fasting; Future  -     TSH with free T4 reflex; Future  -     Vitamin B1 whole blood; Future    Need for vaccination  -     COVID-19 12+ (2023-24) (PFIZER)  -     INFLUENZA VACCINE IM > 6 MONTHS VALENT IIV4 (AFLURIA/FLUZONE)    Immunity status testing  -     Hepatitis A Antibody Total; Future    Screen for colon cancer  -     TRESA(EXACT SCIENCES); Future    Chronic hepatitis C without hepatic coma (H)  Hepatitis C virus infection, unspecified chronicity  -     Hepatitis C RNA, Quantitative by PCR        Patient has been advised of split billing requirements and indicates understanding: Yes (by nurse)      COUNSELING:   Preventive health recommendations reviewed.        He reports that he has quit smoking. His smoking use included cigarettes. He has been exposed to tobacco smoke. He has quit using smokeless tobacco.        Adrian Dyson MD  [facility] [place] [address]  Phone: [phone]  Fax: [phone]    1/4/2024  5:58 AM

## 2024-01-02 NOTE — PATIENT INSTRUCTIONS
-Thank you for choosing the [facility].  -It was a pleasure to see you today.  -Please take a look at the information below for more specific details regarding the treatment plan and recommendations.  -In this after visit summary is a list of your medications and specific instructions.  Please review this carefully as there may be changes made to your medication list.  -If there are any particular questions or concerns, please feel free to reach out to Dr. Dyson.  -If any labs have been completed, we will reach out to you about results.  If the results are normal or not concerning, a letter or MyChart message will be sent to you.  If any follow-up is needed, either Dr. Dyson or the nurse will give you a call.  If you have not heard regarding results after 2 weeks, please reach out to the clinic.    Patient Instructions:    -Continue taking the medications as prescribed.  -Please  more of the depakote medication every month.  There should be enough medication to last you through October 2024.  -Please see the neurologist on 1/9/2024 as scheduled.    -Continue to eat well.  Try to increase your servings of calcium as this can help your bones stay strong and healthy.  Follow a nutrition plan patient fruits and vegetables and low in fats and cholesterol.    -Be sure to eat 5-7 servings of fruits and vegetables each day.  -Find ways to stay active.  Try to get 150 minutes of moderate activity (where you are breathing faster and slightly sweating) each week.  -Try to maintain a body mass index (BMI) of 18.5-25 as this is considered a healthier weight range.  -Brush your teeth twice daily.  See a dentist every 6-12 months.  -Be sure to use sunblock with SPF 15 or greater when going outside for extended periods of time.  Sunblock should be used even when it is a cloudy day.  Do intermittent skin checks for any concerning skin changes.  Wearing a wide brimmed hat and sunglasses can also be helpful  to protect your skin from the sun.  -Monitor for any abnormal skin changes (such as new moles/spots, painful moles, changes in your old moles, wounds that will not heal, multiple colors noted in one lesion, lesions that are asymmetric or not circular, or anything that is concerning for you). If any of these are noted, please schedule an appointment to be seen.     -It is generally recommended for you to complete a health care directive or living will. These documents will be able to reflect your wishes and desire in the case that you are unable to express them yourself. Please let Dr. Dyson know if you would like some assistance with this process.    -For the preventive health procedure (such as colonoscopy, mammogram, etc) order from today, if you do not hear within a week's time from the specialist to schedule an appointment, please call the [facility] and speak with the specialty  to help you schedule the appointment. Depending on the specialist availability, it may be a number of weeks prior to your scheduled appointment.    Please seek immediate medical attention (go to the emergency room or urgent care) for the following reasons: worsening symptoms, or any concerning changes.      --------------------------------------------------------------------------------------------------------------------    -We are always looking for ways to improve.  You may be selected to receive a survey regarding your visit today.  We encourage you to complete the survey and provide specific, constructive feedback to help us improve our processes.  Thank you for your time!  -Please review the contact information listed on the after visit summary and in the electronic chart.  Below is the phone number that we have on file.  If there are any changes that are needed to be made, please reach out to the clinic.  [phone] (home)

## 2024-01-04 NOTE — COMMUNITY RESOURCES LIST (ENGLISH)
01/04/2024   [facility]  N/A  For questions about this resource list or additional care needs, please contact your primary care clinic or care manager.  Phone: [phone]   Email: N/A   Address: [address] [place] [id number]   Hours: N/A        Transportation       Free or low-cost transportation  1  [employer] [place] [employer] Bus Distance: 4.03 miles      In-Person   [address] [place] [address]  Language: English  Hours: Tue 9:00 AM - 2:00 PM  Fees: Self Pay   Phone: (339) 314-3083 Email: info@[employer] Website: http://www.[employer].org/     2  [employer] Bus [employer] - Free or low-cost transportation Distance: 7.11 miles      In-Person   [address] [place] [address]  Language: English  Hours: Mon - Fri 9:00 AM - 5:00 PM  Fees: Free   Phone: (601) 211-3426 Email: info@[employer] Website: https://www.[employer]/     Transportation to medical appointments  3  [employer] - Non-Emergency Medical Transportation Distance: 3.07 miles      In-Person   [address] [place] [address]  Language: English  Hours: Mon - Fri 8:00 AM - 4:00 PM Appt. Only  Fees: Self Pay   Phone: (174) 176-6486 Website: http://www.[employer].org/Medical-Services/Emergency-medical-services/Non-emergency-transportation/     4  [employer] Ride Distance: 5.14 miles      In-Person   [address] [address]  Language: English  Hours: Mon - Thu 6:00 AM - 6:00 PM , Fri 6:00 AM - 5:00 PM  Fees: Insurance, Self Pay   Phone: (883) 103-6168 Email: office@[employer] Website: https://www.[employer]/          Important Numbers & Websites       Emergency Services   911  City Services   311  Poison Control   (514) 654-3599  Suicide Prevention Lifeline   (502) 506-2347 (TALK)  Child Abuse Hotline   (653) 884-3445 (4-A-Child)  Sexual Assault Hotline   (847) 313-7272 (HOPE)  National Runaway Safeline   (471) 825-1400  (RUNAWAY)  [employer] Talkline   (892) 127-9469  Substance Abuse Referral   (418) 292-3162 (HELP)

## 2024-01-08 ENCOUNTER — PATIENT OUTREACH (OUTPATIENT)
Dept: CARE COORDINATION | Facility: CLINIC | Age: 49
End: 2024-01-08
Payer: COMMERCIAL

## 2024-01-08 NOTE — PROGRESS NOTES
Clinic Care Coordination Contact  Follow Up Progress Note      Assessment: CCRN spoke with patient's spouse ( C2C on file) and reminded her of patient's appt tomorrow with his neurologist at 3:45pm. CCRN plans to complete yearly re-assessment by next oureach.    Care Gaps:    Health Maintenance Due   Topic Date Due    COVID-19 Vaccine (1) Never done    COLORECTAL CANCER SCREENING  Never done    HEPATITIS A IMMUNIZATION (1 of 2 - Risk 2-dose series) Never done    INFLUENZA VACCINE (1) 09/01/2023    PHQ-2 (once per calendar year)  01/01/2024       Postponed to next CHW outreach.      Care Plans  Care Plan: Neurology       Problem: Seizure episodes       Goal: Patient will attend his neurology appointment in the next 12 months.       Start Date: 4/28/2023 Expected End Date: 4/28/2024    This Visit's Progress: 40% Recent Progress: 70%    Note:     Barriers: language barrier, low literacy, noncompliance, and lack of knowledge how to navigate complex health care system  Strengths: motivated to attend appt  Patient expressed understanding of goal: Yes    Action steps to achieve this goal:  1. I will attend my follow up appointment with neurologist as rescheduled on 10/03/2023 at 4:15 PM. (Transportation arranged with [employer]). COMPLETED  2. I will attend my 3 months neurology appointment on 1/9/2023 at 3:45pm with Dr Mitchell Naik. Transportation arranged with [employer].  3. My wife will answer the phone when  calls for .  4. I will update CCC at outreach.     Mitchell Naik MD   [facility] - 3rd Floor       Department Address: [address] [place] [id number]   Department Phone: [phone]                           Outreach Frequency: 6 weeks, more frequently as needed    Plan: Patient will attend his neurologist appt on 1/9/2024 at 3:45pm. Ride already set up through [employer] by CHW.

## 2024-01-18 ENCOUNTER — PATIENT OUTREACH (OUTPATIENT)
Dept: CARE COORDINATION | Facility: CLINIC | Age: 49
End: 2024-01-18
Payer: COMMERCIAL

## 2024-01-18 NOTE — PROGRESS NOTES
Clinic Care Coordination Contact  Community Health Worker Follow Up    Care Gaps:   Health Maintenance Due   Topic Date Due    COVID-19 Vaccine (1) Never done    COLORECTAL CANCER SCREENING  Never done    HEPATITIS A IMMUNIZATION (1 of 2 - Risk 2-dose series) Never done    INFLUENZA VACCINE (1) 09/01/2023    PHQ-2 (once per calendar year)  01/01/2024     PCP to address other medical care gaps at the next follow up visit.    Care Plan:   Care Plan: Neurology       Problem: Seizure episodes       Goal: Patient will attend his neurology appointment in the next 12 months.       Start Date: 4/28/2023 Expected End Date: 4/28/2024    This Visit's Progress: 50% Recent Progress: 40%    Note:     Barriers: language barrier, low literacy, noncompliance, and lack of knowledge how to navigate complex health care system  Strengths: motivated to attend appt  Patient expressed understanding of goal: Yes    Action steps to achieve this goal:  1. I will attend my follow up appointment with neurologist as rescheduled on 10/03/2023 at 4:15 PM. (Transportation arranged with [employer]). COMPLETED  2. I will attend my 3 months neurology appointment on 1/9/2023 at 3:45pm with Dr Mitchell Naik. Patient went but was told no appt and assisted patient rescheduled on 3/11/2024.  3. My wife will answer the phone when  calls for .  4. I will update CCC at outreach.     Mitchell Naik MD   [facility]OLOG[facility] - 3rd Floor   3H    Department Address: [address] [place] [id number]   Department Phone: [phone]                           Intervention and Education during outreach:  -Per spouse, patient went to appointment accompanied by spouse and was told there was no appointment and patient came home. CHW assisted and rescheduled on 3/11/2024.  -Patient continues receiving PCA services and [place] benefits with no change.   -Patient and family were informed to call with questions or  concerns.     CHW Next Outreach: In one month.

## 2024-02-20 ENCOUNTER — PATIENT OUTREACH (OUTPATIENT)
Dept: CARE COORDINATION | Facility: CLINIC | Age: 49
End: 2024-02-20
Payer: COMMERCIAL

## 2024-02-20 NOTE — PROGRESS NOTES
Clinic Care Coordination Contact  Clinic Care Coordination Contact  OUTREACH    Referral Information:  Referral Source: PCP    Primary Diagnosis: Neurological Disorders    Chief Complaint   Patient presents with    Clinic Care Coordination - Follow-up     Yearly re-assessment      Clinic Utilization  Difficulty keeping appointments:: Yes  Compliance Concerns: Yes  No-Show Concerns: Yes  No PCP office visit in Past Year: No  Utilization      No Show Count (past year)  15             ED Visits  0             Hospital Admissions  0                    Current as of: 2/19/2024 10:56 PM            Clinical Concerns:  CCRN spoke with spouse today. Has C2C on file. Spouse wasn't home and patient didn't answer his phone. Spouse states patient is doing fine. Completed yearly re-assessment today. Spouse would like neurology appt reminder 2-3 days prior to appt and need ride. Moved CHW outreach from 2/21 to 3/5 to assist. CHW also to assist with yearly dental and eye appt if patient agrees with upcoming outreach.       Pain  Pain (GOAL):: No  Health Maintenance Reviewed: Due/Overdue   Clinical Pathway: None    Medication Management:  Medication review status: Medications reviewed and no changes reported per patient.           Functional Status:  Dependent ADLs:: Bathing, Dressing, Grooming, Toileting, Transfers  Dependent IADLs:: Cleaning, Cooking, Laundry, Shopping, Meal Preparation, Medication Management, Money Management, Transportation, Incontinence  Bed or wheelchair confined:: No  Mobility Status: Dependent/Assisted by Another  Fallen 2 or more times in the past year?: No  Any fall with injury in the past year?: No    Living Situation:  Current living arrangement:: I live in a private home with family  Type of residence:: Apartment    Lifestyle & Psychosocial Needs:    Social Determinants of Health     Food Insecurity: Low Risk  (1/2/2024)    Food Insecurity     Within the past 12 months, did you worry that your food  would run out before you got money to buy more?: No     Within the past 12 months, did the food you bought just not last and you didn t have money to get more?: No   Depression: Not at risk (1/24/2023)    PHQ-2     PHQ-2 Score: 0   Housing Stability: Low Risk  (1/2/2024)    Housing Stability     Do you have housing? : Yes     Are you worried about losing your housing?: No   Tobacco Use: Medium Risk (1/2/2024)    Patient History     Smoking Tobacco Use: Former     Smokeless Tobacco Use: Former     Passive Exposure: Past   Financial Resource Strain: Low Risk  (1/2/2024)    Financial Resource Strain     Within the past 12 months, have you or your family members you live with been unable to get utilities (heat, electricity) when it was really needed?: No   Alcohol Use: Not on file   Transportation Needs: High Risk (1/2/2024)    Transportation Needs     Within the past 12 months, has lack of transportation kept you from medical appointments, getting your medicines, non-medical meetings or appointments, work, or from getting things that you need?: Yes   Physical Activity: Not on file   Interpersonal Safety: Not on file   Stress: Not on file   Social Connections: Not on file     Diet:: Regular  Inadequate nutrition (GOAL):: No  Tube Feeding: No  Inadequate activity/exercise (GOAL):: No  Significant changes in sleep pattern (GOAL): No  Transportation means:: Medical transport     Jewish or spiritual beliefs that impact treatment:: No  Mental health DX:: No  Mental health management concern (GOAL):: No  Chemical Dependency Status: No Current Concerns  Informal Support system:: [place] based, Family, Spouse      Resources and Interventions:  Current Resources:      Community Resources: County Programs, County Worker, [facility], ARM  Supplies Currently Used at Home: None  Equipment Currently Used at Home: none  Employment Status: disabled    Advance Care Plan/Directive  Advanced Care Plans/Directives on file:: No    Referrals  Placed: None    Care Plan:  Care Plan: Neurology       Problem: Seizure episodes       Goal: Patient will attend his neurology appointment in the next 12 months.       Start Date: 4/28/2023 Expected End Date: 4/28/2024    This Visit's Progress: 50% Recent Progress: 40%    Note:     Barriers: language barrier, low literacy, noncompliance, and lack of knowledge how to navigate complex health care system  Strengths: motivated to attend appt  Patient expressed understanding of goal: Yes    Action steps to achieve this goal:  1. I will attend my follow up appointment with neurologist as rescheduled on 10/03/2023 at 4:15 PM. (Transportation arranged with [employer]). COMPLETED  2. I will attend my 3 months neurology appointment on 1/9/2023 at 3:45pm with Dr Mitchell Naik. Patient went but was told no appt and assisted patient rescheduled on 3/11/2024.  3. My wife will answer the phone when  calls for .  4. I will update CCC at outreach.     Mitchell Naik MD   [facility] - [facility]       Department Address: [address] [place] [id number]   Department Phone: [phone]                               Outreach Frequency: 6 weeks, more frequently as needed  Future Appointments                In 1 week SPRO CCC RN [facility] AURELIO Cardoza SPRO    In 2 weeks  LAB [facility]    In 2 weeks Mitchell Naik MD [facility] Neurology Clinic [facility]            Plan: CHW to set up ride from appt on 3/11. Moved CHW outreach from 2./21 to 3/5.

## 2024-03-02 PROCEDURE — 99207 PR CDG-CUT & PASTE-POTENTIAL IMPACT ON LEVEL: CPT | Performed by: HOSPITALIST

## 2024-03-04 DIAGNOSIS — B18.2 CHRONIC HEPATITIS C WITHOUT HEPATIC COMA (H): Primary | ICD-10-CM

## 2024-03-05 ENCOUNTER — PATIENT OUTREACH (OUTPATIENT)
Dept: CARE COORDINATION | Facility: CLINIC | Age: 49
End: 2024-03-05
Payer: COMMERCIAL

## 2024-03-05 NOTE — PROGRESS NOTES
Clinic Care Coordination Contact  Community Health Worker Follow Up    Care Gaps:   Health Maintenance Due   Topic Date Due    COLORECTAL CANCER SCREENING  Never done    HEPATITIS A IMMUNIZATION (1 of 2 - Risk 2-dose series) Never done    INFLUENZA VACCINE (1) 09/01/2023    COVID-19 Vaccine (1 - 2023-24 season) Never done    PHQ-2 (once per calendar year)  01/01/2024     PCP to discuss other care gaps with patient at the next follow up visit.    Care Plan:   Care Plan: Neurology       Problem: Seizure episodes       Goal: Patient will attend his neurology appointment in the next 12 months.       Start Date: 4/28/2023 Expected End Date: 4/28/2024    This Visit's Progress: 60% Recent Progress: 50%    Note:     Barriers: language barrier, low literacy, noncompliance, and lack of knowledge how to navigate complex health care system  Strengths: motivated to attend appt  Patient expressed understanding of goal: Yes    Action steps to achieve this goal:  1. I will attend my follow up appointment with neurologist as rescheduled on 10/03/2023 at 4:15 PM. (Transportation arranged with [employer]). COMPLETED  2. I will attend my 3 months neurology appointment on 1/9/2023 at 3:45pm with Dr Mitchell Naik. Patient went but was told no appt and assisted patient rescheduled on 3/11/2024. Reminded  3. My wife will answer the phone when  calls for .  4. I will update CCC at outreach.     Mitchell Naik MD   [facility] - [address]    Department Address: [address] [place] [id number]   Department Phone: [phone]                           Intervention and Education during outreach:  -CHW reminded patient of upcoming appointment and ride arranged with [email or web address].  -Patient continues receiving PCA services and County benefits with no change.   -Patient declined dental and eye appointments at this time.    CHW Next Outreach: In one month.    AMS (overly happy, verbose, AAOx1 only) and hypoglycemia to 27

## 2024-03-11 ENCOUNTER — HOSPITAL ENCOUNTER (OUTPATIENT)
Facility: CLINIC | Age: 49
Setting detail: OBSERVATION
Discharge: HOME | End: 2024-05-08
Attending: EMERGENCY MEDICINE | Admitting: FAMILY MEDICINE
Payer: COMMERCIAL

## 2024-03-11 ENCOUNTER — OFFICE VISIT (OUTPATIENT)
Dept: NEUROLOGY | Facility: CLINIC | Age: 49
End: 2024-03-11
Payer: COMMERCIAL

## 2024-03-11 ENCOUNTER — APPOINTMENT (OUTPATIENT)
Dept: CT IMAGING | Facility: CLINIC | Age: 49
End: 2024-03-11
Attending: EMERGENCY MEDICINE
Payer: COMMERCIAL

## 2024-03-11 ENCOUNTER — DOCUMENTATION ONLY (OUTPATIENT)
Dept: ENDOCRINOLOGY | Facility: CLINIC | Age: 49
End: 2024-03-11

## 2024-03-11 ENCOUNTER — LAB (OUTPATIENT)
Dept: LAB | Facility: CLINIC | Age: 49
End: 2024-03-11
Payer: COMMERCIAL

## 2024-03-11 VITALS
HEIGHT: 61 IN | WEIGHT: 113.2 LBS | BODY MASS INDEX: 21.37 KG/M2 | OXYGEN SATURATION: 97 % | HEART RATE: 68 BPM | DIASTOLIC BLOOD PRESSURE: 91 MMHG | SYSTOLIC BLOOD PRESSURE: 130 MMHG

## 2024-03-11 DIAGNOSIS — F81.89 NON-VERBAL LEARNING DISORDER: ICD-10-CM

## 2024-03-11 DIAGNOSIS — G40.219 PARTIAL EPILEPSY WITH LOSS OF CONSCIOUSNESS, INTRACTABLE (H): ICD-10-CM

## 2024-03-11 DIAGNOSIS — F10.10 ALCOHOL ABUSE: ICD-10-CM

## 2024-03-11 DIAGNOSIS — Z00.00 ENCOUNTER FOR ANNUAL PHYSICAL EXAM: ICD-10-CM

## 2024-03-11 DIAGNOSIS — F02.818 DEMENTIA DUE TO MEDICAL CONDITION WITH BEHAVIORAL DISTURBANCE (H): ICD-10-CM

## 2024-03-11 DIAGNOSIS — G40.219 PARTIAL EPILEPSY WITH IMPAIRMENT OF CONSCIOUSNESS, WITH INTRACTABLE EPILEPSY (H): ICD-10-CM

## 2024-03-11 DIAGNOSIS — F69 BEHAVIOR PROBLEM, ADULT: ICD-10-CM

## 2024-03-11 DIAGNOSIS — F02.818 DEMENTIA DUE TO MEDICAL CONDITION WITH BEHAVIORAL DISTURBANCE (H): Primary | ICD-10-CM

## 2024-03-11 DIAGNOSIS — Z78.9 UNABLE TO CARE FOR SELF: ICD-10-CM

## 2024-03-11 DIAGNOSIS — F10.21 ALCOHOL USE DISORDER, SEVERE, IN SUSTAINED REMISSION (H): Primary | ICD-10-CM

## 2024-03-11 DIAGNOSIS — B18.2 CHRONIC HEPATITIS C WITHOUT HEPATIC COMA (H): ICD-10-CM

## 2024-03-11 DIAGNOSIS — R45.6 VIOLENT BEHAVIOR: ICD-10-CM

## 2024-03-11 LAB
ALBUMIN SERPL BCG-MCNC: 4.5 G/DL (ref 3.5–5.2)
ALP SERPL-CCNC: 76 U/L (ref 40–150)
ALT SERPL W P-5'-P-CCNC: 6 U/L (ref 0–70)
AMPHETAMINES UR QL SCN: NORMAL
ANION GAP SERPL CALCULATED.3IONS-SCNC: 9 MMOL/L (ref 7–15)
AST SERPL W P-5'-P-CCNC: 19 U/L (ref 0–45)
BARBITURATES UR QL SCN: NORMAL
BASOPHILS # BLD AUTO: 0.1 10E3/UL (ref 0–0.2)
BASOPHILS NFR BLD AUTO: 1 %
BENZODIAZ UR QL SCN: NORMAL
BILIRUB DIRECT SERPL-MCNC: <0.2 MG/DL (ref 0–0.3)
BILIRUB SERPL-MCNC: 0.3 MG/DL
BUN SERPL-MCNC: 5.5 MG/DL (ref 6–20)
BZE UR QL SCN: NORMAL
CALCIUM SERPL-MCNC: 9.2 MG/DL (ref 8.6–10)
CANNABINOIDS UR QL SCN: NORMAL
CHLORIDE SERPL-SCNC: 96 MMOL/L (ref 98–107)
CHOLEST SERPL-MCNC: 147 MG/DL
CREAT SERPL-MCNC: 0.76 MG/DL (ref 0.67–1.17)
DEPRECATED HCO3 PLAS-SCNC: 27 MMOL/L (ref 22–29)
EGFRCR SERPLBLD CKD-EPI 2021: >90 ML/MIN/1.73M2
EOSINOPHIL # BLD AUTO: 0.6 10E3/UL (ref 0–0.7)
EOSINOPHIL NFR BLD AUTO: 7 %
ERYTHROCYTE [DISTWIDTH] IN BLOOD BY AUTOMATED COUNT: 12.6 % (ref 10–15)
FASTING STATUS PATIENT QL REPORTED: NORMAL
FENTANYL UR QL: NORMAL
GLUCOSE SERPL-MCNC: 97 MG/DL (ref 70–99)
HAV AB SER QL IA: REACTIVE
HBA1C MFR BLD: 5.6 %
HCT VFR BLD AUTO: 41.2 % (ref 40–53)
HDLC SERPL-MCNC: 48 MG/DL
HGB BLD-MCNC: 13.7 G/DL (ref 13.3–17.7)
IMM GRANULOCYTES # BLD: 0 10E3/UL
IMM GRANULOCYTES NFR BLD: 0 %
LDLC SERPL CALC-MCNC: 78 MG/DL
LYMPHOCYTES # BLD AUTO: 2.3 10E3/UL (ref 0.8–5.3)
LYMPHOCYTES NFR BLD AUTO: 29 %
MCH RBC QN AUTO: 29 PG (ref 26.5–33)
MCHC RBC AUTO-ENTMCNC: 33.3 G/DL (ref 31.5–36.5)
MCV RBC AUTO: 87 FL (ref 78–100)
MONOCYTES # BLD AUTO: 0.7 10E3/UL (ref 0–1.3)
MONOCYTES NFR BLD AUTO: 9 %
NEUTROPHILS # BLD AUTO: 4.3 10E3/UL (ref 1.6–8.3)
NEUTROPHILS NFR BLD AUTO: 54 %
NONHDLC SERPL-MCNC: 99 MG/DL
NRBC # BLD AUTO: 0 10E3/UL
NRBC BLD AUTO-RTO: 0 /100
OPIATES UR QL SCN: NORMAL
PCP QUAL URINE (ROCHE): NORMAL
PLATELET # BLD AUTO: 246 10E3/UL (ref 150–450)
POTASSIUM SERPL-SCNC: 4.1 MMOL/L (ref 3.4–5.3)
PROT SERPL-MCNC: 7.8 G/DL (ref 6.4–8.3)
RBC # BLD AUTO: 4.72 10E6/UL (ref 4.4–5.9)
SODIUM SERPL-SCNC: 132 MMOL/L (ref 135–145)
TRIGL SERPL-MCNC: 104 MG/DL
TSH SERPL DL<=0.005 MIU/L-ACNC: 1.49 UIU/ML (ref 0.3–4.2)
WBC # BLD AUTO: 7.9 10E3/UL (ref 4–11)

## 2024-03-11 PROCEDURE — 99285 EMERGENCY DEPT VISIT HI MDM: CPT | Mod: 25 | Performed by: EMERGENCY MEDICINE

## 2024-03-11 PROCEDURE — 84443 ASSAY THYROID STIM HORMONE: CPT | Performed by: FAMILY MEDICINE

## 2024-03-11 PROCEDURE — 70450 CT HEAD/BRAIN W/O DYE: CPT

## 2024-03-11 PROCEDURE — 82248 BILIRUBIN DIRECT: CPT | Performed by: FAMILY MEDICINE

## 2024-03-11 PROCEDURE — 120N000002 HC R&B MED SURG/OB UMMC

## 2024-03-11 PROCEDURE — 250N000013 HC RX MED GY IP 250 OP 250 PS 637

## 2024-03-11 PROCEDURE — 99000 SPECIMEN HANDLING OFFICE-LAB: CPT | Performed by: PATHOLOGY

## 2024-03-11 PROCEDURE — 80053 COMPREHEN METABOLIC PANEL: CPT | Performed by: FAMILY MEDICINE

## 2024-03-11 PROCEDURE — 99215 OFFICE O/P EST HI 40 MIN: CPT | Performed by: PSYCHIATRY & NEUROLOGY

## 2024-03-11 PROCEDURE — 87522 HEPATITIS C REVRS TRNSCRPJ: CPT | Performed by: FAMILY MEDICINE

## 2024-03-11 PROCEDURE — 86708 HEPATITIS A ANTIBODY: CPT | Performed by: FAMILY MEDICINE

## 2024-03-11 PROCEDURE — 99417 PROLNG OP E/M EACH 15 MIN: CPT | Performed by: PSYCHIATRY & NEUROLOGY

## 2024-03-11 PROCEDURE — 36415 COLL VENOUS BLD VENIPUNCTURE: CPT | Performed by: FAMILY MEDICINE

## 2024-03-11 PROCEDURE — 99223 1ST HOSP IP/OBS HIGH 75: CPT | Mod: AI

## 2024-03-11 PROCEDURE — 80307 DRUG TEST PRSMV CHEM ANLYZR: CPT | Performed by: EMERGENCY MEDICINE

## 2024-03-11 PROCEDURE — 85025 COMPLETE CBC W/AUTO DIFF WBC: CPT | Performed by: FAMILY MEDICINE

## 2024-03-11 PROCEDURE — 84425 ASSAY OF VITAMIN B-1: CPT | Mod: 90 | Performed by: FAMILY MEDICINE

## 2024-03-11 PROCEDURE — 83036 HEMOGLOBIN GLYCOSYLATED A1C: CPT | Performed by: FAMILY MEDICINE

## 2024-03-11 PROCEDURE — 99285 EMERGENCY DEPT VISIT HI MDM: CPT | Performed by: EMERGENCY MEDICINE

## 2024-03-11 PROCEDURE — 80061 LIPID PANEL: CPT | Performed by: FAMILY MEDICINE

## 2024-03-11 RX ORDER — ACETAMINOPHEN 325 MG/1
650 TABLET ORAL EVERY 6 HOURS PRN
Status: DISCONTINUED | OUTPATIENT
Start: 2024-03-11 | End: 2024-04-19

## 2024-03-11 RX ORDER — AMOXICILLIN 250 MG
2 CAPSULE ORAL 2 TIMES DAILY PRN
Status: DISCONTINUED | OUTPATIENT
Start: 2024-03-11 | End: 2024-04-26

## 2024-03-11 RX ORDER — PROCHLORPERAZINE MALEATE 5 MG
10 TABLET ORAL EVERY 6 HOURS PRN
Status: DISCONTINUED | OUTPATIENT
Start: 2024-03-11 | End: 2024-05-08 | Stop reason: HOSPADM

## 2024-03-11 RX ORDER — AMOXICILLIN 250 MG
1 CAPSULE ORAL 2 TIMES DAILY PRN
Status: DISCONTINUED | OUTPATIENT
Start: 2024-03-11 | End: 2024-04-26

## 2024-03-11 RX ORDER — DIVALPROEX SODIUM 250 MG/1
500 TABLET, DELAYED RELEASE ORAL 2 TIMES DAILY
Status: DISCONTINUED | OUTPATIENT
Start: 2024-03-11 | End: 2024-03-12

## 2024-03-11 RX ORDER — THIAMINE HYDROCHLORIDE 100 MG/ML
500 INJECTION, SOLUTION INTRAMUSCULAR; INTRAVENOUS 3 TIMES DAILY
Qty: 30 ML | Refills: 0 | Status: DISCONTINUED | OUTPATIENT
Start: 2024-03-11 | End: 2024-03-13

## 2024-03-11 RX ORDER — ONDANSETRON 2 MG/ML
4 INJECTION INTRAMUSCULAR; INTRAVENOUS EVERY 6 HOURS PRN
Status: DISCONTINUED | OUTPATIENT
Start: 2024-03-11 | End: 2024-05-08 | Stop reason: HOSPADM

## 2024-03-11 RX ORDER — THIAMINE HYDROCHLORIDE 100 MG/ML
250 INJECTION, SOLUTION INTRAMUSCULAR; INTRAVENOUS DAILY
Qty: 12.5 ML | Refills: 0 | Status: DISCONTINUED | OUTPATIENT
Start: 2024-03-14 | End: 2024-03-13

## 2024-03-11 RX ORDER — MINERAL OIL/HYDROPHIL PETROLAT
OINTMENT (GRAM) TOPICAL 3 TIMES DAILY
Status: DISCONTINUED | OUTPATIENT
Start: 2024-03-11 | End: 2024-05-08 | Stop reason: HOSPADM

## 2024-03-11 RX ORDER — CALCIUM CARBONATE 500 MG/1
1000 TABLET, CHEWABLE ORAL 4 TIMES DAILY PRN
Status: DISCONTINUED | OUTPATIENT
Start: 2024-03-11 | End: 2024-05-08 | Stop reason: HOSPADM

## 2024-03-11 RX ORDER — ONDANSETRON 4 MG/1
4 TABLET, ORALLY DISINTEGRATING ORAL EVERY 6 HOURS PRN
Status: DISCONTINUED | OUTPATIENT
Start: 2024-03-11 | End: 2024-05-08 | Stop reason: HOSPADM

## 2024-03-11 RX ORDER — DIVALPROEX SODIUM 500 MG/1
500 TABLET, DELAYED RELEASE ORAL 2 TIMES DAILY
Qty: 60 TABLET | Refills: 11 | Status: SHIPPED | OUTPATIENT
Start: 2024-03-11 | End: 2024-06-25

## 2024-03-11 RX ORDER — PROCHLORPERAZINE 25 MG
25 SUPPOSITORY, RECTAL RECTAL EVERY 12 HOURS PRN
Status: DISCONTINUED | OUTPATIENT
Start: 2024-03-11 | End: 2024-05-08 | Stop reason: HOSPADM

## 2024-03-11 RX ORDER — LIDOCAINE 40 MG/G
CREAM TOPICAL
Status: DISCONTINUED | OUTPATIENT
Start: 2024-03-11 | End: 2024-05-08 | Stop reason: HOSPADM

## 2024-03-11 RX ADMIN — DIVALPROEX SODIUM 500 MG: 500 TABLET, DELAYED RELEASE ORAL at 22:34

## 2024-03-11 ASSESSMENT — ACTIVITIES OF DAILY LIVING (ADL)
ADLS_ACUITY_SCORE: 35

## 2024-03-11 ASSESSMENT — PAIN SCALES - GENERAL: PAINLEVEL: NO PAIN (0)
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
## 2024-03-11 NOTE — ED NOTES
Bed: ED11  Expected date: 3/11/24  Expected time: 4:57 PM  Means of arrival:   Comments:  [address]: 41 y/o, M. ETOH

## 2024-03-11 NOTE — LETTER
Health Information Management Services               Recipient:  [facility]  PH: [phone]  FAX: [phone]    Sender:  Reyna Egan  [facility]  PH: [phone]    Date: March 27, 2024  Patient Name:  Heath Crawley  Patient YOB: 1975  Routing Message:      Please consider my pt for group home placement. He currently has MA, but [facility]. Please feel free to call [phone] for any questions.    Thank you    The documents accompanying this notice contain confidential information belonging to the sender.  This information is intended only for the use of the individual or entity named above.  The authorized recipient of this information is prohibited from disclosing this information to any other party and is required to destroy the information after its stated need has been fulfilled, unless otherwise required by state law.      If you are not the intended recipient, you are hereby notified that any disclosure, copy, distribution or action taken in reliance on the contents of these documents is strictly prohibited.  If you have received this document in error, please return it by fax to [phone] with a note on the cover sheet explaining why you are returning it (e.g. not your patient, not your provider, etc.).  If you need further assistance, please call [facility] Centralized Transcription at [phone].  Documents may also be returned by mail to [facility], , Formerly named [facility] [address], [address] [place], [place] [address].

## 2024-03-11 NOTE — PROGRESS NOTES
Rapid Response Epic Documentation     Situation:   Male Pt. With a Hx. Of dementia is was noted by the Wife he has stopped talking, but appears to be aware of his surroundings.      Objective:    Con. And Alert, Skin is PWD    Assessment:            BP:  130/91    Pulse: 68  Respiration: 16  SPO2: 97 %  Glucose:  mg/dl  Mental Status: Alert  CMS: Intact  Stroke Scale: Not Applicable  EKG: Not Performed      Treatment:          Location:      3rd floor    Disposition:      Transfer to Emergency Room Via: 911    Protocol Used:     Behavioral Emergency
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
## 2024-03-12 ENCOUNTER — APPOINTMENT (OUTPATIENT)
Dept: GENERAL RADIOLOGY | Facility: CLINIC | Age: 49
End: 2024-03-12
Payer: COMMERCIAL

## 2024-03-12 PROBLEM — Z78.9 UNABLE TO CARE FOR SELF: Status: ACTIVE | Noted: 2024-03-12

## 2024-03-12 PROBLEM — R45.6 VIOLENT BEHAVIOR: Status: ACTIVE | Noted: 2024-03-12

## 2024-03-12 LAB
ALBUMIN UR-MCNC: NEGATIVE MG/DL
APPEARANCE UR: CLEAR
BILIRUB UR QL STRIP: NEGATIVE
COLOR UR AUTO: NORMAL
GLUCOSE UR STRIP-MCNC: NEGATIVE MG/DL
HCV RNA SERPL NAA+PROBE-ACNC: NOT DETECTED IU/ML
HGB UR QL STRIP: NEGATIVE
KETONES UR STRIP-MCNC: NEGATIVE MG/DL
LEUKOCYTE ESTERASE UR QL STRIP: NEGATIVE
NITRATE UR QL: NEGATIVE
PH UR STRIP: 6.5 [PH] (ref 5–7)
PHOSPHATE SERPL-MCNC: 3.7 MG/DL (ref 2.5–4.5)
RBC URINE: 0 /HPF
SP GR UR STRIP: 1.01 (ref 1–1.03)
UROBILINOGEN UR STRIP-MCNC: NORMAL MG/DL
WBC URINE: <1 /HPF

## 2024-03-12 PROCEDURE — 250N000013 HC RX MED GY IP 250 OP 250 PS 637

## 2024-03-12 PROCEDURE — 84100 ASSAY OF PHOSPHORUS: CPT | Performed by: FAMILY MEDICINE

## 2024-03-12 PROCEDURE — 99232 SBSQ HOSP IP/OBS MODERATE 35: CPT | Mod: GC

## 2024-03-12 PROCEDURE — 250N000013 HC RX MED GY IP 250 OP 250 PS 637: Performed by: STUDENT IN AN ORGANIZED HEALTH CARE EDUCATION/TRAINING PROGRAM

## 2024-03-12 PROCEDURE — 99222 1ST HOSP IP/OBS MODERATE 55: CPT | Performed by: PSYCHIATRY & NEUROLOGY

## 2024-03-12 PROCEDURE — 36415 COLL VENOUS BLD VENIPUNCTURE: CPT | Performed by: FAMILY MEDICINE

## 2024-03-12 PROCEDURE — 81001 URINALYSIS AUTO W/SCOPE: CPT | Performed by: EMERGENCY MEDICINE

## 2024-03-12 PROCEDURE — 99255 IP/OBS CONSLTJ NEW/EST HI 80: CPT | Performed by: STUDENT IN AN ORGANIZED HEALTH CARE EDUCATION/TRAINING PROGRAM

## 2024-03-12 PROCEDURE — G0378 HOSPITAL OBSERVATION PER HR: HCPCS

## 2024-03-12 PROCEDURE — 77074 RADEX OSSEOUS SURVEY LMTD: CPT

## 2024-03-12 RX ORDER — FLUMAZENIL 0.1 MG/ML
0.2 INJECTION, SOLUTION INTRAVENOUS
Status: DISCONTINUED | OUTPATIENT
Start: 2024-03-12 | End: 2024-03-13

## 2024-03-12 RX ORDER — HALOPERIDOL 2 MG/1
2 TABLET ORAL ONCE
Status: COMPLETED | OUTPATIENT
Start: 2024-03-12 | End: 2024-03-12

## 2024-03-12 RX ORDER — DIAZEPAM 10 MG/2ML
5-10 INJECTION, SOLUTION INTRAMUSCULAR; INTRAVENOUS EVERY 30 MIN PRN
Status: DISCONTINUED | OUTPATIENT
Start: 2024-03-12 | End: 2024-03-13

## 2024-03-12 RX ORDER — FOLIC ACID 1 MG/1
1 TABLET ORAL DAILY
Status: DISCONTINUED | OUTPATIENT
Start: 2024-03-12 | End: 2024-05-08 | Stop reason: HOSPADM

## 2024-03-12 RX ORDER — DIVALPROEX SODIUM 250 MG/1
500 TABLET, DELAYED RELEASE ORAL EVERY 12 HOURS SCHEDULED
Status: DISCONTINUED | OUTPATIENT
Start: 2024-03-12 | End: 2024-03-12

## 2024-03-12 RX ORDER — CLONIDINE HYDROCHLORIDE 0.1 MG/1
0.1 TABLET ORAL EVERY 8 HOURS
Status: DISCONTINUED | OUTPATIENT
Start: 2024-03-12 | End: 2024-03-13

## 2024-03-12 RX ORDER — MULTIPLE VITAMINS W/ MINERALS TAB 9MG-400MCG
1 TAB ORAL DAILY
Status: DISCONTINUED | OUTPATIENT
Start: 2024-03-12 | End: 2024-05-08 | Stop reason: HOSPADM

## 2024-03-12 RX ORDER — DIVALPROEX SODIUM 250 MG/1
500 TABLET, DELAYED RELEASE ORAL DAILY
Status: DISCONTINUED | OUTPATIENT
Start: 2024-03-13 | End: 2024-03-12

## 2024-03-12 RX ORDER — DIAZEPAM 5 MG
10 TABLET ORAL EVERY 30 MIN PRN
Status: DISCONTINUED | OUTPATIENT
Start: 2024-03-12 | End: 2024-03-13

## 2024-03-12 RX ORDER — HALOPERIDOL 5 MG/ML
2.5-5 INJECTION INTRAMUSCULAR EVERY 6 HOURS PRN
Status: DISCONTINUED | OUTPATIENT
Start: 2024-03-12 | End: 2024-05-08 | Stop reason: HOSPADM

## 2024-03-12 RX ORDER — HALOPERIDOL 5 MG/ML
2 INJECTION INTRAMUSCULAR ONCE
Status: COMPLETED | OUTPATIENT
Start: 2024-03-12 | End: 2024-03-12

## 2024-03-12 RX ORDER — OLANZAPINE 5 MG/1
5-10 TABLET, ORALLY DISINTEGRATING ORAL EVERY 6 HOURS PRN
Status: DISCONTINUED | OUTPATIENT
Start: 2024-03-12 | End: 2024-05-08 | Stop reason: HOSPADM

## 2024-03-12 RX ORDER — DIVALPROEX SODIUM 500 MG/1
500 TABLET, DELAYED RELEASE ORAL EVERY 12 HOURS SCHEDULED
Status: DISCONTINUED | OUTPATIENT
Start: 2024-03-13 | End: 2024-05-08 | Stop reason: HOSPADM

## 2024-03-12 RX ADMIN — CLONIDINE HYDROCHLORIDE 0.1 MG: 0.1 TABLET ORAL at 08:00

## 2024-03-12 RX ADMIN — HALOPERIDOL 2 MG: 2 TABLET ORAL at 19:52

## 2024-03-12 RX ADMIN — THIAMINE HCL TAB 100 MG 100 MG: 100 TAB at 11:20

## 2024-03-12 RX ADMIN — Medication 1 TABLET: at 08:00

## 2024-03-12 RX ADMIN — FOLIC ACID 1 MG: 1 TABLET ORAL at 08:00

## 2024-03-12 RX ADMIN — THIAMINE HCL TAB 100 MG 100 MG: 100 TAB at 19:52

## 2024-03-12 RX ADMIN — CLONIDINE HYDROCHLORIDE 0.1 MG: 0.1 TABLET ORAL at 00:39

## 2024-03-12 RX ADMIN — DIVALPROEX SODIUM 500 MG: 500 TABLET, DELAYED RELEASE ORAL at 08:00

## 2024-03-12 ASSESSMENT — ACTIVITIES OF DAILY LIVING (ADL)
ADLS_ACUITY_SCORE: 24
ADLS_ACUITY_SCORE: 36
ADLS_ACUITY_SCORE: 24
ADLS_ACUITY_SCORE: 24
ADLS_ACUITY_SCORE: 35
ADLS_ACUITY_SCORE: 24
ADLS_ACUITY_SCORE: 24
ADLS_ACUITY_SCORE: 35
ADLS_ACUITY_SCORE: 36
ADLS_ACUITY_SCORE: 24
ADLS_ACUITY_SCORE: 35
ADLS_ACUITY_SCORE: 24
ADLS_ACUITY_SCORE: 35
ADLS_ACUITY_SCORE: 24
ADLS_ACUITY_SCORE: 35
ADLS_ACUITY_SCORE: 24

## 2024-03-12 NOTE — CONSULTS
[facility]  Neurology Consultation    Patient Name:  Heath Crawley  MRN:  [id number]    :  1975  Date of Service:  2024  Primary care provider:  Adrian Dyson      Neurology consultation service was asked to see Heath Crawley by Dr. Ayers to evaluate seizure disorder    Chief Complaint:  seizure disorder    History of Present Illness:   Heath Crawley is a 49 year   old male with history of temporal lobe epilepsy, severe cognitive and functional impairments who presents with aggression and agitation.  His history is not entirely clear regarding what happened prior to him coming to the [place].  It appears he certainly has had seizures for some time and was tolerating Depakote that has been continued thus far.  However compliance is not entirely certain and there is concerns from his primary epileptologist that he is drinking alcohol at unsafe levels.    Discussed with outpatient epileptologist.  Yesterday during clinic visit noted that he had a few possible focal seizures in February.  No generalized tonic-clonic seizures reported in a year at this wife at that time.  They report he was not talking during that visit.  However wife reported he was drinking at ethanol on a daily basis often to intoxication.  His mood has been more labile and he was threatening to both his wife and children.  Wife did not feel that she was safe to bring him home due to his agitation and aggression.  Therefore was recommended to come to the ED.  He noted that yesterday he produced no speech.    Today patient is able to provide some history through [facility] .  He states his last seizure was 4 to 5 days ago.  He does report that he has seizures.  He knows he is in the hospital but is unsure which 1.  He is able to tell me his name and follow simple commands.    ROS  A comprehensive ROS was performed and pertinent findings were included in HPI.     PMH  Temporal lobe epilepsy    Medications   I have  personally reviewed the patient's medication list.     Allergies  I have personally reviewed the patient's allergy list.     Social history: Regular heavy alcohol use reported.  Patient denies alcohol use himself.  No drug use.    No family history of seizures that the mere epileptologist were able to identify      Physical Examination   Vitals: BP 92/58 (BP Location: Left arm)   Pulse 57   Temp 97.4  F (36.3  C) (Oral)   Resp 18   Wt 48.9 kg (107 lb 11.2 oz)   SpO2 98%   BMI 20.63 kg/m    General: Lying in bed, NAD  Head: NC/AT  Eyes: no icterus, op pink and moist  Cardiac:  Extremities warm, no edema.   Respiratory: non-labored on RA  Skin: Scar of her right extremity near amputation site  Neuro:  Awake, alert, attentive.  Oriented to person and hospital.  Could not tell me the month.  Attempted to follow two-step commands but had trouble with cross commands.  Follows all simple commands easily.  Well with phone  there is no aphasia or dysarthria that was reported by .  He is able to answer simple questions.  Is unclear the accuracy of his history.  He is able to name simple objects without difficulty.  His pupils are reactive, his gaze is conjugate.  No gross facial asymmetry.  No abnormal movements seen.  Tone is approximately symmetric.  He has at least 4 out of 5 strength throughout upper and lower extremities with the exception of distal right upper extremity due to amputation.  Able to rise from bed to stand.  Able to take steps of casual steady independent gait.  Sensation is intact to light touch without extinction.  Deep tendon reflexes are present and symmetric throughout.  Toes are equivocal.  With withdrawal    Investigations   I have personally reviewed pertinent labs, tests, and radiological imaging. Discussion of notable findings is included under Impression.     Head CT personally reviewed: Substantial global atrophy.  Hippocampal structures are markedly small on coronal  imaging.    Was patient transferred from outside hospital?   No    Impression  #Temporal lobe epilepsy: unclear compliance with AED's  #Aggression and agitation    MR. Crawley is a 49-year-old male with known temporal lobe epilepsy.  He has an active EEG in the past.  He does have breakthrough seizures.  Was admitted primarily for agitation and aggression with concern for safety of the family in the home setting.  Today's exam seems much better than what was noted in neurology visit yesterday.  He is alert, able to answer simple questions and follow commands and cooperative.  Possibly had an unwitnessed seizure prior.  However there is reports for aggression with family.  Would recommend psychiatric consult.  At this time do not think EEG monitoring would likely be beneficial.  Ideally would obtain labs including CBC, CMP, Depakote level, ammonia.  For now we will continue his home Depakote dose for which I did adjust in chart.  Possibly had a seizure prior to admission that explains worsening mental status but in absence of confirmed compliance and reluctant to adjust AEDs otherwise at this time        Recommendations    -Increase Depakote  dose to 500 mg twice daily (home dose, and done for you)  -CBC, CMP, valproic acid level, ammonia   -No need for EEG at present unless mental status worsens.  -Psychiatric consult for safety of patient and family in the home setting.  Based on description yesterday would have suspected he would need psychiatric stabilization but appears improved today.  - Will sign off, please call when labs obtained if questions or for any new or worsening neurologic symptoms.    Thank you for involving Neurology in the care of Heath Crawley.      Noni Menchaca, DO    Medical Decision Making       MANAGEMENT DISCUSSED with the following over the past 24 hours: Dr. Naik, epilepsy   NOTE(S)/MEDICAL RECORDS REVIEWED over the past 24 hours: H&P and outpatient neuro note  Tests personally interpreted in  the past 24 hours:  - HEAD CT showing as above  Medical complexity over the past 24 hours:  - Prescription DRUG MANAGEMENT performed    Reviewed UA an urine drug screen

## 2024-03-12 NOTE — PROGRESS NOTES
Reason for admission:  With history unspecified dementia with possible hallucinations and admitted for evaluation of behavioral changes and possible placement. Has been having aggressive behaviors at home with ongoing alcohol use.     Primary team notified of pt arrival.  Admitted from: [place] [facility] Emergency room  Via: Wheelchair  Accompanied by: self  Belongings: Placed by window  Admission Profile: Unable to complete due patient not answering questions  Teaching: Unable to complete due patient not answering questions despite using [facility] .  Access: None  Ht./Wt.: complete  Code Status verified on armband: yes  2 RN Skin Assessment Completed By: Devan Almonte RN  Med Rec completed: no  Bed surface reassessed with algorithm and charted: yes  New bed surface ordered: no  Suction/Ambu bag/Flowmeter at bedside: yes      Alert but remained non verbal. Attempted to use Maori  but could not elicit a verbal response. Nod yes or no. Allows nursing assessment and vitals. Refused lab draw, and COVID 19 swab. Skin grossly intact. RT, minimal intake overnight. VSS. Plan for neurology and Psychiatry to see patient. Continue the plan of care.

## 2024-03-12 NOTE — ED NOTES
Attempted to get bloodwork, pt was cooperative with the process until he saw the needle and then pulled away appeared to be fearful and completely refused to go through with the blood draw. No blood obtained and MD notified.
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
## 2024-03-12 NOTE — UTILIZATION REVIEW
"[facility]    Admission Status; Secondary Review Determination     Admission Date: 3/11/2024  5:08 PM      Under the authority of the Utilization Management Committee, the utilization review process indicated a secondary review on the above patient.  The review outcome is based on review of the medical records, discussions with staff, and applying clinical experience noted on the date of the review.          (x) Observation Status Appropriate - This patient does not meet hospital inpatient criteria and is placed in observation status. If this patient's primary payer is Medicare and was admitted as an inpatient, Condition Code 44 should be used and patient status changed to \"observation\".     RATIONALE FOR DETERMINATION   49-year-old male with a history of epilepsy, cognitive and functional impairments, dementia, and behavioral issues was admitted to the hospital yesterday with hallucinations and unmanageable behavior.  Social work has been consulted and is working on disposition and placement.  Psychiatry and neurology have been consulted.  The patient is not receiving as needed medications for behavioral issues.  Imaging and labs are unremarkable.  At the time of this review the patient appears stable and does not meet medical necessity for inpatient hospitalization.  Observation status is recommended.    The severity of illness, intensity of service provided, expected LOS and risk for adverse outcome make the care appropriate for further observation; however, doesn't meet criteria for hospital inpatient admission.  Dr Harris was paged and notified of this determination.        The information on this document is developed by the utilization review team in order for the business office to ensure compliance.  This only denotes the appropriateness of proper admission status and does not reflect the quality of care rendered.         The definitions of Inpatient Status and Observation Status used in making the " determination above are those provided in the CMS Coverage Manual, Chapter 1 and Chapter 6, section 70.4.      Sincerely,     Devan Sims DO MPH   Physician Advisor  Utilization Review  [facility]
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
## 2024-03-12 NOTE — PLAN OF CARE
Goal Outcome Evaluation: No Change      VS: BP 92/58 (BP Location: Left arm)   Pulse 57   Temp 97.4  F (36.3  C) (Oral)   Resp 18   Wt 48.9 kg (107 lb 11.2 oz)   SpO2 98%   BMI 20.63 kg/m       Output/Last BM: Continent of B/B, unknown last BM. Urine sample sent to lab   Activity: Up ad daphne/SBA   Skin/Dressing: WDL   Pain: Non verbal indicators absent   CMS: Pt appears alert, IRMA orientation d/t pt not answering questions   Diet: Reg/Thin - default trays sent   LDA: None   Equipment:    Plan: TBD - Neuro and Psych referral. X-Ray    Additional Info: CIWA difficult to assess d/t muteness/language barrier. Rating 1 for paroxymal sweating and 4 for unknown orientation.      Goal Outcome Evaluation:  Plan of Care Reviewed With: patient  Overall Patient Progress: No change
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
## 2024-03-12 NOTE — PHARMACY-ADMISSION MEDICATION HISTORY
Pharmacy Intern Admission Medication History    Admission medication history is complete. The information provided in this note is only as accurate as the sources available at the time of the update.    Information Source(s): Family member and CareEverywhere/SureScripts via phone    Pertinent Information: 3-way call between writer,  and patient's wife. Patient's wife was somewhat knowledgeable of medications that patient has been taking at home. Patient's wife reported that patient has been taking Depakote 500 mg DR tablet once daily, when he should have taken BID. She noted that patient has been hardly breathing and falling asleep when he takes Depakote BID, so they switched him to take once every night.    Changes made to PTA medication list:  Added: None  Deleted:   Tylenol  Thiamine (B-1)  Changed:   divalproex sodium delayed-release (DEPAKOTE) 500 MG DR tablet - Take 1 tablet (500 mg) by mouth 2 times daily --> Take 500 mg by mouth at bedtime    Allergies reviewed with patient and updates made in EHR: yes    Medication History Completed By: Hayeong Yun 3/11/2024 8:00 PM    PTA Med List   Medication Sig Last Dose    divalproex sodium delayed-release (DEPAKOTE) 500 MG DR tablet Take 1 tablet (500 mg) by mouth 2 times daily (Patient taking differently: Take 500 mg by mouth at bedtime) 3/10/2024 at PM

## 2024-03-13 PROCEDURE — G0378 HOSPITAL OBSERVATION PER HR: HCPCS

## 2024-03-13 PROCEDURE — 250N000013 HC RX MED GY IP 250 OP 250 PS 637

## 2024-03-13 PROCEDURE — 99232 SBSQ HOSP IP/OBS MODERATE 35: CPT | Mod: GC

## 2024-03-13 RX ADMIN — DIVALPROEX SODIUM 500 MG: 500 TABLET, DELAYED RELEASE ORAL at 00:53

## 2024-03-13 RX ADMIN — CLONIDINE HYDROCHLORIDE 0.1 MG: 0.1 TABLET ORAL at 00:53

## 2024-03-13 RX ADMIN — THIAMINE HCL TAB 100 MG 100 MG: 100 TAB at 20:42

## 2024-03-13 RX ADMIN — DIVALPROEX SODIUM 500 MG: 500 TABLET, DELAYED RELEASE ORAL at 08:22

## 2024-03-13 RX ADMIN — CLONIDINE HYDROCHLORIDE 0.1 MG: 0.1 TABLET ORAL at 08:22

## 2024-03-13 RX ADMIN — FOLIC ACID 1 MG: 1 TABLET ORAL at 08:22

## 2024-03-13 RX ADMIN — Medication 1 TABLET: at 08:22

## 2024-03-13 RX ADMIN — THIAMINE HCL TAB 100 MG 100 MG: 100 TAB at 08:22

## 2024-03-13 RX ADMIN — DIVALPROEX SODIUM 500 MG: 500 TABLET, DELAYED RELEASE ORAL at 20:42

## 2024-03-13 ASSESSMENT — ACTIVITIES OF DAILY LIVING (ADL)
ADLS_ACUITY_SCORE: 24
ADLS_ACUITY_SCORE: 23
ADLS_ACUITY_SCORE: 24
ADLS_ACUITY_SCORE: 23
ADLS_ACUITY_SCORE: 24
ADLS_ACUITY_SCORE: 24
ADLS_ACUITY_SCORE: 23
ADLS_ACUITY_SCORE: 24
ADLS_ACUITY_SCORE: 23
ADLS_ACUITY_SCORE: 24
ADLS_ACUITY_SCORE: 24
ADLS_ACUITY_SCORE: 23
ADLS_ACUITY_SCORE: 24
ADLS_ACUITY_SCORE: 24
ADLS_ACUITY_SCORE: 23
ADLS_ACUITY_SCORE: 24

## 2024-03-13 NOTE — CARE PLAN
VS: BP (!) 83/60   Pulse 65   Temp 97.2  F (36.2  C) (Axillary)   Resp 16   Wt 48.9 kg (107 lb 11.2 oz)   SpO2 100%   BMI 20.63 kg/m       Output/Last BM: Continent of B/B. Last BM 3/13   Activity: Up independently.   Skin/Dressing: Visible skin intact.   Pain: Denies   CMS: Oriented to self and place. Unsure of why he is in the hospital.   Diet: Reg/Thin - needs assistance ordering meals   LDA: NA   Equipment:    Plan: TBD   Additional Info: Pt refuses labs, allows medications and assessments     Goal Outcome Evaluation:  Plan of Care Reviewed With: patient  Overall Patient Progress: No change

## 2024-03-13 NOTE — PROGRESS NOTES
Brief Social Work Note    Per IDT, it is most appropriate to communicate with patient and family through in-person . No  was able to be scheduled this afternoon. SW called [facility] (P: [phone]) to request in person  for tomorrow so that Care Management can complete Initial Assessment. They do not have a Medina interpeter on site tomorrow but will be sending a request. They will call the unit when an  is secured to inform staff when they will be here. Provided written handoff to care management staff covering tomorrow.        RODOLFO Downey, LSW  8 Med Surg   [facility]  Phone: [phone]  Pager: [phone]  Vocera: Searchable by name or group: 8 Med Surg Vocera

## 2024-03-13 NOTE — PLAN OF CARE
[id number]    Patient is A&O x4. Speaks Medina. Call light within reach. Able to make needs known. Independent in the room. Voids spontaneously to the bathroom. LBM unable to recall.     RA. Regular diet. No IV access, patient refused. CIWA scoring done per protocol and when awake. R arm amputation.    Patient needs help ordering food. Patient claims he didn't get to order dinner because he doesn't know how to do it. Offered milk, oatmeal, cereals, bread and peanut butter, patient agreed to consume it for tonight though he is asking for rice. Also offered to help ordering breakfast, patient agreed.     BP at 89/65, patient asymptomatic, provider notified. RN managed K, Phos and Mg; unable to collect labs, patient refused. Continue with POC.
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
## 2024-03-14 LAB — VIT B1 PYROPHOSHATE BLD-SCNC: 125 NMOL/L

## 2024-03-14 PROCEDURE — 250N000013 HC RX MED GY IP 250 OP 250 PS 637

## 2024-03-14 PROCEDURE — 99232 SBSQ HOSP IP/OBS MODERATE 35: CPT | Mod: GC

## 2024-03-14 PROCEDURE — G0378 HOSPITAL OBSERVATION PER HR: HCPCS

## 2024-03-14 RX ADMIN — DIVALPROEX SODIUM 500 MG: 500 TABLET, DELAYED RELEASE ORAL at 20:13

## 2024-03-14 RX ADMIN — FOLIC ACID 1 MG: 1 TABLET ORAL at 07:48

## 2024-03-14 RX ADMIN — CALCIUM CARBONATE (ANTACID) CHEW TAB 500 MG 1000 MG: 500 CHEW TAB at 22:38

## 2024-03-14 RX ADMIN — DIVALPROEX SODIUM 500 MG: 500 TABLET, DELAYED RELEASE ORAL at 07:48

## 2024-03-14 RX ADMIN — THIAMINE HCL TAB 100 MG 100 MG: 100 TAB at 07:48

## 2024-03-14 RX ADMIN — Medication 1 TABLET: at 07:48

## 2024-03-14 RX ADMIN — THIAMINE HCL TAB 100 MG 100 MG: 100 TAB at 20:13

## 2024-03-14 ASSESSMENT — ACTIVITIES OF DAILY LIVING (ADL)
ADLS_ACUITY_SCORE: 23
ADLS_ACUITY_SCORE: 23
DEPENDENT_IADLS:: CLEANING;COOKING;LAUNDRY;SHOPPING;MEAL PREPARATION;MEDICATION MANAGEMENT;MONEY MANAGEMENT;TRANSPORTATION;INCONTINENCE
ADLS_ACUITY_SCORE: 23
ADLS_ACUITY_SCORE: 24
ADLS_ACUITY_SCORE: 23
ADLS_ACUITY_SCORE: 23
ADLS_ACUITY_SCORE: 24
ADLS_ACUITY_SCORE: 23
ADLS_ACUITY_SCORE: 24
ADLS_ACUITY_SCORE: 23
ADLS_ACUITY_SCORE: 23
ADLS_ACUITY_SCORE: 24
ADLS_ACUITY_SCORE: 24
ADLS_ACUITY_SCORE: 23
ADLS_ACUITY_SCORE: 23
ADLS_ACUITY_SCORE: 24
ADLS_ACUITY_SCORE: 23
ADLS_ACUITY_SCORE: 23
ADLS_ACUITY_SCORE: 24

## 2024-03-14 NOTE — CONSULTS
Called and talked to the nursing station.  At this time, neuropsychological services does not have staffing coverage to provide inpatient consultations on the [facility] outside the ARU.     --Consult Occupational Therapy and Speech Therapy for cognitive screenings/functional assessments if you have concerns that a patient has cognitive limitations    --Consult Neurology if you are concerned about effects of strokes or other acute neurological disease. More chronic concerns such as dementia (Alzheimer s) often cannot be accurately assessed in the inpatient setting and an outpatient neurology consult after discharge may be best.     --Consult Psychiatry for decisional capacity assessments    --Consult Psychiatry and/or Health Psychology for assessment of mental health barriers     --Consult Addiction Medicine for assessment of substance use barriers    Documentation to close the consult.  NO diagnosis or billable services.     Ivonne Ragsdale PsyD, LP
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
## 2024-03-14 NOTE — PROGRESS NOTES
Patient is A&O x4. Speaks [place] and understands a little bit of English spoken to him. Call light within reach, pt is able to make needs known. Pt is independent in the room. Voids spontaneously to the bathroom. Pt is on a regular diet, no IV access to administer NS flush. No more CIWA Protocol. Pt's right lower arm is amputated.      Patient needs help ordering food per report but pt is asleep at this time and writer could not order the pt a meal.     BP 95/56 (BP Location: Left arm)   Pulse 55   Temp 97.3  F (36.3  C) (Oral)   Resp 18   Wt 48.9 kg (107 lb 11.2 oz)   SpO2 100%   BMI 20.63 kg/m      Continue with POC.

## 2024-03-14 NOTE — UTILIZATION REVIEW
Concurrent stay review; Secondary Review Determination - [facility]        Under the authority of the Utilization Management Committee, the utilization review process indicated a secondary review on the above patient.  The review outcome is based on review of the medical records, discussions with staff, and applying clinical experience noted on the date of the review.        (x) Outpatient [facility] status is appropriate       RATIONALE FOR DETERMINATION:     Hetah Crawley is a 49-year-old male with a history of epilepsy, cognitive and functional impairments, dementia, and behavioral issues who was admitted on 3/11/2024 with hallucinations and unmanageable behavior. Social work has been consulted and is working on disposition and placement. Psychiatry and neurology have been consulted.  Patient is noted to be medically stable for discharge, awaiting safe placement.    Patient delayed discharge is related to disposition, there is no medical necessity for inpatient admission at the time of this review. If there is a change in patient status, please resend for review.    The information on this document is developed by the utilization review team in order for the business office to ensure compliance.  This only denotes the appropriateness of proper admission status and does not reflect the quality of care rendered.       The definitions of Inpatient Status and Observation Status used in making the determination above are those provided in the CMS Coverage Manual, Chapter 1 and Chapter 6, section 70.4.       Sincerely,    Ana Maria Real MD  Physician advisor  Utilization Management

## 2024-03-14 NOTE — PROGRESS NOTES
"[facility]   BRIEF PROGRESS NOTE    SUBJECTIVE  Assessed bedside for hypotension. Patient sitting outside the room on a chair, comfortable. No distress noted. Checked blood pressure cuff, \"Small Adult\", appropriate sizing.     OBJECTIVE:  BP (!) 86/49 (BP Location: Left arm)   Pulse 57   Temp 97.6  F (36.4  C) (Oral)   Resp 18   Wt 48.9 kg (107 lb 11.2 oz)   SpO2 93%   BMI 20.63 kg/m      Exam:   General: Alert and oriented, in no acute distress.  Skin: Warm and dry, no abnormalities noted.  Eyes: Extra-ocular muscles intact, pupils equal and reactive.  ENT: Speech intact, nasal passages open  CV: No cyanosis or pallor, warm and well perfused, bradycardic, regular rhythm  Respiratory: No respiratory distress, no accessory muscle use., CTAB  Neuro: Gait and station normal, Gross and fine motor skills intact.   Psychiatric: Language barrier, unable to assess  Extremities: Warm, able to move all four extremities at will.      ASSESSMENT/PLAN:    # Hypotension  Otherwise asymptomatic. Patient alert, pulses bedside between 58-66. Does not appear fluid down on exam. He is interactive. Continue to monitor. MAP at 61. If MAP drops below 60, will reassess.     Please see daily rounding note for full A/P.  Zita Joshi, DO  11:42 AM    "
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
## 2024-03-14 NOTE — DISCHARGE INSTRUCTIONS
MENTAL HEALTH RESOURCES & SERVICES:   Behavioral Healthcare Providers Scheduling  During your hospitalization, you were seen by a licensed mental health professional [facility]  for a brief mental health assessment and/or psychotherapy at [facility].  It is recommended that you follow up with your established providers (psychiatrist, mental health therapist, and/or primary care doctor - as relevant) as soon as possible. Coordinators from [facility] will be calling you in the next 24-48 hours to ensure that you have the resources you need.  You can also contact [facility] coordinators directly at [phone].    You have been scheduled for the following mental health appointments:        [facility] maintains an extensive network of licensed behavioral health providers to connect patients with the services they need.  We do not charge providers a fee to participate in our referral network.  We match patients with providers based on a patient s specific needs, insurance coverage, and location.  Our first effort will be to refer you to a provider within your care system, and will utilize providers outside your care system as needed.         ADDITIONAL SUPPORTS:  Call or text 772 - National Suicide & Crisis Lifeline - if you feel like you may be in crisis or having thoughts of suicide.    [place] ([place]) provides support groups and educational programs. Visit www.namimn.org Helpline at 1-987.283.1396 or 045-185-1432 for further information.   [facility] ([place]ness) improves the lives of children and adults with mental illnesses and their families by providing free classes on mental illnesses andsupport groups for adults with mental illnesses, parents and family members.   For more information:  Phone: [phone]  Toll free:  5-849-WRUL-HELPS  Website: www.namihelps.org      [facility] [place] [facility] provides a [id number] approach to mental health recovery, support and wellness. Calls are answered by our team of professionally trained Certified Peer Specialists, who have first hand experience living with a mental health condition.   Open Monday-Saturday, 5pm to 10pm. Call [phone].       You may contact the [facility] Transition [facility] for brief, short-term solution focused therapy support with your mental health goals. Call [phone] for more information or to schedule. (Virtual Appointments)          [facility]: Thank you for your interest in [facility]. Currently, patients are experiencing long waits for intake when referred within [facility]. Please know that you may contact your insurance carrier member services to learn more about scheduling in network therapy. Your insurance company will have lists of in network therapists that are not within [facility] and may have more immediate availability.       Get care started with an ongoing therapist by calling to schedule your intake at one of the following clinics:    [facility]: [phone]  [facility]  [phone]  [facility] [phone]  [facility] [phone]  [facility] [phone]  [facility]  [phone]   [facility] in [facility] [phone]  [facility] [phone]    Call an [facility] Behavioral Coordinator at [phone] for assistance in scheduling mental health appointments (Psychiatry/medication management, therapy, support groups, neuropsych testing, intake for programmatic care such as IOP/PHP program, etc.)

## 2024-03-15 ENCOUNTER — APPOINTMENT (OUTPATIENT)
Dept: OCCUPATIONAL THERAPY | Facility: CLINIC | Age: 49
End: 2024-03-15
Payer: COMMERCIAL

## 2024-03-15 PROCEDURE — G0378 HOSPITAL OBSERVATION PER HR: HCPCS

## 2024-03-15 PROCEDURE — 250N000013 HC RX MED GY IP 250 OP 250 PS 637

## 2024-03-15 PROCEDURE — 97165 OT EVAL LOW COMPLEX 30 MIN: CPT | Mod: GO

## 2024-03-15 PROCEDURE — 99231 SBSQ HOSP IP/OBS SF/LOW 25: CPT | Mod: GC

## 2024-03-15 RX ADMIN — WHITE PETROLATUM: 1.75 OINTMENT TOPICAL at 16:30

## 2024-03-15 RX ADMIN — DIVALPROEX SODIUM 500 MG: 500 TABLET, DELAYED RELEASE ORAL at 08:38

## 2024-03-15 RX ADMIN — DIVALPROEX SODIUM 500 MG: 500 TABLET, DELAYED RELEASE ORAL at 20:00

## 2024-03-15 RX ADMIN — Medication 1 TABLET: at 08:38

## 2024-03-15 RX ADMIN — WHITE PETROLATUM: 1.75 OINTMENT TOPICAL at 08:39

## 2024-03-15 RX ADMIN — FOLIC ACID 1 MG: 1 TABLET ORAL at 08:39

## 2024-03-15 RX ADMIN — WHITE PETROLATUM: 1.75 OINTMENT TOPICAL at 20:01

## 2024-03-15 RX ADMIN — THIAMINE HCL TAB 100 MG 100 MG: 100 TAB at 20:00

## 2024-03-15 RX ADMIN — THIAMINE HCL TAB 100 MG 100 MG: 100 TAB at 08:39

## 2024-03-15 ASSESSMENT — ACTIVITIES OF DAILY LIVING (ADL)
ADLS_ACUITY_SCORE: 23
ADLS_ACUITY_SCORE: 23
ADLS_ACUITY_SCORE: 25
ADLS_ACUITY_SCORE: 23
ADLS_ACUITY_SCORE: 25
ADLS_ACUITY_SCORE: 23
ADLS_ACUITY_SCORE: 23
ADLS_ACUITY_SCORE: 25
ADLS_ACUITY_SCORE: 25
ADLS_ACUITY_SCORE: 23
ADLS_ACUITY_SCORE: 25
ADLS_ACUITY_SCORE: 23
ADLS_ACUITY_SCORE: 25
ADLS_ACUITY_SCORE: 23

## 2024-03-15 NOTE — CARE PLAN
VS: /78 (BP Location: Left arm)   Pulse 68   Temp 98.1  F (36.7  C) (Oral)   Resp 18   Wt 48.9 kg (107 lb 11.2 oz)   SpO2 100%   BMI 20.63 kg/m       Output/Last BM: Continent and using bathroom independently   Activity: Up ad daphne - likes to sit in chair in hallway and wave and smile as people pass by   Skin/Dressing: Visible skin intact   Pain: Pt denies pain. Tums given x1 one for abdominal discomfort.   CMS: Pt alert to self and sometimes place. Answers some questions using an    Diet: Reg/Thin. Appetite good   LDA: NA   Equipment: None   Plan: Medically cleared for discharge - awaiting suitable placement   Additional Info: Pt took medications, allows for assessments, cooperative with cares     Goal Outcome Evaluation:  Plan of Care Reviewed With: patient  Overall Patient Progress: No change    Shift: [id number]
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
## 2024-03-15 NOTE — PLAN OF CARE
[id number]    Pt is alert and oriented to self. Up independently in room. No acute events this shift. Medically ready for discharge. Awaiting placement.

## 2024-03-15 NOTE — PLAN OF CARE
Goal Outcome Evaluation:    VS: /73 (BP Location: Left arm)   Pulse 63   Temp 97.9  F (36.6  C) (Oral)   Resp 16   Wt 48.9 kg (107 lb 11.2 oz)   SpO2 99%   BMI 20.63 kg/m      O2: RA   Neuro: Axo x3, intermittent confusion    Bowel/Bladder: Continent of bowel and bladder     LBM: 3/13   Diet: Regular    Skin: Visible skin intact    Pain: None reported with     Activity: Ind   Dressings: None, R arm below elbow amputation    LDA: none   Equipment: Call light    Plan: Waiting on placement    Additional Info:        Ally Esqueda RN on 3/15/2024 at 07:29 PM

## 2024-03-15 NOTE — PROGRESS NOTES
"Occupational Therapy Evaluation       03/15/24 0900   Appointment Info   Signing Clinician's Name / Credentials (OT) Vanessanii Murrieta OTR/L   Quick Adds   Quick Adds Certification       Present yes  (phone)   Language [place]   Living Environment   Living Environment Comments Pt unable to answer questions, reported that he lives alone however per chart review pt lives with family.   Self-Care   Usual Activity Tolerance moderate   Current Activity Tolerance moderate   Equipment Currently Used at Home none   Activity/Exercise/Self-Care Comment Pt unable to answer questions, anticipate pt has been needing 24/7 supervision and verbal cues to complete ADLs due to cognition.   Instrumental Activities of Daily Living (IADL)   IADL Comments Pt unable to answer questions, anticipate assist with all IADLs.   General Information   Onset of Illness/Injury or Date of Surgery 03/11/24   Referring Physician Pau Harris, DO   Patient/Family Therapy Goal Statement (OT) none stated   Additional Occupational Profile Info/Pertinent History of Current Problem Per chart, \"Heath Crawley is a 49 year old male admitted on 3/11/2024. He has a history of temporal lobe epilepsy, severe cognitive and functional impairments due to unspecified dementia with possible hallucinations. He requires admission for safe discharge planning.\"   Limitations/Impairments safety/cognitive   Cognitive Status Examination   Cognitive Status Comments Unable to complete formal cognitive screen due to language barrier affecting score validity. Through functional observation, pt appears to have notable cognitive deficits. Pt required prompting for all task initiation, did follow 1 step commands. Pt responded \"I don't know\" to questions: if you were hungry what would you do? If you had an emergency, what would you do? demonstrating impaired problem solving. Pt not oriented to time and demonstrated impaired insight into deficits. With prompting, " pt able to complete familiar ADL tasks, however anticipate pt would not independently initiate self care tasks.   Visual Perception   Visual Impairment/Limitations unable/difficult to assess   Pain Assessment   Patient Currently in Pain No   Posture   Posture not impaired   Range of Motion Comprehensive   Comment, General Range of Motion RUE amp, R shoulder AROM WFL, LUE WFL   Strength Comprehensive (MMT)   General Manual Muscle Testing (MMT) Assessment no strength deficits identified   Coordination   Coordination Comments LUE coordination intact   Bed Mobility   Bed Mobility supine-sit;sit-supine   Supine-Sit [place] (Bed Mobility) independent   Sit-Supine [place] (Bed Mobility) independent   Transfers   Transfers sit-stand transfer   Sit-Stand Transfer   Sit-Stand [place] (Transfers) independent   Balance   Balance Assessment no deficits identified   Activities of Daily Living   BADL Assessment/Intervention bathing;upper body dressing;lower body dressing;clothing fastener management;grooming;toileting   Bathing Assessment/Intervention   [place] Level (Bathing) supervision   Comment, (Bathing) Per clinical judgement   Upper Body Dressing Assessment/Training   Comment, (Upper Body Dressing) Per clinical judgement   [place] Level (Upper Body Dressing) supervision   Lower Body Dressing Assessment/Training   [place] Level (Lower Body Dressing) supervision   Clothes Fastener Management   [place] Level (Clothes Fastener Management) supervision   Comment, (Clothes Fastener Management) Per clinical judgement   Grooming Assessment/Training   [place] Level (Grooming) supervision   Comment, (Grooming) Per clinical judgement   Toileting   [place] Level (Toileting) supervision   Clinical Impression   Criteria for Skilled Therapeutic Interventions Met (OT) Evaluation only   OT Diagnosis impaired ADL independence and safety   OT Problem List-Impairments impacting ADL problems related  to;cognition   Assessment of Occupational Performance 5 or more Performance Deficits   Identified Performance Deficits ADL including bathing, dressing, g/h, toileting; IADLs   Clinical Decision Making Complexity (OT) problem focused assessment/low complexity   Risk & Benefits of therapy have been explained evaluation/treatment results reviewed;care plan/treatment goals reviewed;risks/benefits reviewed;current/potential barriers reviewed;participants voiced agreement with care plan;participants included;patient   Clinical Impression Comments Pt presents with impaired cognition impacting ability to safely and independently complete ADLs.   OT Total Evaluation Time   OT Eval, Low Complexity Minutes (96348) 12   Therapy Certification   Medical Diagnosis Unspecified dementia with worsening behaviors   Start of Care Date 03/15/24   Certification date from 03/15/24   Certification date to 03/16/24   OT Discharge Planning   OT Plan OT to sign off - no rehab potential.    OT Discharge Recommendation (DC Rec) other (see comments)  (group home)   OT Rationale for DC Rec Pt presents with impaired cognition impacting ability to safely and independently complete ADLs. Pt requires supervision for safety and pt does not demonstrate the cognitive capacity to care for self at this time due to impaired orientation, executive function, and safety deficits. Recommend discharge to group home for supervision and assistance with ADLs and IADLs, as pt mobilizing independently however due to cognition needs supervision and prompting.   OT Brief overview of current status cognitive impairment, IND mobility   Total Session Time   Total Session Time (sum of timed and untimed services) [address] [place] [address] Rehabilitation Services      OUTPATIENT OCCUPATIONAL THERAPY  EVALUATION  PLAN OF TREATMENT FOR OUTPATIENT REHABILITATION  (COMPLETE FOR INITIAL CLAIMS  ONLY)  Patient's Last Name, First Name, M.I.  YOB: 1975  Heath Crawley                             Provider's Name  [facility] Medical Record No.  [id number]                               Onset Date:  03/11/24   Start of Care Date:  03/15/24     Type:     ___PT   _X_OT   ___SLP Medical Diagnosis:  Unspecified dementia with worsening behaviors                        OT Diagnosis:  impaired ADL independence and safety   Visits from SOC:  1   _________________________________________________________________________________  Plan of Treatment/Functional Goals    Planned Interventions:     Goals: See Occupational Therapy Goals on Care Plan in freee electronic health record.    Therapy Frequency:    Predicted Duration of Therapy Intervention:    _________________________________________________________________________________    I CERTIFY THE NEED FOR THESE SERVICES FURNISHED UNDER        THIS PLAN OF TREATMENT AND WHILE UNDER MY CARE .             Physician Signature               Date    X_____________________________________________________                  Certification date from: 03/15/24, Certification date to: 03/16/24    Referring Physician: Pau Harris DO            Initial Assessment        See Occupational Therapy evaluation dated 03/15/24 in Epic electronic health record.

## 2024-03-16 PROCEDURE — G0378 HOSPITAL OBSERVATION PER HR: HCPCS

## 2024-03-16 PROCEDURE — 250N000013 HC RX MED GY IP 250 OP 250 PS 637

## 2024-03-16 RX ADMIN — Medication 1 TABLET: at 07:35

## 2024-03-16 RX ADMIN — WHITE PETROLATUM: 1.75 OINTMENT TOPICAL at 07:35

## 2024-03-16 RX ADMIN — DIVALPROEX SODIUM 500 MG: 500 TABLET, DELAYED RELEASE ORAL at 07:35

## 2024-03-16 RX ADMIN — FOLIC ACID 1 MG: 1 TABLET ORAL at 07:35

## 2024-03-16 RX ADMIN — THIAMINE HCL TAB 100 MG 100 MG: 100 TAB at 07:35

## 2024-03-16 RX ADMIN — WHITE PETROLATUM: 1.75 OINTMENT TOPICAL at 14:24

## 2024-03-16 RX ADMIN — WHITE PETROLATUM: 1.75 OINTMENT TOPICAL at 19:52

## 2024-03-16 RX ADMIN — THIAMINE HCL TAB 100 MG 100 MG: 100 TAB at 19:52

## 2024-03-16 RX ADMIN — DIVALPROEX SODIUM 500 MG: 500 TABLET, DELAYED RELEASE ORAL at 19:52

## 2024-03-16 ASSESSMENT — ACTIVITIES OF DAILY LIVING (ADL)
ADLS_ACUITY_SCORE: 25

## 2024-03-16 NOTE — PROGRESS NOTES
Pt is A/O to self. Vss, afebrile. Denies pain and sob via [place] . Up ad daphne in room and around nursing unit. Gait steady. Calm and cooperative with cares. Stable t/o shift. No behaviors noted. Awaiting placement.

## 2024-03-16 NOTE — PROGRESS NOTES
Shift [id number] Pt has a history of epilepsy,severe cognitive and functional impairment due to dementia and hallucinations    Summary:Admitted for evaluation of the cognitive deficits and behavior and monitored for ETOH withdrawal Waiting on placement  VS:       Pt A/O X 4. Afebrile. VSS. Lungs- Clear bilaterally  Denies nausea, shortness of breath, and chest pain.     Output:       Bowels- + in all four quadrants. Voids spontaneously without   difficulty in the toilet Continent of bowel and bladder.      Activity:       Pt up independent with cares .     Skin:   Skin intact .     Pain:       Denies any pain.      CMS:       CMS and Neuro's are intact. Denies numbness and tingling in all extremities.      Dressing:     Right arm amputation.      Diet:       Pt is on a Regular diet      LDA:     None.      Equipment:       . PCD's on BLE's. Bilateral heels are elevated off the bed.     Plan:       Pt is able to make needs known and the call light is within the pt's reach. Continue to monitor.       Additional Info:     Waiting on placement .
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
## 2024-03-16 NOTE — PROGRESS NOTES
/73 (BP Location: Left arm)   Pulse 63   Temp 97.9  F (36.6  C) (Oral)   Resp 16   Wt 48.9 kg (107 lb 11.2 oz)   SpO2 99%   BMI 20.63 kg/m      Pt AO x4, on RA and sating adequately. Patient is [place] speaking, thus needing language services. Patient took his evening medications, denies pain, griselda denies SOB.  Patient was pleasant. No new events this shift. POC is ongoing.

## 2024-03-17 PROCEDURE — 250N000013 HC RX MED GY IP 250 OP 250 PS 637

## 2024-03-17 PROCEDURE — G0378 HOSPITAL OBSERVATION PER HR: HCPCS

## 2024-03-17 RX ADMIN — FOLIC ACID 1 MG: 1 TABLET ORAL at 07:50

## 2024-03-17 RX ADMIN — DIVALPROEX SODIUM 500 MG: 500 TABLET, DELAYED RELEASE ORAL at 20:02

## 2024-03-17 RX ADMIN — Medication 1 TABLET: at 07:50

## 2024-03-17 RX ADMIN — DIVALPROEX SODIUM 500 MG: 500 TABLET, DELAYED RELEASE ORAL at 07:50

## 2024-03-17 RX ADMIN — WHITE PETROLATUM: 1.75 OINTMENT TOPICAL at 20:56

## 2024-03-17 RX ADMIN — WHITE PETROLATUM: 1.75 OINTMENT TOPICAL at 07:50

## 2024-03-17 RX ADMIN — THIAMINE HCL TAB 100 MG 100 MG: 100 TAB at 07:50

## 2024-03-17 RX ADMIN — WHITE PETROLATUM: 1.75 OINTMENT TOPICAL at 13:40

## 2024-03-17 RX ADMIN — THIAMINE HCL TAB 100 MG 100 MG: 100 TAB at 20:02

## 2024-03-17 ASSESSMENT — ACTIVITIES OF DAILY LIVING (ADL)
ADLS_ACUITY_SCORE: 25
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
## 2024-03-17 NOTE — PLAN OF CARE
Goal Outcome Evaluation:       Patient is A & O to self; IND in the room, and BRP; [place] speaking; wearing spice scrub; ambulates within the unit as needed; waiting for placement; will follow POC.

## 2024-03-17 NOTE — PROGRESS NOTES
Shift 8652-3075 Pt has a history of epilepsy,severe cognitive and functional impairment due to dementia and hallucinations    Summary:Admitted for evaluation of the cognitive deficits and behavior and monitored for ETOH withdrawal Waiting on placement  VS:       Pt A/O X 4. Afebrile. VSS. Lungs- Clear bilaterally  Denies nausea, shortness of breath, and chest pain.     Output:       Bowels- + in all four quadrants. Voids spontaneously without   difficulty in the toilet Continent of bowel and bladder.      Activity:       Pt up independent with cares .     Skin:   Skin intact .     Pain:       Denies any pain.      CMS:       CMS and Neuro's are intact. Denies numbness and tingling in all extremities.      Dressing:     Right arm amputation.      Diet:       Pt is on a Regular diet      LDA:     None.      Equipment:       .Refused  PCD's on BLE's. Bilateral heels are elevated off the bed.     Plan:       Pt is able to make needs known and the call light is within the pt's reach. Continue to monitor.       Additional Info:     Waiting on placement .

## 2024-03-17 NOTE — PLAN OF CARE
Goal Outcome Evaluation:    Pt is A/Ox self. Vss, afebrile. Denies pain and sob via [place] . Up ad daphne in room and around nursing unit. Good appetite. No aspiration/choking. Calm and cooperative with cares. Stable t/o shift. No behaviors noted. Awaiting placement.

## 2024-03-18 PROCEDURE — 250N000013 HC RX MED GY IP 250 OP 250 PS 637: Performed by: INTERNAL MEDICINE

## 2024-03-18 PROCEDURE — 250N000013 HC RX MED GY IP 250 OP 250 PS 637

## 2024-03-18 PROCEDURE — 99232 SBSQ HOSP IP/OBS MODERATE 35: CPT | Performed by: INTERNAL MEDICINE

## 2024-03-18 PROCEDURE — G0378 HOSPITAL OBSERVATION PER HR: HCPCS

## 2024-03-18 RX ADMIN — DIVALPROEX SODIUM 500 MG: 500 TABLET, DELAYED RELEASE ORAL at 08:42

## 2024-03-18 RX ADMIN — WHITE PETROLATUM: 1.75 OINTMENT TOPICAL at 20:18

## 2024-03-18 RX ADMIN — THIAMINE HCL TAB 100 MG 100 MG: 100 TAB at 08:42

## 2024-03-18 RX ADMIN — WHITE PETROLATUM: 1.75 OINTMENT TOPICAL at 14:03

## 2024-03-18 RX ADMIN — FOLIC ACID 1 MG: 1 TABLET ORAL at 08:42

## 2024-03-18 RX ADMIN — Medication 1 TABLET: at 08:42

## 2024-03-18 RX ADMIN — DIVALPROEX SODIUM 500 MG: 500 TABLET, DELAYED RELEASE ORAL at 20:18

## 2024-03-18 RX ADMIN — NICOTINE POLACRILEX 2 MG: 2 GUM, CHEWING BUCCAL at 14:02

## 2024-03-18 RX ADMIN — NICOTINE POLACRILEX 2 MG: 2 GUM, CHEWING BUCCAL at 17:03

## 2024-03-18 RX ADMIN — THIAMINE HCL TAB 100 MG 100 MG: 100 TAB at 20:18

## 2024-03-18 RX ADMIN — WHITE PETROLATUM: 1.75 OINTMENT TOPICAL at 08:43

## 2024-03-18 RX ADMIN — CALCIUM CARBONATE (ANTACID) CHEW TAB 500 MG 1000 MG: 500 CHEW TAB at 20:40

## 2024-03-18 ASSESSMENT — ACTIVITIES OF DAILY LIVING (ADL)
ADLS_ACUITY_SCORE: 25

## 2024-03-18 NOTE — PLAN OF CARE
Goal Outcome Evaluation:         VS: /69 (BP Location: Left arm)   Pulse 68   Temp 97.7  F (36.5  C) (Oral)   Resp 16   Wt 48.8 kg (107 lb 9.4 oz)   SpO2 100%   BMI 20.60 kg/m       O2: 100% RA   Output: Independent; no pain on urination   Last BM: Bowel sounds present; when asked pt didn't feel the need to poop   Activity: independent   Skin: Old scars on stomach; R above elbow amputee   Pain: denies   CMS: No signs of distress or complaints   Dressing: none   Diet: Regular, thin liquids   LDA: none   Equipment: none   Plan: Continue POC   Additional Info: Pt has PRN order for nicotine gum

## 2024-03-18 NOTE — PROGRESS NOTES
Care Management Follow Up    Length of Stay (days): 1    Expected Discharge Date:  TBD     Concerns to be Addressed: discharge planning, home safety, cognitive/perceptual, compliance issue, patient refuses services, medication, mental health  noncompliant with epliepsy meds. Unsafe to go home  Patient plan of care discussed at interdisciplinary rounds: Yes    Anticipated Discharge Disposition:  TBD - likely group home     Anticipated Discharge Services:  TBD  Anticipated Discharge DME:  TBD    Patient/family educated on Medicare website which has current facility and service quality ratings:  N/A at this time  Education Provided on the Discharge Plan:  Not by this writer  Patient/Family in Agreement with the Plan:  Yes - pt's spouse indicated she understands pt needs placement, likely in a GH, see note from John WARD on 3/14/24 for details    Referrals Placed by JEAN CLAUDE/ED:  None currently  Private pay costs discussed: Not applicable    Additional Information:  Case discussed in rounds this AM.  Provider aware that pt requires placement.  Pt remains med-ready for discharge.    Requested [facility]W to complete [facility] referral today.    Sent message to financial counseling via in-basket, provided relevant context, inquired if pt has regular MA or MA-LTC plan.  Requested that financial counseling assist pt's spouse in filling out paperwork to change insurance to MA-LTC, in the event he does not have this type of insurance already.    Received response from financial counseling, this writer's initial request was unclear, required further clarification and needed to address additional questions.  Sent response indicating the following:  - Requested check in [facility] to see if pt has [place]-LTC  - Requested FC assist pt's spouse in applying for MA-LTC on pt's behalf, in the event he does not have it already  - Clarified that it is unclear if pt is certified disabled, as chart indicates he may receive SSDI but his spouse is unsure  -  Clarified that a group home is not going to fill the [facility] request out for this pt    CHW updated this writer that [facility] referral was unable to be completed today.  RNCC intends to re-delegate at a later point.          Care mgmt will continue to follow.    BERENICE Koehler  [facility]  Units 8M/S & 10 ICU  Pager: [phone]  Phone: [phone]
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
## 2024-03-18 NOTE — PROGRESS NOTES
Shift [id number] Pt has a history of epilepsy,severe cognitive and functional impairment due to dementia and hallucinations    Summary:Admitted for evaluation of the cognitive deficits and behavior and monitored for ETOH withdrawal Waiting on placement  VS:       Pt A/O X 4. Afebrile. VSS. Lungs- Clear bilaterally  Denies nausea, shortness of breath, and chest pain.     Output:       Bowels- + in all four quadrants. Voids spontaneously without   difficulty in the toilet Continent of bowel and bladder.      Activity:       Pt up independent with cares .     Skin:   Skin intact .     Pain:       Denies any pain.      CMS:       CMS and Neuro's are intact. Denies numbness and tingling in all extremities.      Dressing:     Right arm amputation.      Diet:       Pt is on a Regular diet Pt drinks a lot of fluids Pt is intermittently rubbing his tummy Not for sure if this is because he is hungry or having pain.     LDA:     None.      Equipment:       .Refused  PCD's on BLE's. Bilateral heels are elevated off the bed.     Plan:       Pt is able to make needs known and the call light is within the pt's reach. Continue to monitor.       Additional Info:     Waiting on placement .

## 2024-03-19 PROCEDURE — G0378 HOSPITAL OBSERVATION PER HR: HCPCS

## 2024-03-19 PROCEDURE — 99231 SBSQ HOSP IP/OBS SF/LOW 25: CPT | Performed by: HOSPITALIST

## 2024-03-19 PROCEDURE — 250N000013 HC RX MED GY IP 250 OP 250 PS 637

## 2024-03-19 PROCEDURE — 99207 PR CDG-CUT & PASTE-POTENTIAL IMPACT ON LEVEL: CPT | Performed by: HOSPITALIST

## 2024-03-19 PROCEDURE — 250N000013 HC RX MED GY IP 250 OP 250 PS 637: Performed by: INTERNAL MEDICINE

## 2024-03-19 RX ADMIN — DIVALPROEX SODIUM 500 MG: 500 TABLET, DELAYED RELEASE ORAL at 19:53

## 2024-03-19 RX ADMIN — Medication 1 TABLET: at 08:27

## 2024-03-19 RX ADMIN — NICOTINE POLACRILEX 2 MG: 2 GUM, CHEWING BUCCAL at 10:50

## 2024-03-19 RX ADMIN — CALCIUM CARBONATE (ANTACID) CHEW TAB 500 MG 1000 MG: 500 CHEW TAB at 13:52

## 2024-03-19 RX ADMIN — DIVALPROEX SODIUM 500 MG: 500 TABLET, DELAYED RELEASE ORAL at 08:27

## 2024-03-19 RX ADMIN — NICOTINE POLACRILEX 2 MG: 2 GUM, CHEWING BUCCAL at 16:20

## 2024-03-19 RX ADMIN — NICOTINE POLACRILEX 2 MG: 2 GUM, CHEWING BUCCAL at 17:17

## 2024-03-19 RX ADMIN — WHITE PETROLATUM: 1.75 OINTMENT TOPICAL at 08:29

## 2024-03-19 RX ADMIN — NICOTINE POLACRILEX 2 MG: 2 GUM, CHEWING BUCCAL at 19:53

## 2024-03-19 RX ADMIN — THIAMINE HCL TAB 100 MG 100 MG: 100 TAB at 19:53

## 2024-03-19 RX ADMIN — NICOTINE POLACRILEX 2 MG: 2 GUM, CHEWING BUCCAL at 13:47

## 2024-03-19 RX ADMIN — WHITE PETROLATUM: 1.75 OINTMENT TOPICAL at 19:53

## 2024-03-19 RX ADMIN — THIAMINE HCL TAB 100 MG 100 MG: 100 TAB at 08:27

## 2024-03-19 RX ADMIN — WHITE PETROLATUM: 1.75 OINTMENT TOPICAL at 13:47

## 2024-03-19 RX ADMIN — NICOTINE POLACRILEX 2 MG: 2 GUM, CHEWING BUCCAL at 12:04

## 2024-03-19 RX ADMIN — FOLIC ACID 1 MG: 1 TABLET ORAL at 08:28

## 2024-03-19 ASSESSMENT — ACTIVITIES OF DAILY LIVING (ADL)
ADLS_ACUITY_SCORE: 25

## 2024-03-19 NOTE — PLAN OF CARE
Goal Outcome Evaluation:      VS: /73 (BP Location: Right arm)   Pulse 64   Temp 97.6  F (36.4  C) (Oral)   Resp 18   Wt 49 kg (108 lb 0.4 oz)   SpO2 98%   BMI 20.69 kg/m       O2: 98% RA   Output: Independent; No pain on urination   Last BM: Bowel sounds present; when asked pt didn't feel the need to poop   Activity: independent   Skin: Old scars ob abdomen; R above elbow amputee   Pain: denies   CMS: no signs of distress or complaints noted   Dressing: none   Diet: Regular, thin liquids    LDA: none   Equipment: none   Plan: Continue POC; awaiting placement   Additional Info: Pt has PRN order for nicotine gum x5 this shift

## 2024-03-19 NOTE — PROGRESS NOTES
"Shift 4788-7540 Pt has a history of epilepsy,severe cognitive and functional impairment due to dementia and hallucinations    Summary:Admitted for evaluation of the cognitive deficits and behavior and monitored for ETOH withdrawal Waiting on placement  VS:       Pt A/O X 4. Afebrile. VSS. Lungs- Clear bilaterally  Denies nausea, shortness of breath, and chest pain.     Output:       Bowels- + in all four quadrants. Voids spontaneously without   difficulty in the toilet Continent of bowel and bladder.      Activity:       Pt up independent with cares .     Skin:   Skin intact . Right above elbow amputee Old scars on abdomen     Pain:       Denies any pain.      CMS:       CMS and Neuro's are intact. Denies numbness and tingling in all extremities.      Dressing:     Right arm amputation.      Diet:       Pt is on a Regular thin liquid diet Pt drinks a lot of fluids Pt is intermittently rubbing his tummy Pt was given \"Tums\" for upset stomach     LDA:     None.      Equipment:       .Refused  PCD's on BLE's. Bilateral heels are elevated off the bed.  Pt is active walking in the room and hallway   Plan:       Pt is able to make needs known and the call light is within the pt's reach. Continue to monitor.       Additional Info:     Waiting on placement .       "
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
## 2024-03-20 PROCEDURE — 250N000013 HC RX MED GY IP 250 OP 250 PS 637: Performed by: PEDIATRICS

## 2024-03-20 PROCEDURE — G0378 HOSPITAL OBSERVATION PER HR: HCPCS

## 2024-03-20 PROCEDURE — 250N000013 HC RX MED GY IP 250 OP 250 PS 637

## 2024-03-20 PROCEDURE — 99231 SBSQ HOSP IP/OBS SF/LOW 25: CPT | Performed by: HOSPITALIST

## 2024-03-20 RX ORDER — NICOTINE 21 MG/24HR
1 PATCH, TRANSDERMAL 24 HOURS TRANSDERMAL DAILY
Status: DISCONTINUED | OUTPATIENT
Start: 2024-03-20 | End: 2024-05-08 | Stop reason: HOSPADM

## 2024-03-20 RX ADMIN — WHITE PETROLATUM: 1.75 OINTMENT TOPICAL at 14:48

## 2024-03-20 RX ADMIN — Medication 1 TABLET: at 07:58

## 2024-03-20 RX ADMIN — THIAMINE HCL TAB 100 MG 100 MG: 100 TAB at 19:50

## 2024-03-20 RX ADMIN — WHITE PETROLATUM: 1.75 OINTMENT TOPICAL at 08:02

## 2024-03-20 RX ADMIN — DIVALPROEX SODIUM 500 MG: 500 TABLET, DELAYED RELEASE ORAL at 19:50

## 2024-03-20 RX ADMIN — THIAMINE HCL TAB 100 MG 100 MG: 100 TAB at 07:58

## 2024-03-20 RX ADMIN — DIVALPROEX SODIUM 500 MG: 500 TABLET, DELAYED RELEASE ORAL at 07:58

## 2024-03-20 RX ADMIN — WHITE PETROLATUM: 1.75 OINTMENT TOPICAL at 19:50

## 2024-03-20 RX ADMIN — FOLIC ACID 1 MG: 1 TABLET ORAL at 07:58

## 2024-03-20 RX ADMIN — CALCIUM CARBONATE (ANTACID) CHEW TAB 500 MG 1000 MG: 500 CHEW TAB at 19:50

## 2024-03-20 RX ADMIN — NICOTINE 1 PATCH: 14 PATCH, EXTENDED RELEASE TRANSDERMAL at 07:58

## 2024-03-20 ASSESSMENT — ACTIVITIES OF DAILY LIVING (ADL)
ADLS_ACUITY_SCORE: 25

## 2024-03-20 NOTE — PROGRESS NOTES
"Shift [id number] Pt has a history of epilepsy,severe cognitive and functional impairment due to dementia and hallucinations  Pt has Nicotine gum but often will swallow it. It was explained to him not to swallow the gum Provider ordered a Nicotine Patch for tomorrow  Summary:Admitted for evaluation of the cognitive deficits and behavior and monitored for ETOH withdrawal Waiting on placement  VS:       Pt A/O X 4. Afebrile. VSS. Lungs- Clear bilaterally  Denies nausea, shortness of breath, and chest pain.     Output:       Bowels- + in all four quadrants. Voids spontaneously without   difficulty in the toilet Continent of bowel and bladder.      Activity:       Pt up independent with cares .     Skin:   Skin intact . Right above elbow amputee Old scars on abdomen     Pain:       Denies any pain.      CMS:       CMS and Neuro's are intact. Denies numbness and tingling in all extremities.      Dressing:     Right arm amputation.      Diet:       Pt is on a Regular thin liquid diet Pt drinks a lot of fluids Pt is intermittently rubbing his tummy Pt was given \"Tums\" for upset stomach     LDA:     None.      Equipment:       .Refused  PCD's on BLE's. Bilateral heels are elevated off the bed.  Pt is active walking in the room and hallway   Plan:       Pt is able to make needs known and the call light is within the pt's reach. Continue to monitor.       Additional Info:     Waiting on placement .       "

## 2024-03-20 NOTE — PROGRESS NOTES
"Care Management Follow Up    Length of Stay (days): 1    Expected Discharge Date:  TBD     Concerns to be Addressed: discharge planning, home safety, cognitive/perceptual, compliance issue, patient refuses services, medication, mental health  noncompliant with epliepsy meds. Unsafe to go home  Patient plan of care discussed at interdisciplinary rounds: Yes    Anticipated Discharge Disposition:  TBD - likely group home     Anticipated Discharge Services:  TBD  Anticipated Discharge DME:  TBD    Patient/family educated on Medicare website which has current facility and service quality ratings:  N/A at this time  Education Provided on the Discharge Plan:  Not by this writer  Patient/Family in Agreement with the Plan:  Yes - pt's spouse indicated she understands pt needs placement, likely in a , see note from John WARD on 3/14/24 for details    Referrals Placed by CM/SW:  Financial counseling (to assist w/ DHS 6381 Request for payment of Long Term Care Services form)  Private pay costs discussed: Not applicable    Additional Information:  Response from financial counseling was received when this writer was out of office yesterday:  \"I did check MNITS before responding. We do not set up a MNChoice assessment, from what I understand the CC or SW on the unit do that part. Has that been set up to be completed? It is also a requirement of LTC.     There is no application that needs to be completed, he has MA. When a person needs to go from MA to MA-LTC, they usually need a request for payment for LTC completed. The group home is the one needing the payment, so some will complete that document. If they say they won't, I believe we have completed the document before, but it is not something we typically continue to follow once we know the [facility] has received it.\"     CHW notified this writer that the [facility]ICES referral was completed for this pt.     Responded to financial counseling, informed them that [facility]ICES referral was " "completed, that MA-LTC request will not be completed by a group home as we do not have an accepting one at this time and they are unlikely to want to do this, and explained that MA-LTC is required to proceed w/ discharge.  Requested FC to complete MA-LTC request w/ pt's spouse on behalf of pt, and submit it, as well as any other required documentation, to the [facility].     Received response from FC:  \"I have asked a team member to complete the DHS 3543 Request for payment of Long Term Care Services.\"     4:35pm - Charge RN notified this writer that pt's family is present, as they report they were requested to come sign a document, but it is unclear what specifically they are referring to.  They do not know what they are coming to sign, who called, or any other details.  This writer stated it may be related to the financial counseling referral that was sent, but that it is this writer's understanding that financial counseling is done for the day and would not be available at this time.  RN stated she would advise pt's family to wait for a follow up call and gather additional details from the caller to ensure a thorough understanding of the request being made.          Care mgmt will continue to follow.    Mike Medrano RNCC  [facility]  Units 8M/S & 10 ICU  Pager: [phone]  Phone: [phone]    "
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
## 2024-03-20 NOTE — PLAN OF CARE
Goal Outcome Evaluation:    VS: /77 (BP Location: Right arm)   Pulse 71   Temp 97.3  F (36.3  C) (Oral)   Resp 18   Wt 49 kg (108 lb 0.4 oz)   SpO2 99%   BMI 20.69 kg/m      O2: RA   Neuro: AxO x3 intermittent confusion    Bowel/Bladder: Continent of bowel & Bladder    LBM: 3/19/2024   Diet: Regular   Skin: Intact  .  Scar on abdomen Aquaphor applied.    Pain: Denied pain  with interpretor   Activity: Ind   Dressings: None   LDA: None   Equipment: Call light   Plan: Awaiting placement   Additional Info:        Ally Esqueda RN on 3/20/2024 at 07:41 PM

## 2024-03-21 PROCEDURE — 250N000013 HC RX MED GY IP 250 OP 250 PS 637

## 2024-03-21 PROCEDURE — 99231 SBSQ HOSP IP/OBS SF/LOW 25: CPT | Performed by: HOSPITALIST

## 2024-03-21 PROCEDURE — 99207 PR CDG-CUT & PASTE-POTENTIAL IMPACT ON LEVEL: CPT | Performed by: HOSPITALIST

## 2024-03-21 PROCEDURE — G0378 HOSPITAL OBSERVATION PER HR: HCPCS

## 2024-03-21 PROCEDURE — 250N000013 HC RX MED GY IP 250 OP 250 PS 637: Performed by: PEDIATRICS

## 2024-03-21 RX ADMIN — THIAMINE HCL TAB 100 MG 100 MG: 100 TAB at 07:43

## 2024-03-21 RX ADMIN — DIVALPROEX SODIUM 500 MG: 500 TABLET, DELAYED RELEASE ORAL at 20:45

## 2024-03-21 RX ADMIN — Medication 1 TABLET: at 07:43

## 2024-03-21 RX ADMIN — WHITE PETROLATUM: 1.75 OINTMENT TOPICAL at 20:46

## 2024-03-21 RX ADMIN — DIVALPROEX SODIUM 500 MG: 500 TABLET, DELAYED RELEASE ORAL at 07:43

## 2024-03-21 RX ADMIN — NICOTINE 1 PATCH: 14 PATCH, EXTENDED RELEASE TRANSDERMAL at 07:43

## 2024-03-21 RX ADMIN — WHITE PETROLATUM: 1.75 OINTMENT TOPICAL at 14:34

## 2024-03-21 RX ADMIN — FOLIC ACID 1 MG: 1 TABLET ORAL at 07:43

## 2024-03-21 RX ADMIN — THIAMINE HCL TAB 100 MG 100 MG: 100 TAB at 20:45

## 2024-03-21 RX ADMIN — WHITE PETROLATUM: 1.75 OINTMENT TOPICAL at 07:42

## 2024-03-21 ASSESSMENT — ACTIVITIES OF DAILY LIVING (ADL)
ADLS_ACUITY_SCORE: 25

## 2024-03-21 NOTE — PROGRESS NOTES
Care Management Follow Up    Length of Stay (days): 1    Expected Discharge Date:  TBD     Concerns to be Addressed: discharge planning, home safety, cognitive/perceptual, compliance issue, patient refuses services, medication, mental health  noncompliant with epliepsy meds. Unsafe to go home  Patient plan of care discussed at interdisciplinary rounds: Yes    Anticipated Discharge Disposition:  TBD - likely group home     Anticipated Discharge Services:  TBD  Anticipated Discharge DME:  TBD    Patient/family educated on Medicare website which has current facility and service quality ratings:  N/A at this time  Education Provided on the Discharge Plan:  Not by this writer  Patient/Family in Agreement with the Plan:  Yes - pt's spouse indicated she understands pt needs placement, likely in a [facility], see note from John WARD on 3/14/24 for details    Referrals Placed by JEAN CLAUDE/ED:  Financial counseling (to assist w/ DHS 3543 Request for payment of Long Term Care Services form)  Private pay costs discussed: Not applicable    Additional Information:  Spoke jorge/ Maggie from financial counseling.  She provided this writer w/ Pete to Obtain Financial Information from the Account Validation Service form.  She stated it was left in pt's room yesterday for pt's spouse to sign, but it was not signed.  Explained the confusion that seemed to occur yesterday (see RNCC note from 3/20 for details), she stated she had explained to pt's spouse what the form was for and where it would be for signing.  She requested this writer to have pt's spouse sign when she is on unit later today around 3:00pm, and then send to  via email.   to submit forms to [facility].    3:10pm - Checked w/ bedside RN regarding pt's spouse - she has not seen her yet today, will notify this writer if she visits.    RNCC intends to follow up w/ pt's spouse tomorrow regarding signing AVS form and to have further discussion regarding discharge planning.          Care mgmt will  continue to follow.    BERENICE Koehler  [facility]  Units 8M/S & 10 ICU  Pager: [phone]  Phone: [phone]
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
## 2024-03-21 NOTE — PROGRESS NOTES
"Shift [id number] Pt has a history of epilepsy,severe cognitive and functional impairment due to dementia and hallucinations  Pt has Nicotine gum but often will swallow it. It was explained to him not to swallow the gum Pt now has a Nicotine Patch  Summary:Admitted for evaluation of the cognitive deficits and behavior and monitored for ETOH withdrawal Waiting on placement  VS:       Pt A/O X 4. Afebrile. VSS. Lungs- Clear bilaterally  Denies nausea, shortness of breath, and chest pain.     Output:       Bowels- + in all four quadrants. Voids spontaneously without   difficulty in the toilet Continent of bowel and bladder.      Activity:       Pt up independent with cares .     Skin:   Skin intact . Right above elbow amputee Old scars on abdomen     Pain:       Denies any pain.      CMS:       CMS and Neuro's are intact. Denies numbness and tingling in all extremities.      Dressing:     Right arm amputation.      Diet:       Pt is on a Regular thin liquid diet Pt drinks a lot of fluids Pt is intermittently rubbing his tummy Pt was given \"Tums\" for upset stomach     LDA:     None.      Equipment:       .Refused  PCD's on BLE's. Bilateral heels are elevated off the bed.  Pt is active walking in the room and hallway   Plan:       Pt is able to make needs known and the call light is within the pt's reach. Continue to monitor.       Additional Info:     Waiting on placement .       "

## 2024-03-22 PROCEDURE — 250N000013 HC RX MED GY IP 250 OP 250 PS 637: Performed by: PEDIATRICS

## 2024-03-22 PROCEDURE — 250N000013 HC RX MED GY IP 250 OP 250 PS 637

## 2024-03-22 PROCEDURE — G0378 HOSPITAL OBSERVATION PER HR: HCPCS

## 2024-03-22 PROCEDURE — 99231 SBSQ HOSP IP/OBS SF/LOW 25: CPT | Performed by: HOSPITALIST

## 2024-03-22 PROCEDURE — 99207 PR CDG-CUT & PASTE-POTENTIAL IMPACT ON LEVEL: CPT | Performed by: HOSPITALIST

## 2024-03-22 RX ADMIN — WHITE PETROLATUM: 1.75 OINTMENT TOPICAL at 13:26

## 2024-03-22 RX ADMIN — WHITE PETROLATUM: 1.75 OINTMENT TOPICAL at 20:14

## 2024-03-22 RX ADMIN — WHITE PETROLATUM: 1.75 OINTMENT TOPICAL at 09:02

## 2024-03-22 RX ADMIN — THIAMINE HCL TAB 100 MG 100 MG: 100 TAB at 20:14

## 2024-03-22 RX ADMIN — NICOTINE 1 PATCH: 14 PATCH, EXTENDED RELEASE TRANSDERMAL at 09:01

## 2024-03-22 RX ADMIN — Medication 1 TABLET: at 09:01

## 2024-03-22 RX ADMIN — DIVALPROEX SODIUM 500 MG: 500 TABLET, DELAYED RELEASE ORAL at 09:01

## 2024-03-22 RX ADMIN — FOLIC ACID 1 MG: 1 TABLET ORAL at 09:01

## 2024-03-22 RX ADMIN — THIAMINE HCL TAB 100 MG 100 MG: 100 TAB at 09:01

## 2024-03-22 RX ADMIN — DIVALPROEX SODIUM 500 MG: 500 TABLET, DELAYED RELEASE ORAL at 20:14

## 2024-03-22 ASSESSMENT — ACTIVITIES OF DAILY LIVING (ADL)
ADLS_ACUITY_SCORE: 25
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
## 2024-03-22 NOTE — PROGRESS NOTES
8 MS End of Shift Summary    Patient is alert and oriented to self and staff, independent in and outside of room. Patient enjoyed sitting outside his room on the floor, staff offered chair but will refused to sit on it. Calm and cooperative, no behavior noted during the shift, denies pain, SOB and chest pain. On a regular diet with good appetite, continent of bowel and bladder. No acute changes, pt in room in bed,  call light within reach. Awaiting placement, plan of care ongoing.

## 2024-03-22 NOTE — PROGRESS NOTES
Care Management Follow Up    Length of Stay (days): 1    Expected Discharge Date:  Unkown     Concerns to be Addressed: discharge planning, home safety, cognitive/perceptual, compliance issue, patient refuses services, medication, mental health  noncompliant with epliepsy meds.     Patient plan of care discussed at interdisciplinary rounds: Yes    Anticipated Discharge Disposition: Group Home     Anticipated Discharge Services: Other (see comment) (Assistance with I/ADLs from group home staff)  Anticipated Discharge DME: None    Patient/family educated on Medicare website which has current facility and service quality ratings: no  Education Provided on the Discharge Plan: Yes  Patient/Family in Agreement with the Plan: yes    Referrals Placed by CM/SW:    Private pay costs discussed: Not applicable    Additional Information:    ED called patient's wife, Maine, with assistance of [facility]  via phone. ED shared brief patient update and included the information regarding patient's insurance needs. ED explained that patient needs a type of Medical Assistance called Medical Assistance for Long Term Care. ED explained that there is a form called Authorization to Obtain Financial Information from the [employer] ([employer]) that needs to be signed. Maine stated that she can come in today at 3pm to sign the form.    ED met with patient's wife, Maine, and daughter in the family lounge. ED utilized [employer] for [place] . ED explained the form that was discussed earlier. Maine signed the form. ED then discussed discharge. ED shared that we are working on getting the correct insurance and getting a county assessment called the [facility] assessment. From there, we will be able to send referrals to find a group home that patient can stay in and have his care needs met. Maine verbalized understanding. ED discussed referral process and Maine deferred to Care Management to send referrals to  places that CM team sees fit. Maine asked if patient's children would be able to continue being his caregivers. SW shared that the group home will have their own staff so no, the children would not be able to be his PCAs/workers. Maine verbalized understanding. Maine asked if she and their children could come visit patient. SW stated that they can definitely visit him in his new phone. Maine and daughter had no further questions or concerns.    BERENICE Berman sent Authorization to Obtain Financial Information from the [employer] ([employer]) to Maggie in financial counseling.      Brunilda Arreaga, GÓMEZW, LSW  8 Med Surg   [facility]  Phone: [phone]  Pager: [phone]  Vocera: Searchable by name or group: 8 Med Surg Vocera

## 2024-03-22 NOTE — PLAN OF CARE
Goal Outcome Evaluation:      Plan of Care Reviewed With: patient    Overall Patient Progress: no change    VS:       Pt A/O X 3. Afebrile. VSS./85 (BP Location: Left arm)   Pulse 67   Temp 97.7  F (36.5  C) (Oral)   Resp 16   Wt 49 kg (108 lb 0.4 oz)   SpO2 97%   BMI 20.69 kg/m    Lungs- clear bilaterally with both anterior and posterior. Denies nausea, shortness of breath, and chest pain.     Output:       Bowels- active in all four quadrants. Voids spontaneously without difficulty in the bathroom.      Activity:       Pt up independently.     Skin:   Some peeling/scaling on LUE. Scaring on abdomin.      Pain:       Denies pain using rFLACC scale.      CMS:       CMS and Neuro's are intact. Denies numbness and tingling in all extremities. Excluding R arm amputation.       Dressing:       No incisional dressing.     Diet:       Pt is on a regular diet and appetite was good this shift.       LDA:       No PIV.     Equipment:       Pt denies PCD's on BLE's. Bilateral heels are elevated off the bed.     Plan:       Pt is able to make needs known and the call light is within the pt's reach.  Waiting for placement.      Additional Info:

## 2024-03-23 PROCEDURE — G0378 HOSPITAL OBSERVATION PER HR: HCPCS

## 2024-03-23 PROCEDURE — 99207 PR CDG-CUT & PASTE-POTENTIAL IMPACT ON LEVEL: CPT | Performed by: HOSPITALIST

## 2024-03-23 PROCEDURE — 250N000013 HC RX MED GY IP 250 OP 250 PS 637

## 2024-03-23 PROCEDURE — 99231 SBSQ HOSP IP/OBS SF/LOW 25: CPT | Performed by: HOSPITALIST

## 2024-03-23 PROCEDURE — 250N000013 HC RX MED GY IP 250 OP 250 PS 637: Performed by: PEDIATRICS

## 2024-03-23 RX ADMIN — DIVALPROEX SODIUM 500 MG: 500 TABLET, DELAYED RELEASE ORAL at 20:35

## 2024-03-23 RX ADMIN — THIAMINE HCL TAB 100 MG 100 MG: 100 TAB at 20:35

## 2024-03-23 RX ADMIN — Medication 1 TABLET: at 08:22

## 2024-03-23 RX ADMIN — FOLIC ACID 1 MG: 1 TABLET ORAL at 08:22

## 2024-03-23 RX ADMIN — WHITE PETROLATUM: 1.75 OINTMENT TOPICAL at 20:35

## 2024-03-23 RX ADMIN — THIAMINE HCL TAB 100 MG 100 MG: 100 TAB at 08:22

## 2024-03-23 RX ADMIN — NICOTINE 1 PATCH: 14 PATCH, EXTENDED RELEASE TRANSDERMAL at 08:22

## 2024-03-23 RX ADMIN — WHITE PETROLATUM: 1.75 OINTMENT TOPICAL at 08:22

## 2024-03-23 RX ADMIN — WHITE PETROLATUM: 1.75 OINTMENT TOPICAL at 13:40

## 2024-03-23 RX ADMIN — DIVALPROEX SODIUM 500 MG: 500 TABLET, DELAYED RELEASE ORAL at 08:22

## 2024-03-23 ASSESSMENT — ACTIVITIES OF DAILY LIVING (ADL)
ADLS_ACUITY_SCORE: 25
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
## 2024-03-23 NOTE — PLAN OF CARE
Goal Outcome Evaluation:      Plan of Care Reviewed With: patient    Overall Patient Progress: no change    Outcome Evaluation: Pt has been very calm and cooporative all shift. Pt seems to be in a very happy mood. Spent most of the day besides meals in his chair in the hallway.    VS:       Pt A/O X 4. Afebrile. VSS. /79 (BP Location: Left arm)   Pulse 79   Temp 97.4  F (36.3  C) (Oral)   Resp 16   Wt 49 kg (108 lb 0.4 oz)   SpO2 97%   BMI 20.69 kg/m   Lungs- clear bilaterally with both anterior and posterior.      Output:       Bowels- active in all four quadrants. Voids spontaneously without difficulty in the bathroom.      Activity:       Pt up independently.     Skin:   Skin intact.     Pain:       Using rFlacc scale, pt denies any pain.     CMS:       CMS and Neuro's are intact. Pt shakes head when asked about numbness and tingling. RUE amputee.       Dressing:       No incisional dressing.     Diet:       Pt is on a regular diet and appetite was good this shift.       LDA:       No PIV.     Equipment:       Pt denies PCD's on BLE's. Bilateral heels are elevated off the bed.     Plan:       Pt is able to make needs known and the call light is within the pt's reach. Continue to monitor.       Additional Info:

## 2024-03-23 NOTE — PROGRESS NOTES
8 MS End of Shift Summary    Patient refused V/S obtained, no acute changes during the shift, up ad daphne, Patient continue to slammed door at the beginning of the shift. Pt was redirected and later still quiet in room.  Awaiting placement

## 2024-03-24 PROCEDURE — 250N000013 HC RX MED GY IP 250 OP 250 PS 637

## 2024-03-24 PROCEDURE — 250N000013 HC RX MED GY IP 250 OP 250 PS 637: Performed by: PEDIATRICS

## 2024-03-24 PROCEDURE — 99207 PR CDG-CUT & PASTE-POTENTIAL IMPACT ON LEVEL: CPT | Performed by: HOSPITALIST

## 2024-03-24 PROCEDURE — G0378 HOSPITAL OBSERVATION PER HR: HCPCS

## 2024-03-24 PROCEDURE — 99231 SBSQ HOSP IP/OBS SF/LOW 25: CPT | Performed by: HOSPITALIST

## 2024-03-24 RX ADMIN — THIAMINE HCL TAB 100 MG 100 MG: 100 TAB at 08:28

## 2024-03-24 RX ADMIN — DIVALPROEX SODIUM 500 MG: 500 TABLET, DELAYED RELEASE ORAL at 08:28

## 2024-03-24 RX ADMIN — WHITE PETROLATUM: 1.75 OINTMENT TOPICAL at 20:16

## 2024-03-24 RX ADMIN — Medication 1 TABLET: at 08:28

## 2024-03-24 RX ADMIN — THIAMINE HCL TAB 100 MG 100 MG: 100 TAB at 20:16

## 2024-03-24 RX ADMIN — NICOTINE 1 PATCH: 14 PATCH, EXTENDED RELEASE TRANSDERMAL at 08:29

## 2024-03-24 RX ADMIN — FOLIC ACID 1 MG: 1 TABLET ORAL at 08:28

## 2024-03-24 RX ADMIN — WHITE PETROLATUM: 1.75 OINTMENT TOPICAL at 15:26

## 2024-03-24 RX ADMIN — DIVALPROEX SODIUM 500 MG: 500 TABLET, DELAYED RELEASE ORAL at 20:16

## 2024-03-24 RX ADMIN — WHITE PETROLATUM: 1.75 OINTMENT TOPICAL at 08:28

## 2024-03-24 ASSESSMENT — ACTIVITIES OF DAILY LIVING (ADL)
ADLS_ACUITY_SCORE: 25

## 2024-03-24 NOTE — PLAN OF CARE
Goal Outcome Evaluation:      Plan of Care Reviewed With: patient    Overall Patient Progress: no change    Outcome Evaluation: Pt has been pretty quite this shift. Plan is waiting on placement for discharge.    VS:       Pt A/O X 4. Afebrile. VSS. /71 (BP Location: Left arm)   Pulse 67   Temp 97.9  F (36.6  C) (Oral)   Resp 15   Wt 49 kg (108 lb 0.4 oz)   SpO2 98%   BMI 20.69 kg/m   Lungs- clear bilaterally with both anterior and posterior.      Output:       Bowels- active in all four quadrants. Voids spontaneously without difficulty in the bathroom.      Activity:       Pt up independently.     Skin:   Skin intact.     Pain:       Using rFLACC scale pt denies pain.     CMS:       CMS and Neuro's are intact. Pt shakes head when asked about numbness and tingling.       Dressing:       No incisional dressing.     Diet:       Pt is on a regular diet and appetite was good this shift.       LDA:       No PIV.     Equipment:       Pt denies PCD's on BLE's. Bilateral heels are elevated off the bed.     Plan:       Pt is able to make needs known and the call light is within the pt's reach. Continue to monitor.       Additional Info:

## 2024-03-24 NOTE — PROGRESS NOTES
8 MS End of Shift Summary    Blood pressure 117/85, pulse 73, temperature 98.9  F (37.2  C), temperature source Oral, resp. rate 16, weight 49 kg (108 lb 0.4 oz), SpO2 98%.    Patient calm and cooperative through the shift, no acute changes, no behavior episode occurred during the shift. In bed asleep most of the night. No sign of pain, distress observed, pt awaiting placement. Plan of care ongoing.
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
## 2024-03-25 PROCEDURE — G0378 HOSPITAL OBSERVATION PER HR: HCPCS

## 2024-03-25 PROCEDURE — 250N000013 HC RX MED GY IP 250 OP 250 PS 637: Performed by: PEDIATRICS

## 2024-03-25 PROCEDURE — 250N000013 HC RX MED GY IP 250 OP 250 PS 637

## 2024-03-25 PROCEDURE — 99207 PR CDG-CUT & PASTE-POTENTIAL IMPACT ON LEVEL: CPT | Performed by: HOSPITALIST

## 2024-03-25 PROCEDURE — 99231 SBSQ HOSP IP/OBS SF/LOW 25: CPT | Performed by: HOSPITALIST

## 2024-03-25 RX ADMIN — WHITE PETROLATUM: 1.75 OINTMENT TOPICAL at 07:43

## 2024-03-25 RX ADMIN — THIAMINE HCL TAB 100 MG 100 MG: 100 TAB at 20:43

## 2024-03-25 RX ADMIN — DIVALPROEX SODIUM 500 MG: 500 TABLET, DELAYED RELEASE ORAL at 07:42

## 2024-03-25 RX ADMIN — NICOTINE 1 PATCH: 14 PATCH, EXTENDED RELEASE TRANSDERMAL at 07:46

## 2024-03-25 RX ADMIN — DIVALPROEX SODIUM 500 MG: 500 TABLET, DELAYED RELEASE ORAL at 20:43

## 2024-03-25 RX ADMIN — WHITE PETROLATUM: 1.75 OINTMENT TOPICAL at 20:43

## 2024-03-25 RX ADMIN — FOLIC ACID 1 MG: 1 TABLET ORAL at 07:42

## 2024-03-25 RX ADMIN — Medication 1 TABLET: at 07:42

## 2024-03-25 RX ADMIN — THIAMINE HCL TAB 100 MG 100 MG: 100 TAB at 07:42

## 2024-03-25 ASSESSMENT — ACTIVITIES OF DAILY LIVING (ADL)
ADLS_ACUITY_SCORE: 26
ADLS_ACUITY_SCORE: 25
ADLS_ACUITY_SCORE: 26
ADLS_ACUITY_SCORE: 25
ADLS_ACUITY_SCORE: 26
ADLS_ACUITY_SCORE: 26
ADLS_ACUITY_SCORE: 25
ADLS_ACUITY_SCORE: 26
ADLS_ACUITY_SCORE: 25
ADLS_ACUITY_SCORE: 25
ADLS_ACUITY_SCORE: 26
ADLS_ACUITY_SCORE: 26
ADLS_ACUITY_SCORE: 25
ADLS_ACUITY_SCORE: 26
ADLS_ACUITY_SCORE: 25

## 2024-03-25 NOTE — PROGRESS NOTES
Care Management Follow Up    Length of Stay (days): 1    Expected Discharge Date:  TBD     Concerns to be Addressed: discharge planning, home safety, cognitive/perceptual, compliance issue, patient refuses services, medication, mental health  noncompliant with epliepsy meds. Unsafe to go home  Patient plan of care discussed at interdisciplinary rounds: Yes    Anticipated Discharge Disposition:  Group Home     Anticipated Discharge Services:  TBD  Anticipated Discharge DME:  TBD    Patient/family educated on Medicare website which has current facility and service quality ratings: pt's spouse deferred to care mgmt to select group homes  Education Provided on the Discharge Plan: Yes (not by this writer)  Patient/Family in Agreement with the Plan:  Yes    Referrals Placed by JEAN CLAUDE/ED:  Financial counseling (to assist / DHS [id number] Request for payment of Long Term Care Services form)  Private pay costs discussed: Not applicable    Additional Information:  Received email from Maggie in financial counseling, she stated the completed [place] request for payment of LTC services form was sent to the [facility] this morning.    Per note from Brunilda WARD on 3/22, pt's spouse deferred to care mgmt for selection of group homes to refer to.  Requested [facility] to send group home referrals, sticking close to home address as a starting point, if possible.          Care mgmt will continue to follow.    Mike Medrano RNCC  [facility]  Units 8M/S & 10 ICU  Pager: [phone]  Phone: [phone]

## 2024-03-25 NOTE — PLAN OF CARE
Goal Outcome Evaluation:    VS: /76 (BP Location: Left arm)   Pulse 63   Temp 98.5  F (36.9  C) (Oral)   Resp 16   Wt 49 kg (108 lb 0.4 oz)   SpO2 98%   BMI 20.69 kg/m      O2: RA   Neuro: AxO x3 , intermittent confusion    Bowel/Bladder: Continent of bowel and bladder    LBM: Unknown , pt refused to disclose with interpretor    Diet: Regular    Skin: Intact , dry    Pain: None reported    Activity: Ind   Dressings: None    LDA: None   Equipment: Call light    Plan: Waiting on placement    Additional Info:        Ally Esqueda RN on 3/25/2024 at 07:24 PM

## 2024-03-25 NOTE — PLAN OF CARE
Goal Outcome Evaluation:      Plan of Care Reviewed With: patient    Overall Patient Progress: no change    Outcome Evaluation: Patient spend most time in room, calm, quiet and cooperative, no behavior noted. In bed asleep, no acute changes during the shift, awaiting placement. Plan of care ongoing.
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
## 2024-03-26 PROCEDURE — 250N000013 HC RX MED GY IP 250 OP 250 PS 637

## 2024-03-26 PROCEDURE — 250N000013 HC RX MED GY IP 250 OP 250 PS 637: Performed by: PEDIATRICS

## 2024-03-26 PROCEDURE — G0378 HOSPITAL OBSERVATION PER HR: HCPCS

## 2024-03-26 PROCEDURE — 99231 SBSQ HOSP IP/OBS SF/LOW 25: CPT | Performed by: INTERNAL MEDICINE

## 2024-03-26 RX ADMIN — THIAMINE HCL TAB 100 MG 100 MG: 100 TAB at 20:47

## 2024-03-26 RX ADMIN — WHITE PETROLATUM: 1.75 OINTMENT TOPICAL at 15:55

## 2024-03-26 RX ADMIN — DIVALPROEX SODIUM 500 MG: 500 TABLET, DELAYED RELEASE ORAL at 08:27

## 2024-03-26 RX ADMIN — NICOTINE 1 PATCH: 14 PATCH, EXTENDED RELEASE TRANSDERMAL at 08:27

## 2024-03-26 RX ADMIN — WHITE PETROLATUM: 1.75 OINTMENT TOPICAL at 20:46

## 2024-03-26 RX ADMIN — Medication 1 TABLET: at 08:27

## 2024-03-26 RX ADMIN — DIVALPROEX SODIUM 500 MG: 500 TABLET, DELAYED RELEASE ORAL at 20:46

## 2024-03-26 RX ADMIN — WHITE PETROLATUM: 1.75 OINTMENT TOPICAL at 08:28

## 2024-03-26 RX ADMIN — FOLIC ACID 1 MG: 1 TABLET ORAL at 08:27

## 2024-03-26 RX ADMIN — THIAMINE HCL TAB 100 MG 100 MG: 100 TAB at 08:27

## 2024-03-26 ASSESSMENT — ACTIVITIES OF DAILY LIVING (ADL)
ADLS_ACUITY_SCORE: 26

## 2024-03-26 NOTE — PROGRESS NOTES
8 MS End of Shift Summary    Blood pressure (!) 135/99, pulse 67, temperature 97.2  F (36.2  C), temperature source Oral, resp. rate 18, weight 49 kg (108 lb 0.4 oz), SpO2 98%.    No acute changes, no behavioral issues noted during the shift, Patient calm and cooperative, on a regular diet with good appetite, continent of bowel and bladder, ambulates independently. Alert and oriented, no distress  observed during safety rounds and no facial expresses of pain noted. Pt. in bed resting, call light within reach    Awaiting placement, plan of care ongoing

## 2024-03-26 NOTE — PLAN OF CARE
Goal Outcome Evaluation:       BP (!) 135/99 (BP Location: Left arm, Patient Position: Semi-Diana's)   Pulse 67   Temp 97.2  F (36.2  C) (Oral)   Resp 18   Wt 49 kg (108 lb 0.4 oz)   SpO2 98%   BMI 20.69 kg/m        Overall Patient Progress: no changeOverall Patient Progress: no change     Pt is alert and oriented to self, able to make some needs known via [place] . On room air, appetite good, cont of bowel and bladder, amb in hallway with steady gait. Nicotine patch in place right shoulder, right arm amputated, skin dry, aquafor applied.   No sob, denies pain, disposition TBD pending placement, continue plan of care
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
## 2024-03-27 PROCEDURE — 250N000013 HC RX MED GY IP 250 OP 250 PS 637

## 2024-03-27 PROCEDURE — 99231 SBSQ HOSP IP/OBS SF/LOW 25: CPT | Performed by: INTERNAL MEDICINE

## 2024-03-27 PROCEDURE — G0378 HOSPITAL OBSERVATION PER HR: HCPCS

## 2024-03-27 RX ADMIN — DIVALPROEX SODIUM 500 MG: 500 TABLET, DELAYED RELEASE ORAL at 20:26

## 2024-03-27 RX ADMIN — WHITE PETROLATUM: 1.75 OINTMENT TOPICAL at 20:28

## 2024-03-27 RX ADMIN — THIAMINE HCL TAB 100 MG 100 MG: 100 TAB at 20:26

## 2024-03-27 RX ADMIN — WHITE PETROLATUM: 1.75 OINTMENT TOPICAL at 14:07

## 2024-03-27 ASSESSMENT — ACTIVITIES OF DAILY LIVING (ADL)
ADLS_ACUITY_SCORE: 26

## 2024-03-27 NOTE — PROGRESS NOTES
Care Management Follow Up    Length of Stay (days): 1    Expected Discharge Date:  TBD     Concerns to be Addressed: discharge planning, home safety, cognitive/perceptual, compliance issue, patient refuses services, medication, mental health  noncompliant with epliepsy meds. Unsafe to go home  Patient plan of care discussed at interdisciplinary rounds: Yes    Anticipated Discharge Disposition:  Group Home     Anticipated Discharge Services:  TBD  Anticipated Discharge DME:  TBD    Patient/family educated on Medicare website which has current facility and service quality ratings: pt's spouse deferred to care mgmt to select group homes  Education Provided on the Discharge Plan: Yes (not by this writer)  Patient/Family in Agreement with the Plan:  Yes    Referrals Placed by CM/SW:  Financial counseling (to assist / DHS 2164 Request for payment of Long Term Care Services form)  Private pay costs discussed: Not applicable    Additional Information:  Requested CHW to send group home referrals.    Received update from CHW that an email was received from [facility] / [facility].  Email says that DHS requires an individual or guardian to provide consent for [facility] assessment.  Email says the referral was held for 7 calendar days after the [facility] requested consent (does not specify how they requested consent or who they were requesting consent from).  Email requests that a new referral be submitted w/ an NIK, followed by a call to [facility], w/ the pt, for him to give verbal consent.    Attempted to call [facility]aris at [phone], no answer, did not leave VM.    Informed CHW that it seems likely the [facility] referral will need to be resubmitted after NIK is completed.  RNCC to address NIK w/ pt and his spouse.  Requested CHW to continue making GH referrals.    Unable to address NIK today d/t time constraints, intend to do so tomorrow.          Care mgmt will continue to follow.    Mike OLEA  BERENICE Medrano  [facility]  Units 8M/S & 10 ICU  Pager: [phone]  Phone: [phone]
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
## 2024-03-27 NOTE — PROGRESS NOTES
N/aVS: /76 (BP Location: Left arm)   Pulse 73   Temp 97.8  F (36.6  C) (Oral)   Resp 16   Wt 51.2 kg (112 lb 12.8 oz)   SpO2 97%   BMI 21.60 kg/m      O2: SpO2 97% @ room air    Neuro: Alert and Oriented x 4    Bowel/Bladder: Continent with bladder/bowel.    LBM: N/A   Diet: Regular   Skin:  Hypertrophic right arm scars                                                                   Pain: No  pain reported throughout shift    Activity: independent   Dressings: No dressings    LDA: No PIV   Equipment: Belongings with pt   Plan: Anticipated Discharge Disposition:  Group Home    Additional Info: history of temporal lobe epilepsy   unspecified dementia with possible hallucinations   Hx of R arm amputation

## 2024-03-27 NOTE — PLAN OF CARE
Plan of Care Reviewed With: patient  Overall Patient Progress: no change    No changes overnight. Medina  utilized. Pt is up independently with steady gait.. Vital signs stable. Continent of bowel and bladder. Denies pain, SOB, chest pain. No PIV. Appetite good, ate dinner around 10pm.    Plan: Pending placement

## 2024-03-27 NOTE — PROGRESS NOTES
3/27/24  Care Management Follow Up    RNCC asked CHW to send referrals to group homes    Pending:    [facility]  PH: [phone]  FAX: [phone]  3/27: Initial referral sent     [facility]  FAX: [phone]  3/27: Initial referral sent     [facility] [address] [place] [address]  [email or web address]  Mario Torrez: [phone]  FAX: [phone]  3/27: Initial referral sent       Reyna Egan  Inpatient CHW  [facility] 5 Ortho, 8 Med Surge, & ED  PH: [phone]

## 2024-03-28 ENCOUNTER — APPOINTMENT (OUTPATIENT)
Dept: INTERPRETER SERVICES | Facility: CLINIC | Age: 49
End: 2024-03-28
Payer: COMMERCIAL

## 2024-03-28 PROCEDURE — 250N000013 HC RX MED GY IP 250 OP 250 PS 637

## 2024-03-28 PROCEDURE — 99232 SBSQ HOSP IP/OBS MODERATE 35: CPT | Performed by: INTERNAL MEDICINE

## 2024-03-28 PROCEDURE — G0378 HOSPITAL OBSERVATION PER HR: HCPCS

## 2024-03-28 RX ADMIN — DIVALPROEX SODIUM 500 MG: 500 TABLET, DELAYED RELEASE ORAL at 20:19

## 2024-03-28 RX ADMIN — THIAMINE HCL TAB 100 MG 100 MG: 100 TAB at 20:19

## 2024-03-28 RX ADMIN — WHITE PETROLATUM: 1.75 OINTMENT TOPICAL at 15:19

## 2024-03-28 RX ADMIN — WHITE PETROLATUM: 1.75 OINTMENT TOPICAL at 22:12

## 2024-03-28 ASSESSMENT — ACTIVITIES OF DAILY LIVING (ADL)
ADLS_ACUITY_SCORE: 26
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
## 2024-03-28 NOTE — PROGRESS NOTES
3/28/24  Care Management Follow Up     Update: Pt will have an assessment tomorrow, 3/29, around 9 to 12 with [facility]. This was notified to the SW and the RNCC on the unit.    Pending:     [facility]  PH: [phone]  FAX: [phone]  3/27: Initial referral sent      [facility]  FAX: [phone]  3/27: Initial referral sent      [facility] [address] [place] [address]  [email or web address]  Mario Berisssmooth: [phone]  FAX: [phone]  3/27: Initial referral sent   3/28: CHW got an email stating that they have not gotten the referral. CHW emailed the referral.    [facility]  [address]  [place] [address]  Haris Osmin: [phone]  Fax: [phone]  3/28: Initial referral sent     Reyna Egan  Inpatient CHW  [facility] 5 Ortho, 8 Med Surge, & ED  PH: [phone]   no

## 2024-03-28 NOTE — PLAN OF CARE
Shift [id number]  Temp: 97.2  F (36.2  C) Temp src: Oral BP: 109/73 Pulse: 67   Resp: 16 SpO2: 97 % O2 Device: None (Room air)       Pt slept throughout most of shift. Independent in room, no complaints of pain or nausea, VSS, afebrile. Medina  for translation, continent bowel and bladder. No new changes overnight, call light within reach, able to make needs known, continue with plan of care, awaiting placement

## 2024-03-28 NOTE — PLAN OF CARE
[id number]    /83 (BP Location: Left arm, Patient Position: Sitting, Cuff Size: Adult Small)   Pulse 77   Temp 97.6  F (36.4  C) (Oral)   Resp 16   Wt 51.2 kg (112 lb 12.8 oz)   SpO2 100%   BMI 21.60 kg/m       Orientation: Alert and oriented.   Pt speaks Medina. Utilized phone  service during assessment.  Bowel: Last BM 3/26/24 per pt report  Bladder: Voiding spontaneously, denies difficulty  Pain: None Reported  Ambulation/Transfers: Independent  Diet/ Liquids: Regular diet, take medications whole  Tubes/ Lines/ Drains: None  Oxygen: On room air  Skin: Hx right arm amputation  Call light within reach. Continue with POC.

## 2024-03-29 PROCEDURE — G0378 HOSPITAL OBSERVATION PER HR: HCPCS

## 2024-03-29 PROCEDURE — 250N000013 HC RX MED GY IP 250 OP 250 PS 637

## 2024-03-29 PROCEDURE — 99231 SBSQ HOSP IP/OBS SF/LOW 25: CPT | Performed by: STUDENT IN AN ORGANIZED HEALTH CARE EDUCATION/TRAINING PROGRAM

## 2024-03-29 PROCEDURE — 250N000013 HC RX MED GY IP 250 OP 250 PS 637: Performed by: PEDIATRICS

## 2024-03-29 RX ADMIN — THIAMINE HCL TAB 100 MG 100 MG: 100 TAB at 08:45

## 2024-03-29 RX ADMIN — DIVALPROEX SODIUM 500 MG: 500 TABLET, DELAYED RELEASE ORAL at 20:01

## 2024-03-29 RX ADMIN — Medication 1 TABLET: at 08:45

## 2024-03-29 RX ADMIN — NICOTINE 1 PATCH: 14 PATCH, EXTENDED RELEASE TRANSDERMAL at 08:46

## 2024-03-29 RX ADMIN — THIAMINE HCL TAB 100 MG 100 MG: 100 TAB at 20:01

## 2024-03-29 RX ADMIN — FOLIC ACID 1 MG: 1 TABLET ORAL at 08:45

## 2024-03-29 RX ADMIN — DIVALPROEX SODIUM 500 MG: 500 TABLET, DELAYED RELEASE ORAL at 08:45

## 2024-03-29 ASSESSMENT — ACTIVITIES OF DAILY LIVING (ADL)
ADLS_ACUITY_SCORE: 26

## 2024-03-29 NOTE — PLAN OF CARE
Goal Outcome Evaluation:  Medina  utilized. Denies numbness/tingling, nausea,vomiting, SOB, and chest pain.  Up independently ambulating around unit and back to bed, gait steady. Denies pain  Rested comfortably throughout the night, no new complaints.  Anticipated discharge to group home  Disposition: TBD   Continue with POC.

## 2024-03-29 NOTE — PROGRESS NOTES
50 y/o male treated for behavior problem. History of alcohol abuse, violent behavior, and inability to care for self. Independent with cares. Tolerating medications well.  utilized when needed. Awaiting placement.

## 2024-03-29 NOTE — PROGRESS NOTES
3/29/24  Care Management Follow Up    Update: Pt had the assessment done this morning and the [facility] are interested in him. CM will now need to resubmit the M[place]ho[facility] assessment referral. RNCC will be working on getting the consentment.     ACCEPTING:    [facility]  [address]  [place] [address]  Haris Osmin: [phone]  Fax: [phone]  3/28: Initial referral sent     Pending:     [facility]  PH: [phone]  FAX: [phone]  3/27: Initial referral sent      [facility]  FAX: [phone]  3/27: Initial referral sent      [facility] [address] [place] [address]  [email or web address]  Mario Torrez: [phone]  FAX: [phone]  3/27: Initial referral sent   3/28: CHW got an email stating that they have not gotten the referral. CHW emailed the referral.    Reyna Egan  Inpatient CHW  [facility] 5 Ortho, 8 Med Surge, & ED  PH: [phone]

## 2024-03-29 NOTE — PROGRESS NOTES
Care Management Follow Up    Length of Stay (days): 1    Expected Discharge Date:  TBD     Concerns to be Addressed: discharge planning, home safety, cognitive/perceptual, compliance issue, patient refuses services, medication, mental health  noncompliant with epliepsy meds. Unsafe to go home  Patient plan of care discussed at interdisciplinary rounds: Yes    Anticipated Discharge Disposition:  Group Home     Anticipated Discharge Services:  TBD  Anticipated Discharge DME:  TBD    Patient/family educated on Medicare website which has current facility and service quality ratings: pt's spouse deferred to care mgmt to select group homes  Education Provided on the Discharge Plan: Yes (not by this writer)  Patient/Family in Agreement with the Plan:  Yes    Referrals Placed by CM/SW:  Financial counseling (to assist w/ DHS 3543 Request for payment of Long Term Care Services form); [facility] referral resubmitted on 3/29/24; group home referrals (see CHW note for details)  Private pay costs discussed: Not applicable    Additional Information:    Relevant Contacts:  Damaris Mckee  Senior  / [facility]   Ph: [phone]  Fax: [phone]  Email: [email or web address]    [facility] (Accepting Group Home Agency)  [address]  [place] [address]  Haris Osmin: [phone]  Fax: [phone]  ________________________________________    [facility] notified this writer that a representative from the following will be assessing pt today:    [facility]  [address]  [place] [address]  Haris Osmin: [phone]  Fax: [phone]    Met w/ Haris on unit, he informed this writer that the assessment completed by their nurse has yielded no concerns, they are okay w/ accepting pt for placement.  Haris inquired about timeline for waiver, this writer informed him that MA-LT request was submitted and that [facility]  referral was submitted, but requires resubmission w/ NIK, so timeline TBD.  Haris expressed understanding, requested to be kept updated to timeline, no further questions/concerns for this writer.    Attempted to call Damaris Mckee, senior  w/ Danilo Carias, regarding NIK request (see note from this writer filed 3/27 for details).  Two calls placed, neither answered, did not leave  at this time.    Case discussed w/ Neelam,  supervisor.  Explained situation surrounding NIK request from [facility].  Neelam advised reaching out to Damaris again, leaving  and sending follow up email.  In the event Damaris does not respond, she advised reaching out to [facility].  RN to attempt to inform [facility] of pt's lack of capacity (as determined by provider) and find out who they attempted to collect consent from.  NIK not to be pursued at this time, pending further clarification from the [facility] and seeing if they are willing to forego NIK after receiving additional context regarding pt's lack of capacity.    Called Damaris, discussed referral, explained pt does not have capacity at this time and that his spouse is the legal next of kin that the hospital has been using for decision-making.  Damaris stated there is a NIK form linked to the online referral.  The NIK is required by [facility] for [facility] referral when a waiver is being sought.  She explained that the NIK allows the county to bypass contacting the consenting party for the assessment.  In the event an NIK is not submitted, the [facility] contacts the party to receive consent.  In this instance, no NIK was submitted, and the pt was listed as the consenting party, whom they were unable to get ahold of.  Damaris stated the referral must be re-submitted w/ NIK or, given the circumstances, listing pt's spouse as consenting party.  She stated that ideally, to make it easiest for the county, NIK is to be submitted.    [facility] referral resubmitted.  Did not  complete NIK, as the digital submission/signature would require pt's spouse to be present (which she is not at this time), and the paper form is unavailable (link does not function).  Due to lack of NIK, [facility] will contact pt's spouse.  Made it very clear in referral that pt's spouse is to be contacted w/ [facility]  to provide consent for the assessment.    Next Steps: RNCC to contact pt's spouse to update her to accepting  agency.  RNCC to follow up w/ meenu regarding [facility] referral and MA-LTC request status, when appropriate.          Care mgmt will continue to follow.    BERENICE Koehler  [facility]  Units 8M/S & 10 ICU  Pager: [phone]  Phone: [phone]

## 2024-03-30 PROCEDURE — 250N000013 HC RX MED GY IP 250 OP 250 PS 637

## 2024-03-30 PROCEDURE — 250N000013 HC RX MED GY IP 250 OP 250 PS 637: Performed by: PEDIATRICS

## 2024-03-30 PROCEDURE — 99231 SBSQ HOSP IP/OBS SF/LOW 25: CPT | Performed by: STUDENT IN AN ORGANIZED HEALTH CARE EDUCATION/TRAINING PROGRAM

## 2024-03-30 PROCEDURE — G0378 HOSPITAL OBSERVATION PER HR: HCPCS

## 2024-03-30 RX ADMIN — Medication 1 TABLET: at 08:36

## 2024-03-30 RX ADMIN — WHITE PETROLATUM: 1.75 OINTMENT TOPICAL at 20:07

## 2024-03-30 RX ADMIN — THIAMINE HCL TAB 100 MG 100 MG: 100 TAB at 20:06

## 2024-03-30 RX ADMIN — DIVALPROEX SODIUM 500 MG: 500 TABLET, DELAYED RELEASE ORAL at 08:37

## 2024-03-30 RX ADMIN — NICOTINE 1 PATCH: 14 PATCH, EXTENDED RELEASE TRANSDERMAL at 08:37

## 2024-03-30 RX ADMIN — FOLIC ACID 1 MG: 1 TABLET ORAL at 08:37

## 2024-03-30 RX ADMIN — ACETAMINOPHEN 650 MG: 325 TABLET, FILM COATED ORAL at 20:06

## 2024-03-30 RX ADMIN — DIVALPROEX SODIUM 500 MG: 500 TABLET, DELAYED RELEASE ORAL at 20:06

## 2024-03-30 RX ADMIN — THIAMINE HCL TAB 100 MG 100 MG: 100 TAB at 08:37

## 2024-03-30 ASSESSMENT — ACTIVITIES OF DAILY LIVING (ADL)
ADLS_ACUITY_SCORE: 26

## 2024-03-30 NOTE — PROGRESS NOTES
48 y/o male treated for behavioral problem, History of alcohol abuse, violent behavior, and inability to care for self. Independent with cares. Tolerating medications well.  utilized when needed. Awaiting placement.

## 2024-03-30 NOTE — PROGRESS NOTES
/85 (BP Location: Left arm)   Pulse 64   Temp 97.1  F (36.2  C) (Oral)   Resp 18   Wt 51.2 kg (112 lb 12.8 oz)   SpO2 100%   BMI 21.60 kg/m      Pt slept throughout the shift, pt took his medications, no complain of n/v, pt is independent with mobility pt is continent of B&B. No new orders or changes, pt is able to make his needs known, call light within reach, continue with POC, pt is awaiting placement.
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
## 2024-03-31 PROCEDURE — 99231 SBSQ HOSP IP/OBS SF/LOW 25: CPT | Performed by: STUDENT IN AN ORGANIZED HEALTH CARE EDUCATION/TRAINING PROGRAM

## 2024-03-31 PROCEDURE — 250N000013 HC RX MED GY IP 250 OP 250 PS 637

## 2024-03-31 PROCEDURE — 250N000013 HC RX MED GY IP 250 OP 250 PS 637: Performed by: PEDIATRICS

## 2024-03-31 PROCEDURE — G0378 HOSPITAL OBSERVATION PER HR: HCPCS

## 2024-03-31 RX ADMIN — FOLIC ACID 1 MG: 1 TABLET ORAL at 08:20

## 2024-03-31 RX ADMIN — DIVALPROEX SODIUM 500 MG: 500 TABLET, DELAYED RELEASE ORAL at 20:36

## 2024-03-31 RX ADMIN — THIAMINE HCL TAB 100 MG 100 MG: 100 TAB at 08:19

## 2024-03-31 RX ADMIN — THIAMINE HCL TAB 100 MG 100 MG: 100 TAB at 20:36

## 2024-03-31 RX ADMIN — Medication 1 TABLET: at 08:19

## 2024-03-31 RX ADMIN — DIVALPROEX SODIUM 500 MG: 500 TABLET, DELAYED RELEASE ORAL at 08:19

## 2024-03-31 RX ADMIN — ACETAMINOPHEN 650 MG: 325 TABLET, FILM COATED ORAL at 20:35

## 2024-03-31 RX ADMIN — NICOTINE 1 PATCH: 14 PATCH, EXTENDED RELEASE TRANSDERMAL at 08:21

## 2024-03-31 RX ADMIN — WHITE PETROLATUM: 1.75 OINTMENT TOPICAL at 20:38

## 2024-03-31 ASSESSMENT — ACTIVITIES OF DAILY LIVING (ADL)
ADLS_ACUITY_SCORE: 26
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
## 2024-03-31 NOTE — PLAN OF CARE
"  Problem: Adult Inpatient Plan of Care  Goal: Plan of Care Review  Description: The Plan of Care Review/Shift note should be completed every shift.  The Outcome Evaluation is a brief statement about your assessment that the patient is improving, declining, or no change.  This information will be displayed automatically on your shift  note.  Outcome: Progressing  Goal: Patient-Specific Goal (Individualized)  Description: You can add care plan individualizations to a care plan. Examples of Individualization might be:  \"Parent requests to be called daily at 9am for status\", \"I have a hard time hearing out of my right ear\", or \"Do not touch me to wake me up as it startles  me\".  Outcome: Progressing  Goal: Absence of Hospital-Acquired Illness or Injury  Outcome: Progressing  Goal: Optimal Comfort and Wellbeing  Outcome: Progressing  Goal: Readiness for Transition of Care  Outcome: Progressing     Problem: Fall Injury Risk  Goal: Absence of Fall and Fall-Related Injury  Outcome: Progressing   Goal Outcome Evaluation:  Pt alert Medina speaking. Mood neutral. Continent bowel and bladder. Pt is on RA LS clear bilateral. No SOB noted. Pt can transfer and ambulate independent in his room and in the hallway. Call light within his reach.                       "

## 2024-03-31 NOTE — PROGRESS NOTES
50 y/o male treated for behavioral problem, History of alcohol abuse, violent behavior, and inability to care for self. Independent with cares. Tolerating medications well.  utilized when needed. Awaiting placement.

## 2024-04-01 PROCEDURE — 99231 SBSQ HOSP IP/OBS SF/LOW 25: CPT | Performed by: STUDENT IN AN ORGANIZED HEALTH CARE EDUCATION/TRAINING PROGRAM

## 2024-04-01 PROCEDURE — 250N000013 HC RX MED GY IP 250 OP 250 PS 637

## 2024-04-01 PROCEDURE — G0378 HOSPITAL OBSERVATION PER HR: HCPCS

## 2024-04-01 PROCEDURE — 250N000013 HC RX MED GY IP 250 OP 250 PS 637: Performed by: PEDIATRICS

## 2024-04-01 RX ADMIN — WHITE PETROLATUM: 1.75 OINTMENT TOPICAL at 08:34

## 2024-04-01 RX ADMIN — THIAMINE HCL TAB 100 MG 100 MG: 100 TAB at 19:50

## 2024-04-01 RX ADMIN — NICOTINE 1 PATCH: 14 PATCH, EXTENDED RELEASE TRANSDERMAL at 08:31

## 2024-04-01 RX ADMIN — WHITE PETROLATUM: 1.75 OINTMENT TOPICAL at 19:50

## 2024-04-01 RX ADMIN — FOLIC ACID 1 MG: 1 TABLET ORAL at 08:30

## 2024-04-01 RX ADMIN — CALCIUM CARBONATE (ANTACID) CHEW TAB 500 MG 1000 MG: 500 CHEW TAB at 09:19

## 2024-04-01 RX ADMIN — THIAMINE HCL TAB 100 MG 100 MG: 100 TAB at 08:30

## 2024-04-01 RX ADMIN — WHITE PETROLATUM: 1.75 OINTMENT TOPICAL at 15:02

## 2024-04-01 RX ADMIN — DIVALPROEX SODIUM 500 MG: 500 TABLET, DELAYED RELEASE ORAL at 08:30

## 2024-04-01 RX ADMIN — DIVALPROEX SODIUM 500 MG: 500 TABLET, DELAYED RELEASE ORAL at 19:50

## 2024-04-01 RX ADMIN — ACETAMINOPHEN 650 MG: 325 TABLET, FILM COATED ORAL at 09:18

## 2024-04-01 RX ADMIN — Medication 1 TABLET: at 08:30

## 2024-04-01 ASSESSMENT — ACTIVITIES OF DAILY LIVING (ADL)
ADLS_ACUITY_SCORE: 26

## 2024-04-01 NOTE — PLAN OF CARE
"  Problem: Adult Inpatient Plan of Care  Goal: Plan of Care Review  Description: The Plan of Care Review/Shift note should be completed every shift.  The Outcome Evaluation is a brief statement about your assessment that the patient is improving, declining, or no change.  This information will be displayed automatically on your shift  note.  Outcome: Progressing  Goal: Patient-Specific Goal (Individualized)  Description: You can add care plan individualizations to a care plan. Examples of Individualization might be:  \"Parent requests to be called daily at 9am for status\", \"I have a hard time hearing out of my right ear\", or \"Do not touch me to wake me up as it startles  me\".  Outcome: Progressing  Goal: Absence of Hospital-Acquired Illness or Injury  Outcome: Progressing  Goal: Optimal Comfort and Wellbeing  Outcome: Progressing  Goal: Readiness for Transition of Care  Outcome: Progressing     Problem: Fall Injury Risk  Goal: Absence of Fall and Fall-Related Injury  Outcome: Progressing   Goal Outcome Evaluation:  Pt Medina speaking alert and oriented to situation. Pt is on RA no SOB noted. Continent bowel and bladder. Independent in his room and in the hallway. Pt took all his schedule meds and PRN Tylenol for pain. Call light with in pt reach.                      "

## 2024-04-01 NOTE — PLAN OF CARE
Goal Outcome Evaluation:    Shift 4091-5654  /76 (BP Location: Left arm)   Pulse 74   Temp 97.3  F (36.3  C) (Oral)   Resp 16   Wt 51.2 kg (112 lb 12.8 oz)   SpO2 97%   BMI 21.60 kg/m      50 y/o male treated for behavioral problem, hx of alcohol abuse, violent behavior and inability to care for self. Independent w cares.  utilized when needed. Awaiting placement.

## 2024-04-01 NOTE — PROGRESS NOTES
"Care Management Follow Up    Length of Stay (days): 1    Expected Discharge Date:  TBD     Concerns to be Addressed: discharge planning, home safety, cognitive/perceptual, compliance issue, patient refuses services, medication, mental health  noncompliant with epliepsy meds. Unsafe to go home  Patient plan of care discussed at interdisciplinary rounds: Yes    Anticipated Discharge Disposition:  Group Home     Anticipated Discharge Services:  TBD  Anticipated Discharge DME:  TBD    Patient/family educated on Medicare website which has current facility and service quality ratings: pt's spouse deferred to care mgmt to select group homes  Education Provided on the Discharge Plan: Yes (not by this writer)  Patient/Family in Agreement with the Plan:  Yes    Referrals Placed by CM/SW:  Financial counseling (to assist w/ DHS 3543 Request for payment of Long Term Care Services form); [facility] referral resubmitted on 3/29/24; group home referrals (see CHW note for details)  Private pay costs discussed: Not applicable    Additional Information:    Relevant Contacts:  Damaris Mckee  Senior  [facility]  Ph: [phone]  Fax: [phone]  Email: [email or web address]    [facility] (Accepting Group Home Agency)  [address]  [place] [address]  Haris Osmin: [phone]  Fax: [phone]  ________________________________________    Received email from [facility], stating the following:  \"[facility]  has received your referral for Heath Crawley and sent it to the appropriate team for assignment. The scheduling contact should hear from the  or assigned worker within 8-12 weeks. For any questions please let me know.\"    Sent response inquiring about the following:  - Requested to confirm this writer is the scheduling contact  - Inquired if consent was received from pt's spouse for this " assessment  - Inquired if assessment can be expedited d/t pt having an accepting group home and no medical need to remain in-hospital    Next Steps: RNCC to contact pt's spouse to update her to accepting  agency.  RNCC to watch for response from [facility].  RNCC to update Haris patel/ [facility] (group home agency) to timeline for assessment and see what specific home they plan to place pt in.        Care mgmt will continue to follow.    Mike Medrano, RNCC  [facility]  Units 8M/S & 10 ICU  Pager: [phone]  Phone: [phone]
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
## 2024-04-01 NOTE — PROGRESS NOTES
VS:     /78 (BP Location: Left arm)   Pulse 71   Temp 97.4  F (36.3  C) (Oral)   Resp 16   Wt 51.2 kg (112 lb 12.8 oz)   SpO2 97%   BMI 21.60 kg/m      Output:     Continent B/B   Activity:     Independent,is able to make needs known and the call light is within the pt's reach.    Skin: Intact, dry   Pain:     Report pain on his upper right abdomen, relieved with pain PRN Tylenol    CMS:     CMS and Neuro's are intact. Denies numbness and tingling in all extremities.   Dressing:     None   Diet:     Regular      LDA:     None   Equipment:     Personal belongings   Plan:     Waiting for placement, Continue to monitor    Additional Info:

## 2024-04-02 PROCEDURE — 250N000013 HC RX MED GY IP 250 OP 250 PS 637

## 2024-04-02 PROCEDURE — 99231 SBSQ HOSP IP/OBS SF/LOW 25: CPT | Performed by: STUDENT IN AN ORGANIZED HEALTH CARE EDUCATION/TRAINING PROGRAM

## 2024-04-02 PROCEDURE — G0378 HOSPITAL OBSERVATION PER HR: HCPCS

## 2024-04-02 PROCEDURE — 250N000013 HC RX MED GY IP 250 OP 250 PS 637: Performed by: PEDIATRICS

## 2024-04-02 RX ADMIN — ACETAMINOPHEN 650 MG: 325 TABLET, FILM COATED ORAL at 19:48

## 2024-04-02 RX ADMIN — WHITE PETROLATUM: 1.75 OINTMENT TOPICAL at 15:28

## 2024-04-02 RX ADMIN — CALCIUM CARBONATE (ANTACID) CHEW TAB 500 MG 1000 MG: 500 CHEW TAB at 15:29

## 2024-04-02 RX ADMIN — ACETAMINOPHEN 650 MG: 325 TABLET, FILM COATED ORAL at 15:29

## 2024-04-02 RX ADMIN — WHITE PETROLATUM: 1.75 OINTMENT TOPICAL at 09:01

## 2024-04-02 RX ADMIN — DIVALPROEX SODIUM 500 MG: 500 TABLET, DELAYED RELEASE ORAL at 19:48

## 2024-04-02 RX ADMIN — FOLIC ACID 1 MG: 1 TABLET ORAL at 09:00

## 2024-04-02 RX ADMIN — DIVALPROEX SODIUM 500 MG: 500 TABLET, DELAYED RELEASE ORAL at 09:00

## 2024-04-02 RX ADMIN — Medication 1 TABLET: at 09:00

## 2024-04-02 RX ADMIN — CALCIUM CARBONATE (ANTACID) CHEW TAB 500 MG 1000 MG: 500 CHEW TAB at 19:49

## 2024-04-02 RX ADMIN — THIAMINE HCL TAB 100 MG 100 MG: 100 TAB at 19:48

## 2024-04-02 RX ADMIN — THIAMINE HCL TAB 100 MG 100 MG: 100 TAB at 09:00

## 2024-04-02 RX ADMIN — WHITE PETROLATUM: 1.75 OINTMENT TOPICAL at 19:50

## 2024-04-02 RX ADMIN — NICOTINE 1 PATCH: 14 PATCH, EXTENDED RELEASE TRANSDERMAL at 09:01

## 2024-04-02 ASSESSMENT — ACTIVITIES OF DAILY LIVING (ADL)
ADLS_ACUITY_SCORE: 26

## 2024-04-02 NOTE — PLAN OF CARE
Writer shift from 0700 to 1500:    Goal Outcome Evaluation:      Plan of Care Reviewed With: patient    Overall Patient Progress: no change    Outcome Evaluation: No acute changes this shift. Pt awaiting placement.    Medina cornejo,  services utilized for translation over phone. Pt A&Ox1, disoriented to place; time; situation. Denies SOB, CP, nausea, vomiting, diarrhea, constipation, numbness, tingling, and pain. No LDAs. Pt on regular diet, takes pills whole with thin liquids. Pt has RUE amputation. Skin intact except for old scarring on abdomen. Waiting for placement. Pt has call light in reach, calls appropriately, and has no unanswered questions or concerns at end of shift. Continue POC.

## 2024-04-02 NOTE — PLAN OF CARE
Goal Outcome Evaluation:   utilized. CMS intact. Denies numbness/tingling, nausea,vomiting, SOB, and chest pain.  Frequently ambulates hallways gate steady.   Denies pain  Rested throughout the night, no new changes this shift.  Continue with POC.  Disposition: Waiting for placement.
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
## 2024-04-02 NOTE — PROGRESS NOTES
Care Management Follow Up    Length of Stay (days): 1    Expected Discharge Date:  TBD     Concerns to be Addressed: discharge planning, home safety, cognitive/perceptual, compliance issue, patient refuses services, medication, mental health  noncompliant with epliepsy meds. Unsafe to go home  Patient plan of care discussed at interdisciplinary rounds: Yes    Anticipated Discharge Disposition:  Group Home     Anticipated Discharge Services:  TBD  Anticipated Discharge DME:  TBD    Patient/family educated on Medicare website which has current facility and service quality ratings: pt's spouse deferred to care mgmt to select group homes  Education Provided on the Discharge Plan: Yes (not by this writer)  Patient/Family in Agreement with the Plan:  Yes    Referrals Placed by CM/SW:  Financial counseling (to assist w/ DHS 3542 Request for payment of Long Term Care Services form); [facility] referral resubmitted on 3/29/24; group home referrals (see CHW note for details)  Private pay costs discussed: Not applicable    Additional Information:    Relevant Contacts:  Damaris Mckee  Senior  / [facility]   Ph: [phone]  Fax: [phone]  Email: [email or web address]    [facility] (Accepting Group Home Agency)  [address]  [place] [address]  Yakevin Osmin: [phone]  Fax: [phone]  ________________________________________    9:22am - Called [facility] at [phone], option 0, spoke w/ representative that did not provide name.  Explained that pt is discharge-ready, [facility] referral was submitted, that there is an accepting group home, and that [facility] request for payment form was submitted to [facility].  Inquired about fast tracking the [facility] referral.  She stated she would transfer this writer to scheduling.   answered phone, did not provide name, informed this writer that this  "writer needs to speak w/ the \" of the day\" (which is presumably who forwarded this writer to scheduling).  She explained that there is nothing she can do at this time to schedule the assessment, and that this writer must go through the  of the day for further requests.  She stated the best she can do is transfer this writer back to [facility].  Was transferred, entered same prompts to speak w/  of the day, automated system then stated they have nobody available and to call back after 12:00pm today.  System then hung up the call.    CHW notified this writer that she has direct line to scheduling rep at [facility] for [facility] assessments.  Requested CHW to contact this individual and get pt's assessment scheduled for as soon as possible.    Next Steps: RNCC to update Haris w/ [facility] (group home agency) to timeline for assessment and see what specific home they plan to place pt in.  In the event Haris does not back out based on wait time for assessment, RNCC to contact pt's spouse to update her to accepting  agency.        Care mgmt will continue to follow.    Mike Medrano, BERENICE  [facility]  Units 8M/S & 10 ICU  Pager: [phone]  Phone: [phone]    "

## 2024-04-03 ENCOUNTER — PATIENT OUTREACH (OUTPATIENT)
Dept: CARE COORDINATION | Facility: CLINIC | Age: 49
End: 2024-04-03
Payer: COMMERCIAL

## 2024-04-03 PROCEDURE — 250N000013 HC RX MED GY IP 250 OP 250 PS 637

## 2024-04-03 PROCEDURE — G0378 HOSPITAL OBSERVATION PER HR: HCPCS

## 2024-04-03 PROCEDURE — 250N000013 HC RX MED GY IP 250 OP 250 PS 637: Performed by: PEDIATRICS

## 2024-04-03 PROCEDURE — 99231 SBSQ HOSP IP/OBS SF/LOW 25: CPT | Performed by: STUDENT IN AN ORGANIZED HEALTH CARE EDUCATION/TRAINING PROGRAM

## 2024-04-03 RX ADMIN — Medication 1 TABLET: at 08:07

## 2024-04-03 RX ADMIN — THIAMINE HCL TAB 100 MG 100 MG: 100 TAB at 08:07

## 2024-04-03 RX ADMIN — FOLIC ACID 1 MG: 1 TABLET ORAL at 08:07

## 2024-04-03 RX ADMIN — THIAMINE HCL TAB 100 MG 100 MG: 100 TAB at 20:06

## 2024-04-03 RX ADMIN — DIVALPROEX SODIUM 500 MG: 500 TABLET, DELAYED RELEASE ORAL at 20:06

## 2024-04-03 RX ADMIN — WHITE PETROLATUM: 1.75 OINTMENT TOPICAL at 20:08

## 2024-04-03 RX ADMIN — NICOTINE 1 PATCH: 14 PATCH, EXTENDED RELEASE TRANSDERMAL at 08:08

## 2024-04-03 RX ADMIN — DIVALPROEX SODIUM 500 MG: 500 TABLET, DELAYED RELEASE ORAL at 08:07

## 2024-04-03 ASSESSMENT — ACTIVITIES OF DAILY LIVING (ADL)
ADLS_ACUITY_SCORE: 27
ADLS_ACUITY_SCORE: 27
ADLS_ACUITY_SCORE: 26
ADLS_ACUITY_SCORE: 27
ADLS_ACUITY_SCORE: 27
ADLS_ACUITY_SCORE: 26
ADLS_ACUITY_SCORE: 27
ADLS_ACUITY_SCORE: 26
ADLS_ACUITY_SCORE: 27
ADLS_ACUITY_SCORE: 26
ADLS_ACUITY_SCORE: 27
ADLS_ACUITY_SCORE: 26
ADLS_ACUITY_SCORE: 26
ADLS_ACUITY_SCORE: 27
ADLS_ACUITY_SCORE: 27
ADLS_ACUITY_SCORE: 26

## 2024-04-03 NOTE — PROGRESS NOTES
Clinic Care Coordination Contact  Care Coordination Clinician Chart Review    Situation: Patient chart reviewed by Care Coordinator.       Background: Care Coordination Program started: 2/20/2023. Initial assessment completed and patient-centered care plan(s) were developed with participation from patient. Lead CC handed patient off to CHW for continued outreaches.       Assessment: Per chart review, patient outreach completed by CC CHW on 3/5/2024.  Patient is actively working to accomplish goal(s). Patient's goal(s) appropriate and relevant at this time. Patient is not due for updated Plan of Care.  Assessments will be completed annually or as needed/with change of patient status.    Patient is currently inpatient since 3/11/2024. CCRN plans to follow up with patient post discharge from the hospital.      Care Plan: Neurology       Problem: Seizure episodes       Goal: Patient will attend his neurology appointment in the next 12 months.       Start Date: 4/28/2023 Expected End Date: 4/28/2024    This Visit's Progress: 60% Recent Progress: 50%    Note:     Barriers: language barrier, low literacy, noncompliance, and lack of knowledge how to navigate complex health care system  Strengths: motivated to attend appt  Patient expressed understanding of goal: Yes    Action steps to achieve this goal:  1. I will attend my follow up appointment with neurologist as rescheduled on 10/03/2023 at 4:15 PM. (Transportation arranged with [employer]). COMPLETED  2. I will attend my 3 months neurology appointment on 1/9/2023 at 3:45pm with Dr Mitchell Naik. Patient went but was told no appt and assisted patient rescheduled on 3/11/2024. Reminded  3. My wife will answer the phone when  calls for .  4. I will update CCC at outreach.     Mitchell Naik MD   [facility] - 3rd Floor   3H    Department Address: [address] [place] [id number]   Department Phone: [phone]                                   Plan/Recommendations: The patient will continue working with Care Coordination to achieve goal(s) as above. CHW will continue outreaches at minimum every 30 days and will involve Lead CC as needed or if patient is ready to move to Maintenance. Lead CC will continue to monitor CHW outreaches and patient's progress to goal(s) every 6 weeks.     Plan of Care updated and sent to patient: No

## 2024-04-03 NOTE — PLAN OF CARE
"  Problem: Adult Inpatient Plan of Care  Goal: Plan of Care Review  Description: The Plan of Care Review/Shift note should be completed every shift.  The Outcome Evaluation is a brief statement about your assessment that the patient is improving, declining, or no change.  This information will be displayed automatically on your shift  note.  Outcome: Progressing  Flowsheets (Taken 4/2/2024 2136)  Plan of Care Reviewed With: patient  Goal: Patient-Specific Goal (Individualized)  Description: You can add care plan individualizations to a care plan. Examples of Individualization might be:  \"Parent requests to be called daily at 9am for status\", \"I have a hard time hearing out of my right ear\", or \"Do not touch me to wake me up as it startles  me\".  Outcome: Progressing  Goal: Absence of Hospital-Acquired Illness or Injury  Outcome: Progressing  Goal: Optimal Comfort and Wellbeing  Outcome: Progressing  Goal: Readiness for Transition of Care  Outcome: Progressing     Problem: Fall Injury Risk  Goal: Absence of Fall and Fall-Related Injury  Outcome: Progressing   Goal Outcome Evaluation:      Plan of Care Reviewed With: patient  Pt alert and oriented to situation. Medina speaking. Pt is on RA no SOB noted. Co pain R and L UQ managed using PO Tylenol and LISA. Independent in his room and in the hallway. Continent bowel and bladder. LBM 04/02/2024. Pt has call light within his reach. Pt can express his own needs properly.                 "

## 2024-04-03 NOTE — CARE PLAN
Shift: 2300 -1900    /88 (BP Location: Left arm)   Pulse 64   Temp 97.2  F (36.2  C) (Oral)   Resp 18   Wt 51.2 kg (112 lb 12.8 oz)   SpO2 97%   BMI 21.60 kg/m      No change overnight, Pt is alert to self, disoriented to time, place, and situation. Denies SOB, chest pain. Call light is in reach Pt calls appropriately, Continue with POC

## 2024-04-03 NOTE — PROGRESS NOTES
Care Management Follow Up    Length of Stay (days): 1    Expected Discharge Date:  TBD     Concerns to be Addressed: discharge planning, home safety, cognitive/perceptual, compliance issue, patient refuses services, medication, mental health  noncompliant with epliepsy meds. Unsafe to go home  Patient plan of care discussed at interdisciplinary rounds: Yes    Anticipated Discharge Disposition:  Group Home     Anticipated Discharge Services:  TBD  Anticipated Discharge DME:  TBD    Patient/family educated on Medicare website which has current facility and service quality ratings: pt's spouse deferred to care mgmt to select group homes  Education Provided on the Discharge Plan: Yes (not by this writer)  Patient/Family in Agreement with the Plan:  Yes    Referrals Placed by CM/SW:  Financial counseling (to assist w/ DHS 3543 Request for payment of Long Term Care Services form); [facility] referral resubmitted on 3/29/24; group home referrals (see CHW note for details)  Private pay costs discussed: Not applicable    Additional Information:    Relevant Contacts:  Damaris Mckee  Senior  w/ [facility]   Ph: [phone]  Fax: [phone]  Email: [email or web address].    Zac Silver ([facility] Front Door Representative)  Email: [email or web address] Management (Accepting Group Home Agency)  HQ: [address], [place] [address]  Haris Osmin: [phone]  Fax: [phone]  Specific home for placement: [address], [place] [address]  ________________________________________    Requested CHW to contact [facility] to attempt to expedite scheduling.    Received update from CHW that [facility] advised responding to the email that was sent to this writer on 4/1/24 from Zac Silver.  This writer responded on 4/1 (see note for details), have not received response at this time.    Called Haris at [facility]  "Management (accepting GH agency).  Informed him of current timeline, he stated that it would not be a problem for their agency, requested this writer to keep him updated as things progress.  He stated he was hoping this writer could request the assessment to happen sooner rather than later.  He stated they have two home locations, were intending to put Tin in the following home: [address], [place] [address].    Sent encrypted email to Zac Silver at the [facility], reiterated that pt is med-ready to discharge, that an accepting GH has been found, and inquired if [facility] assessment can be expedited.    3:00pm - Called [facility] language line,  conference-called pt's spouse, [place].  Introduced self and role, informed her that this writer would like to provide update.  She stated she is at the store right now and has another store she must go to, requested a call back at 5:00pm.  Informed her that this writer will no longer be working at that time, but that this writer could attempt reaching out tomorrow instead, explained that the update is non-urgent.  She was agreeable to this.    Received email from Zac:  \"I checked court records and it doesn t appear that there is any legal party that is assigned as guardian at this time.  Since Tin is unable to provide consent for himself we can take the referral and process it as is.  Is the group home holding this placement open for Tin? If so what is the name of the group home and how long are the able to hold the spot?  When will they release the spot for him? The referral does note you as the primary contact for scheduling so nothing needs to be changed.\"    Responded to Zac, provided group home info and informed them that the spot is currently being held, but it is unknown how long it can be held for.  Also stated that the  would like the date for [facility] assessment to occur sooner rather than later.    Next Steps: RNCC to contact pt's spouse to update " her to accepting  agency and home location.        Care mgmt will continue to follow.    Mike Medrano RNCC  [facility]  Units 8M/S & 10 ICU  Pager: [phone]  Phone: [phone]
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
## 2024-04-04 PROCEDURE — 99231 SBSQ HOSP IP/OBS SF/LOW 25: CPT | Performed by: STUDENT IN AN ORGANIZED HEALTH CARE EDUCATION/TRAINING PROGRAM

## 2024-04-04 PROCEDURE — G0378 HOSPITAL OBSERVATION PER HR: HCPCS

## 2024-04-04 PROCEDURE — 250N000013 HC RX MED GY IP 250 OP 250 PS 637

## 2024-04-04 PROCEDURE — 250N000013 HC RX MED GY IP 250 OP 250 PS 637: Performed by: PEDIATRICS

## 2024-04-04 RX ADMIN — THIAMINE HCL TAB 100 MG 100 MG: 100 TAB at 19:44

## 2024-04-04 RX ADMIN — WHITE PETROLATUM: 1.75 OINTMENT TOPICAL at 08:26

## 2024-04-04 RX ADMIN — DIVALPROEX SODIUM 500 MG: 500 TABLET, DELAYED RELEASE ORAL at 08:26

## 2024-04-04 RX ADMIN — FOLIC ACID 1 MG: 1 TABLET ORAL at 08:26

## 2024-04-04 RX ADMIN — NICOTINE 1 PATCH: 14 PATCH, EXTENDED RELEASE TRANSDERMAL at 08:26

## 2024-04-04 RX ADMIN — WHITE PETROLATUM: 1.75 OINTMENT TOPICAL at 13:19

## 2024-04-04 RX ADMIN — Medication 1 TABLET: at 08:26

## 2024-04-04 RX ADMIN — DIVALPROEX SODIUM 500 MG: 500 TABLET, DELAYED RELEASE ORAL at 19:44

## 2024-04-04 RX ADMIN — THIAMINE HCL TAB 100 MG 100 MG: 100 TAB at 08:26

## 2024-04-04 RX ADMIN — WHITE PETROLATUM: 1.75 OINTMENT TOPICAL at 19:44

## 2024-04-04 ASSESSMENT — ACTIVITIES OF DAILY LIVING (ADL)
ADLS_ACUITY_SCORE: 25
ADLS_ACUITY_SCORE: 26
ADLS_ACUITY_SCORE: 25
ADLS_ACUITY_SCORE: 26
ADLS_ACUITY_SCORE: 26
ADLS_ACUITY_SCORE: 25
ADLS_ACUITY_SCORE: 26
ADLS_ACUITY_SCORE: 26
ADLS_ACUITY_SCORE: 25
ADLS_ACUITY_SCORE: 26
ADLS_ACUITY_SCORE: 25
ADLS_ACUITY_SCORE: 25
ADLS_ACUITY_SCORE: 26
ADLS_ACUITY_SCORE: 25

## 2024-04-04 NOTE — PROGRESS NOTES
Care Management Follow Up    Length of Stay (days): 1    Expected Discharge Date:  TBD     Concerns to be Addressed: discharge planning, home safety, cognitive/perceptual, compliance issue, patient refuses services, medication, mental health  noncompliant with epliepsy meds. Unsafe to go home  Patient plan of care discussed at interdisciplinary rounds: Yes    Anticipated Discharge Disposition:  Group Home     Anticipated Discharge Services:  TBD  Anticipated Discharge DME:  TBD    Patient/family educated on Medicare website which has current facility and service quality ratings: pt's spouse deferred to care mgmt to select group homes  Education Provided on the Discharge Plan: Yes (not by this writer)  Patient/Family in Agreement with the Plan:  Yes    Referrals Placed by CM/SW:  Financial counseling (to assist w/ DHS 1383 Request for payment of Long Term Care Services form); [place] referral resubmitted on 3/29/24; group home referrals (see CHW note for details)  Private pay costs discussed: Not applicable    Additional Information:    Relevant Contacts:  Damaris Mckee  Senior  / [facility]   Ph: [phone]  Fax: [phone]  Email: [email or web address]    Zac Silver ([facility] Representative)  Email: [email or web address].    [facility] (Accepting Group Home Agency)  HQ: [address] [address]  Haris Osmin: [phone]  Fax: [phone]  Specific home for placement: [address], [place] [address]  ________________________________________    CHW notified this writer that she received an email yesterday from [facility] [employer] (accepting group home agency) inquiring about timeline for assessment.  This writer spoke w/ Haris yesterday about timeline.  Advised CHW to reiterate the info this writer provided yesterday and explain that further conversation / meenu is occurring in  "an attempt to expedite assessment.    Received email from Zac in response to this writer's email from yesterday.  They stated they would pass along info to their supervisors to see if the assessment can be expedited.  This writer responded, informed Zac that this writer will await an update from them.    2:13pm - Called [facility]  line,  conference-called pt's spouse, [place].  This writer updated her that an accepting GH was found, which is located in [facility] (same city as prior to admission address), offered to provide address of the specific home.  Stated that before pt can discharge to the home, a funding source, in the form of a waiver, must be secured.  Stated that [place] to get onto a waiver.  Stated that the current timeline for scheduling the assessment is 8-12 weeks, but that this writer is trying to convince the county to schedule and complete it sooner.  [place]'s response to this information, per , was \"Yes\".  This writer inquired if [place] has any questions.  She stated she does not have any questions and everything this writer stated sounds good to her.  Informed her that this writer will continue to attempt to expedite the [place] and that this writer will update her once more information is known.  Advised her to reach out in the event she has any questions/concerns.  She was agreeable to this.  Call ended at this time.    Anupama VILLALPANDO, ED for complex cases, reached out to this writer, discussed case.  She intends to make a global [place] referral for LTC to see if that is an option for discharge.  RNCC to check w/ financial counseling on status of MA-LTC and speak w/ pt's spouse tomorrow to discuss potential of LTC discharge prior to GH.    Sent message via in-basket to FC inquiring if they are able to check status of MA-LTC to see if it is approved/active.          Care mgmt will continue to follow.    Mike Medrano, RNCC  M " [facility]  Units 8M/S & 10 ICU  Pager: [phone]  Phone: [phone]

## 2024-04-04 NOTE — PLAN OF CARE
Goal Outcome Evaluation:      Plan of Care Reviewed With: patient    Overall Patient Progress: no change    Outcome Evaluation: No acute changes during this shift. Pt awaiting placement.    VS:       Pt A/O X 4. Afebrile. VSS.BP 96/66 (BP Location: Left arm)   Pulse 74   Temp 97.7  F (36.5  C) (Oral)   Resp 16   Wt 51.2 kg (112 lb 12.8 oz)   SpO2 98%   BMI 21.60 kg/m    Lungs- clear bilaterally with both anterior and posterior. IS encouraged. Denies nausea, shortness of breath, and chest pain.     Output:       Bowels- active in all four quadrants. Voids spontaneously without difficulty in the bathroom.      Activity:       Pt up independently.      Skin:   Pt refused full skin assessment. Some peeling/scaling on feet.      Pain:       Denies pain at this time.     CMS:       CMS and Neuro's are intact. Denies numbness and tingling in all extremities. R arm amputee.       Dressing:       No incisional dressing.      Diet:       Pt is on a regular diet and appetite was good this shift.       LDA:       No PIV.     Equipment:       Pt denies PCD's on BLE's. Bilateral heels are elevated off the bed.     Plan:       Pt is able to make needs known and the call light is within the pt's reach. Continue to monitor.       Additional Info:
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
## 2024-04-04 NOTE — PLAN OF CARE
No acute changes this shift. Pt awaiting placement. Pt appears to be sleeping during rounds and safety checks. Pt is independent in the room. Continue with plan of care.

## 2024-04-05 PROCEDURE — 250N000013 HC RX MED GY IP 250 OP 250 PS 637

## 2024-04-05 PROCEDURE — 99231 SBSQ HOSP IP/OBS SF/LOW 25: CPT | Performed by: STUDENT IN AN ORGANIZED HEALTH CARE EDUCATION/TRAINING PROGRAM

## 2024-04-05 PROCEDURE — G0378 HOSPITAL OBSERVATION PER HR: HCPCS

## 2024-04-05 PROCEDURE — 250N000013 HC RX MED GY IP 250 OP 250 PS 637: Performed by: PEDIATRICS

## 2024-04-05 RX ADMIN — THIAMINE HCL TAB 100 MG 100 MG: 100 TAB at 20:01

## 2024-04-05 RX ADMIN — FOLIC ACID 1 MG: 1 TABLET ORAL at 07:53

## 2024-04-05 RX ADMIN — WHITE PETROLATUM: 1.75 OINTMENT TOPICAL at 07:53

## 2024-04-05 RX ADMIN — THIAMINE HCL TAB 100 MG 100 MG: 100 TAB at 07:53

## 2024-04-05 RX ADMIN — WHITE PETROLATUM: 1.75 OINTMENT TOPICAL at 17:17

## 2024-04-05 RX ADMIN — NICOTINE 1 PATCH: 14 PATCH, EXTENDED RELEASE TRANSDERMAL at 07:54

## 2024-04-05 RX ADMIN — DIVALPROEX SODIUM 500 MG: 500 TABLET, DELAYED RELEASE ORAL at 20:01

## 2024-04-05 RX ADMIN — Medication 1 TABLET: at 07:53

## 2024-04-05 RX ADMIN — DIVALPROEX SODIUM 500 MG: 500 TABLET, DELAYED RELEASE ORAL at 07:53

## 2024-04-05 RX ADMIN — WHITE PETROLATUM: 1.75 OINTMENT TOPICAL at 20:01

## 2024-04-05 ASSESSMENT — ACTIVITIES OF DAILY LIVING (ADL)
ADLS_ACUITY_SCORE: 25

## 2024-04-05 NOTE — PROGRESS NOTES
Care Management Follow Up    Length of Stay (days): 1    Expected Discharge Date:  TBD     Concerns to be Addressed: discharge planning, home safety, cognitive/perceptual, compliance issue, patient refuses services, medication, mental health  noncompliant with epliepsy meds. Unsafe to go home  Patient plan of care discussed at interdisciplinary rounds: Yes    Anticipated Discharge Disposition:  Group Home; potentially LTC prior to transition to      Anticipated Discharge Services:  TBD  Anticipated Discharge DME:  TBD    Patient/family educated on Medicare website which has current facility and service quality ratings: pt's spouse deferred to care mgmt to select group homes  Education Provided on the Discharge Plan: Yes (not by this writer)  Patient/Family in Agreement with the Plan:  Yes    Referrals Placed by CM/SW:  Financial counseling (to assist / DHS 3543 Request for payment of Long Term Care Services form); [place] referral resubmitted on 3/29/24; group home referrals (see CHW note for details)  Private pay costs discussed: Not applicable    Additional Information:    Relevant Contacts:  Damaris Mckee  Senior  [facility]   Ph: [phone]  Fax: [phone]  Email: [email or web address].    Zac Silver ([facility] Representative)  Email: [email or web address].    [facility] (Accepting Group Home Agency)  HQ: [address], [place] [address]  Haris Osmin: [phone]  Fax: [phone]  Specific home for placement: [address], [place] [address]  ________________________________________    Received message from Financial Counseling that Maggie has been assigned to look into MA-LT approval/activation, however it is not anticipated that it will be active d/t [facility] being backed up.  FC to update RNCC when more is known.    Received email from Zac Silver at the [facility].  Per Zac, the  "supervisors for the scheduling team have stated that the [place] assessment cannot be expedited at this time.  Zac requested to be updated if there is a change in pt status.    At this time, the timeline for [place] assessment is 8-12 weeks before scheduling will reach out to schedule the assessment.    12:30pm - Received VM from Maggie in , she stated she left VM for the  at the [facility], does not have an update at this time on status of MA-LTC.    Called   line,  conference-called pt's spouse, [place].  Informed [place] that the [facility] is unable to expedite assessment, stated that scheduling for the assessment will take place in 8-12 weeks.  Stated that d/t this lengthy delay, this writer would like to look into discharge to LTC facility, as pt's pending insurance that was applied for would be able to cover the cost of LTC while he awaits assessment to get waiver to discharge to group home.  Inquired if [place] has any questions or concerns on this, she stated no, is willing to proceed w/ this writer's plan.  Inquired if there is a location preference for LTC, she stated she is okay w/ this writer finding whatever can accommodate pt's needs.  Advised her to reach out if she has any questions/concerns come up.  Call ended at this time.    Notified ED [place] for complex cases, of the above info.  Inquired about global [place] referral.  She stated they have behavioral concerns, suggested sending referrals to [facility], [facility], [facility], and [facility].    Sent referrals, as suggested by Anupama.    2:55pm - Received call from Nidhi, admissions director from [facility], she stated she has some questions, requested call back at [phone].    3:26pm - Received call from [facility], pt declined d/t interpretation needs and \"complicated\" needs.    Called Nidhi at [facility], explained that LTC is being sought prior to  " "placement, [place] assessment to be scheduled in 8-12 weeks.  Explained accepting GH is found and DHS form 1122 was submitted to [facility].  Explained pt is [place]-speaking w/ donaldo.  Nidhi stated she will check into financials and update this writer, likely on Monday, as to if they can take pt.    Shortly after phone conversation w/ Nidhi, received declination from her through Epic, no reason given.  Reached out via in-basket for clarification on reason for declination.  She stated they cannot take more MA-pending pts.        Pending Referrals:  [facility]  [address] [place] [address]  Ph: [phone]  Fx: [phone]  4/5 - RNCC sent referral via Epic Destinations tab    [facility]  [address] [place] [address]  Ph: [phone]  Fx: [phone]  4/5 - RNCC sent referral via Epic Destinations tab    Declined Referrals:  [facility]  [address] [place] [address]  Ph: [phone]  Fx: [phone]  4/5 - RNCC sent referral via Epic Destinations tab, declined d/t [place]-speaking (facility cannot provide ) and \"complicated\" needs.    [facility]  [address] [place] [address]  Ph: [phone]  Fx: [phone]  4/5 - RNCC sent referral via Epic Destinations tab, declined d/t MA-LTC pending (no payer currently).          Care mgmt will continue to follow.    Mike Medrano, RNCC  [facility]  Units 8M/S & 10 ICU  Pager: [phone]  Phone: [phone]    "
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
## 2024-04-05 NOTE — PLAN OF CARE
Goal Outcome Evaluation:    Plan of Care Reviewed With: patient, other (see comments) ()  Overall Patient Progress: no changeOverall Patient Progress: no change  Outcome Evaluation: pt alert to self, uses Ashli   /74 (BP Location: Left arm)   Pulse 65   Temp 98  F (36.7  C) (Oral)   Resp 18   Wt 50.8 kg (112 lb)   SpO2 98%   BMI 21.45 kg/m    Pt on room air, denies pain via , appetite good, amb ad daphne, takes pills whole, skin dry, aquaphor used. Disposition TBD pending placement, continue plan of care

## 2024-04-05 NOTE — PROGRESS NOTES
2300 - 0700 Shift Summary    Blood pressure 102/74, pulse 77, temperature 97.7  F (36.5  C), temperature source Oral, resp. rate 16, weight 51.2 kg (112 lb 12.8 oz), SpO2 97%.    No changes during the shift, in bed asleep, call light within in reach. No distress observed, no sign of pain noted. Plan of care ongoing

## 2024-04-06 PROCEDURE — G0378 HOSPITAL OBSERVATION PER HR: HCPCS

## 2024-04-06 PROCEDURE — 250N000013 HC RX MED GY IP 250 OP 250 PS 637: Performed by: PEDIATRICS

## 2024-04-06 PROCEDURE — 99231 SBSQ HOSP IP/OBS SF/LOW 25: CPT | Performed by: STUDENT IN AN ORGANIZED HEALTH CARE EDUCATION/TRAINING PROGRAM

## 2024-04-06 PROCEDURE — 250N000013 HC RX MED GY IP 250 OP 250 PS 637

## 2024-04-06 RX ADMIN — DIVALPROEX SODIUM 500 MG: 500 TABLET, DELAYED RELEASE ORAL at 09:07

## 2024-04-06 RX ADMIN — WHITE PETROLATUM: 1.75 OINTMENT TOPICAL at 09:07

## 2024-04-06 RX ADMIN — FOLIC ACID 1 MG: 1 TABLET ORAL at 09:07

## 2024-04-06 RX ADMIN — THIAMINE HCL TAB 100 MG 100 MG: 100 TAB at 20:10

## 2024-04-06 RX ADMIN — Medication 1 TABLET: at 09:07

## 2024-04-06 RX ADMIN — WHITE PETROLATUM: 1.75 OINTMENT TOPICAL at 20:11

## 2024-04-06 RX ADMIN — THIAMINE HCL TAB 100 MG 100 MG: 100 TAB at 09:07

## 2024-04-06 RX ADMIN — DIVALPROEX SODIUM 500 MG: 500 TABLET, DELAYED RELEASE ORAL at 20:10

## 2024-04-06 RX ADMIN — NICOTINE 1 PATCH: 14 PATCH, EXTENDED RELEASE TRANSDERMAL at 09:08

## 2024-04-06 RX ADMIN — WHITE PETROLATUM: 1.75 OINTMENT TOPICAL at 17:42

## 2024-04-06 ASSESSMENT — ACTIVITIES OF DAILY LIVING (ADL)
ADLS_ACUITY_SCORE: 25

## 2024-04-06 NOTE — PROGRESS NOTES
Shift 5969-3551:Pt admitted due to safety concerns at home  Unspecified dementia  Summary:Discharge to LTC when available  VS:       Pt A/O X 4. Afebrile. VSS. Lungs-Clear bilaterally Denies nausea, shortness of breath, and chest pain.     Output:       Bowels- + in all four quadrants. Voids spontaneously without   difficulty in the toilet.      Activity:       Pt up independent with cares.     Skin:   Intact No skin issues .     Pain:     No pain indicated.      CMS:       CMS and Neuro's are intact. Denies numbness and tingling in all extremities.      Dressing:     No dressing.      Diet:       Pt is on a Regular diet .       LDA:     No PIV.      Equipment:       Np PCD's on BLE's. Bilateral heels are elevated off the bed.     Plan:       Pt is able to make needs known and the call light is within the pt's reach. Continue to monitor.       Additional Info:        .
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
## 2024-04-06 NOTE — PLAN OF CARE
Goal Outcome Evaluation:      /78 (BP Location: Left arm)   Pulse 67   Temp 97.4  F (36.3  C) (Oral)   Resp 18   Wt 50.8 kg (112 lb)   SpO2 98%   BMI 21.45 kg/m       Overall Patient Progress: no changeOverall Patient Progress: no change     Pt alert to self, pleasant, cooperative, on room air, amb ad daphne, cont of B&B, denies pain, no SOB, appetite good, takes pills whole. Disposition TBD pending acceptance, continue plan of care

## 2024-04-07 PROCEDURE — 250N000013 HC RX MED GY IP 250 OP 250 PS 637

## 2024-04-07 PROCEDURE — 99231 SBSQ HOSP IP/OBS SF/LOW 25: CPT | Performed by: STUDENT IN AN ORGANIZED HEALTH CARE EDUCATION/TRAINING PROGRAM

## 2024-04-07 PROCEDURE — 250N000013 HC RX MED GY IP 250 OP 250 PS 637: Performed by: PEDIATRICS

## 2024-04-07 PROCEDURE — G0378 HOSPITAL OBSERVATION PER HR: HCPCS

## 2024-04-07 RX ADMIN — CALCIUM CARBONATE (ANTACID) CHEW TAB 500 MG 1000 MG: 500 CHEW TAB at 20:07

## 2024-04-07 RX ADMIN — WHITE PETROLATUM: 1.75 OINTMENT TOPICAL at 08:27

## 2024-04-07 RX ADMIN — THIAMINE HCL TAB 100 MG 100 MG: 100 TAB at 08:27

## 2024-04-07 RX ADMIN — CALCIUM CARBONATE (ANTACID) CHEW TAB 500 MG 1000 MG: 500 CHEW TAB at 18:31

## 2024-04-07 RX ADMIN — NICOTINE 1 PATCH: 14 PATCH, EXTENDED RELEASE TRANSDERMAL at 08:25

## 2024-04-07 RX ADMIN — DIVALPROEX SODIUM 500 MG: 500 TABLET, DELAYED RELEASE ORAL at 20:07

## 2024-04-07 RX ADMIN — CALCIUM CARBONATE (ANTACID) CHEW TAB 500 MG 1000 MG: 500 CHEW TAB at 12:20

## 2024-04-07 RX ADMIN — FOLIC ACID 1 MG: 1 TABLET ORAL at 08:27

## 2024-04-07 RX ADMIN — WHITE PETROLATUM: 1.75 OINTMENT TOPICAL at 20:08

## 2024-04-07 RX ADMIN — WHITE PETROLATUM: 1.75 OINTMENT TOPICAL at 14:02

## 2024-04-07 RX ADMIN — DIVALPROEX SODIUM 500 MG: 500 TABLET, DELAYED RELEASE ORAL at 08:27

## 2024-04-07 RX ADMIN — Medication 1 TABLET: at 08:26

## 2024-04-07 RX ADMIN — THIAMINE HCL TAB 100 MG 100 MG: 100 TAB at 20:07

## 2024-04-07 ASSESSMENT — ACTIVITIES OF DAILY LIVING (ADL)
ADLS_ACUITY_SCORE: 25

## 2024-04-07 NOTE — PROGRESS NOTES
Shift 3524-2468:Pt admitted due to safety concerns at home  Unspecified dementia  Summary:Discharge to LTC when available  VS:       Pt A/O X 4. Afebrile. VSS. Lungs-Clear bilaterally Denies nausea, shortness of breath, and chest pain.     Output:       Bowels- + in all four quadrants. Voids spontaneously without   difficulty in the toilet.      Activity:       Pt up independent with cares.     Skin:   Intact No skin issues .     Pain:     No pain indicated.      CMS:       CMS and Neuro's are intact. Denies numbness and tingling in all extremities.      Dressing:     No dressing.      Diet:       Pt is on a Regular diet .       LDA:     No PIV.      Equipment:       Np PCD's on BLE's. Bilateral heels are elevated off the bed.     Plan:       Pt is able to make needs known and the call light is within the pt's reach. Continue to monitor.       Additional Info:     Children here visiting on the evening shift   .

## 2024-04-07 NOTE — PLAN OF CARE
Goal Outcome Evaluation:      Plan of Care Reviewed With: patient    Overall Patient Progress: no changeOverall Patient Progress: no change       VS: /85 (BP Location: Left arm)   Pulse 72   Temp 97.6  F (36.4  C) (Oral)   Resp 17   Wt 50.8 kg (112 lb)   SpO2 100%   BMI 21.45 kg/m      O2: Stable on RA, no complaints of SOB or chest pain.   Output: Voiding w/o pain or difficulty to bathroom.   Activity: Independent.    Skin: Scars on R abdomen. Hx of right arm amputation.   Pain: Denies pain.   CMS: A&Ox3, intermittent confusion.   Dressing: None   Diet: Regular. No complaints of nausea or vomiting.   LDA: No IV access.   Equipment: Personal items   Plan: Awaiting placement   Additional Info:  utilized when needed. TUMS given PRN for upset stomach.
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
## 2024-04-08 PROCEDURE — 250N000013 HC RX MED GY IP 250 OP 250 PS 637

## 2024-04-08 PROCEDURE — G0378 HOSPITAL OBSERVATION PER HR: HCPCS

## 2024-04-08 PROCEDURE — 250N000013 HC RX MED GY IP 250 OP 250 PS 637: Performed by: PEDIATRICS

## 2024-04-08 PROCEDURE — 99231 SBSQ HOSP IP/OBS SF/LOW 25: CPT | Performed by: STUDENT IN AN ORGANIZED HEALTH CARE EDUCATION/TRAINING PROGRAM

## 2024-04-08 RX ADMIN — DIVALPROEX SODIUM 500 MG: 500 TABLET, DELAYED RELEASE ORAL at 21:15

## 2024-04-08 RX ADMIN — FOLIC ACID 1 MG: 1 TABLET ORAL at 08:01

## 2024-04-08 RX ADMIN — Medication 1 TABLET: at 08:01

## 2024-04-08 RX ADMIN — DIVALPROEX SODIUM 500 MG: 500 TABLET, DELAYED RELEASE ORAL at 08:01

## 2024-04-08 RX ADMIN — NICOTINE 1 PATCH: 14 PATCH, EXTENDED RELEASE TRANSDERMAL at 08:02

## 2024-04-08 RX ADMIN — WHITE PETROLATUM: 1.75 OINTMENT TOPICAL at 08:03

## 2024-04-08 RX ADMIN — THIAMINE HCL TAB 100 MG 100 MG: 100 TAB at 08:01

## 2024-04-08 RX ADMIN — WHITE PETROLATUM: 1.75 OINTMENT TOPICAL at 15:45

## 2024-04-08 RX ADMIN — THIAMINE HCL TAB 100 MG 100 MG: 100 TAB at 21:15

## 2024-04-08 RX ADMIN — WHITE PETROLATUM: 1.75 OINTMENT TOPICAL at 21:15

## 2024-04-08 ASSESSMENT — ACTIVITIES OF DAILY LIVING (ADL)
ADLS_ACUITY_SCORE: 25
ADLS_ACUITY_SCORE: 27
ADLS_ACUITY_SCORE: 25
ADLS_ACUITY_SCORE: 27
ADLS_ACUITY_SCORE: 25
ADLS_ACUITY_SCORE: 27
ADLS_ACUITY_SCORE: 25
ADLS_ACUITY_SCORE: 27
ADLS_ACUITY_SCORE: 25
ADLS_ACUITY_SCORE: 25
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
## 2024-04-08 NOTE — PLAN OF CARE
[id number]    No Acute events  Stable @ RA,Denies SOB or Chest pain  Denies N/V  Continent of b/b  Independent in room  Prn Tums given abdominal for abdominal upset effectively  No PIV  Awaiting placement

## 2024-04-08 NOTE — PROGRESS NOTES
Shift 8174-6005:Pt admitted due to safety concerns at home  Unspecified dementia  Summary:Discharge to LTC when available  VS:       Pt A/O X 4. Afebrile. VSS. Lungs-Clear bilaterally Denies nausea, shortness of breath, and chest pain.     Output:       Bowels- + in all four quadrants. Voids spontaneously without   difficulty in the toilet.      Activity:       Pt up independent with cares.     Skin:   Intact No skin issues .     Pain:     No pain indicated.      CMS:       CMS and Neuro's are intact. Denies numbness and tingling in all extremities.      Dressing:     No dressing.      Diet:       Pt is on a Regular diet .       LDA:     No PIV.      Equipment:       Np PCD's on BLE's. Bilateral heels are elevated off the bed.     Plan:       Pt is able to make needs known and the call light is within the pt's reach. Continue to monitor.       Additional Info:        .

## 2024-04-08 NOTE — PLAN OF CARE
[id number]    Goal Outcome Evaluation:    Plan of Care Reviewed With: patient  Overall Patient Progress: no change    Outcome Evaluation: No new changes this shift. Pt is up independently, ambulating in hallways. Right arm amputation. Denies pain, SOB, chest pain, nausea. No PIV. Pt on regular diet, tray set up. Poor appetite today for breakfast and lunch. Ate only a couple bites of food, but pt kept saying he was full. [place]  used. Continue POC.

## 2024-04-09 PROCEDURE — 250N000013 HC RX MED GY IP 250 OP 250 PS 637

## 2024-04-09 PROCEDURE — G0378 HOSPITAL OBSERVATION PER HR: HCPCS

## 2024-04-09 PROCEDURE — 250N000013 HC RX MED GY IP 250 OP 250 PS 637: Performed by: PEDIATRICS

## 2024-04-09 PROCEDURE — 99231 SBSQ HOSP IP/OBS SF/LOW 25: CPT | Performed by: INTERNAL MEDICINE

## 2024-04-09 RX ADMIN — NICOTINE 1 PATCH: 14 PATCH, EXTENDED RELEASE TRANSDERMAL at 08:04

## 2024-04-09 RX ADMIN — FOLIC ACID 1 MG: 1 TABLET ORAL at 08:05

## 2024-04-09 RX ADMIN — DIVALPROEX SODIUM 500 MG: 500 TABLET, DELAYED RELEASE ORAL at 08:05

## 2024-04-09 RX ADMIN — DIVALPROEX SODIUM 500 MG: 500 TABLET, DELAYED RELEASE ORAL at 20:00

## 2024-04-09 RX ADMIN — WHITE PETROLATUM: 1.75 OINTMENT TOPICAL at 20:01

## 2024-04-09 RX ADMIN — THIAMINE HCL TAB 100 MG 100 MG: 100 TAB at 20:00

## 2024-04-09 RX ADMIN — WHITE PETROLATUM: 1.75 OINTMENT TOPICAL at 14:00

## 2024-04-09 RX ADMIN — THIAMINE HCL TAB 100 MG 100 MG: 100 TAB at 08:05

## 2024-04-09 RX ADMIN — WHITE PETROLATUM: 1.75 OINTMENT TOPICAL at 08:08

## 2024-04-09 RX ADMIN — Medication 1 TABLET: at 08:05

## 2024-04-09 ASSESSMENT — ACTIVITIES OF DAILY LIVING (ADL)
ADLS_ACUITY_SCORE: 27
ADLS_ACUITY_SCORE: 27
ADLS_ACUITY_SCORE: 25
ADLS_ACUITY_SCORE: 27
ADLS_ACUITY_SCORE: 25
ADLS_ACUITY_SCORE: 27
ADLS_ACUITY_SCORE: 27
ADLS_ACUITY_SCORE: 25
ADLS_ACUITY_SCORE: 27
ADLS_ACUITY_SCORE: 25

## 2024-04-09 NOTE — PROGRESS NOTES
Care Management Follow Up    Length of Stay (days): 1    Expected Discharge Date:  TBD     Concerns to be Addressed: discharge planning, home safety, cognitive/perceptual, compliance issue, patient refuses services, medication, mental health  noncompliant with epliepsy meds. Unsafe to go home  Patient plan of care discussed at interdisciplinary rounds: Yes    Anticipated Discharge Disposition:  Group Home; potentially LTC prior to transition to      Anticipated Discharge Services:  TBD  Anticipated Discharge DME:  TBD    Patient/family educated on Medicare website which has current facility and service quality ratings: pt's spouse deferred to care mgmt to select group homes  Education Provided on the Discharge Plan: Yes (not by this writer)  Patient/Family in Agreement with the Plan:  Yes    Referrals Placed by JEAN CLAUDE/ED:  Financial counseling (to assist w/ DHS 3543 Request for payment of Long Term Care Services form); [facility] referral resubmitted on 3/29/24; group home referrals (see CHW note for details)  Private pay costs discussed: Not applicable    Additional Information:    Relevant Contacts:  Damaris Mckee  Senior  / [facility] Door   Ph: [phone]  Fax: [phone]  Email: [email or web address].    Zac Silver ([facility] Door Representative)  Email: [email or web address].    [facility] (Accepting Group Home Agency)  HQ: [address], [place] [address]  Haris Osmin: [phone]  Fax: [phone]  Specific home for placement: [address], [place] [address]  ________________________________________    Requested CHW to follow up on remaining LTC referrals that were sent on 4/5, then to notify this writer of results.  Intend to follow up w/ [facility] for complex cases, following this.    Received notification from CHW that pt has been declined by [facility] d/t lack of independence and  dementia.  [facility] nursing home referral still pending.    Reached out to Anupama WARD, provided update, inquired about next steps, as this writer does not anticipate pt will be accepted to LTC.  Anupama inquired if pt was declined by all facilities she suggested, this writer informed her that [facility] is still pending.  Will await further guidance from Anupama, as well as continue to follow up on referral(s) as needed.          Care mgmt will continue to follow.    BERENICE Koehler  [facility]  Units 8M/S & 10 ICU  Pager: [phone]  Phone: [phone]     denies pain/discomfort
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
## 2024-04-09 NOTE — PLAN OF CARE
[id number]    Goal Outcome Evaluation:    Patient alert and oriented to self, cooperative with cares/meds. Up independently, frequently see patient standing in doorway of room smiling/laughing. No acute concerns/complaints/changes this shift. Medina  utilized. Plan TBD. Call light within reach. Continue with current plan of care.

## 2024-04-09 NOTE — PLAN OF CARE
Goal Outcome Evaluation:      Plan of Care Reviewed With: patient    Overall Patient Progress: no changeOverall Patient Progress: no change    Outcome Evaluation: Pt alert to self only, up ad daphne, denied pain, poor appetite, interview done with , pt laughs inappropriately no behavioral issues continue POC

## 2024-04-09 NOTE — PROGRESS NOTES
"4/9/24  Care Management Follow Up    Pending Referrals:     [facility]  [address], [place] [address]  Ph: [phone]  Fx: [phone]  4/5: RNCC sent referral via Epic Destinations tab  4/9: CHW called and LVM requesting a call back    [facility]  [address] [address]  PH: [phone]  4/9: Initial referral sent     Declined Referrals:    [facility]  [address] [place] [address]  Ph: [phone]  Fx: [phone]  4/5: RNCC sent referral via Epic Destinations tab, declined d/t Medina-speaking (facility cannot provide ) and \"complicated\" needs.    [facility]  [address] [place] [address]  Ph: [phone]  Fx: [phone]  4/5: RNCC sent referral via Epic Destinations tab, declined d/t MA-LTC pending (no payer currently).    [facility] [place] referral  Catie- [place] Liaison  PH: [phone]  FAX: [phone]  4/8: Behavorial concerns    [facility]  [address] [place] [address]  PH: [phone]  4/9: Initial referral sent. Declined due to no bed available.     [facility]  [address] [place] [address]  Ph: [phone]  Fx: [phone]  4/5: RNCC sent referral via Epic Destinations tab  4/9: CHW called and LVM requesting a call back. Declined due to pt not being independent and his dementia dx    Reyna Martiri  Inpatient CHW  [facility] 5 Ortho, 8 Med Surge, & ED  PH: [phone]  "

## 2024-04-09 NOTE — PROGRESS NOTES
VS:   Blood pressure 104/78, pulse 80, temperature 97  F (36.1  C), temperature source Oral, resp. rate 18, weight 50.8 kg (112 lb), SpO2 96%.      Output: Continent of B&B void spontaneously without problem.    Lungs: Clear all fields.   Activity: independent   Skin: intact   Pain:   Denies pain   Neuro/CMS:   CMS and Neuron's are intact.   Dressing(s):   N/A   Diet:   Regular   LDA:   N/A   Equipment:   Call light in reach   Plan:   Continue current plan of care.   Additional Info:

## 2024-04-10 PROCEDURE — 250N000013 HC RX MED GY IP 250 OP 250 PS 637

## 2024-04-10 PROCEDURE — 250N000013 HC RX MED GY IP 250 OP 250 PS 637: Performed by: PEDIATRICS

## 2024-04-10 PROCEDURE — G0378 HOSPITAL OBSERVATION PER HR: HCPCS

## 2024-04-10 PROCEDURE — 99231 SBSQ HOSP IP/OBS SF/LOW 25: CPT | Performed by: INTERNAL MEDICINE

## 2024-04-10 RX ADMIN — NICOTINE 1 PATCH: 14 PATCH, EXTENDED RELEASE TRANSDERMAL at 09:04

## 2024-04-10 RX ADMIN — THIAMINE HCL TAB 100 MG 100 MG: 100 TAB at 09:00

## 2024-04-10 RX ADMIN — FOLIC ACID 1 MG: 1 TABLET ORAL at 09:00

## 2024-04-10 RX ADMIN — WHITE PETROLATUM: 1.75 OINTMENT TOPICAL at 19:19

## 2024-04-10 RX ADMIN — WHITE PETROLATUM: 1.75 OINTMENT TOPICAL at 14:52

## 2024-04-10 RX ADMIN — WHITE PETROLATUM: 1.75 OINTMENT TOPICAL at 09:05

## 2024-04-10 RX ADMIN — THIAMINE HCL TAB 100 MG 100 MG: 100 TAB at 19:19

## 2024-04-10 RX ADMIN — DIVALPROEX SODIUM 500 MG: 500 TABLET, DELAYED RELEASE ORAL at 19:18

## 2024-04-10 RX ADMIN — Medication 1 TABLET: at 09:00

## 2024-04-10 RX ADMIN — DIVALPROEX SODIUM 500 MG: 500 TABLET, DELAYED RELEASE ORAL at 09:00

## 2024-04-10 ASSESSMENT — ACTIVITIES OF DAILY LIVING (ADL)
ADLS_ACUITY_SCORE: 25

## 2024-04-10 NOTE — PROGRESS NOTES
Care Management Follow Up    Length of Stay (days): 1    Expected Discharge Date:  TBD     Concerns to be Addressed: discharge planning, home safety, cognitive/perceptual, compliance issue, patient refuses services, medication, mental health  noncompliant with epliepsy meds. Unsafe to go home  Patient plan of care discussed at interdisciplinary rounds: Yes    Anticipated Discharge Disposition:  Group Home; potentially LTC prior to transition to      Anticipated Discharge Services:  TBD  Anticipated Discharge DME:  TBD    Patient/family educated on Medicare website which has current facility and service quality ratings: pt's spouse deferred to care mgmt to select group homes  Education Provided on the Discharge Plan: Yes  Patient/Family in Agreement with the Plan:  Yes    Referrals Placed by JEAN CLAUDE/ED:  Financial counseling (to assist w/ DHS 3543 Request for payment of Long Term Care Services form); [place] [place] on 3/29/24; group home and LTC referrals (see CHW note for details)  Private pay costs discussed: Not applicable    Additional Information:    Relevant Contacts:  Damaris Mckee  Senior  / [place] [facility]   Ph: [phone]  Fax: [phone]  Email: [email or web address]    Zac Silver ([place] [facility] Representative)  Email: [email or web address]    [facility] (Accepting Group Home Agency)  HQ: [address], [place] [address]  Haris Osmin: [phone]  Fax: [phone]  Specific home for placement: [address], [place] [address]  ________________________________________    Received message from ED Altman for complex cases.  She requested this writer to have provider document pt's current behaviors, as the daily progress notes indicate aggression and violence, which is not how he is presently acting.    Case discussed in rounds this AM.  Updated charge RN and provider to the need for  "staff to document current behaviors.  Provider acknowledged, will include in her daily progress note.    Requested CHW to follow up on remaining pending LTC referrals.  Requested no additional referrals be sent at this time (awaiting provider/nursing note updates to reflect pt's pleasant behavior).    Charge RN provided this writer w/ mail correspondence, addressed to pt, from [facility] Human Services Department.  Unclear what this mail is, did not open it.  Intend to notify pt's spouse this week.    Updated Anupama that provider's note from today states the following:  \"# h/o Aggression, violence (NO EPISODES OF AGGRESSION OR VIOLENCE LATELY. Patient is very pleasant and cooperative)\"  She stated she intends to send another global [place] referral tomorrow, as this may alleviate the concerns they had over pt's behaviors.          Care mgmt will continue to follow.    BERENICE Koehler  [facility]  Units 8M/S & 10 ICU  Pager: [phone]  Phone: [phone]    "

## 2024-04-10 NOTE — PROGRESS NOTES
"4/10/24  Care Management Follow Up    Pending Referrals:     [facility]  [address], [place] [address]  Ph: [phone]  Fx: [phone]  4/5: RNCC sent referral via Epic Destinations tab  4/9: CHW called and LVM requesting a call back  4/10: CHW called and LVM requesting a call back     [facility]  [address] [address]  PH: [phone]  4/9: Initial referral sent  4/10: CHW called and LVM requesting a call back     Declined Referrals:     [facility]  [address] [place] [address]  Ph: [phone]  Fx: [phone]  4/5: RNCC sent referral via Epic Destinations tab, declined d/t Emdina-speaking (facility cannot provide ) and \"complicated\" needs.     [facility]  [address] [place] [address]  Ph: [phone]  Fx: [phone]  4/5: RNCC sent referral via Epic Destinations tab, declined d/t MA-LTC pending (no payer currently).     [facility] [place] referral  Catie- [place] Liaison  PH: [phone]  FAX: [phone]  4/8: Behavorial concerns     [facility]  [address] [place] [address]  PH: [phone]  4/9: Initial referral sent. Declined due to no bed available.      [facility]  [address] [place] [address]  Ph: [phone]  Fx: [phone]  4/5: RNCC sent referral via Epic Destinations tab  4/9: CHW called and LVM requesting a call back. Declined due to pt not being independent and his dementia dx    Reyna Egan  Inpatient CHW  [facility] 5 Ortho, 8 Med Surge, & ED  PH: [phone]  "

## 2024-04-10 NOTE — PROGRESS NOTES
Shift [id number]    VS:   Blood pressure (!) 130/95, pulse 67, temperature 97.1  F (36.2  C), temperature source Oral, resp. rate 18, weight 51.8 kg (114 lb 4.8 oz), SpO2 99%.      Output: Voids spontaneously and without difficulty. Last BM on 4/10/24.   Lungs: Clear and equal bilaterally.    Activity: Independent   Skin: No new deficits noted   Pain:   Denies pain.    Neuro/CMS:   A&O x3.   Dressing(s):   none   Diet:   Regular diet. Fair appetite. Writer used interpretor and pt stated he would like rice with every meal.    LDA:   none   Equipment:   Call light in reach   Plan:   Continue with POC.   Additional Info:
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
## 2024-04-10 NOTE — PLAN OF CARE
Patient alert and oriented to self. Slept through this shift. No symptoms of pain or discomfort reported this shift. Independent in room. No behaviors noted this shift. Call light within reach. Continue with plan of care.

## 2024-04-11 PROCEDURE — 99232 SBSQ HOSP IP/OBS MODERATE 35: CPT | Performed by: STUDENT IN AN ORGANIZED HEALTH CARE EDUCATION/TRAINING PROGRAM

## 2024-04-11 PROCEDURE — 250N000013 HC RX MED GY IP 250 OP 250 PS 637

## 2024-04-11 PROCEDURE — 250N000013 HC RX MED GY IP 250 OP 250 PS 637: Performed by: PEDIATRICS

## 2024-04-11 PROCEDURE — G0378 HOSPITAL OBSERVATION PER HR: HCPCS

## 2024-04-11 RX ADMIN — THIAMINE HCL TAB 100 MG 100 MG: 100 TAB at 20:35

## 2024-04-11 RX ADMIN — WHITE PETROLATUM: 1.75 OINTMENT TOPICAL at 13:35

## 2024-04-11 RX ADMIN — THIAMINE HCL TAB 100 MG 100 MG: 100 TAB at 08:06

## 2024-04-11 RX ADMIN — NICOTINE 1 PATCH: 14 PATCH, EXTENDED RELEASE TRANSDERMAL at 08:07

## 2024-04-11 RX ADMIN — WHITE PETROLATUM: 1.75 OINTMENT TOPICAL at 08:07

## 2024-04-11 RX ADMIN — DIVALPROEX SODIUM 500 MG: 500 TABLET, DELAYED RELEASE ORAL at 08:06

## 2024-04-11 RX ADMIN — DIVALPROEX SODIUM 500 MG: 500 TABLET, DELAYED RELEASE ORAL at 20:35

## 2024-04-11 RX ADMIN — Medication 1 TABLET: at 08:06

## 2024-04-11 RX ADMIN — WHITE PETROLATUM: 1.75 OINTMENT TOPICAL at 20:35

## 2024-04-11 RX ADMIN — FOLIC ACID 1 MG: 1 TABLET ORAL at 08:06

## 2024-04-11 ASSESSMENT — ACTIVITIES OF DAILY LIVING (ADL)
ADLS_ACUITY_SCORE: 25

## 2024-04-11 NOTE — PROGRESS NOTES
"4/11/24  Care Management Follow Up    Update: CHW called the interrupter line ([phone]) and had requested them to call pt's wife. CHW informed wife that there is mail for the pt that can not be opened by anyone but pt or family. They also asked family if they could come in to open the letter. Wife stated that her kids will be in later this evening around 1700 or 1800. This was notified to the RNCC on the unit.     Pending Referrals:     [facility]  [address] [place] [address]  Ph: [phone]  Fx: [phone]  4/5: RNCC sent referral via Epic Destinations tab  4/9: CHW called and LVM requesting a call back  4/10: CHW called and LVM requesting a call back  4/11: CHW called and LVM requesting a call back     [facility]  [address] [address]  PH: [phone]  4/9: Initial referral sent  4/10: CHW called and LVM requesting a call back  4/11: CHW called and LVM requesting a call back     Declined Referrals:     [facility]  [address] [place] [address]  Ph: [phone]  Fx: [phone]  4/5: RNCC sent referral via Epic Destinations tab, declined d/t Medina-speaking (facility cannot provide ) and \"complicated\" needs.     [facility]  [address] [place] [address]  Ph: [phone]  Fx: [phone]  4/5: RNCC sent referral via Epic Destinations tab, declined d/t MA-LTC pending (no payer currently).     [facility] [place] referral  Catie- [place] Liaison  PH: [phone]  FAX: [phone]  4/8: Behavorial concerns     [facility]  [address] [place] [address]  PH: [phone]  4/9: Initial referral sent. Declined due to no bed available.      [facility]  [address] [place] [address]  Ph: [phone]  Fx: [phone]  4/5: RNCC sent referral via [facility] Destinations tab  4/9: CHW called and LVM requesting a call back. Declined due to pt " not being independent and his dementia dx    Reyna Egan  Inpatient CHW  [facility] 5 Ortho, 8 Med Surge, & ED  PH: [phone]

## 2024-04-11 NOTE — PROGRESS NOTES
Shift [id number]     VS:  Blood pressure 119/82, pulse 63, temperature 97.3  F (36.3  C), temperature source Oral, resp. rate 16, weight 51.8 kg (114 lb 4.8 oz), SpO2 100%.      Output: Voids spontaneously and without difficulty. No BM this shift.   Lungs: Clear and equal bilaterally.    Activity: Independent   Skin: No new deficits noted   Pain:    Denies pain.    Neuro/CMS:    A&O x3.   Dressing(s):    none   Diet:    Regular diet. Fair appetite.   LDA:    none   Equipment:    Call light in reach   Plan:    Continue with POC.   Additional Info:

## 2024-04-11 NOTE — PROGRESS NOTES
Care Management Follow Up    Length of Stay (days): 1    Expected Discharge Date:  TBD     Concerns to be Addressed: discharge planning, home safety, cognitive/perceptual, compliance issue, patient refuses services, medication, mental health  noncompliant with epliepsy meds. Unsafe to go home  Patient plan of care discussed at interdisciplinary rounds: Yes    Anticipated Discharge Disposition:  Group Home; potentially LTC prior to transition to      Anticipated Discharge Services:  TBD  Anticipated Discharge DME:  TBD    Patient/family educated on Medicare website which has current facility and service quality ratings: pt's spouse deferred to care mgmt to select group homes  Education Provided on the Discharge Plan: Yes  Patient/Family in Agreement with the Plan:  Yes    Referrals Placed by CM/SW:  Financial counseling (to assist w/ DHS 3543 Request for payment of Long Term Care Services form); [place] res[place] on 3/29/24; group home and LTC referrals (see CHW note for details)  Private pay costs discussed: Not applicable    Additional Information:    Relevant Contacts:  Damaris Mckee  Senior  / [place] [facility]   Ph: [phone]  Fax: [phone]  Email: [email or web address]    Zac Silver ([facility] Representative)  Email: [email or web address]    [facility] (Accepting Group Home Agency)  HQ: [address], [place] [address]  Haris Osmin: [phone]  Fax: [phone]  Specific home for placement: [address], [place] [address]  ________________________________________    Requested CHW to follow up on remaining pending LTC referral(s).    Requested CHW to contact pt's spouse regarding the mail that was received for pt, to see if she is interested in coming to take a look at it.  [facility] has not opened this mail.  It appears to be a packet of papers from [facility] Human  Services Department.    CHW updated this writer that pt's family is coming this evening, around 5 or 6, to take a look at the mail.    Notified charge RN of the above info and provided mail for staff to pass along to family/assist w/ seeing what it is, if needed.  She stated she will update bedside RN.  She stated she will update care mgmt if the content of the packet seems like it may require care coordination to take a look.          Care mgmt will continue to follow.    BERENICE Koehler  [facility]  Units 8M/S & 10 ICU  Pager: [phone]  Phone: [phone]

## 2024-04-11 NOTE — PROGRESS NOTES
Plan of Care Reviewed With: patient  Overall Patient Progress: no change     Outcome Evaluation: Uneventful night. No new changes this shift. Slept through the night. Pt is up independently, ambulating in hallways. Denies pain. No PIV. Pt on regular diet. Continue POC.
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
## 2024-04-12 PROCEDURE — 250N000013 HC RX MED GY IP 250 OP 250 PS 637: Performed by: PEDIATRICS

## 2024-04-12 PROCEDURE — G0378 HOSPITAL OBSERVATION PER HR: HCPCS

## 2024-04-12 PROCEDURE — 99231 SBSQ HOSP IP/OBS SF/LOW 25: CPT | Performed by: STUDENT IN AN ORGANIZED HEALTH CARE EDUCATION/TRAINING PROGRAM

## 2024-04-12 PROCEDURE — 250N000013 HC RX MED GY IP 250 OP 250 PS 637

## 2024-04-12 PROCEDURE — 99232 SBSQ HOSP IP/OBS MODERATE 35: CPT | Performed by: PSYCHIATRY & NEUROLOGY

## 2024-04-12 RX ADMIN — DIVALPROEX SODIUM 500 MG: 500 TABLET, DELAYED RELEASE ORAL at 20:04

## 2024-04-12 RX ADMIN — DIVALPROEX SODIUM 500 MG: 500 TABLET, DELAYED RELEASE ORAL at 07:54

## 2024-04-12 RX ADMIN — Medication 1 TABLET: at 07:53

## 2024-04-12 RX ADMIN — CALCIUM CARBONATE (ANTACID) CHEW TAB 500 MG 1000 MG: 500 CHEW TAB at 18:31

## 2024-04-12 RX ADMIN — WHITE PETROLATUM: 1.75 OINTMENT TOPICAL at 14:36

## 2024-04-12 RX ADMIN — WHITE PETROLATUM: 1.75 OINTMENT TOPICAL at 07:55

## 2024-04-12 RX ADMIN — THIAMINE HCL TAB 100 MG 100 MG: 100 TAB at 20:04

## 2024-04-12 RX ADMIN — NICOTINE 1 PATCH: 14 PATCH, EXTENDED RELEASE TRANSDERMAL at 07:55

## 2024-04-12 RX ADMIN — FOLIC ACID 1 MG: 1 TABLET ORAL at 07:53

## 2024-04-12 RX ADMIN — CALCIUM CARBONATE (ANTACID) CHEW TAB 500 MG 1000 MG: 500 CHEW TAB at 22:19

## 2024-04-12 RX ADMIN — THIAMINE HCL TAB 100 MG 100 MG: 100 TAB at 07:53

## 2024-04-12 RX ADMIN — WHITE PETROLATUM: 1.75 OINTMENT TOPICAL at 20:04

## 2024-04-12 ASSESSMENT — ACTIVITIES OF DAILY LIVING (ADL)
ADLS_ACUITY_SCORE: 25
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
## 2024-04-12 NOTE — PLAN OF CARE
Goal Outcome Evaluation:      Plan of Care Reviewed With: patient    Overall Patient Progress: no changeOverall Patient Progress: no change       VS: /77 (BP Location: Left arm, Patient Position: Semi-Diana's, Cuff Size: Adult Small)   Pulse 69   Temp 98  F (36.7  C) (Oral)   Resp 18   Wt 51.8 kg (114 lb 4.8 oz)   SpO2 99%   BMI 21.89 kg/m      O2: Stable on RA, no complaints of SOB or chest pain.   Output: Voiding w/o pain or difficulty to bathroom.   Activity: Independent.    Skin: Scars on R abdomen.   Pain: Denies pain   CMS: A&Ox3, forgetful at times.   Dressing: none   Diet: Regular. No complaints of nausea or vomiting.   LDA: No IV access.   Equipment: Personal items.   Plan: Continue POC.   Additional Info: Medina cornejo  used when needed.
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
## 2024-04-12 NOTE — PROGRESS NOTES
"4/12/24  Care Management Follow Up    Update: CHW notified RNCC on unit that every facility had declined pt for temporary LTC placement.    Pending Referrals:       Declined Referrals:     [facility]  [address] [place] [address]  Ph: [phone]  Fx: [phone]  4/5: RNCC sent referral via Epic Destinations tab, declined d/t Medina-speaking (facility cannot provide ) and \"complicated\" needs.     [facility]  [address] [place] [address]  Ph: [phone]  Fx: [phone]  4/5: RNCC sent referral via Epic Destinations tab, declined d/t MA-LTC pending (no payer currently).     [facility] referral  [facility]  PH: [phone]  FAX: [phone]  4/8: Behavorial concerns     [facility]  [address] [place] [address]  PH: [phone]  4/9: Initial referral sent. Declined due to no bed available.      [facility]  [address] [address]  Ph: [phone]  Fx: [phone]  4/5: RNCC sent referral via Epic Destinations tab  4/9: CHW called and LVM requesting a call back. Declined due to pt not being independent and his dementia dx    [facility]  [address] [place] [address]  Ph: [phone]  Fx: [phone]  4/5: RNCC sent referral via Epic Destinations tab  4/9: CHW called and LVM requesting a call back  4/10: CHW called and LVM requesting a call back  4/11: CHW called and LVM requesting a call back  4/12: Pt declined due to not having a skilled need    [facility]  [address] [address]  PH: [phone]  4/9: Initial referral sent  4/10: CHW called and LVM requesting a call back  4/11: CHW called and LVM requesting a call back  4/12: Declined due to pt not being appropriate for them     Reyna Egan  Inpatient CHW  [facility] 5 Ortho, 8 Med Surge, & ED  PH: [phone]  "

## 2024-04-12 NOTE — PROGRESS NOTES
Care Management Follow Up    Length of Stay (days): 1    Expected Discharge Date:  TBD     Concerns to be Addressed: discharge planning, home safety, cognitive/perceptual, compliance issue, patient refuses services, medication, mental health  noncompliant with epliepsy meds. Unsafe to go home  Patient plan of care discussed at interdisciplinary rounds: Yes    Anticipated Discharge Disposition:  Group Home; potentially LTC prior to transition to      Anticipated Discharge Services:  TBD  Anticipated Discharge DME:  TBD    Patient/family educated on Medicare website which has current facility and service quality ratings: pt's spouse deferred to care mgmt to select group homes  Education Provided on the Discharge Plan: Yes  Patient/Family in Agreement with the Plan:  Yes    Referrals Placed by CM/SW:  Financial counseling (to assist w/ DHS 4263 Request for payment of Long Term Care Services form); [place] [place] resubmitted on 3/29/24; group home and LTC referrals (see CHW note for details)  Private pay costs discussed: Not applicable    Additional Information:    Relevant Contacts:  Damaris Mckee  Senior  / [facility]   Ph: [phone]  Fax: [phone]  Email: [email or web address]    Zac Silver ([facility] Representative)  Email: [email or web address].    [facility] (Accepting Group Home Agency)  HQ: [address], [place] [address]  Yasin Osmin: [phone]  Fax: [phone]  Specific home for placement: [address], [place] [address]  ________________________________________    9:38am - Reached out to [facility] for complex cases, inquired about results of new [facility] [place] referral.    Received response from Anupama, she inquired if pt still needs MA-LTC approved.  RNCC to update Anupama after checking w/ financial counseling regarding MA-LTC status.    Requested CHW to follow up on  remaining LTC referrals that were previously sent.    Sent inquiry to financial counseling via in-basket regarding status of pt's MA-LTC request submission to the [facility].    Received message from Anupama, she stated pt's Sally PMAP should pay for LTC, despite earlier declination of no payer source.  She stated [place] may have availability, but require a document stating who pt's decision-maker is.  She inquired if the provider would approve of pt completing HCD to name a decision-maker.    Discussed HCD w/ provider, she stated she would consult psych regarding pt's capacity to complete HCD.  Subsequently updated Anupama.    Notified by charge RN that pt's spouse and daughter arrived on unit to retrieve mail.  Met w/ them in quiet area on unit, utilized phone  to communicate.  Explained that mail arrived for pt but is not able to be opened by care mgmt.  Informed them that if they would like to take the mail home, they can do so, or if they would like assistance in looking through it, this writer can assist.  They opted to open the mail and allow this writer to look at it.  It was an informational packet about [facility] services that was sent d/t [facility] referral.  Last page was a general letter to pt that requests all mail to be kept in the event forms come w/ the mail that need to be completed during the [facility] assessment.  Informed family of this info, also pointed out the area that says they can call the [facility] for Med[place]  and be provided w/ the info from packet in their language.  They both expressed understanding and had no questions about the mail.  Informed them that if additional mail arrives, they will be notified.  Provided update that assessment date is not scheduled yet, still seeking LTC as a temporary placement option prior to transition to accepting group home.  Family expressed understanding, had no questions/concerns for this writer.  They packed up the mail to take it home,  "stated they will be visiting pt for a bit before leaving.    Dawn Xavier, care mgmt supervisor, reached out to 8M/S care mgmt regarding pt, inquired if there is a decision on pt being able to sign HCD at this time.  She was informed by unit SW that psych consult is pending to address this.  RNCC to keep Dawn and Anupama updated regarding result of psych eval.    Financial counseling response to earlier inquiry:  \"It is still pending. There is a 5 year look back period, so the application process takes time, not including the back log the counties are experiencing.\"    Next Steps:    - Update Anupama that MA-LTC is still pending    - Look for psych provider documentation regarding pt's ability to, or lack thereof, complete HCD - update Mariann to this result          Care [facility] will continue to follow.    Mike Medrano, RNKEVON  [facility]  Units 8M/S & 10 ICU  Pager: [phone]  Phone: [phone]    "

## 2024-04-13 PROCEDURE — 99231 SBSQ HOSP IP/OBS SF/LOW 25: CPT | Performed by: STUDENT IN AN ORGANIZED HEALTH CARE EDUCATION/TRAINING PROGRAM

## 2024-04-13 PROCEDURE — G0378 HOSPITAL OBSERVATION PER HR: HCPCS

## 2024-04-13 PROCEDURE — 250N000013 HC RX MED GY IP 250 OP 250 PS 637

## 2024-04-13 PROCEDURE — 250N000013 HC RX MED GY IP 250 OP 250 PS 637: Performed by: PEDIATRICS

## 2024-04-13 RX ADMIN — WHITE PETROLATUM: 1.75 OINTMENT TOPICAL at 14:04

## 2024-04-13 RX ADMIN — WHITE PETROLATUM: 1.75 OINTMENT TOPICAL at 19:47

## 2024-04-13 RX ADMIN — NICOTINE 1 PATCH: 14 PATCH, EXTENDED RELEASE TRANSDERMAL at 08:09

## 2024-04-13 RX ADMIN — THIAMINE HCL TAB 100 MG 100 MG: 100 TAB at 19:47

## 2024-04-13 RX ADMIN — Medication 1 TABLET: at 08:10

## 2024-04-13 RX ADMIN — WHITE PETROLATUM: 1.75 OINTMENT TOPICAL at 08:10

## 2024-04-13 RX ADMIN — DIVALPROEX SODIUM 500 MG: 500 TABLET, DELAYED RELEASE ORAL at 08:09

## 2024-04-13 RX ADMIN — THIAMINE HCL TAB 100 MG 100 MG: 100 TAB at 08:10

## 2024-04-13 RX ADMIN — DIVALPROEX SODIUM 500 MG: 500 TABLET, DELAYED RELEASE ORAL at 19:47

## 2024-04-13 RX ADMIN — FOLIC ACID 1 MG: 1 TABLET ORAL at 08:10

## 2024-04-13 ASSESSMENT — ACTIVITIES OF DAILY LIVING (ADL)
ADLS_ACUITY_SCORE: 26
ADLS_ACUITY_SCORE: 25
ADLS_ACUITY_SCORE: 26

## 2024-04-13 NOTE — PLAN OF CARE
Goal Outcome Evaluation:      Plan of Care Reviewed With: patient    Overall Patient Progress: no changeOverall Patient Progress: no change     Shift 2104-0963:    VS: /76 (BP Location: Left arm)   Pulse 64   Temp 97.5  F (36.4  C) (Oral)   Resp 18   Wt 52 kg (114 lb 10.2 oz)   SpO2 99%   BMI 21.95 kg/m      O2: Stable on RA, no complaints of SOB or chest pain.   Output: Voiding w/o pain or difficulty to bathroom.   Activity: Independent.    Skin: Scars on R abdomen.   Pain: Denies pain   CMS: A&Ox3   Dressing: None   Diet: Regular. No complaints of nausea or vomiting.   LDA: No IV access   Equipment: Personal items   Plan: Awaiting placement   Additional Info: No changes this shift. Uses [employer] .

## 2024-04-13 NOTE — PLAN OF CARE
Goal Outcome Evaluation:      Plan of Care Reviewed With: patient    Overall Patient Progress: no change    Pt is A&Ox3, continent B/B, independent. PRN Tums given x1 for stomach discomfort.    No acute issues this shift. Call light within reach. Continue POC.

## 2024-04-13 NOTE — PLAN OF CARE
Goal Outcome Evaluation:      VSS BP 94/56 (BP Location: Left arm)   Pulse 69   Temp 97.8  F (36.6  C) (Oral)   Resp 18   Wt 51.8 kg (114 lb 4.8 oz)   SpO2 97%   BMI 21.89 kg/m       O2 RA denies SOB CP    Output Voiding in bathroom no c/o urinary symptoms    - per pt report   Activity Ind in room   Up for meal Yes   Skin  Visible skin intact. R hand amputated, scaring on back and to abd. Color I consistent with race.    Pain denies   Neuro/CMS Intact- need .   Dressing none   Diet R-thin-whole   LDA none   Plan Cont poc
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
## 2024-04-14 PROCEDURE — 250N000013 HC RX MED GY IP 250 OP 250 PS 637

## 2024-04-14 PROCEDURE — 99231 SBSQ HOSP IP/OBS SF/LOW 25: CPT | Performed by: INTERNAL MEDICINE

## 2024-04-14 PROCEDURE — 250N000013 HC RX MED GY IP 250 OP 250 PS 637: Performed by: PEDIATRICS

## 2024-04-14 PROCEDURE — G0378 HOSPITAL OBSERVATION PER HR: HCPCS

## 2024-04-14 RX ADMIN — WHITE PETROLATUM: 1.75 OINTMENT TOPICAL at 13:17

## 2024-04-14 RX ADMIN — WHITE PETROLATUM: 1.75 OINTMENT TOPICAL at 19:46

## 2024-04-14 RX ADMIN — FOLIC ACID 1 MG: 1 TABLET ORAL at 07:52

## 2024-04-14 RX ADMIN — THIAMINE HCL TAB 100 MG 100 MG: 100 TAB at 19:46

## 2024-04-14 RX ADMIN — CALCIUM CARBONATE (ANTACID) CHEW TAB 500 MG 1000 MG: 500 CHEW TAB at 18:32

## 2024-04-14 RX ADMIN — NICOTINE 1 PATCH: 14 PATCH, EXTENDED RELEASE TRANSDERMAL at 07:53

## 2024-04-14 RX ADMIN — DIVALPROEX SODIUM 500 MG: 500 TABLET, DELAYED RELEASE ORAL at 07:53

## 2024-04-14 RX ADMIN — Medication 1 TABLET: at 07:52

## 2024-04-14 RX ADMIN — WHITE PETROLATUM: 1.75 OINTMENT TOPICAL at 07:54

## 2024-04-14 RX ADMIN — THIAMINE HCL TAB 100 MG 100 MG: 100 TAB at 07:52

## 2024-04-14 RX ADMIN — DIVALPROEX SODIUM 500 MG: 500 TABLET, DELAYED RELEASE ORAL at 19:46

## 2024-04-14 ASSESSMENT — ACTIVITIES OF DAILY LIVING (ADL)
ADLS_ACUITY_SCORE: 26

## 2024-04-14 NOTE — PLAN OF CARE
Goal Outcome Evaluation:      Plan of Care Reviewed With: patient    Overall Patient Progress: no change    Pt is A&O x2, continent B/B, independent. Denies pain, N/V, CP, and SOB.    No acute issues this shift. Call light within reach. Continue POC.    Temp: 97.6  F (36.4  C) Temp src: Axillary BP: 129/87 Pulse: 60   Resp: 18 SpO2: 100 % O2 Device: None (Room air)
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
## 2024-04-14 NOTE — PLAN OF CARE
Goal Outcome Evaluation:      Plan of Care Reviewed With: patient    Overall Patient Progress: no changeOverall Patient Progress: no change       VS: BP (!) 127/95 (BP Location: Right arm)   Pulse 68   Temp 97  F (36.1  C) (Oral)   Resp 20   Wt 52 kg (114 lb 10.2 oz)   SpO2 99%   BMI 21.95 kg/m      O2: Stable on RA, no complaints of SOB or chest pain.   Output: Voiding w/o pain or difficulty to bathroom.   Activity: Independent.    Skin: Scars on R abdomen.   Pain: Denies pain.   CMS: A&Ox3   Dressing: None   Diet: Regular. No complaints of nausea or vomiting.   LDA: None   Equipment: Personal items.   Plan: Awaiting placement.   Additional Info: No acute changes this shift.

## 2024-04-15 PROCEDURE — G0378 HOSPITAL OBSERVATION PER HR: HCPCS

## 2024-04-15 PROCEDURE — 250N000013 HC RX MED GY IP 250 OP 250 PS 637: Performed by: PEDIATRICS

## 2024-04-15 PROCEDURE — 250N000013 HC RX MED GY IP 250 OP 250 PS 637

## 2024-04-15 PROCEDURE — 99231 SBSQ HOSP IP/OBS SF/LOW 25: CPT | Performed by: INTERNAL MEDICINE

## 2024-04-15 RX ADMIN — WHITE PETROLATUM: 1.75 OINTMENT TOPICAL at 19:55

## 2024-04-15 RX ADMIN — FOLIC ACID 1 MG: 1 TABLET ORAL at 08:25

## 2024-04-15 RX ADMIN — WHITE PETROLATUM: 1.75 OINTMENT TOPICAL at 08:26

## 2024-04-15 RX ADMIN — THIAMINE HCL TAB 100 MG 100 MG: 100 TAB at 08:25

## 2024-04-15 RX ADMIN — NICOTINE 1 PATCH: 14 PATCH, EXTENDED RELEASE TRANSDERMAL at 08:24

## 2024-04-15 RX ADMIN — Medication 1 TABLET: at 08:25

## 2024-04-15 RX ADMIN — DIVALPROEX SODIUM 500 MG: 500 TABLET, DELAYED RELEASE ORAL at 08:25

## 2024-04-15 RX ADMIN — DIVALPROEX SODIUM 500 MG: 500 TABLET, DELAYED RELEASE ORAL at 19:55

## 2024-04-15 RX ADMIN — THIAMINE HCL TAB 100 MG 100 MG: 100 TAB at 19:55

## 2024-04-15 RX ADMIN — WHITE PETROLATUM: 1.75 OINTMENT TOPICAL at 14:24

## 2024-04-15 ASSESSMENT — ACTIVITIES OF DAILY LIVING (ADL)
ADLS_ACUITY_SCORE: 26

## 2024-04-15 NOTE — PLAN OF CARE
"Goal Outcome Evaluation:  /66 (BP Location: Left arm)   Pulse 68   Temp 97.5  F (36.4  C) (Oral)   Resp 18   Wt 52 kg (114 lb 10.2 oz)   SpO2 98%   BMI 21.95 kg/m        Plan of Care Reviewed With: patient, other (see comments) ()  Pt is alert and oriented to self, states via  he was \"born in 1995,\" not sure of date, knows he is in [place], and does not know current day or date  On room air, denies pain, amb in hallway with steady gait, Pt is on room service trays, did not eat breakfast, offered alternative pt stated via , \"I'll eat anything you order for me\" Smiling, pleasant and cooperative.    Continent of B&B, no c/o pain amb ad daphne in hallway, disposition TBD, continue plan of care          "

## 2024-04-15 NOTE — PLAN OF CARE
Goal Outcome Evaluation:      Plan of Care Reviewed With: patient    Overall Patient Progress: no change    Pt is A&O x2, continent B/B, independent. Pt was pleasant and compliant with all cares. Denies N/V, CP, and SOB.    No acute issues this shift. Call light within reach. Continue POC.
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
## 2024-04-16 ENCOUNTER — PATIENT OUTREACH (OUTPATIENT)
Dept: CARE COORDINATION | Facility: CLINIC | Age: 49
End: 2024-04-16
Payer: COMMERCIAL

## 2024-04-16 PROCEDURE — 99232 SBSQ HOSP IP/OBS MODERATE 35: CPT | Performed by: PEDIATRICS

## 2024-04-16 PROCEDURE — 250N000013 HC RX MED GY IP 250 OP 250 PS 637

## 2024-04-16 PROCEDURE — 250N000013 HC RX MED GY IP 250 OP 250 PS 637: Performed by: PEDIATRICS

## 2024-04-16 PROCEDURE — G0378 HOSPITAL OBSERVATION PER HR: HCPCS

## 2024-04-16 RX ADMIN — WHITE PETROLATUM: 1.75 OINTMENT TOPICAL at 08:10

## 2024-04-16 RX ADMIN — Medication 1 TABLET: at 08:10

## 2024-04-16 RX ADMIN — NICOTINE 1 PATCH: 14 PATCH, EXTENDED RELEASE TRANSDERMAL at 08:15

## 2024-04-16 RX ADMIN — DIVALPROEX SODIUM 500 MG: 500 TABLET, DELAYED RELEASE ORAL at 19:59

## 2024-04-16 RX ADMIN — ACETAMINOPHEN 650 MG: 325 TABLET, FILM COATED ORAL at 05:27

## 2024-04-16 RX ADMIN — FOLIC ACID 1 MG: 1 TABLET ORAL at 08:09

## 2024-04-16 RX ADMIN — CALCIUM CARBONATE (ANTACID) CHEW TAB 500 MG 1000 MG: 500 CHEW TAB at 08:09

## 2024-04-16 RX ADMIN — WHITE PETROLATUM: 1.75 OINTMENT TOPICAL at 15:00

## 2024-04-16 RX ADMIN — WHITE PETROLATUM: 1.75 OINTMENT TOPICAL at 19:59

## 2024-04-16 RX ADMIN — THIAMINE HCL TAB 100 MG 100 MG: 100 TAB at 19:59

## 2024-04-16 RX ADMIN — DIVALPROEX SODIUM 500 MG: 500 TABLET, DELAYED RELEASE ORAL at 08:09

## 2024-04-16 RX ADMIN — THIAMINE HCL TAB 100 MG 100 MG: 100 TAB at 08:09

## 2024-04-16 ASSESSMENT — ACTIVITIES OF DAILY LIVING (ADL)
ADLS_ACUITY_SCORE: 26

## 2024-04-16 NOTE — PROGRESS NOTES
Care Management Follow Up    Length of Stay (days): 1    Expected Discharge Date:  Unknown     Concerns to be Addressed: discharge planning, other (see comments) (guardianship)    Patient plan of care discussed at interdisciplinary rounds: Yes    Anticipated Discharge Disposition: Group Home     Anticipated Discharge Services: Other (see comment) (Assistance with I/ADLs from group home staff)  Anticipated Discharge DME: None    Patient/family educated on Medicare website which has current facility and service quality ratings: no  Education Provided on the Discharge Plan: Yes  Patient/Family in Agreement with the Plan: yes    Referrals Placed by CM/SW: Community Residential Settings (Group Homes)    Accepted:    [facility]   HQ: [address] [facility] [address]  [place] [address]  Haris Osmin: [phone]  Fax: [phone]  Specific home for placement: [address], [place] [address]    Private pay costs discussed: Not applicable    Additional Information:    SW involved to assist with guardianship. Patient will need legal decision maker in the community. Unfortunately, agencies outside of the hospital cannot utilize Next of Kin as patient's decision maker. Patient is unable to identify a trusted decision maker through a Healthcare Directive. Therefore, he will need a guardian.    Patient has history of guardianship. The Emergency Guardianship was granted to patient's wife in 2023 and it  60 days after initiation, in 2024. This guardianship was initiated because patient's immigration hearing was coming up and patient needed a legal decision maker to act on his behalf during the immigration hearing. Through case review, it appears that patient's wife was assisted by a company through [facility] [place] called [email or web address]. Unfortunately, the court documents required to petition for guardianship are not readily available in other languages and patient's  wife is [place]-speaking. Therefore, patient's wife will need assistance through an  and/or  to complete the necessary documents.     SW called [employer] (P:  [phone]) and left generic VM inquiring about their services to see if they would be able to assist.     ED also sent email to additional community resources, <[email or web address] inquiring about their ability to assist in this situation.        RODOLFO Downey, LSW  8 Med Surg   [facility]  Phone: [phone]  Pager: [phone]  Vocera: Searchable by name or group: 8 Med Surg Vocera

## 2024-04-16 NOTE — PLAN OF CARE
"Goal Outcome Evaluation:      Plan of Care Reviewed With: patient, other (see comments), spouse ()  /72 (BP Location: Left arm)   Pulse 68   Temp 97.6  F (36.4  C) (Oral)   Resp 18   Wt 52 kg (114 lb 10.2 oz)   SpO2 100%   BMI 21.95 kg/m      Overall Patient Progress: no changeOverall Patient Progress: no change    Outcome Evaluation: pt is alert to name, disoriented to , place, time and situation.  pt speaks only [place],  utilized  Amb ad daphne in hallway, smiling, pleasant, calm.   Patient not eating , utilized  to determine food preferences, pt stated via  \"I'll eat anything you order for me\" and does not.  Dietician updated.  Will let oncoming shift know to check room for leftover unconsumed food improperly stored. Disposition TBD pending placement, continue plan of care  "

## 2024-04-16 NOTE — PROGRESS NOTES
Clinic Care Coordination Contact  [facility]    Clinical Data: Care Coordinator Outreach    Outreach Documentation Number of Outreach Attempt   4/16/2024   9:24 AM 1     Per chart review, patient is still admitted and unsure when patient will be discharged.     Plan: Care Coordinator will try to reach patient again in two weeks.
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
## 2024-04-16 NOTE — PLAN OF CARE
"  Problem: Adult Inpatient Plan of Care  Goal: Plan of Care Review  Description: The Plan of Care Review/Shift note should be completed every shift.  The Outcome Evaluation is a brief statement about your assessment that the patient is improving, declining, or no change.  This information will be displayed automatically on your shift  note.  4/16/2024 0145 by Chelsey Wang RN  Outcome: Progressing  4/15/2024 2129 by Chelsey Wang RN  Outcome: Progressing  Goal: Patient-Specific Goal (Individualized)  Description: You can add care plan individualizations to a care plan. Examples of Individualization might be:  \"Parent requests to be called daily at 9am for status\", \"I have a hard time hearing out of my right ear\", or \"Do not touch me to wake me up as it startles  me\".  4/16/2024 0145 by Chelsey Wang RN  Outcome: Progressing  4/15/2024 2129 by Chelsey Wang RN  Outcome: Progressing  Goal: Absence of Hospital-Acquired Illness or Injury  4/16/2024 0145 by Chelsey Wang RN  Outcome: Progressing  4/15/2024 2129 by Chelsey Wang RN  Outcome: Progressing  Intervention: Identify and Manage Fall Risk  Recent Flowsheet Documentation  Taken 4/16/2024 0001 by Chelsey Wang RN  Safety Promotion/Fall Prevention: clutter free environment maintained  Taken 4/15/2024 2200 by Chelsey Wang RN  Safety Promotion/Fall Prevention: clutter free environment maintained  Intervention: Prevent Skin Injury  Recent Flowsheet Documentation  Taken 4/16/2024 0001 by Chelsey Wang RN  Body Position: position changed independently  Taken 4/15/2024 2200 by Chelsey Wang RN  Body Position: position changed independently  Goal: Optimal Comfort and Wellbeing  4/16/2024 0145 by Chelsey Wagn RN  Outcome: Progressing  4/15/2024 2129 by Chelsey Wang RN  Outcome: Progressing  Goal: Readiness for Transition of Care  4/16/2024 0145 by Chelsey Wang RN  Outcome: Progressing  4/15/2024 2129 by Chelsey Wang, " RN  Outcome: Progressing   Goal Outcome Evaluation:

## 2024-04-17 PROCEDURE — 250N000013 HC RX MED GY IP 250 OP 250 PS 637: Performed by: PEDIATRICS

## 2024-04-17 PROCEDURE — 250N000013 HC RX MED GY IP 250 OP 250 PS 637

## 2024-04-17 PROCEDURE — G0378 HOSPITAL OBSERVATION PER HR: HCPCS

## 2024-04-17 PROCEDURE — 99232 SBSQ HOSP IP/OBS MODERATE 35: CPT | Performed by: PEDIATRICS

## 2024-04-17 RX ORDER — ACETAMINOPHEN 325 MG/1
975 TABLET ORAL 3 TIMES DAILY
Status: DISCONTINUED | OUTPATIENT
Start: 2024-04-17 | End: 2024-04-19

## 2024-04-17 RX ADMIN — NICOTINE 1 PATCH: 14 PATCH, EXTENDED RELEASE TRANSDERMAL at 09:09

## 2024-04-17 RX ADMIN — FOLIC ACID 1 MG: 1 TABLET ORAL at 09:06

## 2024-04-17 RX ADMIN — WHITE PETROLATUM: 1.75 OINTMENT TOPICAL at 09:07

## 2024-04-17 RX ADMIN — WHITE PETROLATUM: 1.75 OINTMENT TOPICAL at 17:25

## 2024-04-17 RX ADMIN — THIAMINE HCL TAB 100 MG 100 MG: 100 TAB at 19:59

## 2024-04-17 RX ADMIN — DIVALPROEX SODIUM 500 MG: 500 TABLET, DELAYED RELEASE ORAL at 09:06

## 2024-04-17 RX ADMIN — WHITE PETROLATUM: 1.75 OINTMENT TOPICAL at 19:58

## 2024-04-17 RX ADMIN — ACETAMINOPHEN 975 MG: 325 TABLET, FILM COATED ORAL at 23:45

## 2024-04-17 RX ADMIN — ACETAMINOPHEN 975 MG: 325 TABLET, FILM COATED ORAL at 17:25

## 2024-04-17 RX ADMIN — DIVALPROEX SODIUM 500 MG: 500 TABLET, DELAYED RELEASE ORAL at 19:59

## 2024-04-17 RX ADMIN — Medication 1 TABLET: at 09:14

## 2024-04-17 RX ADMIN — THIAMINE HCL TAB 100 MG 100 MG: 100 TAB at 09:06

## 2024-04-17 ASSESSMENT — ACTIVITIES OF DAILY LIVING (ADL)
ADLS_ACUITY_SCORE: 26
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
## 2024-04-17 NOTE — PLAN OF CARE
Goal Outcome Evaluation:      Plan of Care Reviewed With: patient    Overall Patient Progress: no changeOverall Patient Progress: no change       Problem: Fall Injury Risk  Goal: Absence of Fall and Fall-Related Injury  Outcome: Progressing  Intervention: Promote Injury-Free Environment  Recent Flowsheet Documentation  Taken 4/17/2024 0000 by Chelsey Wang RN  Safety Promotion/Fall Prevention: clutter free environment maintained  Taken 4/16/2024 2000 by Chelsey Wang RN  Safety Promotion/Fall Prevention: clutter free environment maintained

## 2024-04-18 PROCEDURE — 250N000013 HC RX MED GY IP 250 OP 250 PS 637

## 2024-04-18 PROCEDURE — 99231 SBSQ HOSP IP/OBS SF/LOW 25: CPT | Performed by: PEDIATRICS

## 2024-04-18 PROCEDURE — 250N000013 HC RX MED GY IP 250 OP 250 PS 637: Performed by: PEDIATRICS

## 2024-04-18 PROCEDURE — G0378 HOSPITAL OBSERVATION PER HR: HCPCS

## 2024-04-18 RX ADMIN — WHITE PETROLATUM: 1.75 OINTMENT TOPICAL at 19:16

## 2024-04-18 RX ADMIN — THIAMINE HCL TAB 100 MG 100 MG: 100 TAB at 19:16

## 2024-04-18 RX ADMIN — ACETAMINOPHEN 975 MG: 325 TABLET, FILM COATED ORAL at 07:54

## 2024-04-18 RX ADMIN — ACETAMINOPHEN 975 MG: 325 TABLET, FILM COATED ORAL at 13:42

## 2024-04-18 RX ADMIN — WHITE PETROLATUM: 1.75 OINTMENT TOPICAL at 13:54

## 2024-04-18 RX ADMIN — Medication 1 TABLET: at 07:54

## 2024-04-18 RX ADMIN — THIAMINE HCL TAB 100 MG 100 MG: 100 TAB at 07:54

## 2024-04-18 RX ADMIN — NICOTINE 1 PATCH: 14 PATCH, EXTENDED RELEASE TRANSDERMAL at 07:56

## 2024-04-18 RX ADMIN — WHITE PETROLATUM: 1.75 OINTMENT TOPICAL at 07:55

## 2024-04-18 RX ADMIN — DIVALPROEX SODIUM 500 MG: 500 TABLET, DELAYED RELEASE ORAL at 07:54

## 2024-04-18 RX ADMIN — FOLIC ACID 1 MG: 1 TABLET ORAL at 07:54

## 2024-04-18 RX ADMIN — ACETAMINOPHEN 975 MG: 325 TABLET, FILM COATED ORAL at 19:16

## 2024-04-18 RX ADMIN — DIVALPROEX SODIUM 500 MG: 500 TABLET, DELAYED RELEASE ORAL at 19:16

## 2024-04-18 ASSESSMENT — ACTIVITIES OF DAILY LIVING (ADL)
ADLS_ACUITY_SCORE: 26

## 2024-04-18 NOTE — PROGRESS NOTES
VS: /77 (BP Location: Left arm, Patient Position: Semi-Diana's, Cuff Size: Adult Small)   Pulse 62   Temp 97  F (36.1  C) (Oral)   Resp 18   Wt 52 kg (114 lb 10.2 oz)   SpO2 97%   BMI 21.95 kg/m       O2: Stable on room air >90 %   Output: Voids without difficulties   Last BM: No BM this shift    Activity: Independent   Up for meals? Yes, on room service, ate 75% of breakfast and diner but did not eat his lunch   Skin: Scab, peeling on bilateral heel/foot   Pain: Pt stated abdominal pain, scheduled tylenol administered   CMS: AOx3   Dressing: None   Diet: Regular   LDA: None   Equipment:    Plan: Waiting on placement    Additional Info:
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
## 2024-04-18 NOTE — PROGRESS NOTES
Shift [id number]:Pt admitted due to safety concerns at home  Unspecified dementia  Summary:Discharge to LTC when available  VS:       Pt A/O X 4. Afebrile. VSS. Lungs-Clear bilaterally Denies nausea, shortness of breath, and chest pain.     Output:       Bowels- + in all four quadrants. Voids spontaneously without   difficulty in the toilet.      Activity:       Pt up independent with cares.     Skin:   Intact No skin issues .     Pain:     No pain indicated. Has scheduled Tylenol     CMS:       CMS and Neuro's are intact. Denies numbness and tingling in all extremities.      Dressing:     No dressing.      Diet:       Pt is on a Regular diet .       LDA:     No PIV.      Equipment:       Np PCD's on BLE's. Bilateral heels are elevated off the bed.     Plan:       Pt is able to make needs known and the call light is within the pt's reach. Continue to monitor.       Additional Info:        .

## 2024-04-19 ENCOUNTER — APPOINTMENT (OUTPATIENT)
Dept: INTERPRETER SERVICES | Facility: CLINIC | Age: 49
End: 2024-04-19
Payer: COMMERCIAL

## 2024-04-19 PROCEDURE — G0378 HOSPITAL OBSERVATION PER HR: HCPCS

## 2024-04-19 PROCEDURE — 250N000013 HC RX MED GY IP 250 OP 250 PS 637

## 2024-04-19 PROCEDURE — 99232 SBSQ HOSP IP/OBS MODERATE 35: CPT | Performed by: PEDIATRICS

## 2024-04-19 PROCEDURE — 250N000013 HC RX MED GY IP 250 OP 250 PS 637: Performed by: PEDIATRICS

## 2024-04-19 RX ORDER — ACETAMINOPHEN 325 MG/1
975 TABLET ORAL EVERY 8 HOURS PRN
Status: DISCONTINUED | OUTPATIENT
Start: 2024-04-19 | End: 2024-05-08 | Stop reason: HOSPADM

## 2024-04-19 RX ADMIN — DIVALPROEX SODIUM 500 MG: 500 TABLET, DELAYED RELEASE ORAL at 08:59

## 2024-04-19 RX ADMIN — NICOTINE 1 PATCH: 14 PATCH, EXTENDED RELEASE TRANSDERMAL at 08:38

## 2024-04-19 RX ADMIN — FOLIC ACID 1 MG: 1 TABLET ORAL at 08:39

## 2024-04-19 RX ADMIN — THIAMINE HCL TAB 100 MG 100 MG: 100 TAB at 20:10

## 2024-04-19 RX ADMIN — WHITE PETROLATUM: 1.75 OINTMENT TOPICAL at 13:28

## 2024-04-19 RX ADMIN — Medication 1 TABLET: at 08:39

## 2024-04-19 RX ADMIN — THIAMINE HCL TAB 100 MG 100 MG: 100 TAB at 08:39

## 2024-04-19 RX ADMIN — WHITE PETROLATUM: 1.75 OINTMENT TOPICAL at 20:10

## 2024-04-19 RX ADMIN — DIVALPROEX SODIUM 500 MG: 500 TABLET, DELAYED RELEASE ORAL at 20:10

## 2024-04-19 RX ADMIN — WHITE PETROLATUM: 1.75 OINTMENT TOPICAL at 08:40

## 2024-04-19 ASSESSMENT — ACTIVITIES OF DAILY LIVING (ADL)
ADLS_ACUITY_SCORE: 26

## 2024-04-19 NOTE — PROGRESS NOTES
Shift [id number]:Pt admitted due to safety concerns at home  Unspecified dementia  Summary:Discharge to LTC when available  VS:       Pt A/O X 4. Afebrile. VSS. Lungs-Clear bilaterally Denies nausea, shortness of breath, and chest pain.     Output:       Bowels- + in all four quadrants. Voids spontaneously without   difficulty in the toilet.      Activity:       Pt up independent with cares.     Skin: Peeling on bilateral heels/feet     Pain:     No pain indicated. Has scheduled Tylenol     CMS:       CMS and Neuro's are intact. A/O x3 Denies numbness and tingling in all extremities.      Dressing:     No dressing.      Diet:       Pt is on a Regular diet .       LDA:     No PIV.      Equipment:       Np PCD's on BLE's. Bilateral heels are elevated off the bed.     Plan:       Pt is able to make needs known and the call light is within the pt's reach. Continue to monitor.       Additional Info:        .
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
## 2024-04-19 NOTE — PLAN OF CARE
Goal Outcome Evaluation:         VS: /66 (BP Location: Left arm)   Pulse 56   Temp 97.2  F (36.2  C) (Oral)   Resp 18   Wt 50.9 kg (112 lb 3.4 oz)   SpO2 99%   BMI 21.49 kg/m       O2: 99% RA   Output: No concerns noted   Last BM: 4/16/2024 per chart   Activity: Independent walks hallway   Skin: Old scars on abdomen and back- nicotine patch on DAYNA arm   Pain: denies   CMS: Denies any distress   Dressing: none   Diet: Regular- room service   LDA: none   Equipment: none   Plan: Awaiting placement   Additional Info: Enjoys juice/pop

## 2024-04-20 PROCEDURE — G0378 HOSPITAL OBSERVATION PER HR: HCPCS

## 2024-04-20 PROCEDURE — 250N000013 HC RX MED GY IP 250 OP 250 PS 637

## 2024-04-20 PROCEDURE — 99231 SBSQ HOSP IP/OBS SF/LOW 25: CPT | Performed by: PEDIATRICS

## 2024-04-20 PROCEDURE — 250N000013 HC RX MED GY IP 250 OP 250 PS 637: Performed by: PEDIATRICS

## 2024-04-20 RX ADMIN — WHITE PETROLATUM: 1.75 OINTMENT TOPICAL at 07:55

## 2024-04-20 RX ADMIN — WHITE PETROLATUM: 1.75 OINTMENT TOPICAL at 13:57

## 2024-04-20 RX ADMIN — Medication 1 TABLET: at 07:54

## 2024-04-20 RX ADMIN — NICOTINE 1 PATCH: 14 PATCH, EXTENDED RELEASE TRANSDERMAL at 07:55

## 2024-04-20 RX ADMIN — FOLIC ACID 1 MG: 1 TABLET ORAL at 07:54

## 2024-04-20 RX ADMIN — THIAMINE HCL TAB 100 MG 100 MG: 100 TAB at 07:54

## 2024-04-20 RX ADMIN — DIVALPROEX SODIUM 500 MG: 500 TABLET, DELAYED RELEASE ORAL at 07:54

## 2024-04-20 RX ADMIN — DIVALPROEX SODIUM 500 MG: 500 TABLET, DELAYED RELEASE ORAL at 19:14

## 2024-04-20 RX ADMIN — WHITE PETROLATUM: 1.75 OINTMENT TOPICAL at 19:14

## 2024-04-20 RX ADMIN — THIAMINE HCL TAB 100 MG 100 MG: 100 TAB at 19:14

## 2024-04-20 ASSESSMENT — ACTIVITIES OF DAILY LIVING (ADL)
ADLS_ACUITY_SCORE: 26

## 2024-04-20 NOTE — PROGRESS NOTES
VS: /81 (BP Location: Left leg, Patient Position: Supine, Cuff Size: Adult Small)   Pulse 63   Temp 97.5  F (36.4  C) (Oral)   Resp 16   Wt 49 kg (108 lb 0.4 oz)   SpO2 97%   BMI 20.69 kg/m       O2: Stable on room air >90%   Output: Voids without difficulties   Last BM: Pt confirmed he had a BM today via  services   Activity: Independent   Up for meals? Yes   Skin: Nicotine patch on Rt arm, scab, peeling on bilateral foot/heel   Pain: Denies   CMS: AOx3   Dressing: None   Diet: Regular   LDA: None   Equipment:    Plan: Waiting on placement   Additional Info:

## 2024-04-20 NOTE — PROGRESS NOTES
Shift 1508-5917:Pt admitted due to safety concerns at home  Unspecified dementia  Summary:Discharge to LTC when available  VS:       Pt A/O X 4. Afebrile. VSS. Lungs-Clear bilaterally Denies nausea, shortness of breath, and chest pain.     Output:       Bowels- + in all four quadrants. Voids spontaneously without   difficulty in the toilet.      Activity:       Pt up independent with cares.     Skin: Peeling on bilateral heels/feet  Old scars on abdomen and back. Nicotine Patch on Left Upper Arm   Pain:     No pain indicated. Has scheduled Tylenol     CMS:       CMS and Neuro's are intact. A/O x3 Denies numbness and tingling in all extremities.      Dressing:     No dressing.      Diet:       Pt is on a Regular diet .       LDA:     No PIV.      Equipment:       Np PCD's on BLE's. Bilateral heels are elevated off the bed.     Plan:       Pt is able to make needs known and the call light is within the pt's reach. Continue to monitor.       Additional Info:        .

## 2024-04-21 PROCEDURE — 250N000013 HC RX MED GY IP 250 OP 250 PS 637

## 2024-04-21 PROCEDURE — 99231 SBSQ HOSP IP/OBS SF/LOW 25: CPT | Performed by: PEDIATRICS

## 2024-04-21 PROCEDURE — 250N000013 HC RX MED GY IP 250 OP 250 PS 637: Performed by: PEDIATRICS

## 2024-04-21 PROCEDURE — G0378 HOSPITAL OBSERVATION PER HR: HCPCS

## 2024-04-21 RX ADMIN — WHITE PETROLATUM: 1.75 OINTMENT TOPICAL at 20:54

## 2024-04-21 RX ADMIN — Medication 1 TABLET: at 08:17

## 2024-04-21 RX ADMIN — FOLIC ACID 1 MG: 1 TABLET ORAL at 08:17

## 2024-04-21 RX ADMIN — WHITE PETROLATUM: 1.75 OINTMENT TOPICAL at 13:29

## 2024-04-21 RX ADMIN — WHITE PETROLATUM: 1.75 OINTMENT TOPICAL at 08:17

## 2024-04-21 RX ADMIN — NICOTINE 1 PATCH: 14 PATCH, EXTENDED RELEASE TRANSDERMAL at 08:23

## 2024-04-21 RX ADMIN — DIVALPROEX SODIUM 500 MG: 500 TABLET, DELAYED RELEASE ORAL at 08:17

## 2024-04-21 RX ADMIN — THIAMINE HCL TAB 100 MG 100 MG: 100 TAB at 20:54

## 2024-04-21 RX ADMIN — THIAMINE HCL TAB 100 MG 100 MG: 100 TAB at 08:17

## 2024-04-21 RX ADMIN — DIVALPROEX SODIUM 500 MG: 500 TABLET, DELAYED RELEASE ORAL at 20:54

## 2024-04-21 ASSESSMENT — ACTIVITIES OF DAILY LIVING (ADL)
ADLS_ACUITY_SCORE: 26
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
## 2024-04-21 NOTE — PLAN OF CARE
Goal Outcome Evaluation:       Patient is A & O to self; nonverbal; IND in the room; ambulates in the unit as needed; complies with care and ADLs; received his scheduled medications. Denied SOB, pain, and discomfort. Will follow POC. Call light is within reach.  BP (!) 144/97 (BP Location: Left arm)   Pulse 77   Temp 97  F (36.1  C) (Oral)   Resp 16   Wt 49 kg (108 lb 0.4 oz)   SpO2 99%   BMI 20.69 kg/m      Roberto Vieyra RN

## 2024-04-21 NOTE — PROGRESS NOTES
2300 - 0700    Blood pressure (!) 119/94, pulse 68, temperature 97.2  F (36.2  C), temperature source Oral, resp. rate 18, weight 49 kg (108 lb 0.4 oz), SpO2 100%.    Patient in bed asleep, no acute changes during the shift. Stable on room air, no distress observed during round. Call light within reach, plan of care ongoing.

## 2024-04-22 PROCEDURE — 250N000013 HC RX MED GY IP 250 OP 250 PS 637

## 2024-04-22 PROCEDURE — G0378 HOSPITAL OBSERVATION PER HR: HCPCS

## 2024-04-22 PROCEDURE — 99231 SBSQ HOSP IP/OBS SF/LOW 25: CPT | Performed by: PEDIATRICS

## 2024-04-22 PROCEDURE — 250N000013 HC RX MED GY IP 250 OP 250 PS 637: Performed by: PEDIATRICS

## 2024-04-22 RX ADMIN — WHITE PETROLATUM: 1.75 OINTMENT TOPICAL at 07:38

## 2024-04-22 RX ADMIN — WHITE PETROLATUM: 1.75 OINTMENT TOPICAL at 13:32

## 2024-04-22 RX ADMIN — DIVALPROEX SODIUM 500 MG: 500 TABLET, DELAYED RELEASE ORAL at 07:36

## 2024-04-22 RX ADMIN — DIVALPROEX SODIUM 500 MG: 500 TABLET, DELAYED RELEASE ORAL at 20:25

## 2024-04-22 RX ADMIN — THIAMINE HCL TAB 100 MG 100 MG: 100 TAB at 07:35

## 2024-04-22 RX ADMIN — Medication 1 TABLET: at 07:35

## 2024-04-22 RX ADMIN — WHITE PETROLATUM: 1.75 OINTMENT TOPICAL at 20:25

## 2024-04-22 RX ADMIN — FOLIC ACID 1 MG: 1 TABLET ORAL at 07:35

## 2024-04-22 RX ADMIN — THIAMINE HCL TAB 100 MG 100 MG: 100 TAB at 20:25

## 2024-04-22 RX ADMIN — NICOTINE 1 PATCH: 14 PATCH, EXTENDED RELEASE TRANSDERMAL at 07:37

## 2024-04-22 ASSESSMENT — ACTIVITIES OF DAILY LIVING (ADL)
ADLS_ACUITY_SCORE: 26
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
## 2024-04-22 NOTE — PLAN OF CARE
Goal Outcome Evaluation:      Plan of Care Reviewed With: patient    Overall Patient Progress: improving    Blood pressure 114/85, pulse 70, temperature 97.1  F (36.2  C), temperature source Oral, resp. rate 18, weight 49 kg (108 lb 0.4 oz), SpO2 99%.    Patient alert to self, disoriented to situations, stable on room air, no distress observed during rounds. Patient spend most time in room, expresses no further needs at this time, call light within reach, plan of care ongoing.

## 2024-04-22 NOTE — PLAN OF CARE
Goal Outcome Evaluation:      Plan of Care Reviewed With: patient    Overall Patient Progress: improvingOverall Patient Progress: improving       VS: /77 (BP Location: Left arm)   Pulse 68   Temp 97.5  F (36.4  C) (Oral)   Resp 18   Wt 49 kg (108 lb 0.4 oz)   SpO2 97%   BMI 20.69 kg/m      O2: Stable on RA, no complaints of SOB or chest pain.   Output: Voiding w/o pain or difficulty to bathroom.   Activity: Independent    Skin: Scars on R abdomen.   Pain: Denies pain.   CMS: A&Ox3   Dressing: None   Diet: Regular. No complaints of nausea or vomiting.   LDA: None   Equipment: Personal items   Plan: Awaiting placement.   Additional Info: No acute changes this shift.

## 2024-04-23 PROCEDURE — 250N000013 HC RX MED GY IP 250 OP 250 PS 637

## 2024-04-23 PROCEDURE — 250N000013 HC RX MED GY IP 250 OP 250 PS 637: Performed by: PEDIATRICS

## 2024-04-23 PROCEDURE — G0378 HOSPITAL OBSERVATION PER HR: HCPCS

## 2024-04-23 PROCEDURE — 99232 SBSQ HOSP IP/OBS MODERATE 35: CPT | Performed by: STUDENT IN AN ORGANIZED HEALTH CARE EDUCATION/TRAINING PROGRAM

## 2024-04-23 RX ADMIN — Medication 1 TABLET: at 08:31

## 2024-04-23 RX ADMIN — DIVALPROEX SODIUM 500 MG: 500 TABLET, DELAYED RELEASE ORAL at 20:09

## 2024-04-23 RX ADMIN — CALCIUM CARBONATE (ANTACID) CHEW TAB 500 MG 1000 MG: 500 CHEW TAB at 18:55

## 2024-04-23 RX ADMIN — THIAMINE HCL TAB 100 MG 100 MG: 100 TAB at 08:31

## 2024-04-23 RX ADMIN — FOLIC ACID 1 MG: 1 TABLET ORAL at 08:31

## 2024-04-23 RX ADMIN — NICOTINE 1 PATCH: 14 PATCH, EXTENDED RELEASE TRANSDERMAL at 08:31

## 2024-04-23 RX ADMIN — WHITE PETROLATUM: 1.75 OINTMENT TOPICAL at 13:48

## 2024-04-23 RX ADMIN — CALCIUM CARBONATE (ANTACID) CHEW TAB 500 MG 1000 MG: 500 CHEW TAB at 21:07

## 2024-04-23 RX ADMIN — DIVALPROEX SODIUM 500 MG: 500 TABLET, DELAYED RELEASE ORAL at 08:31

## 2024-04-23 RX ADMIN — WHITE PETROLATUM: 1.75 OINTMENT TOPICAL at 08:32

## 2024-04-23 RX ADMIN — WHITE PETROLATUM: 1.75 OINTMENT TOPICAL at 20:09

## 2024-04-23 RX ADMIN — THIAMINE HCL TAB 100 MG 100 MG: 100 TAB at 20:09

## 2024-04-23 ASSESSMENT — ACTIVITIES OF DAILY LIVING (ADL)
ADLS_ACUITY_SCORE: 26
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
## 2024-04-23 NOTE — PLAN OF CARE
Goal Outcome Evaluation:      Plan of Care Reviewed With: patient    Overall Patient Progress: no changeOverall Patient Progress: no change       VS: /79 (BP Location: Left arm)   Pulse 77   Temp 98.2  F (36.8  C) (Oral)   Resp 17   Wt 49 kg (108 lb 0.4 oz)   SpO2 99%   BMI 20.69 kg/m      O2: Stable on RA, no complaints of SOB or chest pain.   Output: Voiding w/o pain or difficulty to bathroom.   Activity: Independent.    Skin: Scars on R abdomen.   Pain: Denies pain.   CMS: A&Ox3   Dressing: None   Diet: Regular. No complaints of nausea or vomiting.   LDA: None   Equipment: Personal items.   Plan: Awaiting placement.   Additional Info: Continues to be pleasant & cooperative. No acute changes this shift.

## 2024-04-23 NOTE — PLAN OF CARE
Goal Outcome Evaluation:      Plan of Care Reviewed With: patient    Overall Patient Progress: improving    Pt is oriented to self, continent B/B, independent. Pt is pleasant and cooperative with all cares.    No acute issues this shift. Call light within reach. Continue POC.    Temp: 97.9  F (36.6  C) Temp src: Oral BP: 109/75 Pulse: 65   Resp: 17 SpO2: 98 % O2 Device: None (Room air)

## 2024-04-24 PROCEDURE — 250N000013 HC RX MED GY IP 250 OP 250 PS 637: Performed by: PEDIATRICS

## 2024-04-24 PROCEDURE — 99231 SBSQ HOSP IP/OBS SF/LOW 25: CPT | Performed by: STUDENT IN AN ORGANIZED HEALTH CARE EDUCATION/TRAINING PROGRAM

## 2024-04-24 PROCEDURE — G0378 HOSPITAL OBSERVATION PER HR: HCPCS

## 2024-04-24 PROCEDURE — 250N000013 HC RX MED GY IP 250 OP 250 PS 637

## 2024-04-24 RX ADMIN — DIVALPROEX SODIUM 500 MG: 500 TABLET, DELAYED RELEASE ORAL at 19:44

## 2024-04-24 RX ADMIN — WHITE PETROLATUM: 1.75 OINTMENT TOPICAL at 18:24

## 2024-04-24 RX ADMIN — WHITE PETROLATUM: 1.75 OINTMENT TOPICAL at 19:44

## 2024-04-24 RX ADMIN — DIVALPROEX SODIUM 500 MG: 500 TABLET, DELAYED RELEASE ORAL at 07:57

## 2024-04-24 RX ADMIN — WHITE PETROLATUM: 1.75 OINTMENT TOPICAL at 07:57

## 2024-04-24 RX ADMIN — THIAMINE HCL TAB 100 MG 100 MG: 100 TAB at 07:57

## 2024-04-24 RX ADMIN — Medication 1 TABLET: at 07:57

## 2024-04-24 RX ADMIN — CALCIUM CARBONATE (ANTACID) CHEW TAB 500 MG 1000 MG: 500 CHEW TAB at 19:44

## 2024-04-24 RX ADMIN — FOLIC ACID 1 MG: 1 TABLET ORAL at 07:57

## 2024-04-24 RX ADMIN — THIAMINE HCL TAB 100 MG 100 MG: 100 TAB at 19:44

## 2024-04-24 RX ADMIN — NICOTINE 1 PATCH: 14 PATCH, EXTENDED RELEASE TRANSDERMAL at 07:58

## 2024-04-24 ASSESSMENT — ACTIVITIES OF DAILY LIVING (ADL)
ADLS_ACUITY_SCORE: 26

## 2024-04-24 NOTE — PROGRESS NOTES
Patient oriented to self. Independent with ambulation. Continent of bowel and bladder. Regular diet, good appetite. /79 (BP Location: Left arm)   Pulse 57   Temp 97.3  F (36.3  C) (Oral)   Resp 16   Wt 49.8 kg (109 lb 12.6 oz)   SpO2 100%   BMI 21.03 kg/m

## 2024-04-24 NOTE — PLAN OF CARE
Goal Outcome Evaluation:      Plan of Care Reviewed With: patient    Overall Patient Progress: no change    Pt is oriented to self, continent B/B, independent. Pt is pleasant and cooperative with all cares. PRN Tums given x1 for stomach discomfort.    No acute issues this shift. Call light within reach. Continue POC.
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
## 2024-04-25 PROCEDURE — 250N000013 HC RX MED GY IP 250 OP 250 PS 637

## 2024-04-25 PROCEDURE — 250N000013 HC RX MED GY IP 250 OP 250 PS 637: Performed by: PEDIATRICS

## 2024-04-25 PROCEDURE — 99231 SBSQ HOSP IP/OBS SF/LOW 25: CPT | Performed by: STUDENT IN AN ORGANIZED HEALTH CARE EDUCATION/TRAINING PROGRAM

## 2024-04-25 PROCEDURE — G0378 HOSPITAL OBSERVATION PER HR: HCPCS

## 2024-04-25 RX ADMIN — CALCIUM CARBONATE (ANTACID) CHEW TAB 500 MG 1000 MG: 500 CHEW TAB at 20:42

## 2024-04-25 RX ADMIN — NICOTINE 1 PATCH: 14 PATCH, EXTENDED RELEASE TRANSDERMAL at 08:02

## 2024-04-25 RX ADMIN — Medication 1 TABLET: at 08:02

## 2024-04-25 RX ADMIN — FOLIC ACID 1 MG: 1 TABLET ORAL at 08:02

## 2024-04-25 RX ADMIN — DIVALPROEX SODIUM 500 MG: 500 TABLET, DELAYED RELEASE ORAL at 20:42

## 2024-04-25 RX ADMIN — DIVALPROEX SODIUM 500 MG: 500 TABLET, DELAYED RELEASE ORAL at 08:02

## 2024-04-25 RX ADMIN — THIAMINE HCL TAB 100 MG 100 MG: 100 TAB at 20:42

## 2024-04-25 RX ADMIN — WHITE PETROLATUM: 1.75 OINTMENT TOPICAL at 20:42

## 2024-04-25 RX ADMIN — ACETAMINOPHEN 975 MG: 325 TABLET, FILM COATED ORAL at 20:42

## 2024-04-25 RX ADMIN — THIAMINE HCL TAB 100 MG 100 MG: 100 TAB at 08:02

## 2024-04-25 ASSESSMENT — ACTIVITIES OF DAILY LIVING (ADL)
ADLS_ACUITY_SCORE: 26
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
## 2024-04-25 NOTE — PLAN OF CARE
Goal Outcome Evaluation:      Plan of Care Reviewed With: patient    Overall Patient Progress: no change    Pt is oriented to self, continent B/B, independent. PRN Tums given x1 for stomach discomfort. Pt was was pleasant and agreeable to all medications and cares.    No acute issues this shift. Call light within reach. Continue POC.

## 2024-04-26 PROCEDURE — 99231 SBSQ HOSP IP/OBS SF/LOW 25: CPT | Performed by: STUDENT IN AN ORGANIZED HEALTH CARE EDUCATION/TRAINING PROGRAM

## 2024-04-26 PROCEDURE — 250N000013 HC RX MED GY IP 250 OP 250 PS 637

## 2024-04-26 PROCEDURE — 250N000013 HC RX MED GY IP 250 OP 250 PS 637: Performed by: PEDIATRICS

## 2024-04-26 PROCEDURE — G0378 HOSPITAL OBSERVATION PER HR: HCPCS

## 2024-04-26 PROCEDURE — 250N000013 HC RX MED GY IP 250 OP 250 PS 637: Performed by: PHYSICIAN ASSISTANT

## 2024-04-26 RX ORDER — POLYETHYLENE GLYCOL 3350 17 G/17G
17 POWDER, FOR SOLUTION ORAL DAILY
Status: DISCONTINUED | OUTPATIENT
Start: 2024-04-26 | End: 2024-05-08 | Stop reason: HOSPADM

## 2024-04-26 RX ORDER — BISACODYL 10 MG
10 SUPPOSITORY, RECTAL RECTAL DAILY PRN
Status: DISCONTINUED | OUTPATIENT
Start: 2024-04-26 | End: 2024-05-08 | Stop reason: HOSPADM

## 2024-04-26 RX ORDER — AMOXICILLIN 250 MG
1 CAPSULE ORAL 2 TIMES DAILY
Status: DISCONTINUED | OUTPATIENT
Start: 2024-04-26 | End: 2024-04-27

## 2024-04-26 RX ADMIN — DIVALPROEX SODIUM 500 MG: 500 TABLET, DELAYED RELEASE ORAL at 09:47

## 2024-04-26 RX ADMIN — SENNOSIDES AND DOCUSATE SODIUM 1 TABLET: 8.6; 5 TABLET ORAL at 21:02

## 2024-04-26 RX ADMIN — CALCIUM CARBONATE (ANTACID) CHEW TAB 500 MG 1000 MG: 500 CHEW TAB at 17:00

## 2024-04-26 RX ADMIN — ACETAMINOPHEN 975 MG: 325 TABLET, FILM COATED ORAL at 12:33

## 2024-04-26 RX ADMIN — WHITE PETROLATUM: 1.75 OINTMENT TOPICAL at 16:59

## 2024-04-26 RX ADMIN — WHITE PETROLATUM: 1.75 OINTMENT TOPICAL at 21:02

## 2024-04-26 RX ADMIN — NICOTINE 1 PATCH: 14 PATCH, EXTENDED RELEASE TRANSDERMAL at 09:45

## 2024-04-26 RX ADMIN — WHITE PETROLATUM: 1.75 OINTMENT TOPICAL at 09:47

## 2024-04-26 RX ADMIN — FOLIC ACID 1 MG: 1 TABLET ORAL at 09:47

## 2024-04-26 RX ADMIN — THIAMINE HCL TAB 100 MG 100 MG: 100 TAB at 21:02

## 2024-04-26 RX ADMIN — THIAMINE HCL TAB 100 MG 100 MG: 100 TAB at 09:47

## 2024-04-26 RX ADMIN — SENNOSIDES AND DOCUSATE SODIUM 2 TABLET: 8.6; 5 TABLET ORAL at 12:33

## 2024-04-26 RX ADMIN — Medication 1 TABLET: at 09:47

## 2024-04-26 RX ADMIN — POLYETHYLENE GLYCOL 3350 17 G: 17 POWDER, FOR SOLUTION ORAL at 21:02

## 2024-04-26 RX ADMIN — DIVALPROEX SODIUM 500 MG: 500 TABLET, DELAYED RELEASE ORAL at 21:02

## 2024-04-26 ASSESSMENT — ACTIVITIES OF DAILY LIVING (ADL)
ADLS_ACUITY_SCORE: 26

## 2024-04-26 NOTE — PLAN OF CARE
[id number]    Goal Outcome Evaluation:    Patient alert and oriented to self. Up independently in room, frequently ambulates room and unit. C/o abdominal discomfort, prn tylenol and tums given. No acute concerns this shift. Call light within reach. Continue with current plan of care.
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
## 2024-04-26 NOTE — PROGRESS NOTES
VS: /75 (BP Location: Left arm)   Pulse 68   Temp 98.2  F (36.8  C) (Oral)   Resp 16   Wt 49.8 kg (109 lb 12.6 oz)   SpO2 98%   BMI 21.03 kg/m      Orientation Pt is alert to self.    Respiratory Lung sounds clear bilaterally. No cough. Denies SOB, no accessory muscles used. Pt saturations 98% on room air    Mobility/ Activity Pt is up independently in his room.    Bowel/Bladder: Pt is continent of both bowel and bladder. This morning pt reported last BM was 4-5 days ago, but this afternoon when asked again, pt stated he had bm yesterday morning.    IV /LDA No iv access   Diet Regular diet. Pt has trays sent up automatically, family has also been bringing in food for pt. Pt tolerating well, although often points to stomach   Gastrointestinal Pt reported stomach upset on three occasions today. Pt was given senna, Tylenol and tums throughout the day. The tylenol seemed to be the most effective for the stomach upset.    Skin / Wounds / Dressings: No outstanding skin issues noted    Pain Pt stating he has pain in his stomach. Feels like constipation to him.    Equipment: na   Circulation/ Sensation Denies numbness or tingling.   PRNS Tylenol, Tums, Senna    Plan Continue to monitor    Additional info: Provider made aware of pt stomach pain.

## 2024-04-26 NOTE — PROGRESS NOTES
Care Management Follow Up    Length of Stay (days): 1    Expected Discharge Date:  unknown     Concerns to be Addressed: discharge planning, other (see comments) (guardianship)  N/A  Patient plan of care discussed at interdisciplinary rounds: Yes    Anticipated Discharge Disposition: Group Home     Anticipated Discharge Services: Other (see comment) (Assistance with I/ADLs from group home staff)  Anticipated Discharge DME: None    Patient/family educated on Medicare website which has current facility and service quality ratings: no  Education Provided on the Discharge Plan: Yes  Patient/Family in Agreement with the Plan: yes    Referrals Placed by CM/SW: [facility] ([facility])  Private pay costs discussed: Not applicable    Additional Information:  Facilitated a conversation as requested by patient's 2 children. The Son needed a [facility]  which we utilized [employer] line. The son and daughter wanted to know if the son could go back to work as the son hasn't been able to work for years since he has been his dad's paid PCA through the [facility]. He understands the plan is for his dad to go to LTC but in order to go to LTC he needs a guardian per their request. This Float SW stated Brunilda the normal Unit SW will be back tomorrow and that they can follow up with her on where she has maybe gotten in regards to guardianship questions. She had emailed the last emergency guardianship organization that was helping facilitate this, but since the wife's guardianship lapsed we would need to pursue new guardianship which is what Brunilda is working on via email. The son was mainly concerned with if he could go back to work a regular job full time as he was unable to with his dad at home. Both the son and daughter were smiling and looked relieved when they were told that he shouldn't be going back home but rather to LTC.     Provided Brunilda's Unit direct # on a sticky note to the daughter who speaks  english.         TITA Kumar, [facility]   Covering 8 SW  Ph: [phone]

## 2024-04-27 ENCOUNTER — APPOINTMENT (OUTPATIENT)
Dept: GENERAL RADIOLOGY | Facility: CLINIC | Age: 49
End: 2024-04-27
Attending: PHYSICIAN ASSISTANT
Payer: COMMERCIAL

## 2024-04-27 PROCEDURE — G0378 HOSPITAL OBSERVATION PER HR: HCPCS

## 2024-04-27 PROCEDURE — 250N000013 HC RX MED GY IP 250 OP 250 PS 637

## 2024-04-27 PROCEDURE — 250N000013 HC RX MED GY IP 250 OP 250 PS 637: Performed by: PEDIATRICS

## 2024-04-27 PROCEDURE — 74019 RADEX ABDOMEN 2 VIEWS: CPT

## 2024-04-27 PROCEDURE — 74019 RADEX ABDOMEN 2 VIEWS: CPT | Mod: 26 | Performed by: RADIOLOGY

## 2024-04-27 PROCEDURE — 250N000013 HC RX MED GY IP 250 OP 250 PS 637: Performed by: PHYSICIAN ASSISTANT

## 2024-04-27 PROCEDURE — 250N000013 HC RX MED GY IP 250 OP 250 PS 637: Performed by: STUDENT IN AN ORGANIZED HEALTH CARE EDUCATION/TRAINING PROGRAM

## 2024-04-27 PROCEDURE — 99232 SBSQ HOSP IP/OBS MODERATE 35: CPT | Performed by: STUDENT IN AN ORGANIZED HEALTH CARE EDUCATION/TRAINING PROGRAM

## 2024-04-27 RX ORDER — AMOXICILLIN 250 MG
2 CAPSULE ORAL 2 TIMES DAILY
Status: DISCONTINUED | OUTPATIENT
Start: 2024-04-27 | End: 2024-05-08 | Stop reason: HOSPADM

## 2024-04-27 RX ADMIN — THIAMINE HCL TAB 100 MG 100 MG: 100 TAB at 19:36

## 2024-04-27 RX ADMIN — FOLIC ACID 1 MG: 1 TABLET ORAL at 08:29

## 2024-04-27 RX ADMIN — DIVALPROEX SODIUM 500 MG: 500 TABLET, DELAYED RELEASE ORAL at 08:29

## 2024-04-27 RX ADMIN — THIAMINE HCL TAB 100 MG 100 MG: 100 TAB at 08:29

## 2024-04-27 RX ADMIN — Medication 1 TABLET: at 08:29

## 2024-04-27 RX ADMIN — SENNOSIDES AND DOCUSATE SODIUM 2 TABLET: 8.6; 5 TABLET ORAL at 19:36

## 2024-04-27 RX ADMIN — NICOTINE 1 PATCH: 14 PATCH, EXTENDED RELEASE TRANSDERMAL at 08:29

## 2024-04-27 RX ADMIN — DIVALPROEX SODIUM 500 MG: 500 TABLET, DELAYED RELEASE ORAL at 19:36

## 2024-04-27 RX ADMIN — WHITE PETROLATUM: 1.75 OINTMENT TOPICAL at 08:33

## 2024-04-27 RX ADMIN — ACETAMINOPHEN 975 MG: 325 TABLET, FILM COATED ORAL at 15:08

## 2024-04-27 RX ADMIN — WHITE PETROLATUM: 1.75 OINTMENT TOPICAL at 14:13

## 2024-04-27 RX ADMIN — BISACODYL 10 MG: 10 SUPPOSITORY RECTAL at 16:22

## 2024-04-27 RX ADMIN — SENNOSIDES AND DOCUSATE SODIUM 1 TABLET: 8.6; 5 TABLET ORAL at 08:29

## 2024-04-27 RX ADMIN — POLYETHYLENE GLYCOL 3350 17 G: 17 POWDER, FOR SOLUTION ORAL at 08:29

## 2024-04-27 RX ADMIN — WHITE PETROLATUM: 1.75 OINTMENT TOPICAL at 19:36

## 2024-04-27 ASSESSMENT — ACTIVITIES OF DAILY LIVING (ADL)
ADLS_ACUITY_SCORE: 26

## 2024-04-27 NOTE — PLAN OF CARE
Patient alert to self. Slept on and off this shift. Denied pain or discomfort. Voiding adequately without concern. No BM noted this shift. No stomach pain reported this shift. X-Ray done this shift. Awaiting for results. No concerns at this time. Call light within reach. Continue with plan of care.
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
## 2024-04-27 NOTE — PLAN OF CARE
Goal Outcome Evaluation:      Plan of Care Reviewed With: patient    Overall Patient Progress: no changeOverall Patient Progress: no change     Discharge Plan:  Group home      VS: /86 (BP Location: Left arm)   Pulse 74   Temp 97.9  F (36.6  C) (Oral)   Resp 16   Wt 49.8 kg (109 lb 12.6 oz)   SpO2 99%   BMI 21.03 kg/m       O2: Sating >95% on RA, denies SOB/Chest pain. No N/V noted.    Output: Voids spontaneously in bathroom   Last BM: bowel sounds normoactive x4 Constipated purine juice given, suppository administered. Pt had bm x1.     Activity: Up independently in room   Up for meals? Yes   Skin: All visible skin intact   Pain: Abd pain, prn tylenol administered    CMS: A/O to self, speaks elizabeth   Dressing: None   Diet: Regular diet, thin liquids    LDA: None    Equipment:  IV pole, personal belongings   Plan: Call light within reach, bed in a low position. Able to make needs known. Continue to monitor with POC.   Additional Info:

## 2024-04-27 NOTE — PROGRESS NOTES
Medicine Cross Cover Note    Contacted by nursing regarding patient report of abdominal pain and stomach upset despite Tylenol and Tums. Earlier today reported no BM in 4-5 days, later said he had a BM yesterday but nothing is documented in I&Os. Some nausea, no vomiting and is tolerating PO intake. Not a reliable historian.     Today's vital signs, medications, and nursing notes were reviewed.     BP (!) 142/97 (BP Location: Left arm)   Pulse 69   Temp 98.2  F (36.8  C) (Oral)   Resp 16   Wt 49.8 kg (109 lb 12.6 oz)   SpO2 97%   BMI 21.03 kg/m    General: Sitting up in bed, alert, pleasant, appears comfortable.   GI: Abdomen soft, non distended. Bowel sounds hypoactive. Patient nods yes to pain with palpation throughout but smiles throughout exam and has no peritonitic signs.     A/P:  Generalized Abdominal Discomfort.   Suspect this may be due to constipation.   Scheduling bowel regimen, will escalate as needed.   Obtaining AXR.   If worsening symptoms would also update basic labs.     Yuridia Overton PA-C  Hospitalist Service  Contact information available via [facility] Paging/Directory

## 2024-04-28 PROCEDURE — 250N000013 HC RX MED GY IP 250 OP 250 PS 637: Performed by: PEDIATRICS

## 2024-04-28 PROCEDURE — G0378 HOSPITAL OBSERVATION PER HR: HCPCS

## 2024-04-28 PROCEDURE — 250N000013 HC RX MED GY IP 250 OP 250 PS 637

## 2024-04-28 PROCEDURE — 250N000013 HC RX MED GY IP 250 OP 250 PS 637: Performed by: STUDENT IN AN ORGANIZED HEALTH CARE EDUCATION/TRAINING PROGRAM

## 2024-04-28 PROCEDURE — 250N000013 HC RX MED GY IP 250 OP 250 PS 637: Performed by: PHYSICIAN ASSISTANT

## 2024-04-28 PROCEDURE — 99232 SBSQ HOSP IP/OBS MODERATE 35: CPT | Performed by: STUDENT IN AN ORGANIZED HEALTH CARE EDUCATION/TRAINING PROGRAM

## 2024-04-28 RX ADMIN — SENNOSIDES AND DOCUSATE SODIUM 2 TABLET: 8.6; 5 TABLET ORAL at 19:43

## 2024-04-28 RX ADMIN — POLYETHYLENE GLYCOL 3350 17 G: 17 POWDER, FOR SOLUTION ORAL at 08:07

## 2024-04-28 RX ADMIN — WHITE PETROLATUM: 1.75 OINTMENT TOPICAL at 19:43

## 2024-04-28 RX ADMIN — CALCIUM CARBONATE (ANTACID) CHEW TAB 500 MG 1000 MG: 500 CHEW TAB at 17:59

## 2024-04-28 RX ADMIN — THIAMINE HCL TAB 100 MG 100 MG: 100 TAB at 19:43

## 2024-04-28 RX ADMIN — FOLIC ACID 1 MG: 1 TABLET ORAL at 08:08

## 2024-04-28 RX ADMIN — WHITE PETROLATUM: 1.75 OINTMENT TOPICAL at 14:05

## 2024-04-28 RX ADMIN — DIVALPROEX SODIUM 500 MG: 500 TABLET, DELAYED RELEASE ORAL at 08:08

## 2024-04-28 RX ADMIN — SENNOSIDES AND DOCUSATE SODIUM 2 TABLET: 8.6; 5 TABLET ORAL at 08:08

## 2024-04-28 RX ADMIN — Medication 1 TABLET: at 08:08

## 2024-04-28 RX ADMIN — WHITE PETROLATUM: 1.75 OINTMENT TOPICAL at 08:08

## 2024-04-28 RX ADMIN — DIVALPROEX SODIUM 500 MG: 500 TABLET, DELAYED RELEASE ORAL at 19:43

## 2024-04-28 RX ADMIN — THIAMINE HCL TAB 100 MG 100 MG: 100 TAB at 08:08

## 2024-04-28 RX ADMIN — NICOTINE 1 PATCH: 14 PATCH, EXTENDED RELEASE TRANSDERMAL at 08:08

## 2024-04-28 RX ADMIN — ACETAMINOPHEN 975 MG: 325 TABLET, FILM COATED ORAL at 12:27

## 2024-04-28 ASSESSMENT — ACTIVITIES OF DAILY LIVING (ADL)
ADLS_ACUITY_SCORE: 26
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
## 2024-04-28 NOTE — PROGRESS NOTES
Injectable Influenza Immunization Documentation    1.  Is the person to be vaccinated sick today?   No    2. Does the person to be vaccinated have an allergy to a component   of the vaccine?   No  Egg Allergy Algorithm Link    3. Has the person to be vaccinated ever had a serious reaction   to influenza vaccine in the past?   No    4. Has the person to be vaccinated ever had Guillain-Barré syndrome?   No    Form completed by Annia Medina CMA            "VS: /83 (BP Location: Left arm)   Pulse 78   Temp 97.4  F (36.3  C) (Oral)   Resp 16   Wt 49.8 kg (109 lb 12.6 oz)   SpO2 99%   BMI 21.03 kg/m      Orientation Pt is Alert and Oriented x 4   Respiratory Lung sounds clear bilaterally. Having intermittent non productive cough. Denies SOB, no accessory muscles used. Pt saturations 99% on room air.    Mobility/ Activity Pt is up independently   Bowel/Bladder: Pt continent of both bowel and bladder. Pt denies having bowel movement today. Pt told provider that he had blood in his stool yesterday.    IV /LDA No iv access   Diet Regular diet, tolerating well. Enjoys peanut butter and jelly sandwiches   Gastrointestinal Pt stated he was having slight abdominal pain today, but that it was not nearly as bad as yesterday. Pt stated yesterday he felt like his \"intestines were ripping apart\".    Skin / Wounds / Dressings: No outstanding skin issues noted.    Pain Pt stated he had pain in his abdomen this afternoon.    Equipment:    Circulation/ Sensation Denies numbness or tingling.   PRNS Tylenol given 1x for abdominal pain. LISA 1x   Plan Continue to monitor    Additional info:      "

## 2024-04-28 NOTE — PLAN OF CARE
Notified that patient is refusing labs.    Will monitor vital signs and will monitor for additional bloody Bms. Still suspect first may have been secondary to rectal tear s/p resolution of constipation.    Dennis Muller MD  Internal Medicine, Hospitalist Service

## 2024-04-28 NOTE — PLAN OF CARE
Goal Outcome Evaluation:      Plan of Care Reviewed With: patient    Overall Patient Progress: no changeOverall Patient Progress: no change    Outcome Evaluation: Pt is alert to name, disorientated to time and situation, Medina  needed for communication    Shift 2354-2237  VS: Temp: 97.5  F (36.4  C) Temp src: Oral BP: 123/89 Pulse: 63   Resp: 16 SpO2: 100 % O2 Device: None (Room air)      O2: Stable On RA, O2 > 90%. Denies SOB, cough, & chest pain.    Output: Cont B&B.    Activity: Independent in room   Pain: No c/o pain   Neuro: A&O to self.    Diet: Regular diet.    Equipment: Personal items,    Plan: Continue POC. Pt able to make needs known, call light within reach, awaiting placement   Additional Info:

## 2024-04-29 PROCEDURE — 99231 SBSQ HOSP IP/OBS SF/LOW 25: CPT | Performed by: STUDENT IN AN ORGANIZED HEALTH CARE EDUCATION/TRAINING PROGRAM

## 2024-04-29 PROCEDURE — 250N000013 HC RX MED GY IP 250 OP 250 PS 637

## 2024-04-29 PROCEDURE — 250N000013 HC RX MED GY IP 250 OP 250 PS 637: Performed by: PHYSICIAN ASSISTANT

## 2024-04-29 PROCEDURE — 250N000013 HC RX MED GY IP 250 OP 250 PS 637: Performed by: PEDIATRICS

## 2024-04-29 PROCEDURE — 250N000013 HC RX MED GY IP 250 OP 250 PS 637: Performed by: STUDENT IN AN ORGANIZED HEALTH CARE EDUCATION/TRAINING PROGRAM

## 2024-04-29 PROCEDURE — G0378 HOSPITAL OBSERVATION PER HR: HCPCS

## 2024-04-29 RX ADMIN — SENNOSIDES AND DOCUSATE SODIUM 2 TABLET: 8.6; 5 TABLET ORAL at 08:21

## 2024-04-29 RX ADMIN — WHITE PETROLATUM: 1.75 OINTMENT TOPICAL at 08:21

## 2024-04-29 RX ADMIN — POLYETHYLENE GLYCOL 3350 17 G: 17 POWDER, FOR SOLUTION ORAL at 08:21

## 2024-04-29 RX ADMIN — FOLIC ACID 1 MG: 1 TABLET ORAL at 08:21

## 2024-04-29 RX ADMIN — SENNOSIDES AND DOCUSATE SODIUM 2 TABLET: 8.6; 5 TABLET ORAL at 19:38

## 2024-04-29 RX ADMIN — NICOTINE 1 PATCH: 14 PATCH, EXTENDED RELEASE TRANSDERMAL at 08:22

## 2024-04-29 RX ADMIN — WHITE PETROLATUM: 1.75 OINTMENT TOPICAL at 19:38

## 2024-04-29 RX ADMIN — THIAMINE HCL TAB 100 MG 100 MG: 100 TAB at 19:38

## 2024-04-29 RX ADMIN — Medication 1 TABLET: at 08:21

## 2024-04-29 RX ADMIN — DIVALPROEX SODIUM 500 MG: 500 TABLET, DELAYED RELEASE ORAL at 08:21

## 2024-04-29 RX ADMIN — THIAMINE HCL TAB 100 MG 100 MG: 100 TAB at 08:21

## 2024-04-29 RX ADMIN — DIVALPROEX SODIUM 500 MG: 500 TABLET, DELAYED RELEASE ORAL at 19:38

## 2024-04-29 ASSESSMENT — ACTIVITIES OF DAILY LIVING (ADL)
ADLS_ACUITY_SCORE: 26

## 2024-04-29 NOTE — PROGRESS NOTES
Care Management Follow Up    Length of Stay (days): 1    Expected Discharge Date:  Unknown     Concerns to be Addressed: discharge planning, other (see comments) (guardianship)  N/A  Patient plan of care discussed at interdisciplinary rounds: Yes    Anticipated Discharge Disposition: Group Home, Long Term Care     Anticipated Discharge Services: Other (see comment) (Assistance with I/ADLs from group home staff)  Anticipated Discharge DME: None    Patient/family educated on Medicare website which has current facility and service quality ratings: no  Education Provided on the Discharge Plan: Yes  Patient/Family in Agreement with the Plan: yes    Referrals Placed by CM/SW: Community Residential Settings (Group Homes)  Private pay costs discussed: Not applicable    Additional Information:    SW called patient's wife with assistance from [facility] Language Line . Patient's wife is busy in a meeting right now and requested a call back tomorrow morning.        Brunilda Arreaga, GÓMEZW, LSW  8 Med Surg   [facility]  Phone: [phone]  Pager: [phone]  Vocera: Searchable by name or group: 8 Med Surg Vocera

## 2024-04-29 NOTE — PROGRESS NOTES
Shift Report: [id number]  Patient alert and oriented. Ambulates independently. Continent of bowel and bladder. Regular diet, good appetite. No IV access. Refused lab x2, updated MD.  /70 (BP Location: Left arm)   Pulse 84   Temp 98.5  F (36.9  C) (Oral)   Resp 16   Wt 50.3 kg (110 lb 14.3 oz)   SpO2 96%   BMI 21.24 kg/m
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
## 2024-04-29 NOTE — PLAN OF CARE
Goal Outcome Evaluation:      Plan of Care Reviewed With: patient    Overall Patient Progress: no changeOverall Patient Progress: no change    Outcome Evaluation: Pt is alert to name, disorientated to time and situation, Medina  needed for communication    Pt is alert and orientated to self. Up independently in room, will occasionally ambulate the garza. No complaints of abdominal discomfort during shift. No new acute changes during this shift. Medina  needed for translation. Call light within reach, able to make needs known, continue with plan of care, awaiting placement

## 2024-04-30 ENCOUNTER — PATIENT OUTREACH (OUTPATIENT)
Dept: CARE COORDINATION | Facility: CLINIC | Age: 49
End: 2024-04-30
Payer: COMMERCIAL

## 2024-04-30 PROCEDURE — 250N000011 HC RX IP 250 OP 636

## 2024-04-30 PROCEDURE — 250N000013 HC RX MED GY IP 250 OP 250 PS 637

## 2024-04-30 PROCEDURE — 99231 SBSQ HOSP IP/OBS SF/LOW 25: CPT | Performed by: STUDENT IN AN ORGANIZED HEALTH CARE EDUCATION/TRAINING PROGRAM

## 2024-04-30 PROCEDURE — 250N000013 HC RX MED GY IP 250 OP 250 PS 637: Performed by: PHYSICIAN ASSISTANT

## 2024-04-30 PROCEDURE — G0378 HOSPITAL OBSERVATION PER HR: HCPCS

## 2024-04-30 PROCEDURE — 250N000013 HC RX MED GY IP 250 OP 250 PS 637: Performed by: PEDIATRICS

## 2024-04-30 PROCEDURE — 250N000013 HC RX MED GY IP 250 OP 250 PS 637: Performed by: STUDENT IN AN ORGANIZED HEALTH CARE EDUCATION/TRAINING PROGRAM

## 2024-04-30 RX ADMIN — ONDANSETRON 4 MG: 4 TABLET, ORALLY DISINTEGRATING ORAL at 08:21

## 2024-04-30 RX ADMIN — THIAMINE HCL TAB 100 MG 100 MG: 100 TAB at 08:16

## 2024-04-30 RX ADMIN — SENNOSIDES AND DOCUSATE SODIUM 2 TABLET: 8.6; 5 TABLET ORAL at 08:16

## 2024-04-30 RX ADMIN — WHITE PETROLATUM: 1.75 OINTMENT TOPICAL at 08:16

## 2024-04-30 RX ADMIN — SENNOSIDES AND DOCUSATE SODIUM 2 TABLET: 8.6; 5 TABLET ORAL at 19:49

## 2024-04-30 RX ADMIN — POLYETHYLENE GLYCOL 3350 17 G: 17 POWDER, FOR SOLUTION ORAL at 08:16

## 2024-04-30 RX ADMIN — FOLIC ACID 1 MG: 1 TABLET ORAL at 08:16

## 2024-04-30 RX ADMIN — CALCIUM CARBONATE (ANTACID) CHEW TAB 500 MG 1000 MG: 500 CHEW TAB at 08:21

## 2024-04-30 RX ADMIN — NICOTINE 1 PATCH: 14 PATCH, EXTENDED RELEASE TRANSDERMAL at 08:18

## 2024-04-30 RX ADMIN — WHITE PETROLATUM: 1.75 OINTMENT TOPICAL at 19:49

## 2024-04-30 RX ADMIN — THIAMINE HCL TAB 100 MG 100 MG: 100 TAB at 19:49

## 2024-04-30 RX ADMIN — Medication 1 TABLET: at 08:16

## 2024-04-30 RX ADMIN — DIVALPROEX SODIUM 500 MG: 500 TABLET, DELAYED RELEASE ORAL at 08:16

## 2024-04-30 RX ADMIN — DIVALPROEX SODIUM 500 MG: 500 TABLET, DELAYED RELEASE ORAL at 19:49

## 2024-04-30 ASSESSMENT — ACTIVITIES OF DAILY LIVING (ADL)
ADLS_ACUITY_SCORE: 26

## 2024-04-30 NOTE — PLAN OF CARE
[id number]    Goal Outcome Evaluation:    Patient alert and oriented. Up independently in room and ambulates hallway frequently. Continent of bowel and bladder. Regular diet, thin liquids, pills whole, denies nausea/vomiting. No IV access. Discharge plan TBD. Able to make needs known. Call light within reach. Continue with current plan of care.
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
## 2024-04-30 NOTE — PROGRESS NOTES
Clinic Care Coordination Contact  [facility]    Clinical Data: Care Coordinator Outreach    Outreach Documentation Number of Outreach Attempt   4/16/2024   9:24 AM 1   4/30/2024  10:51 AM 2     CHW attempted to call for follow up, however per chart review that patient is still admitted and unsure when patient will be discharged. CHW will follow up and keep monitoring status and informed family members to call once patient is discharged.     Plan: Care Coordinator will try to reach patient again in 1 month.

## 2024-04-30 NOTE — PROGRESS NOTES
Shift Report: [id number]    Patient alert and oriented. Ambulates independently. Continent of bowel and bladder. Regular diet, good appetite. No IV access. Refused lab, updated MD. C/o upset stomach, PRN Tums and Zofran given, effective.    /76 (BP Location: Left arm)   Pulse 71   Temp 97.3  F (36.3  C) (Oral)   Resp 18   Wt 50.3 kg (110 lb 14.3 oz)   SpO2 95%   BMI 21.24 kg/m

## 2024-05-01 PROCEDURE — 250N000013 HC RX MED GY IP 250 OP 250 PS 637: Performed by: PHYSICIAN ASSISTANT

## 2024-05-01 PROCEDURE — 250N000013 HC RX MED GY IP 250 OP 250 PS 637

## 2024-05-01 PROCEDURE — 250N000013 HC RX MED GY IP 250 OP 250 PS 637: Performed by: STUDENT IN AN ORGANIZED HEALTH CARE EDUCATION/TRAINING PROGRAM

## 2024-05-01 PROCEDURE — 250N000013 HC RX MED GY IP 250 OP 250 PS 637: Performed by: PEDIATRICS

## 2024-05-01 PROCEDURE — G0378 HOSPITAL OBSERVATION PER HR: HCPCS

## 2024-05-01 PROCEDURE — 99231 SBSQ HOSP IP/OBS SF/LOW 25: CPT | Performed by: STUDENT IN AN ORGANIZED HEALTH CARE EDUCATION/TRAINING PROGRAM

## 2024-05-01 RX ADMIN — DIVALPROEX SODIUM 500 MG: 500 TABLET, DELAYED RELEASE ORAL at 20:35

## 2024-05-01 RX ADMIN — WHITE PETROLATUM: 1.75 OINTMENT TOPICAL at 20:35

## 2024-05-01 RX ADMIN — WHITE PETROLATUM: 1.75 OINTMENT TOPICAL at 16:03

## 2024-05-01 RX ADMIN — WHITE PETROLATUM: 1.75 OINTMENT TOPICAL at 08:01

## 2024-05-01 RX ADMIN — SENNOSIDES AND DOCUSATE SODIUM 2 TABLET: 8.6; 5 TABLET ORAL at 20:35

## 2024-05-01 RX ADMIN — THIAMINE HCL TAB 100 MG 100 MG: 100 TAB at 20:35

## 2024-05-01 RX ADMIN — THIAMINE HCL TAB 100 MG 100 MG: 100 TAB at 08:01

## 2024-05-01 RX ADMIN — Medication 1 TABLET: at 08:01

## 2024-05-01 RX ADMIN — ACETAMINOPHEN 975 MG: 325 TABLET, FILM COATED ORAL at 20:34

## 2024-05-01 RX ADMIN — CALCIUM CARBONATE (ANTACID) CHEW TAB 500 MG 1000 MG: 500 CHEW TAB at 20:35

## 2024-05-01 RX ADMIN — SENNOSIDES AND DOCUSATE SODIUM 2 TABLET: 8.6; 5 TABLET ORAL at 08:01

## 2024-05-01 RX ADMIN — POLYETHYLENE GLYCOL 3350 17 G: 17 POWDER, FOR SOLUTION ORAL at 08:01

## 2024-05-01 RX ADMIN — CALCIUM CARBONATE (ANTACID) CHEW TAB 500 MG 1000 MG: 500 CHEW TAB at 16:05

## 2024-05-01 RX ADMIN — NICOTINE 1 PATCH: 14 PATCH, EXTENDED RELEASE TRANSDERMAL at 08:03

## 2024-05-01 RX ADMIN — DIVALPROEX SODIUM 500 MG: 500 TABLET, DELAYED RELEASE ORAL at 08:01

## 2024-05-01 RX ADMIN — FOLIC ACID 1 MG: 1 TABLET ORAL at 08:01

## 2024-05-01 ASSESSMENT — ACTIVITIES OF DAILY LIVING (ADL)
ADLS_ACUITY_SCORE: 26

## 2024-05-01 NOTE — PROGRESS NOTES
Care Management Follow Up    Length of Stay (days): 1    Expected Discharge Date:       Concerns to be Addressed: discharge planning, other (see comments) (guardianship)  N/A  Patient plan of care discussed at interdisciplinary rounds: Yes    Anticipated Discharge Disposition: Group Home     Anticipated Discharge Services: Other (see comment) (Assistance with I/ADLs from group home staff)  Anticipated Discharge DME: None    Patient/family educated on Medicare website which has current facility and service quality ratings: no  Education Provided on the Discharge Plan: Yes  Patient/Family in Agreement with the Plan: yes    Referrals Placed by CM/SW: [facility] ([facility])  Private pay costs discussed: Not applicable    Additional Information:    Spoke to spouse Maine Fajardo via phone with . Explained to spouse that guardianship is needed in order to secure placement for pt. SW provided the resources of [employer] ([phone]), [employer] [place] ([phone]) for assistance with applying for guardianship. Spouse had questions on what the discharge facility would be like (can family visit, wanted to ensure pt is eligible for care as they do not have citizenship but a different form of residency, would children need to provide care at facility). SW assured family can visit, pt has active health insurance for facility payment, children can visit but facility staff will provide care. SW explained that guardianship will need to be applied for before pt can discharge from the hospital, however.   Spouse inquired if facility or hospital can assist pt with immigration needs. SW shared about [facility] ([phone]) or another immigration service, hospital and other facilities are unable to assist or give legal advice. Spouse expressed understanding of needing to apply for guardianship and is in agreement.    Spouse informed their children have  taken a trip to [place] and she is unable to write down the numbers without their assistance. Children are back home on Saturday so SW will have weekend SW follow up to share the following resources:  -[employer] ([phone]), [employer] [place] ([phone]) for assistance with applying for guardianship  -[place] [employer] ([phone]) for immigration assistance. [facility] can also probably provide guidance on resources to access for citizenship.    URSULA Hansen BSW  Flo   Covering 8: [phone]

## 2024-05-01 NOTE — PROGRESS NOTES
Patient alert and oriented. Ambulates independently. Continent of bowel and bladder. Regular diet, good appetite. PRN Tums given x1 for abd discomfort. No IV access. Refused lab, MD updated.    BP 95/56 (BP Location: Left arm, Patient Position: Sitting, Cuff Size: Adult Small)   Pulse 52   Temp 97.3  F (36.3  C) (Oral)   Resp 17   Wt 50.3 kg (110 lb 14.3 oz)   SpO2 100%   BMI 21.24 kg/m

## 2024-05-01 NOTE — PROGRESS NOTES
Shift [id number]:Pt admitted due to safety concerns at home  Unspecified dementia  Summary:Discharge to LTC when available  VS:       Pt A/O X 4. Afebrile. VSS. Lungs-Clear bilaterally Denies nausea, shortness of breath, and chest pain.     Output:       Bowels- + in all four quadrants. Voids spontaneously without   difficulty in the toilet.      Activity:       Pt up independent with cares.     Skin: Peeling on bilateral heels/feet  Old scars on abdomen and back. Nicotine Patch on Left Upper Arm   Pain:     No pain indicated. Has scheduled Tylenol     CMS:       CMS and Neuro's are intact. A/O x3 Denies numbness and tingling in all extremities.      Dressing:     No dressing.      Diet:       Pt is on a Regular diet .       LDA:     No PIV.      Equipment:       Np PCD's on BLE's. Bilateral heels are elevated off the bed.     Plan:       Pt is able to make needs known and the call light is within the pt's reach. Continue to monitor.       Additional Info:        .

## 2024-05-02 PROCEDURE — 99232 SBSQ HOSP IP/OBS MODERATE 35: CPT | Performed by: STUDENT IN AN ORGANIZED HEALTH CARE EDUCATION/TRAINING PROGRAM

## 2024-05-02 PROCEDURE — 250N000013 HC RX MED GY IP 250 OP 250 PS 637: Performed by: PEDIATRICS

## 2024-05-02 PROCEDURE — 250N000013 HC RX MED GY IP 250 OP 250 PS 637: Performed by: PHYSICIAN ASSISTANT

## 2024-05-02 PROCEDURE — 250N000013 HC RX MED GY IP 250 OP 250 PS 637

## 2024-05-02 PROCEDURE — 250N000013 HC RX MED GY IP 250 OP 250 PS 637: Performed by: STUDENT IN AN ORGANIZED HEALTH CARE EDUCATION/TRAINING PROGRAM

## 2024-05-02 PROCEDURE — G0378 HOSPITAL OBSERVATION PER HR: HCPCS

## 2024-05-02 RX ADMIN — SENNOSIDES AND DOCUSATE SODIUM 2 TABLET: 8.6; 5 TABLET ORAL at 09:49

## 2024-05-02 RX ADMIN — ACETAMINOPHEN 975 MG: 325 TABLET, FILM COATED ORAL at 09:45

## 2024-05-02 RX ADMIN — WHITE PETROLATUM: 1.75 OINTMENT TOPICAL at 09:51

## 2024-05-02 RX ADMIN — WHITE PETROLATUM: 1.75 OINTMENT TOPICAL at 19:38

## 2024-05-02 RX ADMIN — DIVALPROEX SODIUM 500 MG: 500 TABLET, DELAYED RELEASE ORAL at 09:50

## 2024-05-02 RX ADMIN — Medication 1 TABLET: at 09:49

## 2024-05-02 RX ADMIN — DIVALPROEX SODIUM 500 MG: 500 TABLET, DELAYED RELEASE ORAL at 19:38

## 2024-05-02 RX ADMIN — THIAMINE HCL TAB 100 MG 100 MG: 100 TAB at 19:38

## 2024-05-02 RX ADMIN — THIAMINE HCL TAB 100 MG 100 MG: 100 TAB at 09:44

## 2024-05-02 RX ADMIN — NICOTINE 1 PATCH: 14 PATCH, EXTENDED RELEASE TRANSDERMAL at 09:51

## 2024-05-02 RX ADMIN — FOLIC ACID 1 MG: 1 TABLET ORAL at 09:49

## 2024-05-02 RX ADMIN — SENNOSIDES AND DOCUSATE SODIUM 2 TABLET: 8.6; 5 TABLET ORAL at 19:38

## 2024-05-02 RX ADMIN — CALCIUM CARBONATE (ANTACID) CHEW TAB 500 MG 1000 MG: 500 CHEW TAB at 09:45

## 2024-05-02 RX ADMIN — POLYETHYLENE GLYCOL 3350 17 G: 17 POWDER, FOR SOLUTION ORAL at 09:44

## 2024-05-02 ASSESSMENT — ACTIVITIES OF DAILY LIVING (ADL)
ADLS_ACUITY_SCORE: 26

## 2024-05-02 NOTE — PLAN OF CARE
Shift [id number]   Pt is alert and orientated to self, Up independently in the room. Continent of bowel and bladder. PRN Tums and tylenol given for abdominal discomfort. Regular diet, denied nausea and vomiting. No IV access. Able to make needs known, call light within reach, continue with plan of care
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
## 2024-05-02 NOTE — PLAN OF CARE
Goal Outcome Evaluation:                      Pt alert and oriented to self, disoriented to time place situation, awaits placement. Up independently in room.C/o abdominal pain, using tums and tylenol. Unknown when LBM was. Voiding independently. Awaits placement. Needs encouragement to eat.

## 2024-05-03 PROCEDURE — 99232 SBSQ HOSP IP/OBS MODERATE 35: CPT | Performed by: STUDENT IN AN ORGANIZED HEALTH CARE EDUCATION/TRAINING PROGRAM

## 2024-05-03 PROCEDURE — 250N000013 HC RX MED GY IP 250 OP 250 PS 637

## 2024-05-03 PROCEDURE — 250N000013 HC RX MED GY IP 250 OP 250 PS 637: Performed by: STUDENT IN AN ORGANIZED HEALTH CARE EDUCATION/TRAINING PROGRAM

## 2024-05-03 PROCEDURE — 250N000013 HC RX MED GY IP 250 OP 250 PS 637: Performed by: PEDIATRICS

## 2024-05-03 PROCEDURE — G0378 HOSPITAL OBSERVATION PER HR: HCPCS

## 2024-05-03 RX ADMIN — SENNOSIDES AND DOCUSATE SODIUM 2 TABLET: 8.6; 5 TABLET ORAL at 08:35

## 2024-05-03 RX ADMIN — SENNOSIDES AND DOCUSATE SODIUM 2 TABLET: 8.6; 5 TABLET ORAL at 20:11

## 2024-05-03 RX ADMIN — DIVALPROEX SODIUM 500 MG: 500 TABLET, DELAYED RELEASE ORAL at 08:35

## 2024-05-03 RX ADMIN — NICOTINE 1 PATCH: 14 PATCH, EXTENDED RELEASE TRANSDERMAL at 08:37

## 2024-05-03 RX ADMIN — WHITE PETROLATUM: 1.75 OINTMENT TOPICAL at 08:36

## 2024-05-03 RX ADMIN — THIAMINE HCL TAB 100 MG 100 MG: 100 TAB at 08:35

## 2024-05-03 RX ADMIN — Medication 1 TABLET: at 08:35

## 2024-05-03 RX ADMIN — FOLIC ACID 1 MG: 1 TABLET ORAL at 08:35

## 2024-05-03 RX ADMIN — THIAMINE HCL TAB 100 MG 100 MG: 100 TAB at 20:11

## 2024-05-03 RX ADMIN — WHITE PETROLATUM: 1.75 OINTMENT TOPICAL at 20:11

## 2024-05-03 RX ADMIN — DIVALPROEX SODIUM 500 MG: 500 TABLET, DELAYED RELEASE ORAL at 20:11

## 2024-05-03 ASSESSMENT — ACTIVITIES OF DAILY LIVING (ADL)
ADLS_ACUITY_SCORE: 26

## 2024-05-03 NOTE — PLAN OF CARE
Goal Outcome Evaluation:    Pt is alert and orientated to self. Up ad daphne independently in room and hallways. Continent B&B. Regular diet, appetite good, and denies N/V. No IV access. Pt able to make needs known and call light within reach. Continue POC.     Seymour Cadet RN

## 2024-05-03 NOTE — PLAN OF CARE
Problem: Adult Inpatient Plan of Care  Goal: Optimal Comfort and Wellbeing  Outcome: Progressing  Intervention: Monitor Pain and Promote Comfort  Recent Flowsheet Documentation  Taken 5/3/2024 0000 by Chelsey Wang RN  Pain Management Interventions: medication (see MAR)  Taken 5/2/2024 1600 by Chelsey Wang RN  Pain Management Interventions: medication (see MAR)   Goal Outcome Evaluation:
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
## 2024-05-03 NOTE — PROGRESS NOTES
Shift [id number]:Pt admitted due to safety concerns at home  Unspecified dementia  Summary:Discharge to LTC when available  VS:       Pt A/O X 4. Afebrile. VSS. Lungs-Clear bilaterally Denies nausea, shortness of breath, and chest pain.     Output:       Bowels- + in all four quadrants. Voids spontaneously without   difficulty in the toilet.      Activity:       Pt up independent with cares.     Skin: Peeling on bilateral heels/feet  Old scars on abdomen and back. Nicotine Patch on Left Upper Arm   Pain:     No pain indicated. Has scheduled Tylenol     CMS:       CMS and Neuro's are intact. A/O x 3 Denies numbness and tingling in all extremities.      Dressing:     No dressing.      Diet:       Pt is on a Regular diet .  Pt had a good appetite @dinner     LDA:     No PIV.      Equipment:       Np PCD's on BLE's. Bilateral heels are elevated off the bed.     Plan:       Pt is able to make needs known and the call light is within the pt's reach. Continue to monitor.       Additional Info:        .

## 2024-05-04 PROCEDURE — 250N000013 HC RX MED GY IP 250 OP 250 PS 637

## 2024-05-04 PROCEDURE — 250N000013 HC RX MED GY IP 250 OP 250 PS 637: Performed by: PEDIATRICS

## 2024-05-04 PROCEDURE — 250N000013 HC RX MED GY IP 250 OP 250 PS 637: Performed by: STUDENT IN AN ORGANIZED HEALTH CARE EDUCATION/TRAINING PROGRAM

## 2024-05-04 PROCEDURE — G0378 HOSPITAL OBSERVATION PER HR: HCPCS

## 2024-05-04 PROCEDURE — 250N000013 HC RX MED GY IP 250 OP 250 PS 637: Performed by: PHYSICIAN ASSISTANT

## 2024-05-04 PROCEDURE — 250N000013 HC RX MED GY IP 250 OP 250 PS 637: Performed by: INTERNAL MEDICINE

## 2024-05-04 PROCEDURE — 99232 SBSQ HOSP IP/OBS MODERATE 35: CPT | Performed by: STUDENT IN AN ORGANIZED HEALTH CARE EDUCATION/TRAINING PROGRAM

## 2024-05-04 RX ADMIN — THIAMINE HCL TAB 100 MG 100 MG: 100 TAB at 19:56

## 2024-05-04 RX ADMIN — POLYETHYLENE GLYCOL 3350 17 G: 17 POWDER, FOR SOLUTION ORAL at 07:58

## 2024-05-04 RX ADMIN — DIVALPROEX SODIUM 500 MG: 500 TABLET, DELAYED RELEASE ORAL at 19:56

## 2024-05-04 RX ADMIN — WHITE PETROLATUM: 1.75 OINTMENT TOPICAL at 07:59

## 2024-05-04 RX ADMIN — WHITE PETROLATUM: 1.75 OINTMENT TOPICAL at 19:57

## 2024-05-04 RX ADMIN — FOLIC ACID 1 MG: 1 TABLET ORAL at 07:58

## 2024-05-04 RX ADMIN — WHITE PETROLATUM: 1.75 OINTMENT TOPICAL at 14:39

## 2024-05-04 RX ADMIN — ACETAMINOPHEN 975 MG: 325 TABLET, FILM COATED ORAL at 08:32

## 2024-05-04 RX ADMIN — DIVALPROEX SODIUM 500 MG: 500 TABLET, DELAYED RELEASE ORAL at 07:58

## 2024-05-04 RX ADMIN — THIAMINE HCL TAB 100 MG 100 MG: 100 TAB at 07:58

## 2024-05-04 RX ADMIN — NICOTINE POLACRILEX 2 MG: 2 GUM, CHEWING BUCCAL at 13:09

## 2024-05-04 RX ADMIN — SENNOSIDES AND DOCUSATE SODIUM 2 TABLET: 8.6; 5 TABLET ORAL at 19:56

## 2024-05-04 RX ADMIN — Medication 1 TABLET: at 07:58

## 2024-05-04 RX ADMIN — CALCIUM CARBONATE (ANTACID) CHEW TAB 500 MG 1000 MG: 500 CHEW TAB at 08:33

## 2024-05-04 RX ADMIN — NICOTINE 1 PATCH: 14 PATCH, EXTENDED RELEASE TRANSDERMAL at 08:00

## 2024-05-04 RX ADMIN — SENNOSIDES AND DOCUSATE SODIUM 2 TABLET: 8.6; 5 TABLET ORAL at 07:58

## 2024-05-04 ASSESSMENT — ACTIVITIES OF DAILY LIVING (ADL)
ADLS_ACUITY_SCORE: 26

## 2024-05-04 NOTE — PLAN OF CARE
VS: BP (!) 133/94 (BP Location: Left arm)   Pulse 60   Temp 97.3  F (36.3  C) (Oral)   Resp 16   Wt 50.3 kg (110 lb 14.3 oz)   SpO2 97%   BMI 21.24 kg/m        O2: 97% on RA   Output: Continent Bowel and bladder   Last BM: Pt unable to recall   Activity: Pt up independent with cares    Skin: Old scar on the abdomen and back. Nicotine patch on right upper arm   Pain: denies   CMS: A&O x3   Dressing: none   Diet: Regular, tolerating well.   LDA: none   Equipment:    Plan: C/POC   Additional Info:
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
## 2024-05-04 NOTE — PLAN OF CARE
Goal Outcome Evaluation:    /75 (BP Location: Left arm)   Pulse 61   Temp 97.4  F (36.3  C) (Oral)   Resp 16   Wt 50.3 kg (110 lb 14.3 oz)   SpO2 99%   BMI 21.24 kg/m      Pt is alert and orientated to self. Up ad daphne independently in room and hallways. Continent B&B. Regular diet, appetite good, and denies N/V. No IV access.  used today to help order with meals. Pt did report mild stomach pain and was given PRN Tylenol and Tums. Pt able to make needs known and call light within reach. Awaiting placement to Group Home. Continue POC.     Seymour Cadet RN

## 2024-05-05 PROCEDURE — G0378 HOSPITAL OBSERVATION PER HR: HCPCS

## 2024-05-05 PROCEDURE — 250N000013 HC RX MED GY IP 250 OP 250 PS 637: Performed by: PEDIATRICS

## 2024-05-05 PROCEDURE — 250N000013 HC RX MED GY IP 250 OP 250 PS 637: Performed by: STUDENT IN AN ORGANIZED HEALTH CARE EDUCATION/TRAINING PROGRAM

## 2024-05-05 PROCEDURE — 250N000013 HC RX MED GY IP 250 OP 250 PS 637

## 2024-05-05 PROCEDURE — 99232 SBSQ HOSP IP/OBS MODERATE 35: CPT | Performed by: STUDENT IN AN ORGANIZED HEALTH CARE EDUCATION/TRAINING PROGRAM

## 2024-05-05 RX ORDER — FLUTICASONE PROPIONATE 50 MCG
1 SPRAY, SUSPENSION (ML) NASAL DAILY
Status: COMPLETED | OUTPATIENT
Start: 2024-05-05 | End: 2024-05-07

## 2024-05-05 RX ADMIN — SENNOSIDES AND DOCUSATE SODIUM 2 TABLET: 8.6; 5 TABLET ORAL at 20:07

## 2024-05-05 RX ADMIN — SALINE NASAL SPRAY 1 SPRAY: 1.5 SOLUTION NASAL at 11:22

## 2024-05-05 RX ADMIN — FOLIC ACID 1 MG: 1 TABLET ORAL at 08:30

## 2024-05-05 RX ADMIN — FLUTICASONE PROPIONATE 1 SPRAY: 50 SPRAY, METERED NASAL at 11:22

## 2024-05-05 RX ADMIN — THIAMINE HCL TAB 100 MG 100 MG: 100 TAB at 08:30

## 2024-05-05 RX ADMIN — SALINE NASAL SPRAY 1 SPRAY: 1.5 SOLUTION NASAL at 20:09

## 2024-05-05 RX ADMIN — NICOTINE 1 PATCH: 14 PATCH, EXTENDED RELEASE TRANSDERMAL at 08:30

## 2024-05-05 RX ADMIN — Medication 1 TABLET: at 08:30

## 2024-05-05 RX ADMIN — THIAMINE HCL TAB 100 MG 100 MG: 100 TAB at 20:07

## 2024-05-05 RX ADMIN — SENNOSIDES AND DOCUSATE SODIUM 2 TABLET: 8.6; 5 TABLET ORAL at 08:30

## 2024-05-05 RX ADMIN — WHITE PETROLATUM: 1.75 OINTMENT TOPICAL at 20:09

## 2024-05-05 RX ADMIN — WHITE PETROLATUM: 1.75 OINTMENT TOPICAL at 14:36

## 2024-05-05 RX ADMIN — ACETAMINOPHEN 975 MG: 325 TABLET, FILM COATED ORAL at 08:30

## 2024-05-05 RX ADMIN — DIVALPROEX SODIUM 500 MG: 500 TABLET, DELAYED RELEASE ORAL at 08:30

## 2024-05-05 RX ADMIN — WHITE PETROLATUM: 1.75 OINTMENT TOPICAL at 08:30

## 2024-05-05 RX ADMIN — DIVALPROEX SODIUM 500 MG: 500 TABLET, DELAYED RELEASE ORAL at 20:07

## 2024-05-05 ASSESSMENT — ACTIVITIES OF DAILY LIVING (ADL)
ADLS_ACUITY_SCORE: 26

## 2024-05-05 NOTE — PLAN OF CARE
Goal Outcome Evaluation:    Blood pressure 101/70, pulse 72, temperature 97.2  F (36.2  C), temperature source Oral, resp. rate 16, weight 50.3 kg (110 lb 14.3 oz), SpO2 100%.    Pt alert and orientated to self. Up ad daphne independently in room and hallways. Continent B&B. Regular diet, appetite good, and denies N/V. No IV access. Pt reported mild stomach pain and was given PRN Tylenol. Pt observed having a congested nose today, provider notified and ordered nasal spray. Awaiting placement to Group Home. Continue POC.     Seymour Cadet RN

## 2024-05-05 NOTE — PLAN OF CARE
VS: /83 (BP Location: Right arm)   Pulse 66   Temp 97.4  F (36.3  C) (Oral)   Resp 16   Wt 50.3 kg (110 lb 14.3 oz)   SpO2 99%   BMI 21.24 kg/m        O2: 99% on RA.   Output: Continent bowel and bladder   Last BM: Unable to recall   Activity: Pt up independent with cares    Skin: Old scar at the abdomen. Nicotine patch on the left shoulder   Pain: denies   CMS: intact   Dressing: none   Diet: Regular, tolerating well.   LDA: None   Equipment:    Plan: C/POC   Additional Info: Pt was Awake most of the night.
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
## 2024-05-06 PROCEDURE — 250N000013 HC RX MED GY IP 250 OP 250 PS 637: Performed by: PHYSICIAN ASSISTANT

## 2024-05-06 PROCEDURE — G0378 HOSPITAL OBSERVATION PER HR: HCPCS

## 2024-05-06 PROCEDURE — 250N000013 HC RX MED GY IP 250 OP 250 PS 637: Performed by: STUDENT IN AN ORGANIZED HEALTH CARE EDUCATION/TRAINING PROGRAM

## 2024-05-06 PROCEDURE — 250N000013 HC RX MED GY IP 250 OP 250 PS 637

## 2024-05-06 PROCEDURE — 99232 SBSQ HOSP IP/OBS MODERATE 35: CPT | Performed by: STUDENT IN AN ORGANIZED HEALTH CARE EDUCATION/TRAINING PROGRAM

## 2024-05-06 PROCEDURE — 250N000013 HC RX MED GY IP 250 OP 250 PS 637: Performed by: PEDIATRICS

## 2024-05-06 RX ADMIN — THIAMINE HCL TAB 100 MG 100 MG: 100 TAB at 20:32

## 2024-05-06 RX ADMIN — WHITE PETROLATUM: 1.75 OINTMENT TOPICAL at 13:40

## 2024-05-06 RX ADMIN — Medication 1 TABLET: at 08:56

## 2024-05-06 RX ADMIN — NICOTINE 1 PATCH: 14 PATCH, EXTENDED RELEASE TRANSDERMAL at 08:53

## 2024-05-06 RX ADMIN — SALINE NASAL SPRAY 1 SPRAY: 1.5 SOLUTION NASAL at 08:55

## 2024-05-06 RX ADMIN — DIVALPROEX SODIUM 500 MG: 500 TABLET, DELAYED RELEASE ORAL at 20:32

## 2024-05-06 RX ADMIN — POLYETHYLENE GLYCOL 3350 17 G: 17 POWDER, FOR SOLUTION ORAL at 08:56

## 2024-05-06 RX ADMIN — THIAMINE HCL TAB 100 MG 100 MG: 100 TAB at 08:56

## 2024-05-06 RX ADMIN — WHITE PETROLATUM: 1.75 OINTMENT TOPICAL at 20:32

## 2024-05-06 RX ADMIN — WHITE PETROLATUM: 1.75 OINTMENT TOPICAL at 08:56

## 2024-05-06 RX ADMIN — SENNOSIDES AND DOCUSATE SODIUM 2 TABLET: 8.6; 5 TABLET ORAL at 20:32

## 2024-05-06 RX ADMIN — SALINE NASAL SPRAY 1 SPRAY: 1.5 SOLUTION NASAL at 20:32

## 2024-05-06 RX ADMIN — DIVALPROEX SODIUM 500 MG: 500 TABLET, DELAYED RELEASE ORAL at 08:57

## 2024-05-06 RX ADMIN — SENNOSIDES AND DOCUSATE SODIUM 2 TABLET: 8.6; 5 TABLET ORAL at 08:56

## 2024-05-06 RX ADMIN — FOLIC ACID 1 MG: 1 TABLET ORAL at 08:56

## 2024-05-06 RX ADMIN — FLUTICASONE PROPIONATE 1 SPRAY: 50 SPRAY, METERED NASAL at 08:54

## 2024-05-06 ASSESSMENT — ACTIVITIES OF DAILY LIVING (ADL)
ADLS_ACUITY_SCORE: 26

## 2024-05-06 NOTE — PLAN OF CARE
VS: /81 (BP Location: Right arm)   Pulse 63   Temp 97  F (36.1  C) (Oral)   Resp 16   Wt 50.3 kg (110 lb 14.3 oz)   SpO2 96%   BMI 21.24 kg/m        O2: >96% on RA.   Output: Continent bowel and bladder   Last BM: Pt refused to  cooperate.   Activity: Independent     Skin: Old scar on abdomen.   Pain: Denies    CMS: intact   Dressing: None    Diet: Regular, tolerating well.   LDA: No PIV   Equipment:    Plan: C/POC   Additional Info:

## 2024-05-06 NOTE — PROGRESS NOTES
Care Management Follow Up      Expected Discharge Date:  TBD pending guardianship      Concerns to be Addressed: discharge planning, other (see comments) (guardianship)  N/A  Patient plan of care discussed at interdisciplinary rounds: Yes    Anticipated Discharge Disposition: Group Home, Long Term Care    [facility]  [address] [place] [address]  Main Number: [phone]    [facility] (Accepting Group Home Agency)  HQ: [address], [place] [address]  Haris Osmin: [phone]  Fax: [phone]  Specific home for placement: [address], [place] [address]    Patient will go to either facility listed above, depending on who can accept sooner after guardianship is obtained.     Anticipated Discharge Services: Other (see comment) (Assistance with I/ADLs from group home staff)  Anticipated Discharge DME: None    Patient/family educated on Medicare website which has current facility and service quality ratings: no  Education Provided on the Discharge Plan: Yes  Patient/Family in Agreement with the Plan: yes    Referrals Placed by CM/SW: [facility] (Group Homes)  Private pay costs discussed: Not applicable    Additional Information:    ED called patient's wife, Maine, with assistance from [facility] . SW asked if her children are at home to assist her with writing down the phone numbers. Maine stated that one child stepped out of the home briefly and another is at school until about 3pm. Maine will call her son to see when he will be back and then will call SW back.    ED called Maine with assistance from [facility] . Maine's son, Rebecca, present on the call. ED shared the following information:  -[employer] [facility] [employer] [facility] ([phone]), [employer] [place] ([phone]) for assistance with applying for guardianship  -[employer] [employer] ([phone]) for immigration  "assistance. [employer] [place] can also probably provide guidance on resources to access for citizenship.    Rebecca asked if patient will be discharged home soon. ED shared that we are still pursuing guardianship for him and then he will be going to another facility that can meet his needs. Rebecca stated that when patient lived at home, Rebecca was his PCA. Victoriajuani stated that if patient won't be going home soon, he will probably need to get another job. Rebecca just wanted to see because he does not have a job right now and stated that it might be tough for him to find a new job. He stated that he will not get a new job if he knows patient will be returning home. ED shared that we were under the impression that he could not return home under their care. ED asked for clarification and Victoriajuani said \"I think it is up to the doctors, whatever they recommend.\" ED noted that SW will follow up with IDT and get back to [facility].  No further questions at this time.          Brunilda Arreaga, GÓMEZW, LSW  8 Med Surg   [facility]  Phone: [phone]  Pager: [phone]  Vocera: Searchable by name or group: 8 Med Surg Vocera  "
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
## 2024-05-06 NOTE — PLAN OF CARE
VS: /72 (BP Location: Left arm)   Pulse 62   Temp 97  F (36.1  C) (Oral)   Resp 17   Wt 50.3 kg (110 lb 14.3 oz)   SpO2 92%   BMI 21.24 kg/m       O2: Stable @ RA ,Denies SOB ,Chest pain   Output: Continent b/b LBM - 5/6/24   Activity: Ind in room   Skin: Old scar at the abdomen   Pain: Denies pain   CMS AXOX2   Dressing: None   Diet: Regular diet    LDA: No PIV   Equipment: Personal belongings   Plan: Continue with POC   Additional Info: Pt refused lab draws 3 times,Provider informed  Pt refused to eat,had 3 trays since morning including pts favorite meals ,Pt refused to eat says he is waiting for Spouse and kids so they can eat together.Using the  ,writer called spouse ,Spouse says they are not coming over tonight,Pt was encouraged to eat since spouse will not be coming tonight,but was unsuccessful.Pt request writer leaves the 3 trays till tonight.

## 2024-05-07 PROCEDURE — 250N000013 HC RX MED GY IP 250 OP 250 PS 637

## 2024-05-07 PROCEDURE — 99232 SBSQ HOSP IP/OBS MODERATE 35: CPT | Performed by: STUDENT IN AN ORGANIZED HEALTH CARE EDUCATION/TRAINING PROGRAM

## 2024-05-07 PROCEDURE — 250N000013 HC RX MED GY IP 250 OP 250 PS 637: Performed by: PEDIATRICS

## 2024-05-07 PROCEDURE — 250N000013 HC RX MED GY IP 250 OP 250 PS 637: Performed by: PHYSICIAN ASSISTANT

## 2024-05-07 PROCEDURE — G0378 HOSPITAL OBSERVATION PER HR: HCPCS

## 2024-05-07 PROCEDURE — 250N000013 HC RX MED GY IP 250 OP 250 PS 637: Performed by: STUDENT IN AN ORGANIZED HEALTH CARE EDUCATION/TRAINING PROGRAM

## 2024-05-07 RX ADMIN — SALINE NASAL SPRAY 1 SPRAY: 1.5 SOLUTION NASAL at 07:59

## 2024-05-07 RX ADMIN — WHITE PETROLATUM: 1.75 OINTMENT TOPICAL at 08:00

## 2024-05-07 RX ADMIN — THIAMINE HCL TAB 100 MG 100 MG: 100 TAB at 07:59

## 2024-05-07 RX ADMIN — NICOTINE 1 PATCH: 14 PATCH, EXTENDED RELEASE TRANSDERMAL at 08:04

## 2024-05-07 RX ADMIN — DIVALPROEX SODIUM 500 MG: 500 TABLET, DELAYED RELEASE ORAL at 07:59

## 2024-05-07 RX ADMIN — POLYETHYLENE GLYCOL 3350 17 G: 17 POWDER, FOR SOLUTION ORAL at 07:59

## 2024-05-07 RX ADMIN — WHITE PETROLATUM: 1.75 OINTMENT TOPICAL at 13:39

## 2024-05-07 RX ADMIN — FLUTICASONE PROPIONATE 1 SPRAY: 50 SPRAY, METERED NASAL at 07:59

## 2024-05-07 RX ADMIN — THIAMINE HCL TAB 100 MG 100 MG: 100 TAB at 20:16

## 2024-05-07 RX ADMIN — Medication 1 TABLET: at 07:59

## 2024-05-07 RX ADMIN — SENNOSIDES AND DOCUSATE SODIUM 2 TABLET: 8.6; 5 TABLET ORAL at 07:59

## 2024-05-07 RX ADMIN — SALINE NASAL SPRAY 1 SPRAY: 1.5 SOLUTION NASAL at 20:28

## 2024-05-07 RX ADMIN — SENNOSIDES AND DOCUSATE SODIUM 2 TABLET: 8.6; 5 TABLET ORAL at 20:16

## 2024-05-07 RX ADMIN — DIVALPROEX SODIUM 500 MG: 500 TABLET, DELAYED RELEASE ORAL at 20:16

## 2024-05-07 RX ADMIN — WHITE PETROLATUM: 1.75 OINTMENT TOPICAL at 20:16

## 2024-05-07 RX ADMIN — FOLIC ACID 1 MG: 1 TABLET ORAL at 07:59

## 2024-05-07 ASSESSMENT — ACTIVITIES OF DAILY LIVING (ADL)
ADLS_ACUITY_SCORE: 26

## 2024-05-07 NOTE — PROGRESS NOTES
Patient in bed asleep most of the night, no acute changes doing the shift. Patient in bed with call light within reach, expresses no other needs at this time. Plan of care ongoing

## 2024-05-07 NOTE — PLAN OF CARE
Goal Outcome Evaluation:  BP (!) 127/93 (BP Location: Left arm)   Pulse 70   Temp 98.7  F (37.1  C) (Oral)   Resp 16   Wt 50.3 kg (110 lb 14.3 oz)   SpO2 100%   BMI 21.24 kg/m    Pt is alert to self, does not know birthday, date, time or situation, cooperative, needs anticipated by staff , on room air, amb ad daphne continent of B&B, right arm amputee, needs assist with set up for meals, disposition pending guardianship, continue plan of care
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
## 2024-05-07 NOTE — PROGRESS NOTES
Care Management Follow Up    Length of Stay (days): 1    Expected Discharge Date:  5/8/2024     Concerns to be Addressed: discharge planning, other (see comments) (guardianship)  N/A  Patient plan of care discussed at interdisciplinary rounds: Yes    Anticipated Discharge Disposition: Group Home     Anticipated Discharge Services: Other (see comment) (Assistance with I/ADLs from group home staff)  Anticipated Discharge DME: None    Patient/family educated on Medicare website which has current facility and service quality ratings: no  Education Provided on the Discharge Plan: Yes  Patient/Family in Agreement with the Plan: yes    Referrals Placed by CM/SW: Community [facility]idential Settings (Group Homes)  Private pay costs discussed: Not applicable    Additional Information:    SW spoke with IDT during care rounds. Team agreed that SW should follow up with wife, but that a discharge home under their care would be a safe discharge plan.    SW called patient's wife, Maine, with assistance from [facility] . Maine stated that she heard from the nurse yesterday and patient was crying and not eating. Maine stated that she thinks it is because patient misses being home and wants to go home. ED then stated that there is no further medical reason for patient to be in the hospital and the doctor has no concerns about him leaving the hospital. Maine acknowledged. Maine stated that patient's children are his caregivers. ED stated that previously we were looking for a different place to live and asked if something has changed and patient is now able to go back home with them. Maine stated that yes, he can return home and his children can care for him. ED asked if Maine would feel safe with patient returning home. Maine said that she has no concerns about patient returning home. She stated that patient does well with their children and there has never been an issue between patient and the children. Maine stated that sometimes patient  will get angry with Maine and yell at her. When asked what Maine will do if this happens, Maine stated that she will stay away from patient since the children will be the primary caregivers. ED asked if there will be somebody home 24/7 and Maine acknowledged that someone will be. Maine stated that patient is able to return home any day or time. SW suggested tomorrow, Maine agreed. Maine stated that their children can be here about 3-4pm tomorrow to bring patient home. ED stated that SW will call her tomorrow to make sure that everything is good for patient returning home. No further questions at this time.    ED spoke with care . She agrees that this is a safe discharge plan. Suggested that SW inform her that if patient does become aggressive to the point that he cannot be managed, or he aims his aggression at the children, she should call the police.            Brunilda Arreaga, GÓMEZW, LSW  8 Med Surg   [facility]  Phone: [phone]  Pager: [phone]  Vocera: Searchable by name or group: 8 Med Surg Vocera

## 2024-05-07 NOTE — PLAN OF CARE
"  VS: /82 (BP Location: Left arm)   Pulse 69   Temp 98.2  F (36.8  C) (Oral)   Resp 18   Wt 50.3 kg (110 lb 14.3 oz)   SpO2 96%   BMI 21.24 kg/m       O2: Stable @ RA ,Denies SOB ,Chest pain   Output: Continent b/b LBM - 5//6/2024   Activity: Ind in room   Skin: Old scar at the abdomen   Pain: Denies pain   CMS AXOX2   Dressing: None   Diet: Regular diet    LDA: No PIV   Equipment: Personal belongings   Plan: Continue with POC   Additional Info: Pt was unhappy says he misses family.Using the  Pt answered just \"yes\" and \"no\" and refused to say any more words to the . informed writer pt will likely be going home to family tomorrow. Pt appeared happy to know he will be leaving tomorrow.                           "

## 2024-05-08 ENCOUNTER — APPOINTMENT (OUTPATIENT)
Dept: GENERAL RADIOLOGY | Facility: CLINIC | Age: 49
End: 2024-05-08
Attending: STUDENT IN AN ORGANIZED HEALTH CARE EDUCATION/TRAINING PROGRAM
Payer: COMMERCIAL

## 2024-05-08 VITALS
BODY MASS INDEX: 21.05 KG/M2 | HEART RATE: 86 BPM | WEIGHT: 109.9 LBS | TEMPERATURE: 97.4 F | SYSTOLIC BLOOD PRESSURE: 118 MMHG | DIASTOLIC BLOOD PRESSURE: 86 MMHG | OXYGEN SATURATION: 96 % | RESPIRATION RATE: 16 BRPM

## 2024-05-08 LAB
ATRIAL RATE - MUSE: 74 BPM
ATRIAL RATE - MUSE: 79 BPM
DIASTOLIC BLOOD PRESSURE - MUSE: NORMAL MMHG
DIASTOLIC BLOOD PRESSURE - MUSE: NORMAL MMHG
INTERPRETATION ECG - MUSE: NORMAL
INTERPRETATION ECG - MUSE: NORMAL
P AXIS - MUSE: 29 DEGREES
P AXIS - MUSE: 59 DEGREES
PR INTERVAL - MUSE: 158 MS
PR INTERVAL - MUSE: 164 MS
QRS DURATION - MUSE: 74 MS
QRS DURATION - MUSE: 74 MS
QT - MUSE: 378 MS
QT - MUSE: 380 MS
QTC - MUSE: 419 MS
QTC - MUSE: 435 MS
R AXIS - MUSE: 0 DEGREES
R AXIS - MUSE: 0 DEGREES
SYSTOLIC BLOOD PRESSURE - MUSE: NORMAL MMHG
SYSTOLIC BLOOD PRESSURE - MUSE: NORMAL MMHG
T AXIS - MUSE: 21 DEGREES
T AXIS - MUSE: 41 DEGREES
VENTRICULAR RATE- MUSE: 74 BPM
VENTRICULAR RATE- MUSE: 79 BPM

## 2024-05-08 PROCEDURE — 93010 ELECTROCARDIOGRAM REPORT: CPT | Performed by: INTERNAL MEDICINE

## 2024-05-08 PROCEDURE — 250N000013 HC RX MED GY IP 250 OP 250 PS 637

## 2024-05-08 PROCEDURE — 250N000009 HC RX 250: Performed by: STUDENT IN AN ORGANIZED HEALTH CARE EDUCATION/TRAINING PROGRAM

## 2024-05-08 PROCEDURE — 71045 X-RAY EXAM CHEST 1 VIEW: CPT | Mod: 26 | Performed by: RADIOLOGY

## 2024-05-08 PROCEDURE — 250N000013 HC RX MED GY IP 250 OP 250 PS 637: Performed by: STUDENT IN AN ORGANIZED HEALTH CARE EDUCATION/TRAINING PROGRAM

## 2024-05-08 PROCEDURE — 71045 X-RAY EXAM CHEST 1 VIEW: CPT

## 2024-05-08 PROCEDURE — 99239 HOSP IP/OBS DSCHRG MGMT >30: CPT | Performed by: STUDENT IN AN ORGANIZED HEALTH CARE EDUCATION/TRAINING PROGRAM

## 2024-05-08 PROCEDURE — 250N000013 HC RX MED GY IP 250 OP 250 PS 637: Performed by: PHYSICIAN ASSISTANT

## 2024-05-08 PROCEDURE — 999N000157 HC STATISTIC RCP TIME EA 10 MIN

## 2024-05-08 PROCEDURE — 93005 ELECTROCARDIOGRAM TRACING: CPT

## 2024-05-08 PROCEDURE — 94640 AIRWAY INHALATION TREATMENT: CPT

## 2024-05-08 PROCEDURE — G0378 HOSPITAL OBSERVATION PER HR: HCPCS

## 2024-05-08 PROCEDURE — 250N000013 HC RX MED GY IP 250 OP 250 PS 637: Performed by: PEDIATRICS

## 2024-05-08 RX ORDER — LANOLIN ALCOHOL/MO/W.PET/CERES
100 CREAM (GRAM) TOPICAL DAILY
Qty: 30 TABLET | Refills: 0 | Status: SHIPPED | OUTPATIENT
Start: 2024-05-08 | End: 2024-05-28

## 2024-05-08 RX ORDER — ALBUTEROL SULFATE 0.83 MG/ML
2.5 SOLUTION RESPIRATORY (INHALATION) EVERY 4 HOURS PRN
Status: DISCONTINUED | OUTPATIENT
Start: 2024-05-08 | End: 2024-05-08 | Stop reason: HOSPADM

## 2024-05-08 RX ORDER — POLYETHYLENE GLYCOL 3350 17 G/17G
17 POWDER, FOR SOLUTION ORAL DAILY
Qty: 510 G | Refills: 0 | Status: SHIPPED | OUTPATIENT
Start: 2024-05-08 | End: 2024-05-28

## 2024-05-08 RX ADMIN — ALBUTEROL SULFATE 2.5 MG: 2.5 SOLUTION RESPIRATORY (INHALATION) at 04:44

## 2024-05-08 RX ADMIN — WHITE PETROLATUM: 1.75 OINTMENT TOPICAL at 08:59

## 2024-05-08 RX ADMIN — DIVALPROEX SODIUM 500 MG: 500 TABLET, DELAYED RELEASE ORAL at 08:58

## 2024-05-08 RX ADMIN — POLYETHYLENE GLYCOL 3350 17 G: 17 POWDER, FOR SOLUTION ORAL at 08:57

## 2024-05-08 RX ADMIN — Medication 1 TABLET: at 08:58

## 2024-05-08 RX ADMIN — THIAMINE HCL TAB 100 MG 100 MG: 100 TAB at 08:58

## 2024-05-08 RX ADMIN — FOLIC ACID 1 MG: 1 TABLET ORAL at 08:58

## 2024-05-08 RX ADMIN — ALBUTEROL SULFATE 2.5 MG: 2.5 SOLUTION RESPIRATORY (INHALATION) at 09:34

## 2024-05-08 RX ADMIN — SENNOSIDES AND DOCUSATE SODIUM 2 TABLET: 8.6; 5 TABLET ORAL at 08:58

## 2024-05-08 RX ADMIN — NICOTINE 1 PATCH: 14 PATCH, EXTENDED RELEASE TRANSDERMAL at 09:01

## 2024-05-08 ASSESSMENT — ACTIVITIES OF DAILY LIVING (ADL)
ADLS_ACUITY_SCORE: 26

## 2024-05-08 NOTE — PROGRESS NOTES
Went to round on patient around 0145 and found patient having shortness of breath and wheezing. Using , pt reports having shortness of breath and difficulty to breath and chest pain/tightness. Vitals were taken and MD paged. Vitals as follows:    BP (!) 151/103   Pulse 61   Resp 26   Wt 49.9 kg (109 lb 14.4 oz)   SpO2 93%      MD called and ordered some labs, EKG and chest x-ray. Pt refusing labs, talked with patient and listened, pt continued to refuse due to fear or needles. MD updated.

## 2024-05-08 NOTE — PLAN OF CARE
Pt has been discharged home  AVS and discharge meds discussed and given to pt and his children  Pt took along all personal belongings  No PIV  Pts wife will be driving patient home with his kids  Pt left the unit @ 1180

## 2024-05-08 NOTE — PROGRESS NOTES
Overnight Cross Cover Note:     New onset chest pain and dyspnea early in the night, no desats.  Chest pain appears to have resolved when he was told he would need labs to evaluate, dyspnea also improving.  Blood pressure elevated relative to baseline, no braulio tachypnea but somewhat increased work of breathing, bilateral wheezes present (not previously present).  Patient has consistently declined all labs, discussed at length utilizing [employer] , offered topical numbing and anxiolytic, still declined.  CXR with small R pleural effusion not present on previous imaging.      Plan:   - Trial albuterol neb q6h PRN  - Repeat EKG in AM.     If open to labs, would consider:  - EMLA or topical lidocaine and nasal midazolam  - BMP/CBC  - BNP/trop  - D-dimer    Needs [employer]
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
## 2024-05-08 NOTE — PLAN OF CARE
Using  Pt denies chest pain and Shortness of breadth.  Wheezing ,coughing and work of breathing observed.Prn Nebulization done @ 0934  Pt says he is fine.Vital signs are stable

## 2024-05-08 NOTE — PLAN OF CARE
"Goal Outcome Evaluation:  Plan of Care Reviewed With: patient  Overall Patient Progress: no change, worsening    Patient disoriented to time and situation. Independent in room. When asked, patient reports \"no more difficulty breathing and no more shortness of breath.\" However, it is apparent that patient is still having increased work of breathing and wheezing. Occasional coughing. PRN nebs added for SOB. One done tonight, pt reports a bit improvement. Frequent rounding done. Patient continues to have no behaviors.     Plan from yesterday was to discharge home today. Pt asking about wife a couple of times. Continue with POC.                       "

## 2024-05-08 NOTE — PROGRESS NOTES
Care Management Discharge Note    Discharge Date: 05/08/2024       Discharge Disposition: Home    Discharge Services: None    Discharge DME: None    Discharge Transportation: family or friend will provide    Private pay costs discussed: Not applicable    Does the patient's insurance plan have a 3 day qualifying hospital stay waiver?  No    PAS Confirmation Code:  N/A  Patient/family educated on Medicare website which has current facility and service quality ratings: no    Education Provided on the Discharge Plan: Yes  Persons Notified of Discharge Plans: bedside nurse, charge nurse, patient, provider, family  Patient/Family in Agreement with the Plan: yes    Handoff Referral Completed: Yes    Additional Information:    ED called and spoke with patient's wife, [place], with assistance from [facility] [employer] interpreters. [place] stated that she and her children remain ready to bring patient home today. They will be here around 3pm. ED informed them that if patient becomes aggressive past the point of where family can calm him down, she can call the police or bring patient back to the Emergency Department to be evaluated (if safe to transport, otherwise call 911). [place] acknowledged. No further questions at this time.        GÓMEZ DowneyW, LSW  8 Med Surg   [facility]  Phone: [phone]  Pager: [phone]  Vocera: Searchable by name or group: 8 Med Surg Vocera

## 2024-05-10 ENCOUNTER — TELEPHONE (OUTPATIENT)
Dept: NEUROLOGY | Facility: CLINIC | Age: 49
End: 2024-05-10
Payer: COMMERCIAL

## 2024-05-10 NOTE — TELEPHONE ENCOUNTER
Phone busy and call would not go through for the patient to call back and schedule the following:    Appointment type: Return Seizure  Provider: Gumaro  Return date: June 2024  Specialty phone number: [phone]  Additional appointment(s) needed:   Additonal Notes: Per Provider would like in person      Kayla Smith on 5/10/2024 at 11:22 AM

## 2024-05-14 ENCOUNTER — TELEPHONE (OUTPATIENT)
Dept: NEUROLOGY | Facility: CLINIC | Age: 49
End: 2024-05-14
Payer: COMMERCIAL

## 2024-05-14 ENCOUNTER — APPOINTMENT (OUTPATIENT)
Dept: RADIOLOGY | Facility: HOSPITAL | Age: 49
End: 2024-05-14
Attending: EMERGENCY MEDICINE
Payer: COMMERCIAL

## 2024-05-14 ENCOUNTER — HOSPITAL ENCOUNTER (INPATIENT)
Facility: HOSPITAL | Age: 49
LOS: 2 days | Discharge: HOME | End: 2024-05-16
Attending: EMERGENCY MEDICINE | Admitting: HOSPITALIST
Payer: COMMERCIAL

## 2024-05-14 DIAGNOSIS — R45.1 AGITATION: ICD-10-CM

## 2024-05-14 DIAGNOSIS — R06.00 DYSPNEA, UNSPECIFIED TYPE: ICD-10-CM

## 2024-05-14 DIAGNOSIS — J96.01 ACUTE HYPOXEMIC RESPIRATORY FAILURE (H): ICD-10-CM

## 2024-05-14 DIAGNOSIS — J20.9 ACUTE BRONCHITIS, UNSPECIFIED ORGANISM: Primary | ICD-10-CM

## 2024-05-14 LAB
ALBUMIN SERPL BCG-MCNC: 4 G/DL (ref 3.5–5.2)
ALP SERPL-CCNC: 60 U/L (ref 40–150)
ALT SERPL W P-5'-P-CCNC: 10 U/L (ref 0–70)
ANION GAP SERPL CALCULATED.3IONS-SCNC: 9 MMOL/L (ref 7–15)
AST SERPL W P-5'-P-CCNC: 18 U/L (ref 0–45)
BASE EXCESS BLDV CALC-SCNC: 2.6 MMOL/L (ref -3–3)
BILIRUB SERPL-MCNC: 0.3 MG/DL
BUN SERPL-MCNC: 11.5 MG/DL (ref 6–20)
CALCIUM SERPL-MCNC: 9 MG/DL (ref 8.6–10)
CHLORIDE SERPL-SCNC: 100 MMOL/L (ref 98–107)
CREAT SERPL-MCNC: 0.95 MG/DL (ref 0.67–1.17)
DEPRECATED HCO3 PLAS-SCNC: 26 MMOL/L (ref 22–29)
EGFRCR SERPLBLD CKD-EPI 2021: >90 ML/MIN/1.73M2
ERYTHROCYTE [DISTWIDTH] IN BLOOD BY AUTOMATED COUNT: 13.2 % (ref 10–15)
ETHANOL SERPL-MCNC: <0.01 G/DL
FLUAV RNA SPEC QL NAA+PROBE: NEGATIVE
FLUBV RNA RESP QL NAA+PROBE: NEGATIVE
GLUCOSE SERPL-MCNC: 122 MG/DL (ref 70–99)
HCO3 BLDV-SCNC: 28 MMOL/L (ref 21–28)
HCT VFR BLD AUTO: 40.7 % (ref 40–53)
HGB BLD-MCNC: 13.6 G/DL (ref 13.3–17.7)
MCH RBC QN AUTO: 29.9 PG (ref 26.5–33)
MCHC RBC AUTO-ENTMCNC: 33.4 G/DL (ref 31.5–36.5)
MCV RBC AUTO: 90 FL (ref 78–100)
NT-PROBNP SERPL-MCNC: 459 PG/ML (ref 0–450)
O2/TOTAL GAS SETTING VFR VENT: 60 %
OXYHGB MFR BLDV: 89 % (ref 70–75)
PCO2 BLDV: 51 MM HG (ref 40–50)
PH BLDV: 7.35 [PH] (ref 7.32–7.43)
PLATELET # BLD AUTO: 220 10E3/UL (ref 150–450)
PO2 BLDV: 58 MM HG (ref 25–47)
POTASSIUM SERPL-SCNC: 3.8 MMOL/L (ref 3.4–5.3)
PROT SERPL-MCNC: 7.5 G/DL (ref 6.4–8.3)
RBC # BLD AUTO: 4.55 10E6/UL (ref 4.4–5.9)
RSV RNA SPEC NAA+PROBE: NEGATIVE
SAO2 % BLDV: 89 % (ref 70–75)
SARS-COV-2 RNA RESP QL NAA+PROBE: NEGATIVE
SODIUM SERPL-SCNC: 135 MMOL/L (ref 135–145)
TROPONIN T SERPL HS-MCNC: 10 NG/L
WBC # BLD AUTO: 11.1 10E3/UL (ref 4–11)

## 2024-05-14 PROCEDURE — 250N000009 HC RX 250: Performed by: EMERGENCY MEDICINE

## 2024-05-14 PROCEDURE — 84145 PROCALCITONIN (PCT): CPT | Performed by: HOSPITALIST

## 2024-05-14 PROCEDURE — 94640 AIRWAY INHALATION TREATMENT: CPT

## 2024-05-14 PROCEDURE — 94660 CPAP INITIATION&MGMT: CPT

## 2024-05-14 PROCEDURE — 71045 X-RAY EXAM CHEST 1 VIEW: CPT

## 2024-05-14 PROCEDURE — 36415 COLL VENOUS BLD VENIPUNCTURE: CPT | Performed by: EMERGENCY MEDICINE

## 2024-05-14 PROCEDURE — 96375 TX/PRO/DX INJ NEW DRUG ADDON: CPT

## 2024-05-14 PROCEDURE — 82077 ASSAY SPEC XCP UR&BREATH IA: CPT | Performed by: EMERGENCY MEDICINE

## 2024-05-14 PROCEDURE — 99285 EMERGENCY DEPT VISIT HI MDM: CPT | Mod: 25

## 2024-05-14 PROCEDURE — 120N000001 HC R&B MED SURG/OB

## 2024-05-14 PROCEDURE — 96372 THER/PROPH/DIAG INJ SC/IM: CPT | Performed by: EMERGENCY MEDICINE

## 2024-05-14 PROCEDURE — 96366 THER/PROPH/DIAG IV INF ADDON: CPT

## 2024-05-14 PROCEDURE — 83735 ASSAY OF MAGNESIUM: CPT | Performed by: HOSPITALIST

## 2024-05-14 PROCEDURE — 999N000157 HC STATISTIC RCP TIME EA 10 MIN

## 2024-05-14 PROCEDURE — 93005 ELECTROCARDIOGRAM TRACING: CPT | Performed by: EMERGENCY MEDICINE

## 2024-05-14 PROCEDURE — 96365 THER/PROPH/DIAG IV INF INIT: CPT | Mod: 59

## 2024-05-14 PROCEDURE — 85027 COMPLETE CBC AUTOMATED: CPT | Performed by: EMERGENCY MEDICINE

## 2024-05-14 PROCEDURE — 250N000011 HC RX IP 250 OP 636: Performed by: EMERGENCY MEDICINE

## 2024-05-14 PROCEDURE — 83880 ASSAY OF NATRIURETIC PEPTIDE: CPT | Performed by: EMERGENCY MEDICINE

## 2024-05-14 PROCEDURE — 84484 ASSAY OF TROPONIN QUANT: CPT | Performed by: EMERGENCY MEDICINE

## 2024-05-14 PROCEDURE — 82805 BLOOD GASES W/O2 SATURATION: CPT | Performed by: EMERGENCY MEDICINE

## 2024-05-14 PROCEDURE — 84100 ASSAY OF PHOSPHORUS: CPT | Performed by: HOSPITALIST

## 2024-05-14 PROCEDURE — 80053 COMPREHEN METABOLIC PANEL: CPT | Performed by: EMERGENCY MEDICINE

## 2024-05-14 PROCEDURE — 87637 SARSCOV2&INF A&B&RSV AMP PRB: CPT | Performed by: EMERGENCY MEDICINE

## 2024-05-14 RX ORDER — LORAZEPAM 2 MG/ML
1 INJECTION INTRAMUSCULAR ONCE
Status: DISCONTINUED | OUTPATIENT
Start: 2024-05-14 | End: 2024-05-14

## 2024-05-14 RX ORDER — IPRATROPIUM BROMIDE AND ALBUTEROL SULFATE 2.5; .5 MG/3ML; MG/3ML
3 SOLUTION RESPIRATORY (INHALATION) ONCE
Status: COMPLETED | OUTPATIENT
Start: 2024-05-14 | End: 2024-05-14

## 2024-05-14 RX ORDER — HALOPERIDOL 5 MG/ML
5 INJECTION INTRAMUSCULAR ONCE
Status: COMPLETED | OUTPATIENT
Start: 2024-05-14 | End: 2024-05-14

## 2024-05-14 RX ORDER — METHYLPREDNISOLONE SODIUM SUCCINATE 125 MG/2ML
125 INJECTION, POWDER, LYOPHILIZED, FOR SOLUTION INTRAMUSCULAR; INTRAVENOUS ONCE
Status: COMPLETED | OUTPATIENT
Start: 2024-05-14 | End: 2024-05-14

## 2024-05-14 RX ORDER — LORAZEPAM 2 MG/ML
1 INJECTION INTRAMUSCULAR ONCE
Status: COMPLETED | OUTPATIENT
Start: 2024-05-14 | End: 2024-05-14

## 2024-05-14 RX ORDER — MAGNESIUM SULFATE HEPTAHYDRATE 40 MG/ML
2 INJECTION, SOLUTION INTRAVENOUS ONCE
Status: COMPLETED | OUTPATIENT
Start: 2024-05-14 | End: 2024-05-15

## 2024-05-14 RX ADMIN — LORAZEPAM 1 MG: 2 INJECTION INTRAMUSCULAR; INTRAVENOUS at 18:12

## 2024-05-14 RX ADMIN — METHYLPREDNISOLONE SODIUM SUCCINATE 125 MG: 125 INJECTION, POWDER, FOR SOLUTION INTRAMUSCULAR; INTRAVENOUS at 20:08

## 2024-05-14 RX ADMIN — IPRATROPIUM BROMIDE AND ALBUTEROL SULFATE 3 ML: .5; 3 SOLUTION RESPIRATORY (INHALATION) at 18:06

## 2024-05-14 RX ADMIN — HALOPERIDOL LACTATE 5 MG: 5 INJECTION, SOLUTION INTRAMUSCULAR at 18:12

## 2024-05-14 RX ADMIN — MAGNESIUM SULFATE HEPTAHYDRATE 2 G: 40 INJECTION, SOLUTION INTRAVENOUS at 20:12

## 2024-05-14 ASSESSMENT — ACTIVITIES OF DAILY LIVING (ADL)
ADLS_ACUITY_SCORE: 37

## 2024-05-14 ASSESSMENT — COLUMBIA-SUICIDE SEVERITY RATING SCALE - C-SSRS: IS THE PATIENT NOT ABLE TO COMPLETE C-SSRS: UNABLE TO VERBALIZE

## 2024-05-14 NOTE — ED PROVIDER NOTES
EMERGENCY DEPARTMENT ENCOUNTER      NAME: Heath Crawley  YOB: 1975  MRN: [id number]    FINAL IMPRESSION  1. Acute hypoxemic respiratory failure (H)    2. Dyspnea, unspecified type    3. Agitation        MEDICAL DECISION MAKING   Pertinent Labs & Imaging studies reviewed. (See chart for details)    Heath Crawley is a 49 year old male who presents via EMS for evaluation of difficulty breathing.  I met patient shortly after arrival and obtained information from medics.  Family called paramedics to the home due to patient difficulty breathing.  When they arrived on scene, she appeared to be in moderate respiratory distress with audible inspiratory and expiratory wheezing from the bedside, tachypneic and hypoxic with O2 sats in the mid 80s.  He was placed on supplementary oxygen and given a DuoNeb then albuterol neb without much improvement.  He was then placed on CPAP and had significant improvement in work of breathing.  Paramedics report that they checked around the home and did not see any nebulizers or inhalers to suggest that patient has a history of difficulty breathing/asthma/COPD.  Family also confirmed that he does not have a known history of lung disease.  They did note that he has some mental health concerns but has apparently never had difficulty breathing like this today.  At time of my initial evaluation, patient was not able to offer anything in the way of history, even with [place] .  He was switched from CPAP to BiPAP by RT here and did appear to be breathing fairly comfortably.  We attempted to obtain IV access but he became very agitated.  I was not able to de-escalate using  and with this, patient was given IM Haldol and Ativan.  He did require 9-10 ED staff to restrain him in the process but after this was given, did relax to a point that we were able to obtain IV access for labs and imaging.    Records reviewed.  Patient has a history of learning disorder, dementia,  hepatitis C, alcohol use disorder, partial epilepsy.  I considered a very broad differential include not limited to COPD/asthma exacerbation, viral URI, pneumonia, CHF, ACS/ischemia, unstable angina, PE, anemia, electrolyte derangement, other.  Will proceed with broad workup including labs, EKG, CT PE study but in the meantime, portable chest x-ray, and management of symptoms with trial of steroids, nebulizer, and magnesium.    EKG revealed sinus rhythm without clear ischemic changes.  BNP slightly elevated at 459.  High sensitivity troponin below age adjusted cutoff. ACS less likely in the setting of ECG without acute ischemic changes and I do not feel delta troponin necessary given timing of symptoms.  CBC reassuring. No evidence of leukocytosis to suggest systemic infectious/inflammatory process. No acute anemia. PLTs wnl. CMP reassuring. No evidence of PAOLO, acidosis, or significant electrolyte derangement. No acute elevation of bilirubin or transaminates to suggest acute hepatobiliary process. Procalcitonin within normal  limits.  Viral swabs all negative.  VBG with evidence of retention, pCO2 of 51.  I independently reviewed chest x-ray which showed no obvious pneumothorax, infiltrate, pulmonary edema, or displaced rib fractures.      There was a delay in obtaining CT PE study due to patient's agitation.  I rechecked the patient multiple times.  He did fall asleep after initial interventions were given IM and remained comfortable on BiPAP.  Family unfortunately, did not come to the ED so was not able to obtain any other information or update them.  Discussed the case with hospital medicine team who agreed to facilitate admission.  CT PE study pending at time of admission.    I independently reviewed CXR (as noted above), formal radiologist read pending.      Medical Decision Making  Obtained supplemental history:Supplemental history obtained?: Documented in chart and EMS  Reviewed external records: External  records reviewed?: No  Care impacted by chronic illness:Dementia  Care significantly affected by social determinants of health:N/A  Did you consider but not order tests?: Work up considered but not performed and documented in chart, if applicable  Did you interpret images independently?: Independent interpretation of ECG and images noted in documentation, when applicable.  Consultation discussion with other provider:Did you involve another provider (consultant, , pharmacy, etc.)?: I discussed the care with another health care provider, see documentation for details.  Admit.    Critical care: 60 minutes excluding separately billable procedures.  Includes bedside management, time reviewing test results, review of records, discussing the case with staff, documenting the medical record and time spent with family members (or surrogate decision makers) discussing specific treatment issues.       ED COURSE  5:32 PM I met with the patient, obtained history, performed an initial exam, and discussed options and plan for diagnostics and treatment here in the ED.  6:16 PM I rechecked the patient.  6:24 PM Patient required IM Haldol and Ativan and required ten staff members to hold the patient until he calmed   7:00 PM I rechecked the patient who is now resting comfortably   9:48 PM I discussed the patient with Dr. Bhatia from the hospitalist service who agrees to admit the patient.      MEDICATIONS GIVEN IN THE ED  methylPREDNISolone sodium succinate (solu-MEDROL) injection 125 mg (125 mg Intravenous $Given 5/14/24 2008)   ipratropium - albuterol 0.5 mg/2.5 mg/3 mL (DUONEB) neb solution 3 mL (3 mLs Nebulization $Given 5/14/24 1806)   magnesium sulfate 2 g in 50 mL sterile water intermittent infusion (0 g Intravenous Stopped 5/15/24 0020)   haloperidol lactate (HALDOL) injection 5 mg (5 mg Intramuscular $Given 5/14/24 1812)   LORazepam (ATIVAN) injection 1 mg (1 mg Intramuscular $Given 5/14/24 1812)   iopamidol (ISOVUE-370)  solution 75 mL (75 mLs Intravenous $Given 5/15/24 0140)       NEW PRESCRIPTIONS STARTED AT TODAY'S VISIT  New Prescriptions    No medications on file          =================================================================    Chief Complaint   Patient presents with    Shortness of Breath         HPI:    Patient information was obtained from: EMS    Use of : Yes (Phone ) - Language Med[place] Crawley is a 49 year old male who presents with shortness of breath.    Per EMS, patient had a respiratory rate of 40 and was grunting and audibly wheezing. Per family who was on scene, patient does not having any past respiratory history but notes that patient has a history of mental health issues. Patient O2 saturation was mid-80's at arrival. Duo neb administration did not help, and albuterol treatment led to a small improvement in O2. Patient O2 sat increased significantly after placement of CPAP.     Patient history is limited due to patient's mental status.      RELEVANT HISTORY, MEDICATIONS, & ALLERGIES   Past medical history, surgical history, family history, medications, and allergies reviewed and pertinent noted in HPI.    REVIEW OF SYSTEMS:  A complete review of systems was performed with pertinent positives and negatives noted in the HPI. All other systems negative.     PHYSICAL EXAM:    Vitals: /81   Pulse 66   Resp 18   Wt 49.9 kg (110 lb 0.2 oz)   SpO2 93%   BMI 21.07 kg/m     General: Alert and interactive.  Agitated, anxious appearing, in moderate respiratory distress.  HENT: Atraumatic. Full AROM of neck. Conjunctiva clear.   Cardiovascular: Tachycardic, regular.  Chest/Pulmonary: Arrives on CPAP.  Increased work of breathing, tachypneic.  Moderate respiratory distress.  Abdomen: Soft, nondistended. Nontender without guarding or rebound.  Extremities: Normal AROM of all major joints.  Partial amputation of right upper extremity.  Skin: Warm and dry. Normal skin color.   Neuro: CNs grossly  intact. Moves all extremities spontaneously.   Psych: Uncooperative      LAB  Labs Ordered and Resulted from Time of ED Arrival to Time of ED Departure   BLOOD GAS VENOUS - Abnormal       Result Value    pH Venous 7.35      pCO2 Venous 51 (*)     pO2 Venous 58 (*)     Bicarbonate Venous 28      Base Excess/Deficit Venous 2.6      FIO2 60      Oxyhemoglobin Venous 89 (*)     O2 Sat, Venous 89.0 (*)    NT PROBNP INPATIENT - Abnormal    N terminal Pro BNP Inpatient 459 (*)    CBC WITH PLATELETS - Abnormal    WBC Count 11.1 (*)     RBC Count 4.55      Hemoglobin 13.6      Hematocrit 40.7      MCV 90      MCH 29.9      MCHC 33.4      RDW 13.2      Platelet Count 220     COMPREHENSIVE METABOLIC PANEL - Abnormal    Sodium 135      Potassium 3.8      Carbon Dioxide (CO2) 26      Anion Gap 9      Urea Nitrogen 11.5      Creatinine 0.95      GFR Estimate >90      Calcium 9.0      Chloride 100      Glucose 122 (*)     Alkaline Phosphatase 60      AST 18      ALT 10      Protein Total 7.5      Albumin 4.0      Bilirubin Total 0.3     INFLUENZA A/B, RSV, & SARS-COV2 PCR - Normal    Influenza A PCR Negative      Influenza B PCR Negative      RSV PCR Negative      SARS CoV2 PCR Negative     TROPONIN T, HIGH SENSITIVITY - Normal    Troponin T, High Sensitivity 10     ETHYL ALCOHOL LEVEL - Normal    Alcohol ethyl <0.01     PROCALCITONIN - Normal    Procalcitonin 0.03     MAGNESIUM - Normal    Magnesium 2.0     PHOSPHORUS - Normal    Phosphorus 3.5     BASIC METABOLIC PANEL   CBC WITH PLATELETS       RADIOLOGY  CT Chest Pulmonary Embolism w Contrast   Final Result   IMPRESSION:   1.  No evidence of pulmonary embolus to the segmental level.   2.  Patchy groundglass opacities seen within the bilateral lung apices as well as within the lingula anteriorly, concerning for developing multifocal infectious and/or inflammatory process.   3.  The bronchial wall thickening present in the lower lobes bilaterally, right greater than left, with  subsegmental atelectasis in the lung bases, findings are nonspecific but may reflect bronchial inflammation/bronchitis.      XR Chest Port 1 View   Final Result   IMPRESSION: Small right pleural effusion without significant change. Mild basilar atelectasis. Mild interstitial prominence (vascular congestion not excluded). Normal heart size. No pneumothorax.         Echocardiogram Complete    (Results Pending)       EKG  Performed at: 05/14/24 at 5:47 PM  Impression: Sinus rhythm.  Rightward axis.  No acute ischemic changes.  T wave inversion in anterior leads.  Rate: 93  Rhythm: Sinus   QRS Interval: 72  QTc Interval: 479  Comparison: 5/8/2024    All laboratory and imaging results and EKG's were personally reviewed and interpreted by myself prior to disposition decision.           I, Marquita Whyte, am serving as a scribe to document services personally performed by Dr. Yvonne Reynolds based on my observation and the provider's statements to me. I, Yvonne Reynolds MD attest that Marquita Whyte  is acting in a scribe capacity, has observed my performance of the services and has documented them in accordance with my direction.    Yvonne Reynolds M.D.  Emergency Medicine  [facility] EMERGENCY DEPARTMENT  [facility] [facility], [place] [address] [phone]  Dept: [phone]     Yvonne Reynolds MD  05/15/24 4715

## 2024-05-14 NOTE — MEDICATION SCRIBE - ADMISSION MEDICATION HISTORY
Medication Scribe Admission Medication History    Admission medication history is complete. The information provided in this note is only as accurate as the sources available at the time of the update.    Information Source(s): Patient and CareEverywhere/SureScripts via in-person    Pertinent Information:     Changes made to PTA medication list:  Added: None  Deleted: None  Changed: None    Allergies reviewed with patient and updates made in EHR: yes    Medication History Completed By: DENEEN MG 5/14/2024 6:15 PM    PTA Med List   Medication Sig Last Dose    divalproex sodium delayed-release (DEPAKOTE) 500 MG DR tablet Take 1 tablet (500 mg) by mouth 2 times daily 5/14/2024 at am    polyethylene glycol (MIRALAX) 17 GM/Dose powder Take 17 g by mouth daily for constipation. 5/14/2024 at am    thiamine (B-1) 100 MG tablet Take 1 tablet (100 mg) by mouth daily for vitamin supplement 5/14/2024 at am

## 2024-05-14 NOTE — ED TRIAGE NOTES
I N T E R N A L  M E D I C I N E  VIKTORIA SANCHEZ      ENCOUNTER DATE:  01/02/2024    Edmond Chase / 65 y.o. / male        MEDICARE ANNUAL WELLNESS VISIT       Chief Complaint: Medicare Wellness-subsequent       Patient's general assessment of his health since a year ago:     - Compared to one year ago, he feels his physical health is slightly worse.    - Compared to one year ago, he feels his mental health is slightly worse.      HPI for other active medical problems:     Depression: Continues to be followed by therapist with benefit.  Mood has improved some after receiving news that he remains cancer free.  Taking Remeron 45 mg nightly with benefit.  No SI/HI.      HTN with CAD: Followed by cardiology.  Next appointment is on January 24, 2024 with Dr. Miguel.  Metoprolol succinate XL 25 mg daily, amlodipine 10 mg daily, lisinopril 10 mg daily.  Imdur 60 mg BID, furosemide 20 mg daily.  Wears compression socks.  Has not taken his morning medications so far today, resulting in elevated BP reading at today's visit.  Reports his angina remains stable.      Type 2 Diabetes: Remains on Farxiga 10 mg daily.  June 2023 A1C 6.2.  Scheduled for diabetic eye exam this spring 2024.  Would like to establish with podiatry for diabetic foot exam.      HLD: Remains on atorvastatin 40 mg daily.  June 2023 Lipid panel with normal triglycerides 111, LDL 66.    Followed by cardiothoracic surgery for history of esophageal cancer.  Prescribed metoclopramide 5 mg TID PRN for nausea, vomiting.  He continues with intermittent episodes of nausea throughout the week.      Up to date on colonoscopy as of November 2023 with [facility], 2 year recall.    Last PSA was August 2023 of 1.140, consistent with historical average.            HISTORY       Recent Hospitalizations:    Recent hospitalization?: NO    If YES, location, date, and diagnoses:     Location:   Date:   Principle Discharge Dx:   Secondary Dx:  Arrival SPF from home, developed sob today, was 85%on ra received duo neb x2 placed on CPAP and received albuterol neb x1 per report pt had expiratory wheezes. On arrival pt placed on bipap 100%o2 . Language barrier. Pt agitated while attempting iv..      Triage Assessment (Adult)       Row Name 05/14/24 8152          Triage Assessment    Airway WDL WDL        Respiratory WDL    Respiratory WDL X;rhythm/pattern     Rhythm/Pattern, Respiratory tachypneic;labored        Cardiac WDL    Cardiac WDL WDL                            Patient Care Team:    Patient Care Team:  Nargis Velasquez APRN as PCP - General (Family Medicine)  Papa Miguel MD as Consulting Physician (Cardiology)  Sekou Pisano MD as Consulting Physician (Pulmonary Disease)  Gregor Carlson MD as Consulting Physician (Gastroenterology)  Estevan Yuan MD (Sports Medicine)  Alex Hernadez MD as Consulting Physician (Nephrology)  Yazmin Molina APRN as Nurse Practitioner (Psychiatry)  Dylon Schwartz MD as Consulting Physician (Hematology and Oncology)  Valerie Stockton MD as Referring Physician (Thoracic Surgery)  Valerie Stockton MD as Surgeon (Thoracic Surgery)      Allergies:  Patient has no known allergies.    Medications:  Current Outpatient Medications on File Prior to Visit   Medication Sig Dispense Refill    amLODIPine (NORVASC) 10 MG tablet Take 1 tablet by mouth Daily. (Patient taking differently: Take 1 tablet by mouth Every Night.) 30 tablet 11    atorvastatin (LIPITOR) 40 MG tablet TAKE ONE TABLET BY MOUTH DAILY 90 tablet 3    dapagliflozin Propanediol (Farxiga) 10 MG tablet Administer 10 mg per J tube Daily. Indications: Type 2 Diabetes (Patient taking differently: Take 10 mg by mouth Daily. Indications: Type 2 Diabetes) 30 tablet 5    furosemide (LASIX) 20 MG tablet TAKE 1 TABLET BY MOUTH DAILY 90 tablet 0    isosorbide mononitrate (IMDUR) 60 MG 24 hr tablet Take 1 tablet by mouth 2 (Two) Times a Day. 180 tablet 3    lisinopril (PRINIVIL,ZESTRIL) 10 MG tablet Take 1 tablet by mouth Every Night for 360 days. 90 tablet 3    metoclopramide (REGLAN) 5 MG tablet Take 1 tablet by mouth 3 (Three) Times a Day As Needed.      metoprolol succinate XL (TOPROL-XL) 25 MG 24 hr tablet TAKE ONE TABLET BY MOUTH DAILY 90 tablet 2    mirtazapine (REMERON) 45 MG tablet Take 1 tablet by mouth Every Night. 90 tablet 0     No current facility-administered medications on file prior to visit.        PFSH:     The following portions of  the patient's history were reviewed and updated as appropriate: Allergies / Current Medications / Past Medical History / Surgical History / Social History / Family History    Problem List:  Patient Active Problem List   Diagnosis    Essential hypertension    Hyperglycemia    Hyperlipidemia    Class 2 severe obesity due to excess calories with serious comorbidity and body mass index (BMI) of 36.0 to 36.9 in adult    Nocturia    Nonrheumatic aortic valve insufficiency    Myocardial ischemia    Coronary artery disease involving native coronary artery of native heart    Ascending aortic aneurysm    CAD in native artery    S/P CABG (coronary artery bypass graft)    S/P AVR (aortic valve replacement)    Status post ascending aortic aneurysm repair    Fatigue    Abnormal nuclear stress test    Coronary artery disease    Coronary artery disease of bypass graft of native heart with stable angina pectoris    TJ on auto CPAP    Hypersomnia due to medical condition    Angina at rest    Type 2 diabetes mellitus, without long-term current use of insulin    Anemia    Ulcer of esophagus without bleeding    Polyp of colon    Rectal bleeding    Gastrointestinal hemorrhage    Malignant neoplasm of lower third of esophagus    Gastrointestinal hemorrhage, unspecified gastrointestinal hemorrhage type    Exertional chest pain    Iron deficiency anemia    Cholelithiasis       Past Medical History:  Past Medical History:   Diagnosis Date    Abnormal nuclear stress test     Anxiety     Anxiety and depression     Aortic valve insufficiency     nonrheumtic     Arthritis     knees    Ascending aortic aneurysm     CAD (coronary artery disease)     stent    Chest pain     Diabetes mellitus     Diarrhea     Dizzy     CAREFUL WHEN GETTING UP    Dyspnea     Esophageal cancer 11/2022    no chemo or radiation    Essential hypertension     Fatigue     G tube feedings     per jejunostomy tube nightly with permitin  3 cans nightly    GERD  "(gastroesophageal reflux disease)     GI bleed     Heart murmur     History of pneumonia     History of recent blood transfusion     hemorrhage     no reaction    Hyperglycemia     Hyperlipidemia     Hypersomnia with sleep apnea     Jejunostomy tube present     Lightheadedness     Malaise and fatigue     Migraines     \"OCCULAR MIGRAINES\"    Morbid obesity     Myocardial ischemia     Nausea     Nocturia     TJ on auto CPAP 10/31/2016    Overnight polysomnogram.  Weight 276 pounds.  Severe TJ with AHI 79 events per hour.  Auto CPAP recommended.    Pneumonia     FEB 2016    Scrotal bleeding     SOB (shortness of breath)        Past Surgical History:  Past Surgical History:   Procedure Laterality Date    AORTIC VALVE REPAIR/REPLACEMENT  05/2016    ASCENDING ARCH/HEMIARCH REPLACEMENT N/A 05/02/2016    Procedure: BHAVESH STERNOTOMY CORONARY ARTERY BYPASS GRAFT TIMES 3 USING LEFT INTERNAL MAMMARY ARTERY AND RIGHT GREATER SAPHENOUS VEIN GRAFT PER ENDOSCOPIC VEIN HARVESTING, AORTIC ANEURYSM REPAIR WITH ROOT REPAIR AND AORTIC VALVE REPLACEMENT;  Surgeon: Rosalio Cline MD;  Location: [facility] MAIN OR;  Service:     CARDIAC CATHETERIZATION N/A 04/01/2016    Procedure: Left Heart Cath;  Surgeon: Erick Tam MD;  Location: [facility] CATH INVASIVE LOCATION;  Service:     CARDIAC CATHETERIZATION N/A 04/01/2016    Procedure: Left ventriculography;  Surgeon: Erick Tam MD;  [facility];  Service:     CARDIAC CATHETERIZATION N/A 04/01/2016    Procedure: Right Heart Cath;  Surgeon: Erick Tam MD;  Location: [facility] CATH INVASIVE LOCATION;  Service:     CARDIAC CATHETERIZATION N/A 10/30/2017    Procedure: Coronary angiography;  Surgeon: Sorin Vasquez MD;  Location: [facility] CATH INVASIVE LOCATION;  Service:     CARDIAC CATHETERIZATION  10/30/2017    Procedure: Saphenous Vein Graft;  Surgeon: Sorin Vasquez MD;  Location: [facility] CATH INVASIVE LOCATION;  Service:     CARDIAC CATHETERIZATION N/A 10/30/2017    " Procedure: Native mammary injection;  Surgeon: Sorin Vasquez MD;  [facility];  Service:     CARDIAC CATHETERIZATION N/A 07/07/2020    Procedure: Coronary angiography;  Surgeon: Gregor Kim MD;  [facility];  Service: Cardiovascular;  Laterality: N/A;    CARDIAC CATHETERIZATION N/A 07/07/2020    Procedure: Left heart cath;  Surgeon: Gregor Kim MD;  [facility];  Service: Cardiovascular;  Laterality: N/A;    CARDIAC CATHETERIZATION N/A 07/07/2020    Procedure: Left ventriculography;  Surgeon: Gregor Kim MD;  [facility];  Service: Cardiovascular;  Laterality: N/A;    CARDIAC CATHETERIZATION N/A 07/07/2020    Procedure: Stent ESMER bypass graft;  Surgeon: Gregor Kim MD;  [facility];  Service: Cardiovascular;  Laterality: N/A;    CARDIAC CATHETERIZATION N/A 06/17/2021    Procedure: SAPHENOUS VEIN GRAFT;  Surgeon: Marques Ndiaye MD;  Location: [facility] CATH INVASIVE LOCATION;  Service: Cardiovascular;  Laterality: N/A;    CARDIAC CATHETERIZATION N/A 06/17/2021    Procedure: Left Heart Cath;  Surgeon: Marques Ndiaye MD;  [facility];  Service: Cardiovascular;  Laterality: N/A;    CARDIAC CATHETERIZATION N/A 06/17/2021    Procedure: Coronary angiography;  Surgeon: Marques Ndiaye MD;  Location: [facility] CATH INVASIVE LOCATION;  Service: Cardiovascular;  Laterality: N/A;    CARDIAC CATHETERIZATION N/A 07/14/2022    Procedure: Left Heart Cath;  Surgeon: Charlie Aj MD;  [facility];  Service: Cardiovascular;  Laterality: N/A;    CARDIAC CATHETERIZATION N/A 07/14/2022    Procedure: Coronary angiography;  Surgeon: Charlie Aj MD;  Location: [facility] CATH INVASIVE LOCATION;  Service: Cardiovascular;  Laterality: N/A;    CARDIAC CATHETERIZATION  07/14/2022    Procedure: Saphenous  Vein Graft;  Surgeon: Charlie Aj MD;  Location: [facility] CATH INVASIVE LOCATION;  Service: Cardiovascular;;    CARDIAC CATHETERIZATION N/A 07/14/2022    Procedure: Native mammary injection;  Surgeon: Charlie Aj MD;  [facility];  Service: Cardiovascular;  Laterality: N/A;    CATARACT EXTRACTION EXTRACAPSULAR W/ INTRAOCULAR LENS IMPLANTATION Left     CHOLECYSTECTOMY WITH INTRAOPERATIVE CHOLANGIOGRAM N/A 9/1/2023    Procedure: CHOLECYSTECTOMY LAPAROSCOPIC INTRAOPERATIVE CHOLANGIOGRAM choledoscopy common bile duct exploration;  Surgeon: Jose Manuel Baca MD;  Location: [facility] MAIN OR;  Service: General;  Laterality: N/A;    COLONOSCOPY N/A 11/26/2022    Procedure: COLONOSCOPY AT BEDSIDE;  Surgeon: Tomy Lopez MD;  Location: [facility] MAIN OR;  Service: Gastroenterology;  Laterality: N/A;    COLONOSCOPY W/ POLYPECTOMY N/A 10/04/2022    Procedure: COLONOSCOPY to cecum with cold forceps and cold snare polypectomies;  Surgeon: Gregor Carlson MD;  Location: [facility] ENDOSCOPY;  Service: Gastroenterology;  Laterality: N/A;  PRE- hx of polyps  POST- diverticulosis, polyps    CORONARY ARTERY BYPASS GRAFT  05/2016    LIMA TO LAD, SVG TO PDA, SVG TO OM2    ENDOSCOPY N/A 07/13/2022    Procedure: ESOPHAGOGASTRODUODENOSCOPY;  Surgeon: Marcia Hollis MD;  Location: [facility] ENDOSCOPY;  Service: Gastroenterology;  Laterality: N/A;  PRE- ANEMIA, MELENA  POST- MILD EROSIVE GASTRITIS, GE JUNCTION ULCER    ENDOSCOPY N/A 10/04/2022    Procedure: ESOPHAGOGASTRODUODENOSCOPY with biopsies;  Surgeon: Gregor Carlson MD;  Location: [facility] ENDOSCOPY;  Service: Gastroenterology;  Laterality: N/A;  PRE- hx of esophageal ulcer  POST- gastric cardia mass    ENDOSCOPY N/A 5/1/2023    Procedure: ESOPHAGOGASTRODUODENOSCOPY WITH  [id number] balloon DILATATION of pylorus and anastomosis;  Surgeon: Valerie Stockton MD;  Location: [facility] ENDOSCOPY;  Service: Gastroenterology;   "Laterality: N/A;  PRE: dysphagia  POST: no abnormalities seen    ESOPHAGOGASTRECTOMY Right 02/03/2023    Procedure: BRONCHOSCOPY, RIGHT ROBOTIC VIDEO ASSISTED THORACOSCOPY WITH TOTAL ESOPHAGECTOMY, INTERCOSTAL NERVE BLOCK, FEEDING JEJUNOSTOMY PLACEMENT;  Surgeon: Valerie Stockton MD;  Location: [facility];  Service: [facility] - [facility];  Laterality: Right;    INNER EAR SURGERY      TONSILLECTOMY         Social History:  Social History     Socioeconomic History    Marital status: Single   Tobacco Use    Smoking status: Never     Passive exposure: Never    Smokeless tobacco: Never   Vaping Use    Vaping Use: Never used   Substance and Sexual Activity    Alcohol use: Not Currently     Comment: usually 1-2 month but none since christmas 2022    Drug use: Never    Sexual activity: Defer       Family History:  Family History   Problem Relation Age of Onset    Hyperlipidemia Mother     Arthritis Mother     Hypertension Mother     Diabetes Mother     Heart disease Mother     Hyperlipidemia Father     Heart disease Father     Hypertension Father     Lung cancer Father     Heart disease Sister     Stroke Maternal Grandmother     Heart disease Maternal Grandmother     Hypertension Maternal Grandfather     Hypertension Paternal Grandmother     Hypertension Paternal Grandfather     Other Other         The patient states that his sister has a problem that goes by the name of \"long QT\".  He says this is a familial trait but he also says that he is \"not interested in pursuing it for himself\".    Coronary artery disease Other     Malig Hyperthermia Neg Hx          PATIENT ASSESSMENT     Vitals:  Vitals:    01/02/24 0953   BP: (!) 162/102   Pulse: 76   Temp: 97.9 °F (36.6 °C)   SpO2: 98%   Weight: 125 kg (276 lb 3.2 oz)   Height: 185.4 cm (72.99\")       BP Readings from Last 3 Encounters:   01/02/24 (!) 162/102   12/18/23 120/66   10/17/23 148/86     Wt Readings from Last 3 Encounters:   01/02/24 125 kg (276 lb 3.2 oz)   12/18/23 " 122 kg (269 lb)   10/17/23 121 kg (267 lb)      Body mass index is 36.45 kg/m².    [unfilled]        Review of Systems:    Review of Systems   Constitutional:  Negative for chills, fever and unexpected weight change.   Respiratory:  Negative for cough, chest tightness and shortness of breath.    Cardiovascular:  Negative for chest pain, palpitations and leg swelling.   Neurological:  Negative for dizziness, weakness, light-headedness and headaches.   Psychiatric/Behavioral:  The patient is not nervous/anxious.          Physical Exam:    Physical Exam  Vitals reviewed.   Constitutional:       General: He is not in acute distress.     Appearance: Normal appearance. He is not ill-appearing, toxic-appearing or diaphoretic.   HENT:      Head: Normocephalic and atraumatic.      Right Ear: Tympanic membrane, ear canal and external ear normal. There is no impacted cerumen.      Left Ear: Tympanic membrane, ear canal and external ear normal. There is no impacted cerumen.      Nose: Nose normal. No congestion or rhinorrhea.      Mouth/Throat:      Mouth: Mucous membranes are moist.      Pharynx: Oropharynx is clear. No oropharyngeal exudate or posterior oropharyngeal erythema.   Eyes:      Extraocular Movements: Extraocular movements intact.      Conjunctiva/sclera: Conjunctivae normal.      Pupils: Pupils are equal, round, and reactive to light.   Cardiovascular:      Rate and Rhythm: Normal rate and regular rhythm.      Heart sounds: Normal heart sounds.   Pulmonary:      Effort: Pulmonary effort is normal. No respiratory distress.      Breath sounds: Normal breath sounds.   Abdominal:      General: Bowel sounds are normal.      Palpations: Abdomen is soft.      Tenderness: There is no abdominal tenderness.   Musculoskeletal:         General: Normal range of motion.      Cervical back: Normal range of motion and neck supple.      Right lower leg: No edema.      Left lower leg: No edema.   Lymphadenopathy:      Cervical: No  cervical adenopathy.   Skin:     General: Skin is warm and dry.   Neurological:      General: No focal deficit present.      Mental Status: He is alert and oriented to person, place, and time. Mental status is at baseline.   Psychiatric:         Mood and Affect: Mood normal.         Behavior: Behavior normal.         Thought Content: Thought content normal.         Judgment: Judgment normal.           Reviewed Data:    Labs:   Lab Results   Component Value Date     09/11/2023    K 3.6 09/11/2023    CALCIUM 8.7 09/11/2023    AST 23 09/11/2023    ALT 28 09/11/2023    BUN 13 09/11/2023    CREATININE 0.87 09/11/2023    CREATININE 0.68 (L) 09/02/2023    CREATININE 0.90 08/21/2023    EGFRIFNONA 91 06/17/2021    EGFRIFAFRI 72 03/29/2016       Lab Results   Component Value Date    HGBA1C 6.20 (H) 06/09/2023    HGBA1C 6.60 (H) 03/03/2023    HGBA1C 7.00 (H) 07/10/2022    MICROALBUR 50.6 08/04/2022       Lab Results   Component Value Date    LDL 66 06/09/2023    LDL 60 08/04/2022    LDL 56 02/28/2020    HDL 36 (L) 06/09/2023    TRIG 111 06/09/2023    CHOLHDLRATIO 3.39 06/09/2023       Lab Results   Component Value Date    TSH 1.680 12/15/2022    FREET4 1.38 08/04/2022          Lab Results   Component Value Date    WBC 5.30 09/11/2023    HGB 11.3 (L) 09/11/2023    HGB 11.1 (L) 09/02/2023    HGB 12.3 (L) 06/09/2023     09/11/2023                   Lab Results   Component Value Date    PSA 1.140 08/21/2023    PSA 1.5 08/04/2022    PSA 1.27 08/14/2015       Imaging:          Medical Tests:          Screening for Glaucoma:  Previous screening for glaucoma?: Yes      Hearing Loss Screen:  Finger Rub Hearing Test (right ear): passed  Finger Rub Hearing Test (left ear): passed      Urinary Incontinence Screen:  Episodes of urinary incontinence? : No      Depression Screen:      1/2/2024     9:54 AM   PHQ-2/PHQ-9 Depression Screening   Little Interest or Pleasure in Doing Things 1-->several days   Feeling Down, Depressed  or Hopeless 1-->several days   Trouble Falling or Staying Asleep, or Sleeping Too Much 1-->several days   Feeling Tired or Having Little Energy 3-->nearly every day   Poor Appetite or Overeating 1-->several days   Feeling Bad about Yourself - or that You are a Failure or Have Let Yourself or Your Family Down 1-->several days   Trouble Concentrating on Things, Such as Reading the Newspaper or Watching Television 1-->several days   Moving or Speaking So Slowly that Other People Could Have Noticed? Or the Opposite - Being So Fidgety 1-->several days   Thoughts that You Would be Better Off Dead or of Hurting Yourself in Some Way 0-->not at all   PHQ-9: Brief Depression Severity Measure Score 10   If You Checked Off Any Problems, How Difficult Have These Problems Made It For You to Do Your Work, Take Care of Things at Home, or Get Along with Other People? somewhat difficult        PHQ-2: N/A (Has diagnosis of depression and is on treatment)    PHQ-9: 10-14 (Moderate Depression)       FUNCTIONAL, FALL RISK, & COGNITIVE SCREENING (Components below):    DATA:        1/2/2024    10:44 AM   Functional & Cognitive Status   Do you have difficulty preparing food and eating? No   Do you have difficulty bathing yourself, getting dressed or grooming yourself? No   Do you have difficulty using the toilet? No   Do you have difficulty moving around from place to place? No   Do you have trouble with steps or getting out of a bed or a chair? No   Current Diet Unhealthy Diet   Dental Exam Up to date   Eye Exam Up to date   Exercise (times per week) 0 times per week   Current Exercises Include Walking   Do you need help using the phone?  No   Are you deaf or do you have serious difficulty hearing?  Yes   Do you need help to go to places out of walking distance? No   Do you need help shopping? No   Do you need help preparing meals?  No   Do you need help with housework?  No   Do you need help with laundry? No   Do you need help taking your  medications? No   Do you need help managing money? No   Do you ever drive or ride in a car without wearing a seat belt? No   Have you felt unusual stress, anger or loneliness in the last month? No   Who do you live with? Alone   If you need help, do you have trouble finding someone available to you? No   Have you been bothered in the last four weeks by sexual problems? No   Do you have difficulty concentrating, remembering or making decisions? No         A) Assessment of Functional Ability:  (Assessment of ability to perform ADL's (showering/bathing, using toilet, dressing, feeding self, moving self around) and IADL's (use telephone, shop, prepare food, housekeep, do laundry, transport independently, take medications independently, and handle finances)    Degree of functional impairment: NONE (based on assessment noted above)      B) Assessment of Fall Risk:  Fall Risk Assessment was completed, and patient is at low risk for falls.       Need for further evaluation of gait, strength, and balance? : No    Timed Up and Go (TUG):   (>= 12 seconds indicates high risk for falling)    Observable abnormalities included: Normal gait pattern       C. Assessment of Cognitive Function:    Mini-Cog Test:     1) Registration (3 objects): Yes   2) Number of objects recalled: 3   3) Clock Draw: Passed? : N/A       Further evaluation required? : No        COUNSELING       A. Identification of Health Risk Factors:    Risk factors include: cardiovascular risk factors, depression / other psychiatric problems, and obesity      B. Age-Appropriate Screening Schedule:  (Refer to the list below for future screening recommendations based on patient's age, sex and/or medical conditions. Orders for these recommended tests are listed in the plan section. The patient has been provided with a written plan)    Health Maintenance Topics  Health Maintenance   Topic Date Due    ZOSTER VACCINE (1 of 2) Never done    URINE MICROALBUMIN  08/04/2023     HEMOGLOBIN A1C  12/09/2023    DIABETIC FOOT EXAM  01/17/2024 (Originally 8/4/2023)    DIABETIC EYE EXAM  02/20/2024 (Originally 12/1/2022)    Pneumococcal Vaccine 65+ (1 of 2 - PCV) 01/02/2025 (Originally 9/16/1964)    TDAP/TD VACCINES (1 - Tdap) 01/02/2025 (Originally 9/16/1977)    LIPID PANEL  06/09/2024    BMI FOLLOWUP  12/18/2024    ANNUAL WELLNESS VISIT  01/02/2025    COLORECTAL CANCER SCREENING  11/26/2032    HEPATITIS C SCREENING  Completed    COVID-19 Vaccine  Completed    INFLUENZA VACCINE  Completed       Health Maintenance Topics Due or Over-Due  Health Maintenance Due   Topic Date Due    ZOSTER VACCINE (1 of 2) Never done    URINE MICROALBUMIN  08/04/2023    HEMOGLOBIN A1C  12/09/2023         C. Advanced Care Planning:    Advance Care Planning   ACP discussion was held with the patient during this visit. Patient has an advance directive in EMR which is still valid.        D. Patient Self-Management and Personalized Health Advice:    He has been provided with personalized counseling/information (including brochures/handouts) about:     -- optimizing diet/nutrition plans, improving exercise / conditioning, weight management, reducing risk for cardiac/vascular events with pre-existing disease, and managing depression/anxiety      He has been recommended for the following preventative services which has been performed today, will be ordered today or ordered/performed on upcoming follow-up visit:     -- PROSTATE CANCER screening (discussed and PSA ordered +/- NIKOS performed), **COLORECTAL cancer screening (colonoscopy/Cologuard discussed or ordered), vaccination for INFLUENZA administered/recommended/discussed , vaccination for PNEUMOVAX/PCV administered/recommended/discussed, vaccination for Tdap / Td administered/recommended/discussed , vaccination for HEPATITIS A administered/recommended/discussed, RSV vaccination administered/recommended/discussed, DIABETES screening performed (current/reviewed labs/lab  ordered)      E. Miscellaneous Items:    -Aspirin use counseling: Contraindicated from taking ASA    -Discussed BMI with him. The BMI is above average; BMI management plan is completed (discussed plans for weight loss)    -Reviewed use of high risk medication in the elderly: YES    -Reviewed for potential of harmful drug interactions in the elderly: YES        WRAP UP       Assessment & Plan:    1) MEDICARE ANNUAL WELLNESS VISIT    2) OTHER MEDICAL CONDITIONS ADDRESSED TODAY:            Problem List Items Addressed This Visit       Essential hypertension    Relevant Medications    amLODIPine (NORVASC) 10 MG tablet    lisinopril (PRINIVIL,ZESTRIL) 10 MG tablet    metoprolol succinate XL (TOPROL-XL) 25 MG 24 hr tablet    furosemide (LASIX) 20 MG tablet    Other Relevant Orders    CBC & Differential    Comprehensive Metabolic Panel    TSH+Free T4    Urinalysis without microscopic (no culture) - Urine, Clean Catch    Microalbumin / Creatinine Urine Ratio - Urine, Clean Catch    Hyperlipidemia    Relevant Medications    atorvastatin (LIPITOR) 40 MG tablet    Other Relevant Orders    Comprehensive Metabolic Panel    Lipid Panel With / Chol / HDL Ratio    Type 2 diabetes mellitus, without long-term current use of insulin    Relevant Medications    dapagliflozin Propanediol (Farxiga) 10 MG tablet    Other Relevant Orders    Comprehensive Metabolic Panel    Hemoglobin A1c    Microalbumin / Creatinine Urine Ratio - Urine, Clean Catch    Ambulatory Referral to Podiatry (Completed)    POCT Glucose (Completed)     Other Visit Diagnoses       Encounter for annual wellness visit (AWV) in Medicare patient    -  Primary    Prostate cancer screening        Relevant Orders    PSA Screen    Encounter for immunization        Relevant Orders    Fluzone High-Dose 65+yrs (9654-3555) (Completed)                      Orders Placed This Encounter   Procedures    Fluzone High-Dose 65+yrs (1140-8012)    Comprehensive Metabolic Panel     Hemoglobin A1c    Lipid Panel With / Chol / HDL Ratio    PSA Screen    TSH+Free T4    Urinalysis without microscopic (no culture) - Urine, Clean Catch    Microalbumin / Creatinine Urine Ratio - Urine, Clean Catch    Ambulatory Referral to Podiatry    POCT Glucose    CBC & Differential       Discussion / Summary:    He should continue to follow closely with cardiology.  He is interested in considering use of ED medication.  Recommend that he obtain cardiology clearance given his lengthy history of CAD/ angina.  He will discuss further with cardiology.    He will follow up with pharmacy to update medications.    Medications as of TODAY:              Current Outpatient Medications   Medication Sig Dispense Refill    amLODIPine (NORVASC) 10 MG tablet Take 1 tablet by mouth Daily. (Patient taking differently: Take 1 tablet by mouth Every Night.) 30 tablet 11    atorvastatin (LIPITOR) 40 MG tablet TAKE ONE TABLET BY MOUTH DAILY 90 tablet 3    dapagliflozin Propanediol (Farxiga) 10 MG tablet Administer 10 mg per J tube Daily. Indications: Type 2 Diabetes (Patient taking differently: Take 10 mg by mouth Daily. Indications: Type 2 Diabetes) 30 tablet 5    furosemide (LASIX) 20 MG tablet TAKE 1 TABLET BY MOUTH DAILY 90 tablet 0    isosorbide mononitrate (IMDUR) 60 MG 24 hr tablet Take 1 tablet by mouth 2 (Two) Times a Day. 180 tablet 3    lisinopril (PRINIVIL,ZESTRIL) 10 MG tablet Take 1 tablet by mouth Every Night for 360 days. 90 tablet 3    metoclopramide (REGLAN) 5 MG tablet Take 1 tablet by mouth 3 (Three) Times a Day As Needed.      metoprolol succinate XL (TOPROL-XL) 25 MG 24 hr tablet TAKE ONE TABLET BY MOUTH DAILY 90 tablet 2    mirtazapine (REMERON) 45 MG tablet Take 1 tablet by mouth Every Night. 90 tablet 0     No current facility-administered medications for this visit.         FOLLOW-UP:            Return for 4 month chronic care, 1 year AWV.                 Future Appointments   Date Time Provider Department  Center   1/17/2024  2:00 PM Yazmin Molina APRN [facility] ONC None   1/23/2024 11:00 AM Marcia Stoner APRN [facility]   4/22/2024  1:45 PM [facility] 3 BH [facility]   4/29/2024 10:45 AM Valerie Stockton MD [facility]   5/2/2024 11:00 AM Nargis Velasquez APRN [facility]  [facility]   8/28/2024  9:30 AM Sekou Pisano MD [facility] AND[facility] None           After Visit Summary (AVS) including the Personalized Prevention  Plan Services (PPPS) was either printed and given to the patient at check-out today and/or sent to [facility] for review.       @[facility]@

## 2024-05-14 NOTE — TELEPHONE ENCOUNTER
Patient confirmed scheduled appointment:  Date: 6/25/2024  Time: 1:30pm  Visit type: Return Seizure  Provider: Gumaro  Location: [facility]  Testing/imaging:   Additional notes: DAWSON Smith on 5/14/2024 at 11:52 AM

## 2024-05-14 NOTE — ED NOTES
Bed: JNED-09  Expected date:   Expected time:   Means of arrival:   Comments:  JEANETH SAMPSON, Duoneb x2, CPap

## 2024-05-15 ENCOUNTER — APPOINTMENT (OUTPATIENT)
Dept: CT IMAGING | Facility: HOSPITAL | Age: 49
End: 2024-05-15
Attending: EMERGENCY MEDICINE
Payer: COMMERCIAL

## 2024-05-15 ENCOUNTER — APPOINTMENT (OUTPATIENT)
Dept: CARDIOLOGY | Facility: HOSPITAL | Age: 49
End: 2024-05-15
Attending: HOSPITALIST
Payer: COMMERCIAL

## 2024-05-15 ENCOUNTER — TELEPHONE (OUTPATIENT)
Dept: CARDIOLOGY | Facility: CLINIC | Age: 49
End: 2024-05-15
Payer: COMMERCIAL

## 2024-05-15 DIAGNOSIS — I50.9 ACUTE DECOMPENSATED HEART FAILURE (H): Primary | ICD-10-CM

## 2024-05-15 LAB
ANION GAP SERPL CALCULATED.3IONS-SCNC: 10 MMOL/L (ref 7–15)
BUN SERPL-MCNC: 12 MG/DL (ref 6–20)
CALCIUM SERPL-MCNC: 9.1 MG/DL (ref 8.6–10)
CHLORIDE SERPL-SCNC: 101 MMOL/L (ref 98–107)
CREAT SERPL-MCNC: 0.85 MG/DL (ref 0.67–1.17)
DEPRECATED HCO3 PLAS-SCNC: 23 MMOL/L (ref 22–29)
EGFRCR SERPLBLD CKD-EPI 2021: >90 ML/MIN/1.73M2
ERYTHROCYTE [DISTWIDTH] IN BLOOD BY AUTOMATED COUNT: 13.3 % (ref 10–15)
GLUCOSE SERPL-MCNC: 147 MG/DL (ref 70–99)
HCT VFR BLD AUTO: 41.3 % (ref 40–53)
HGB BLD-MCNC: 13.6 G/DL (ref 13.3–17.7)
MAGNESIUM SERPL-MCNC: 2 MG/DL (ref 1.7–2.3)
MAGNESIUM SERPL-MCNC: 2.5 MG/DL (ref 1.7–2.3)
MCH RBC QN AUTO: 29.8 PG (ref 26.5–33)
MCHC RBC AUTO-ENTMCNC: 32.9 G/DL (ref 31.5–36.5)
MCV RBC AUTO: 90 FL (ref 78–100)
PHOSPHATE SERPL-MCNC: 3.5 MG/DL (ref 2.5–4.5)
PLATELET # BLD AUTO: 228 10E3/UL (ref 150–450)
POTASSIUM SERPL-SCNC: 4.9 MMOL/L (ref 3.4–5.3)
PROCALCITONIN SERPL IA-MCNC: 0.03 NG/ML
RBC # BLD AUTO: 4.57 10E6/UL (ref 4.4–5.9)
SODIUM SERPL-SCNC: 134 MMOL/L (ref 135–145)
WBC # BLD AUTO: 7.4 10E3/UL (ref 4–11)

## 2024-05-15 PROCEDURE — 80048 BASIC METABOLIC PNL TOTAL CA: CPT | Performed by: HOSPITALIST

## 2024-05-15 PROCEDURE — 250N000011 HC RX IP 250 OP 636: Performed by: HOSPITALIST

## 2024-05-15 PROCEDURE — 93306 TTE W/DOPPLER COMPLETE: CPT | Mod: 26 | Performed by: INTERNAL MEDICINE

## 2024-05-15 PROCEDURE — 258N000003 HC RX IP 258 OP 636: Performed by: HOSPITALIST

## 2024-05-15 PROCEDURE — 99207 PR NO CHARGE LOS: CPT | Performed by: INTERNAL MEDICINE

## 2024-05-15 PROCEDURE — 93306 TTE W/DOPPLER COMPLETE: CPT

## 2024-05-15 PROCEDURE — 999N000157 HC STATISTIC RCP TIME EA 10 MIN

## 2024-05-15 PROCEDURE — 250N000011 HC RX IP 250 OP 636: Performed by: INTERNAL MEDICINE

## 2024-05-15 PROCEDURE — 94660 CPAP INITIATION&MGMT: CPT

## 2024-05-15 PROCEDURE — 71275 CT ANGIOGRAPHY CHEST: CPT

## 2024-05-15 PROCEDURE — 250N000013 HC RX MED GY IP 250 OP 250 PS 637: Performed by: HOSPITALIST

## 2024-05-15 PROCEDURE — 250N000009 HC RX 250: Performed by: INTERNAL MEDICINE

## 2024-05-15 PROCEDURE — 120N000001 HC R&B MED SURG/OB

## 2024-05-15 PROCEDURE — 999N000111 HC STATISTIC OT IP EVAL DEFER

## 2024-05-15 PROCEDURE — 94640 AIRWAY INHALATION TREATMENT: CPT | Mod: 76

## 2024-05-15 PROCEDURE — 94640 AIRWAY INHALATION TREATMENT: CPT

## 2024-05-15 PROCEDURE — 99222 1ST HOSP IP/OBS MODERATE 55: CPT | Performed by: HOSPITALIST

## 2024-05-15 PROCEDURE — 87205 SMEAR GRAM STAIN: CPT | Performed by: HOSPITALIST

## 2024-05-15 PROCEDURE — 83735 ASSAY OF MAGNESIUM: CPT | Performed by: INTERNAL MEDICINE

## 2024-05-15 PROCEDURE — 250N000009 HC RX 250: Performed by: HOSPITALIST

## 2024-05-15 PROCEDURE — 85027 COMPLETE CBC AUTOMATED: CPT | Performed by: HOSPITALIST

## 2024-05-15 PROCEDURE — 999N000156 HC STATISTIC RCP CONSULT EA 30 MIN

## 2024-05-15 PROCEDURE — 36415 COLL VENOUS BLD VENIPUNCTURE: CPT | Performed by: HOSPITALIST

## 2024-05-15 PROCEDURE — 87899 AGENT NOS ASSAY W/OPTIC: CPT | Performed by: HOSPITALIST

## 2024-05-15 RX ORDER — NALOXONE HYDROCHLORIDE 0.4 MG/ML
0.2 INJECTION, SOLUTION INTRAMUSCULAR; INTRAVENOUS; SUBCUTANEOUS
Status: DISCONTINUED | OUTPATIENT
Start: 2024-05-15 | End: 2024-05-16 | Stop reason: HOSPADM

## 2024-05-15 RX ORDER — ENOXAPARIN SODIUM 100 MG/ML
40 INJECTION SUBCUTANEOUS EVERY 24 HOURS
Status: DISCONTINUED | OUTPATIENT
Start: 2024-05-15 | End: 2024-05-16

## 2024-05-15 RX ORDER — NALOXONE HYDROCHLORIDE 0.4 MG/ML
0.4 INJECTION, SOLUTION INTRAMUSCULAR; INTRAVENOUS; SUBCUTANEOUS
Status: DISCONTINUED | OUTPATIENT
Start: 2024-05-15 | End: 2024-05-16 | Stop reason: HOSPADM

## 2024-05-15 RX ORDER — PROCHLORPERAZINE 25 MG
25 SUPPOSITORY, RECTAL RECTAL EVERY 12 HOURS PRN
Status: DISCONTINUED | OUTPATIENT
Start: 2024-05-15 | End: 2024-05-16 | Stop reason: HOSPADM

## 2024-05-15 RX ORDER — ONDANSETRON 2 MG/ML
4 INJECTION INTRAMUSCULAR; INTRAVENOUS EVERY 6 HOURS PRN
Status: DISCONTINUED | OUTPATIENT
Start: 2024-05-15 | End: 2024-05-16 | Stop reason: HOSPADM

## 2024-05-15 RX ORDER — ACETAMINOPHEN 650 MG/1
650 SUPPOSITORY RECTAL EVERY 4 HOURS PRN
Status: DISCONTINUED | OUTPATIENT
Start: 2024-05-15 | End: 2024-05-16 | Stop reason: HOSPADM

## 2024-05-15 RX ORDER — ACETAMINOPHEN 325 MG/1
650 TABLET ORAL EVERY 4 HOURS PRN
Status: DISCONTINUED | OUTPATIENT
Start: 2024-05-15 | End: 2024-05-16 | Stop reason: HOSPADM

## 2024-05-15 RX ORDER — IPRATROPIUM BROMIDE AND ALBUTEROL SULFATE 2.5; .5 MG/3ML; MG/3ML
3 SOLUTION RESPIRATORY (INHALATION)
Status: DISCONTINUED | OUTPATIENT
Start: 2024-05-15 | End: 2024-05-16 | Stop reason: HOSPADM

## 2024-05-15 RX ORDER — DIVALPROEX SODIUM 500 MG/1
500 TABLET, DELAYED RELEASE ORAL 2 TIMES DAILY
Status: DISCONTINUED | OUTPATIENT
Start: 2024-05-15 | End: 2024-05-16 | Stop reason: HOSPADM

## 2024-05-15 RX ORDER — ALBUTEROL SULFATE 5 MG/ML
2.5 SOLUTION RESPIRATORY (INHALATION)
Status: DISCONTINUED | OUTPATIENT
Start: 2024-05-15 | End: 2024-05-16 | Stop reason: HOSPADM

## 2024-05-15 RX ORDER — QUETIAPINE FUMARATE 25 MG/1
25 TABLET, FILM COATED ORAL EVERY 6 HOURS PRN
Status: DISCONTINUED | OUTPATIENT
Start: 2024-05-15 | End: 2024-05-16 | Stop reason: HOSPADM

## 2024-05-15 RX ORDER — METHYLPREDNISOLONE SODIUM SUCCINATE 40 MG/ML
40 INJECTION, POWDER, LYOPHILIZED, FOR SOLUTION INTRAMUSCULAR; INTRAVENOUS DAILY
Qty: 4 ML | Refills: 0 | Status: DISCONTINUED | OUTPATIENT
Start: 2024-05-15 | End: 2024-05-16

## 2024-05-15 RX ORDER — IOPAMIDOL 755 MG/ML
75 INJECTION, SOLUTION INTRAVASCULAR ONCE
Status: COMPLETED | OUTPATIENT
Start: 2024-05-15 | End: 2024-05-15

## 2024-05-15 RX ORDER — LIDOCAINE 40 MG/G
CREAM TOPICAL
Status: DISCONTINUED | OUTPATIENT
Start: 2024-05-15 | End: 2024-05-16 | Stop reason: HOSPADM

## 2024-05-15 RX ORDER — CALCIUM CARBONATE 500 MG/1
1000 TABLET, CHEWABLE ORAL 4 TIMES DAILY PRN
Status: DISCONTINUED | OUTPATIENT
Start: 2024-05-15 | End: 2024-05-16 | Stop reason: HOSPADM

## 2024-05-15 RX ORDER — OLANZAPINE 10 MG/2ML
5 INJECTION, POWDER, FOR SOLUTION INTRAMUSCULAR EVERY 8 HOURS PRN
Status: DISCONTINUED | OUTPATIENT
Start: 2024-05-15 | End: 2024-05-16 | Stop reason: HOSPADM

## 2024-05-15 RX ORDER — ALBUTEROL SULFATE 5 MG/ML
2.5 SOLUTION RESPIRATORY (INHALATION) 4 TIMES DAILY
Status: DISCONTINUED | OUTPATIENT
Start: 2024-05-15 | End: 2024-05-15

## 2024-05-15 RX ORDER — CEFTRIAXONE 1 G/1
1 INJECTION, POWDER, FOR SOLUTION INTRAMUSCULAR; INTRAVENOUS EVERY 24 HOURS
Qty: 50 ML | Refills: 0 | Status: DISCONTINUED | OUTPATIENT
Start: 2024-05-15 | End: 2024-05-16

## 2024-05-15 RX ORDER — PROCHLORPERAZINE MALEATE 10 MG
10 TABLET ORAL EVERY 6 HOURS PRN
Status: DISCONTINUED | OUTPATIENT
Start: 2024-05-15 | End: 2024-05-16 | Stop reason: HOSPADM

## 2024-05-15 RX ORDER — GUAIFENESIN 200 MG/10ML
200 LIQUID ORAL EVERY 4 HOURS PRN
Status: DISCONTINUED | OUTPATIENT
Start: 2024-05-15 | End: 2024-05-16 | Stop reason: HOSPADM

## 2024-05-15 RX ORDER — FUROSEMIDE 10 MG/ML
20 INJECTION INTRAMUSCULAR; INTRAVENOUS ONCE
Status: COMPLETED | OUTPATIENT
Start: 2024-05-15 | End: 2024-05-15

## 2024-05-15 RX ORDER — ONDANSETRON 4 MG/1
4 TABLET, ORALLY DISINTEGRATING ORAL EVERY 6 HOURS PRN
Status: DISCONTINUED | OUTPATIENT
Start: 2024-05-15 | End: 2024-05-16 | Stop reason: HOSPADM

## 2024-05-15 RX ORDER — HYDRALAZINE HYDROCHLORIDE 10 MG/1
10 TABLET, FILM COATED ORAL EVERY 4 HOURS PRN
Status: DISCONTINUED | OUTPATIENT
Start: 2024-05-15 | End: 2024-05-16 | Stop reason: HOSPADM

## 2024-05-15 RX ORDER — HYDRALAZINE HYDROCHLORIDE 20 MG/ML
10 INJECTION INTRAMUSCULAR; INTRAVENOUS EVERY 4 HOURS PRN
Status: DISCONTINUED | OUTPATIENT
Start: 2024-05-15 | End: 2024-05-16 | Stop reason: HOSPADM

## 2024-05-15 RX ORDER — AMOXICILLIN 250 MG
1 CAPSULE ORAL 2 TIMES DAILY PRN
Status: DISCONTINUED | OUTPATIENT
Start: 2024-05-15 | End: 2024-05-16 | Stop reason: HOSPADM

## 2024-05-15 RX ORDER — POLYETHYLENE GLYCOL 3350 17 G/17G
17 POWDER, FOR SOLUTION ORAL DAILY
Status: DISCONTINUED | OUTPATIENT
Start: 2024-05-15 | End: 2024-05-16 | Stop reason: HOSPADM

## 2024-05-15 RX ORDER — SALIVA STIMULANT COMB. NO.3
1 SPRAY, NON-AEROSOL (ML) MUCOUS MEMBRANE 4 TIMES DAILY PRN
Status: DISCONTINUED | OUTPATIENT
Start: 2024-05-15 | End: 2024-05-16 | Stop reason: HOSPADM

## 2024-05-15 RX ORDER — AMOXICILLIN 250 MG
2 CAPSULE ORAL 2 TIMES DAILY PRN
Status: DISCONTINUED | OUTPATIENT
Start: 2024-05-15 | End: 2024-05-16 | Stop reason: HOSPADM

## 2024-05-15 RX ADMIN — ALBUTEROL SULFATE 2.5 MG: 2.5 SOLUTION RESPIRATORY (INHALATION) at 07:38

## 2024-05-15 RX ADMIN — IOPAMIDOL 75 ML: 755 INJECTION, SOLUTION INTRAVENOUS at 01:40

## 2024-05-15 RX ADMIN — IPRATROPIUM BROMIDE AND ALBUTEROL SULFATE 3 ML: .5; 3 SOLUTION RESPIRATORY (INHALATION) at 21:28

## 2024-05-15 RX ADMIN — THIAMINE HCL TAB 100 MG 100 MG: 100 TAB at 08:24

## 2024-05-15 RX ADMIN — CEFTRIAXONE SODIUM 1 G: 1 INJECTION, POWDER, FOR SOLUTION INTRAMUSCULAR; INTRAVENOUS at 16:33

## 2024-05-15 RX ADMIN — ALBUTEROL SULFATE 2.5 MG: 2.5 SOLUTION RESPIRATORY (INHALATION) at 12:10

## 2024-05-15 RX ADMIN — DIVALPROEX SODIUM 500 MG: 500 TABLET, DELAYED RELEASE ORAL at 08:24

## 2024-05-15 RX ADMIN — GUAIFENESIN 200 MG: 200 SOLUTION ORAL at 12:48

## 2024-05-15 RX ADMIN — POLYETHYLENE GLYCOL 3350 17 G: 17 POWDER, FOR SOLUTION ORAL at 08:34

## 2024-05-15 RX ADMIN — IPRATROPIUM BROMIDE AND ALBUTEROL SULFATE 3 ML: .5; 3 SOLUTION RESPIRATORY (INHALATION) at 15:26

## 2024-05-15 RX ADMIN — METHYLPREDNISOLONE SODIUM SUCCINATE 40 MG: 40 INJECTION, POWDER, FOR SOLUTION INTRAMUSCULAR; INTRAVENOUS at 14:43

## 2024-05-15 RX ADMIN — AZITHROMYCIN MONOHYDRATE 500 MG: 500 INJECTION, POWDER, LYOPHILIZED, FOR SOLUTION INTRAVENOUS at 03:20

## 2024-05-15 ASSESSMENT — ACTIVITIES OF DAILY LIVING (ADL)
ADLS_ACUITY_SCORE: 37
ADLS_ACUITY_SCORE: 38
ADLS_ACUITY_SCORE: 37
ADLS_ACUITY_SCORE: 37
ADLS_ACUITY_SCORE: 44
ADLS_ACUITY_SCORE: 37
ADLS_ACUITY_SCORE: 45
ADLS_ACUITY_SCORE: 37
ADLS_ACUITY_SCORE: 37
ADLS_ACUITY_SCORE: 45
ADLS_ACUITY_SCORE: 37
ADLS_ACUITY_SCORE: 45
ADLS_ACUITY_SCORE: 37
ADLS_ACUITY_SCORE: 37
ADLS_ACUITY_SCORE: 45
ADLS_ACUITY_SCORE: 37
ADLS_ACUITY_SCORE: 37
ADLS_ACUITY_SCORE: 38

## 2024-05-15 NOTE — PLAN OF CARE
Goal Outcome Evaluation:    Problem: Gas Exchange Impaired  Goal: Optimal Gas Exchange  Outcome: Progressing         Pt alert but disoriented to time and situation. [place] speaking. Denies pain. Calm and cooperative. On 2L NC. Expiratory wheezes prior to scheduled nebs. Frequent coughs, PRN guaifenesin given. ST on tele.

## 2024-05-15 NOTE — PLAN OF CARE
OT Deferral Note    Occupational Therapy: Orders received. Chart reviewed and discussed with care team.? Occupational Therapy not indicated due to: No caretaker or family present today when OT checked on pt; Due to pt's past medical history of dementia/ cognitive deficits, as well as behavioral considerations while in this facility, education on CHF is more appropriate to be given to pt's guardian/caregiver.?     Pt is not appropriate for OT in this facility at this time, OT can be reordered if needed in the future while admitted. Defer discharge recommendations to medical team.? Will complete orders.     CHAPIN Araya/L. 5/15/2024, 10:10 AM

## 2024-05-15 NOTE — H&P
[facility]    History and Physical - Hospitalist Service       Date of Admission:  5/14/2024    Assessment & Plan      Heath Crawley is a 49 year old male admitted on 5/14/2024. He was brought to the ED by ambulance from home for evaluation of shortness of breath and hypoxia    #Acute respiratory failure with hypoxia  -Chest x-ray showed small right pleural effusion without significant change; mild basilar atelectasis; mild interstitial prominence (vascular congestion not excluded)  -N-terminal proBNP 459, elevated per reference range  -Afebrile, WBC 11.1, procalcitonin not elevated so less likely acute infection  -Treated in the ED with Solu-Medrol 125 mg, DuoNebs  -CT chest to rule out PE is pending  -Lasix 20 mg IV x 1  -Echocardiogram to evaluate structure and function  -Continue BiPAP, wean off as tolerated  -Albuterol nebs scheduled and as needed    #Wernicke-Korsakoff  #Dementia with behavioral disturbance  -Risk for violent behavior; hospitalization at [facility] from 3/11/2024 through 5/8/2024 ultimately went back home with family  -Delirium prevention treatment  -Treated in the ED with lorazepam 1 mg and haloperidol 5 mg IM; concern for needle phobia at [facility] treated with haloperidol 5 mg and/or lorazepam 2 mg if urgent access required  -Continue PTA thiamine    #Elevated blood pressure  -Patient had been intermittently hypotensive during last hospitalization  -Monitor for now    #Temporal lobe epilepsy  -Continue PTA Depakote    #Constipation  -Continue PTA MiraLAX        Diet: Combination Diet Regular Diet Adult    DVT Prophylaxis: Enoxaparin (Lovenox) SQ  Vila Catheter: Not present  Lines: None     Cardiac Monitoring: ACTIVE order. Indication: Acute decompensated heart failure (48 hours)  Code Status: Full Code          Disposition Plan     Medically Ready for Discharge: Anticipated in 2-4 Days           Lionel Bhatia MD  Hospitalist Service  [facility]  Hospital  Securely message with [employer] (more info)  Text page via [facility] Paging/Directory     ______________________________________________________________________    Chief Complaint   Shortness of breath    History is obtained from the electronic health record and emergency department physician    History of Present Illness   Heath Crawley is a 49 year old male who was brought to the ED by ambulance from home for evaluation of shortness of breath.  Past medical history of alcohol abuse, Warnicke Korsakoff, neurocognitive impairment, hallucinations, agitation, malnutrition, hepatitis C, temporal lobe epilepsy, right arm amputation, right arm shrapnel.  Patient was admitted at [facility] on 3/11/2024 due to behavioral changes, family unable to care for him.  Due to alcohol abuse, there was concern about Warnicke Korsakoff treated with IV thiamine.  Evaluated by neurologist and recommended increased dose of Depakote.  Patient remained cooperative and ultimately discharged back home with family.  Prior to ED evaluation, patient was noted winded with audible wheezing.  Upon EMS arrival, oxygen saturation was 85% on room air.  He was given DuoNebs and placed on CPAP.  In the ED, he was switched to BiPAP and required sedation with lorazepam and haloperidol due to agitation.      Past Medical History    No past medical history on file.    Past Surgical History   No past surgical history on file.    Prior to Admission Medications   Prior to Admission Medications   Prescriptions Last Dose Informant Patient Reported? Taking?   divalproex sodium delayed-release (DEPAKOTE) 500 MG DR tablet 5/14/2024 at am  No Yes   Sig: Take 1 tablet (500 mg) by mouth 2 times daily   polyethylene glycol (MIRALAX) 17 GM/Dose powder 5/14/2024 at am  No Yes   Sig: Take 17 g by mouth daily for constipation.   thiamine (B-1) 100 MG tablet 5/14/2024 at am  No Yes   Sig: Take 1 tablet (100 mg) by mouth daily for vitamin supplement      Facility-Administered  Medications: None           Physical Exam   Vital Signs:     BP: (!) 127/91 Pulse: 85   Resp: 20 SpO2: 97 % O2 Device: BiPAP/CPAP    Weight: 110 lbs .15 oz    Constitutional: mild distress  Respiratory: tachypneic and rhonchi diffuse  Cardiovascular: regular rate and rhythm  GI: normal bowel sounds  Skin: no bruising or bleeding  Musculoskeletal: no lower extremity pitting edema present    Medical Decision Making       55 MINUTES SPENT BY ME on the date of service doing chart review, history, exam, documentation & further activities per the note.      Data     I have personally reviewed the following data over the past 24 hrs:    11.1 (H)  \   13.6   / 220     135 100 11.5 /  122 (H)   3.8 26 0.95 \     ALT: 10 AST: 18 AP: 60 TBILI: 0.3   ALB: 4.0 TOT PROTEIN: 7.5 LIPASE: N/A     Trop: 10 BNP: 459 (H)     Procal: 0.03 CRP: N/A Lactic Acid: N/A         Imaging results reviewed over the past 24 hrs:   Recent Results (from the past 24 hour(s))   XR Chest Port 1 View    Narrative    EXAM: XR CHEST PORT 1 VIEW  LOCATION: [facility]  DATE: 5/14/2024    INDICATION: Dyspnea.  COMPARISON: 5/8/2024.      Impression    IMPRESSION: Small right pleural effusion without significant change. Mild basilar atelectasis. Mild interstitial prominence (vascular congestion not excluded). Normal heart size. No pneumothorax.

## 2024-05-15 NOTE — ED NOTES
Bed: JNED-37  Expected date: 5/15/24  Expected time: 11:33 AM  Means of arrival:   Comments:  ED 9

## 2024-05-15 NOTE — PROGRESS NOTES
Addendum:  CT chest:  IMPRESSION:  1.  No evidence of pulmonary embolus to the segmental level.  2.  Patchy groundglass opacities seen within the bilateral lung apices as well as within the lingula anteriorly, concerning for developing multifocal infectious and/or inflammatory process.  3.  The bronchial wall thickening present in the lower lobes bilaterally, right greater than left, with subsegmental atelectasis in the lung bases, findings are nonspecific but may reflect bronchial inflammation/bronchitis.    #Acute bronchitis  -Will add azithromycin  -Check sputum culture  -Continue albuterol neb  Lionel Bhatia MD

## 2024-05-15 NOTE — PROGRESS NOTES
Patient seen by Dr. Bhatia this morning.  CT chest shows bronchitis.  Will add DuoNebs and Rocephin.  BNP is not elevated and therefore will hold off on further diuresis.  Sodium was low likely due to diuresis.  Recheck sodium next a.m. and methylprednisone once daily

## 2024-05-16 ENCOUNTER — PATIENT OUTREACH (OUTPATIENT)
Dept: CARE COORDINATION | Facility: CLINIC | Age: 49
End: 2024-05-16
Payer: COMMERCIAL

## 2024-05-16 VITALS
DIASTOLIC BLOOD PRESSURE: 77 MMHG | OXYGEN SATURATION: 93 % | RESPIRATION RATE: 18 BRPM | WEIGHT: 107.8 LBS | BODY MASS INDEX: 20.64 KG/M2 | SYSTOLIC BLOOD PRESSURE: 117 MMHG | HEART RATE: 95 BPM | TEMPERATURE: 98 F

## 2024-05-16 LAB — L PNEUMO1 AG UR QL IA: NEGATIVE

## 2024-05-16 PROCEDURE — 258N000003 HC RX IP 258 OP 636: Performed by: HOSPITALIST

## 2024-05-16 PROCEDURE — 250N000011 HC RX IP 250 OP 636: Performed by: HOSPITALIST

## 2024-05-16 PROCEDURE — 250N000009 HC RX 250: Performed by: INTERNAL MEDICINE

## 2024-05-16 PROCEDURE — 250N000013 HC RX MED GY IP 250 OP 250 PS 637: Performed by: HOSPITALIST

## 2024-05-16 PROCEDURE — 250N000011 HC RX IP 250 OP 636: Performed by: INTERNAL MEDICINE

## 2024-05-16 PROCEDURE — 99239 HOSP IP/OBS DSCHRG MGMT >30: CPT | Performed by: INTERNAL MEDICINE

## 2024-05-16 RX ORDER — AZITHROMYCIN 500 MG/1
500 TABLET, FILM COATED ORAL DAILY
Qty: 3 TABLET | Refills: 0 | Status: SHIPPED | OUTPATIENT
Start: 2024-05-16 | End: 2024-05-20

## 2024-05-16 RX ORDER — ALBUTEROL SULFATE 90 UG/1
2 AEROSOL, METERED RESPIRATORY (INHALATION) EVERY 6 HOURS PRN
Qty: 18 G | Refills: 0 | Status: SHIPPED | OUTPATIENT
Start: 2024-05-16 | End: 2024-05-20

## 2024-05-16 RX ORDER — PREDNISONE 20 MG/1
40 TABLET ORAL DAILY
Qty: 10 TABLET | Refills: 0 | Status: DISCONTINUED | OUTPATIENT
Start: 2024-05-17 | End: 2024-05-16 | Stop reason: HOSPADM

## 2024-05-16 RX ORDER — PREDNISONE 20 MG/1
40 TABLET ORAL DAILY
Qty: 10 TABLET | Refills: 0 | Status: SHIPPED | OUTPATIENT
Start: 2024-05-16 | End: 2024-05-20

## 2024-05-16 RX ADMIN — AZITHROMYCIN MONOHYDRATE 500 MG: 500 INJECTION, POWDER, LYOPHILIZED, FOR SOLUTION INTRAVENOUS at 09:31

## 2024-05-16 RX ADMIN — DIVALPROEX SODIUM 500 MG: 500 TABLET, DELAYED RELEASE ORAL at 09:26

## 2024-05-16 RX ADMIN — POLYETHYLENE GLYCOL 3350 17 G: 17 POWDER, FOR SOLUTION ORAL at 09:24

## 2024-05-16 RX ADMIN — METHYLPREDNISOLONE SODIUM SUCCINATE 40 MG: 40 INJECTION, POWDER, FOR SOLUTION INTRAMUSCULAR; INTRAVENOUS at 09:31

## 2024-05-16 RX ADMIN — IPRATROPIUM BROMIDE AND ALBUTEROL SULFATE 3 ML: .5; 3 SOLUTION RESPIRATORY (INHALATION) at 01:31

## 2024-05-16 RX ADMIN — THIAMINE HCL TAB 100 MG 100 MG: 100 TAB at 09:26

## 2024-05-16 RX ADMIN — IPRATROPIUM BROMIDE AND ALBUTEROL SULFATE 3 ML: .5; 3 SOLUTION RESPIRATORY (INHALATION) at 13:40

## 2024-05-16 ASSESSMENT — ACTIVITIES OF DAILY LIVING (ADL)
ADLS_ACUITY_SCORE: 39

## 2024-05-16 NOTE — PLAN OF CARE
Problem: Gas Exchange Impaired  Goal: Optimal Gas Exchange  Outcome: Progressing  Intervention: Optimize Oxygenation and Ventilation  Recent Flowsheet Documentation  Taken 5/16/2024 0014 by Funmilayo Padilla RN  Head of Bed (HOB) Positioning: HOB at 20 degrees     Problem: Dementia Signs/Symptoms  Goal: Improved Behavioral Control (Dementia Signs/Symptoms)  Outcome: Progressing   Goal Outcome Evaluation:  Pt remains confused.  Oriented to self this shift.  Cooperation with cares fluctuated greatly.  Pt insisting he feels better and wants to go home. Explained via  the reason pt is here at the hospital.   Pt removing oxygen at times. Denied pain.  Refused lab work to be drawn this am.  Steady gait and up to the bathroom void.

## 2024-05-16 NOTE — PROGRESS NOTES
Clinic Care Coordination Contact  Care Coordination Clinician Chart Review    Situation: Patient chart reviewed by Care Coordinator.       Background: Care Coordination Program started: 2/20/2023. Initial assessment completed and patient-centered care plan(s) were developed with participation from patient. Lead CC handed patient off to CHW for continued outreaches.       Assessment: Per chart review, patient outreach completed by CC CHW on 4/20/2024.  Patient is actively working to accomplish goal(s). Patient's goal(s) appropriate and relevant at this time. Patient is due for updated Plan of Care.  Assessments will be completed annually or as needed/with change of patient status.    Per chart review, patient is inpatient at [facility] since 5/14/2024. CCRN plans to follow up with patient after discharge.     Care Plan: Neurology       Problem: Seizure episodes       Goal: Patient will attend his neurology appointment in the next 12 months.       Start Date: 4/28/2023 Expected End Date: 4/28/2024    This Visit's Progress: 60% Recent Progress: 50%    Note:     Barriers: language barrier, low literacy, noncompliance, and lack of knowledge how to navigate complex health care system  Strengths: motivated to attend appt  Patient expressed understanding of goal: Yes    Action steps to achieve this goal:  1. I will attend my follow up appointment with neurologist as rescheduled on 10/03/2023 at 4:15 PM. (Transportation arranged with [employer]). COMPLETED  2. I will attend my 3 months neurology appointment on 1/9/2023 at 3:45pm with Dr Mitchell Naik. Patient went but was told no appt and assisted patient rescheduled on 3/11/2024. Reminded  3. My wife will answer the phone when  calls for .  4. I will update CCC at outreach.     Mitchell Naik MD   [facility] NEUROLOGY - 3rd Floor   3H    Department Address: [address] [place] [id number]   Department Phone:  [phone]                                    Plan/Recommendations: The patient will continue working with Care Coordination to achieve goal(s) as above. CHW will continue outreaches at minimum every 30 days and will involve Lead CC as needed or if patient is ready to move to Maintenance. Lead CC will continue to monitor CHW outreaches and patient's progress to goal(s) every 6 weeks.     Plan of Care updated and sent to patient: Yes, via mail

## 2024-05-16 NOTE — PLAN OF CARE
Problem: Adult Inpatient Plan of Care  Goal: Absence of Hospital-Acquired Illness or Injury  Intervention: Identify and Manage Fall Risk  Recent Flowsheet Documentation  Taken 5/16/2024 0900 by Netta Fernández RN  Safety Promotion/Fall Prevention: activity supervised     Problem: Adult Inpatient Plan of Care  Goal: Absence of Hospital-Acquired Illness or Injury  Intervention: Prevent Skin Injury  Recent Flowsheet Documentation  Taken 5/16/2024 0900 by Netta Fernández RN  Body Position: position changed independently     Problem: Adult Inpatient Plan of Care  Goal: Absence of Hospital-Acquired Illness or Injury  Intervention: Prevent Infection  Recent Flowsheet Documentation  Taken 5/16/2024 0900 by Netta Fernández RN  Infection Prevention: rest/sleep promoted   Goal Outcome Evaluation:       Patient alert, denied any pain. Declined blood draw this morning, and refused his scheduled Lovenox, patient informed me via  that he was afraid of needles. Plan in place for discharge, however patient stated his spouse would not be here until later today.

## 2024-05-16 NOTE — PLAN OF CARE
Problem: Dementia Signs/Symptoms  Goal: Improved Behavioral Control (Dementia Signs/Symptoms)  Outcome: Progressing     Problem: Gas Exchange Impaired  Goal: Optimal Gas Exchange  Outcome: Progressing     Goal Outcome Evaluation:       Pt presented to the hospital on 5/14 with shortness of breath. CT chest shows bronchitis. Pt has a strong productive cough. Denies pain. Lungs are diminished. Pt required BIPAP I ED, now on O2 at 2 liters to maintain sats >92%. Receiving IV abx in the form of Azithromycin and Rocephin. On telemetry monitoring. Cardiac rhythm is sinus tach. Pt is [place] speaking.  services required with encounters. Denies pain. He is independent in his room. Hx of alcohol abuse with Wernicke-Korsakoff dementia with behaviors. Pt received Ativan and Haldol in ED. Pt has been pleasantly confused this shift. Attempts made to reorientate and assisted with calling family on the phone. Appetite is adequate. Tolerating a regular diet. On K+ and Mg protocols. Recheck in AM.

## 2024-05-16 NOTE — DISCHARGE SUMMARY
[facility]  Hospitalist Discharge Summary      Date of Admission:  5/14/2024  Date of Discharge:  5/16/2024  Discharging Provider: Frantz Lovett MD  Discharge Service: Hospitalist Service    Discharge Diagnoses   Acute bronchitis   acute hypoxic respiratory failure with hypoxia   Wernicke's Korsakoff dementia  hypertension accelerated  Temporal lobe epilepsy  Constipation  Clinically Significant Risk Factors          Follow-ups Needed After Discharge   Follow-up Appointments     Follow-up and recommended labs and tests       Follow up with primary care provider, Adrian Dyson, within 7 days for   hospital follow- up.  No follow up labs or test are needed.            Unresulted Labs Ordered in the Past 30 Days of this Admission       Date and Time Order Name Status Description    5/15/2024  2:23 AM Respiratory Aerobic Bacterial Culture Preliminary         These results will be followed up by PCP    Discharge Disposition   Discharged to home  Condition at discharge: Stable    Hospital Course   49-year-old with temporal lobe epilepsy and Wernicke's dementia who presented with shortness of breath he was recently admitted to [facility] for behavioral changes and family unable to take care of him.  He was saturating 85% on room air.  He was given DuoNebs in addition to CPAP.  CT of the chest showed acute bronchitis.  He was treated with Rocephin azithromycin and methylprednisolone.  He felt better.  He does not want to stay in the hospital and refuses oxygen therapy.  Not much to offer if patient refuses.  Discharged with oral steroids oral antibiotics and albuterol inhaler.  Follow-up with primary care in a week      Consultations This Hospital Stay   PHARMACY IP CONSULT  CORE CLINIC EVALUATION IP CONSULT  OCCUPATIONAL THERAPY ADULT IP CONSULT  NUTRITION SERVICES ADULT IP CONSULT  CARE MANAGEMENT / SOCIAL WORK IP CONSULT  CARE MANAGEMENT / SOCIAL WORK IP CONSULT    Code Status   Full Code    Time Spent  on this Encounter   I, Frantz Lovett MD, personally saw the patient today and spent greater than 30 minutes discharging this patient.       Frantz Lovett MD  [address] [address]  Phone: [phone]  Fax: [phone]  ______________________________________________________________________    Physical Exam   Vital Signs: Temp: 97.8  F (36.6  C) Temp src: Oral BP: 110/69 Pulse: 71   Resp: 18 SpO2: 93 % O2 Device: None (Room air) Oxygen Delivery: 3 LPM  Weight: 107 lbs 12.8 oz  no apparent distress  Minimal wheezing at the bases       Primary Care Physician   Adrian Dyson    Discharge Orders      Reason for your hospital stay    Shortness of breath     Follow-up and recommended labs and tests     Follow up with primary care provider, Adrian Dyson, within 7 days for hospital follow- up.  No follow up labs or test are needed.     Activity    Your activity upon discharge: activity as tolerated     Diet    Follow this diet upon discharge: Orders Placed This Encounter      Combination Diet Regular Diet Adult       Significant Results and Procedures   Results for orders placed or performed during the hospital encounter of 05/14/24   XR Chest Port 1 View    Narrative    EXAM: XR CHEST PORT 1 VIEW  LOCATION: [facility]  DATE: 5/14/2024    INDICATION: Dyspnea.  COMPARISON: 5/8/2024.      Impression    IMPRESSION: Small right pleural effusion without significant change. Mild basilar atelectasis. Mild interstitial prominence (vascular congestion not excluded). Normal heart size. No pneumothorax.     CT Chest Pulmonary Embolism w Contrast    Narrative    EXAM: CT CHEST PULMONARY EMBOLISM W CONTRAST  LOCATION: [facility]  DATE: 5/15/2024    INDICATION: dyspnea, hypoxia; Male sex; No prior imaging in the last 24 hours; Pulmonary Embolism Rule Out Criteria (PERC) score > 0; Revised Lowman Score (RGS) not >= 11; No D dimer  result available; D dimer not ordered  COMPARISON: 2024 and 2023  TECHNIQUE: CT chest pulmonary angiogram during arterial phase injection of IV contrast. Multiplanar reformats and MIP reconstructions were performed. Dose reduction techniques were used.   CONTRAST: 75ml Isovue 370    FINDINGS:  ANGIOGRAM CHEST: Pulmonary arteries are normal caliber and negative for pulmonary emboli. Thoracic aorta is negative for dissection. No CT evidence of right heart strain.    LUNGS AND PLEURA: Patchy groundglass opacities are seen in the lingula anteriorly as well as within the left and right upper lobes. There is bronchial wall thickening present within the right and to lesser degree left lower lobes. With subsegmental   atelectasis present on the right middle lobe and right lower lobe.    MEDIASTINUM/AXILLAE: No lymphadenopathy. Normal esophagus. No significant pericardial effusion.    CORONARY ARTERY CALCIFICATION: Mild.    UPPER ABDOMEN: Normal.    MUSCULOSKELETAL: A chronic compression fracture of the T7 and T8 are again noted, similar to 2023. No acute fracture or malalignment is identified.      Impression    IMPRESSION:  1.  No evidence of pulmonary embolus to the segmental level.  2.  Patchy groundglass opacities seen within the bilateral lung apices as well as within the lingula anteriorly, concerning for developing multifocal infectious and/or inflammatory process.  3.  The bronchial wall thickening present in the lower lobes bilaterally, right greater than left, with subsegmental atelectasis in the lung bases, findings are nonspecific but may reflect bronchial inflammation/bronchitis.   Echocardiogram Complete    Narrative    [id number]  [id number]  [id number]  [id number]^ODETTE^[place] [address]     Name: JUVENAL SALGADO  MRN: [id number]  : 1975  Study Date: 05/15/2024 10:47 AM  Age: 49 yrs  Gender: Male  Patient Location: [facility]  Reason For Study:  Heart Failure  Ordering Physician: ODETTE HERRERA  Performed By: AD     BSA: 1.4 m2  Height: 60 in  Weight: 110 lb  HR: 94  ______________________________________________________________________________  Procedure  Complete Echo Adult.  ______________________________________________________________________________  Interpretation Summary     1. Normal left ventricular size and systolic performance with a visually  estimated ejection fraction of 60%.  2. No significant valvular heart disease is identified on this study.  3. Normal right ventricular size and systolic performance.  ______________________________________________________________________________  Left ventricle:  Normal left ventricular size and systolic performance with a visually  estimated ejection fraction of 60%. There is normal regional wall motion. Left  ventricular wall thickness is normal.     Assessment of LV Diastolic Function: The cumulative findings suggest normal  diastolic filling [The septal e' velocity is < 7 cm/s & lateral e' velocity  is  < 10 cm/s. The average E/e' is < 14. The TR velocity cannot be determined due  to insufficient tricuspid insufficiency signal. Left atrial volume index is  less than 34 mL/mÂ ].     Right ventricle:  Normal right ventricular size and systolic performance.     Left atrium:  There is mild left atrial enlargement.     Right atrium:  The right atrium is of normal size. The right atrium is of normal size.     IVC:  The IVC is of normal caliber.     Aortic valve:  The aortic valve is comprised of three cusps. No significant aortic stenosis  or aortic insufficiency is detected on this study.     Mitral valve:  The mitral valve appears morphologically normal. There is trace mitral  insufficiency.     Tricuspid valve:  The tricuspid valve is grossly morphologically normal. There is trace  tricuspid insufficiency.     Pulmonic valve:  The pulmonic valve is grossly morphologically normal.     Thoracic  aorta:  The aortic root and proximal ascending aorta are of normal dimension.     Pericardium:  There is no significant pericardial effusion.  ______________________________________________________________________________  ______________________________________________________________________________  MMode/2D Measurements & Calculations  IVSd: 1.1 cm  LVIDd: 3.5 cm  LVIDs: 2.3 cm  LVPWd: 1.2 cm  FS: 35.8 %  LV mass(C)d: 129.3 grams  LV mass(C)dI: 89.3 grams/m2  Ao root diam: 3.2 cm  LA dimension: 2.6 cm  asc Aorta Diam: 3.7 cm  LA/Ao: 0.80  LVOT diam: 2.2 cm  LVOT area: 3.8 cm2  Ao root diam index Ht(cm/m): 2.1  Ao root diam index BSA (cm/m2): 2.2  Asc Ao diam index BSA (cm/m2): 2.6  Asc Ao diam index Ht(cm/m): 2.4  EF Biplane: 59.5 %     LA Volume Indexed (AL/bp): 19.1 ml/m2  RWT: 0.71     Time Measurements  MM HR: 94.0 BPM     Doppler Measurements & Calculations  MV E max eliu: 49.7 cm/sec  MV A max eliu: 102.4 cm/sec  MV E/A: 0.49  MV max P.7 mmHg  MV mean P.4 mmHg  MV V2 VTI: 15.4 cm  MVA(VTI): 4.4 cm2  MV dec slope: 351.5 cm/sec2  MV dec time: 0.14 sec  Ao V2 max: 121.6 cm/sec  Ao max P.0 mmHg  Ao V2 mean: 90.6 cm/sec  Ao mean PG: 3.6 mmHg  Ao V2 VTI: 20.8 cm  MARY(I,D): 3.3 cm2  MARY(V,D): 3.5 cm2     LV V1 max P.1 mmHg  LV V1 max: 113.1 cm/sec  LV V1 VTI: 18.0 cm  SV(LVOT): 68.4 ml  SI(LVOT): 47.2 ml/m2  PA acc time: 0.12 sec  AV Eliu Ratio (DI): 0.93  MARY Index (cm2/m2): 2.3  E/E': 8.0  E/E' av.4  Lateral E/e': 6.8  Medial E/e': 8.0  Peak E' Eliu: 6.2 cm/sec  RV S Eliu: 18.0 cm/sec     ______________________________________________________________________________  Report approved by: Amador Davis 05/15/2024 12:01 PM             Discharge Medications   Current Discharge Medication List        START taking these medications    Details   albuterol (PROAIR HFA/PROVENTIL HFA/VENTOLIN HFA) 108 (90 Base) MCG/ACT inhaler Inhale 2 puffs into the lungs every 6 hours as needed for shortness of  breath, wheezing or cough  Qty: 18 g, Refills: 0    Comments: Pharmacy may dispense brand covered by insurance (Proair, or proventil or ventolin or generic albuterol inhaler)  Associated Diagnoses: Acute bronchitis, unspecified organism      azithromycin (ZITHROMAX) 500 MG tablet Take 1 tablet (500 mg) by mouth daily for 3 days  Qty: 3 tablet, Refills: 0    Associated Diagnoses: Acute bronchitis, unspecified organism      predniSONE (DELTASONE) 20 MG tablet Take 2 tablets (40 mg) by mouth daily for 5 days  Qty: 10 tablet, Refills: 0    Associated Diagnoses: Acute bronchitis, unspecified organism           CONTINUE these medications which have NOT CHANGED    Details   divalproex sodium delayed-release (DEPAKOTE) 500 MG DR tablet Take 1 tablet (500 mg) by mouth 2 times daily  Qty: 60 tablet, Refills: 11    Associated Diagnoses: Partial epilepsy with loss of consciousness, intractable (H)      polyethylene glycol (MIRALAX) 17 GM/Dose powder Take 17 g by mouth daily for constipation.  Qty: 510 g, Refills: 0    Associated Diagnoses: Dementia due to medical condition with behavioral disturbance (H)      thiamine (B-1) 100 MG tablet Take 1 tablet (100 mg) by mouth daily for vitamin supplement  Qty: 30 tablet, Refills: 0    Associated Diagnoses: Alcohol use disorder, severe, in sustained remission (H); Dementia due to medical condition with behavioral disturbance (H)           Allergies   No Known Allergies

## 2024-05-16 NOTE — CONSULTS
Care Management Discharge Note    Discharge Date: 05/16/2024       Discharge Disposition: Home    Discharge Services: County Worker, PCA    Discharge DME: None    Discharge Transportation: family or friend will provide    Private pay costs discussed: Not applicable    Does the patient's insurance plan have a 3 day qualifying hospital stay waiver?  Yes     Which insurance plan 3 day waiver is available? Alternative insurance waiver    Will the waiver be used for post-acute placement? No    PAS Confirmation Code: N/A  Patient/family educated on Medicare website which has current facility and service quality ratings: no    Education Provided on the Discharge Plan: Yes  Persons Notified of Discharge Plans: family  Patient/Family in Agreement with the Plan: yes    Handoff Referral Completed: Yes    Additional Information:  CM consulted for Initial CM Assessment and assistance with discharge planning.     12:41 PM  ED spoke with Pt's spouse [place] over the phone with the support of a [place] . Pt lives with his wife and two children who are 15 and 20 years old. Pt is disabled and receives SSDI. Pt does not use any DME when he is feeling well and is at his baseline. Pt's son is Pt's PCA 6 hours/week. Maine reported that family is able to care for Pt at home and that they do not need any additional support from CM. Maine reported her and her children will be picking Pt up for discharge this afternoon around 4 PM. Maine denied any further questions from CM and reported family will take Pt home this afternoon.     ED left Vocera message with bedside RN informing her of family's plan to pick-up Pt this afternoon.      URSULA Us

## 2024-05-16 NOTE — PLAN OF CARE
RCAT Treatment Plan    Patient Score: 9  Patient Acuity: 4    Clinical Indication for Therapy: Bronchitis    Therapy Ordered: Duoneb q6hrs and Albuterol Q2hrs PRN    Assessment Summary: Patient seen on 2lpm NC. Breath sounds coarse/expiratory wheezes. Pt will benefit from scheduled nebulizes at this time.Will continue current therapy per MD orders.    Winsome Crook, RT  5/15/2024

## 2024-05-16 NOTE — PROGRESS NOTES
CLINICAL NUTRITION SERVICES NOTE    Received consult to provide 2 gm sodium diet education.  Yesterday RD left a copy of Making Healthy Food Choices, in [place].  Pt sleeping and no visitors in room when RD visited today.

## 2024-05-17 LAB
ATRIAL RATE - MUSE: 93 BPM
BACTERIA SPT CULT: NORMAL
DIASTOLIC BLOOD PRESSURE - MUSE: NORMAL MMHG
GRAM STAIN RESULT: NORMAL
INTERPRETATION ECG - MUSE: NORMAL
P AXIS - MUSE: 53 DEGREES
PR INTERVAL - MUSE: 146 MS
QRS DURATION - MUSE: 72 MS
QT - MUSE: 386 MS
QTC - MUSE: 479 MS
R AXIS - MUSE: 91 DEGREES
SYSTOLIC BLOOD PRESSURE - MUSE: NORMAL MMHG
T AXIS - MUSE: 55 DEGREES
VENTRICULAR RATE- MUSE: 93 BPM

## 2024-05-20 ENCOUNTER — ALLIED HEALTH/NURSE VISIT (OUTPATIENT)
Dept: NURSING | Facility: CLINIC | Age: 49
End: 2024-05-20
Payer: COMMERCIAL

## 2024-05-20 ENCOUNTER — OFFICE VISIT (OUTPATIENT)
Dept: FAMILY MEDICINE | Facility: CLINIC | Age: 49
End: 2024-05-20
Payer: COMMERCIAL

## 2024-05-20 VITALS
OXYGEN SATURATION: 96 % | WEIGHT: 107.12 LBS | BODY MASS INDEX: 20.22 KG/M2 | RESPIRATION RATE: 14 BRPM | TEMPERATURE: 97.7 F | SYSTOLIC BLOOD PRESSURE: 124 MMHG | HEIGHT: 61 IN | DIASTOLIC BLOOD PRESSURE: 88 MMHG

## 2024-05-20 DIAGNOSIS — R41.3 MEMORY LOSS: ICD-10-CM

## 2024-05-20 DIAGNOSIS — R56.9 SEIZURES (H): ICD-10-CM

## 2024-05-20 DIAGNOSIS — J20.9 ACUTE BRONCHITIS, UNSPECIFIED ORGANISM: Primary | ICD-10-CM

## 2024-05-20 DIAGNOSIS — Z71.89 COMPLEX CARE COORDINATION: Primary | ICD-10-CM

## 2024-05-20 DIAGNOSIS — F03.90 DEMENTIA, UNSPECIFIED DEMENTIA SEVERITY, UNSPECIFIED DEMENTIA TYPE, UNSPECIFIED WHETHER BEHAVIORAL, PSYCHOTIC, OR MOOD DISTURBANCE OR ANXIETY (H): ICD-10-CM

## 2024-05-20 PROCEDURE — 99495 TRANSJ CARE MGMT MOD F2F 14D: CPT | Performed by: FAMILY MEDICINE

## 2024-05-20 PROCEDURE — 99207 PR NO CHARGE LOS: CPT

## 2024-05-20 RX ORDER — ALBUTEROL SULFATE 90 UG/1
2 AEROSOL, METERED RESPIRATORY (INHALATION) EVERY 6 HOURS PRN
Qty: 18 G | Refills: 0 | Status: SHIPPED | OUTPATIENT
Start: 2024-05-20 | End: 2024-05-28

## 2024-05-20 RX ORDER — AZITHROMYCIN 500 MG/1
500 TABLET, FILM COATED ORAL DAILY
Qty: 3 TABLET | Refills: 0 | Status: SHIPPED | OUTPATIENT
Start: 2024-05-20 | End: 2024-05-28

## 2024-05-20 RX ORDER — PREDNISONE 20 MG/1
40 TABLET ORAL DAILY
Qty: 10 TABLET | Refills: 0 | Status: SHIPPED | OUTPATIENT
Start: 2024-05-20 | End: 2024-06-26

## 2024-05-20 NOTE — PROGRESS NOTES
Assessment & Plan     Acute bronchitis, unspecified organism  Seizure disorder  Dementia  Explained medications to patient's wife and informed her that depakote is essential, and not the cause of cough - an infection is causing the cough. Restart depakote - wife agrees. Needs meds for bronchitis (never got at discharge)- new rxs sent to their usual pharmacy ([facility]) and wife will  today. Med education provided by myself and again after visit by [place] cornejo care coordination RN Oma. Wife was also showed video on inhaler use, as she was uncertain on how to use. FOllow up within a week, sooner if worse or new concerns.   Likely will place home care referral for help w med management - Oma will check w agencies and let me know. For now, they need to give rxs as prescribed and additional education given by care coordination RN today   Patient has neurology follow up scheduled in one month.  Neuropsych testing referral placed - this will likely take some time to get in  - azithromycin (ZITHROMAX) 500 MG tablet  Dispense: 3 tablet; Refill: 0  - predniSONE (DELTASONE) 20 MG tablet  Dispense: 10 tablet; Refill: 0  - albuterol (PROAIR HFA/PROVENTIL HFA/VENTOLIN HFA) 108 (90 Base) MCG/ACT inhaler  Dispense: 18 g; Refill: 0          MED REC REQUIRED  Post Medication Reconciliation Status:  Discharge medications reconciled, continue medications without change            Michel Joe is a 49 year old, presenting for the following health issues:  Hospital F/U and possible side effect from divalproex      5/20/2024     8:48 AM   Additional Questions   Roomed by airas rebollar   Accompanied by wife     HPI     Here for hosp follow up. Had two recent hospitalizations.    Had been hospitalized 3/11-5/8 [facility] for seizures, agitation, aggressive behavior. That discharge summary reviewed. Had neuro consult 3/12 - increased depakote. Had psych consult (dementia) - wife is decision maker. Neuropsych testing advised but unable  "to complete inpatient.     Returned to ER at [facility] 5/14 for cough and difficulty breathing - wife is concerned it was caused by depakote. He was found to have bronchitis - started on steroids, rocephin, azithromycin, duonebs, CPAP. Improved and discharge home - per summary needed 3 additional days of azithroycin and 5 days or prednisone at time of discharge. Albuterol also rxd.     Only brings depakote bottles to clinic today.   Initially, unclear from talking to wife if they got prednisone and azithromycin at discharge. Wife states she received only one big white bottle when leaving hospital, not sure how to give this medication (not clear what is was)    Wife she states she did NOT get pills/new rxs at pharmacy after 5/16 discharge  - I called [facility] - and pharmacist called [facility] and spoke with the floor, and pharmacist was able to confirm RN never gave patient discharge medications.     Seizures \"all gone\" per wife  Runny nose, cough all the time, sneezing, chills, but overall better compared to admission    \"Not better\" - wife seems to be referring to memory/behavior - states he is wandering living room at night, cannot sleep  Always going to the bathroom - wife uncertain if stool or urine  Always talking to himself    No alcohol - family is not letting him drink    Daughter (16) at home speaks/reads english, can help w pill bottles per wife    Wife reports breathing is better compared to admission, but still having a bad cough. She has not been giving depakote because she attributed the cough to this \"new medication,\" and was afraid that giving it would make his cough and breathing worse.     Per 5/8 [facility] discharge summary:  Vulnerable adult  Unspecified dementia with worsening behaviors at home  Temporal lobe epilepsy   Acute metabolic encephalopathy, resolved  Progressive worsening over several months with concern about family ability to care for him at home. Ultimately determined the " "wife, with help from their adult children, prefer to have him back home. Initially believed the family did not think he would be safe at home, but further communication revealed they are willing and prefer this despite risks. Probable that communication barrier led to either misunderstanding or slow understanding, and that family did not initially understand all options.   - does have a limited hx of limited aggression documented in his chart, he has been completely pleasant and cooperative for several weeks now, no concerns for aggression now  - Pt does not have capacity at this time and wife is the appropriate surrogate  - Per psychiatry evaluation on 4/12: \"The patient presented with significant cognitive impairment and will be unable to verbally designate a healthcare surrogate. He would need a guardian and as before this can be his wife. \"  - Discharge to home in care of family 05/08/24   - Return to PCP for ongoing evaluation for neurocognitive disorder  - Neuropsychiatry testing, not available inpatient; OT following  - PTA Depakote BID   - Seizure precautions    Hospital Follow-up Visit:    Hospital/Nursing Home/IP Rehab Facility: [facility]  Date of Admission: 5/14/2024  Date of Discharge: 5/16/2024  Reason(s) for Admission: Acute bronchitis   acute hypoxic respiratory failure with hypoxia   Wernicke's Korsakoff dementia  hypertension accelerated  Temporal lobe epilepsy  Constipation  Was the patient in the ICU or did the patient experience delirium during hospitalization?  No  Do you have any other stressors you would like to discuss with your provider? No    Problems taking medications regularly:  None  Medication changes since discharge: None  Problems adhering to non-medication therapy:  None    Summary of hospitalization:  [facility] discharge summary reviewed  Diagnostic Tests/Treatments reviewed.  Follow up needed: no labs (needle phobia required medication " "treatment prior to labs in hospital)  Other Healthcare Providers Involved in Patient s Care:         Care Coordination and Specialist appointment - meet jorge Ernandez today ([facility]) and likely referral to home care  Update since discharge: improved.         Plan of care communicated with patient, family, and other healthcare provider                           Objective    /88   Temp 97.7  F (36.5  C) (Oral)   Resp 14   Ht 1.538 m (5' 0.55\")   Wt 48.6 kg (107 lb 1.9 oz)   SpO2 96%   BMI 20.54 kg/m    Body mass index is 20.54 kg/m .  Physical Exam   GENERAL: alert and no distress  EYES: Eyes grossly normal to inspection, conjunctivae and sclerae normal  NECK: no adenopathy, no asymmetry, masses, or scars  RESP: breathing comfortably, lungs clear to auscultation - no rales, rhonchi or wheezes  CV: regular rate and rhythm, normal S1 S2, no S3 or S4, no murmur, click or rub, no peripheral edema  MS: no gross musculoskeletal defects noted, no edema  Neuro: Awake and alert. Wife answers questions for him. Grossly nonfocal and no abnormal movements, no obvious seizure activity.   Psych: does not appear to be responding to internal stimuli            Signed Electronically by: Zita Magallanes MD     "

## 2024-05-20 NOTE — Clinical Note
Patient is reporting his [facility] worker is not visiting him at home the past 1-2 months.

## 2024-05-21 ENCOUNTER — PATIENT OUTREACH (OUTPATIENT)
Dept: CARE COORDINATION | Facility: CLINIC | Age: 49
End: 2024-05-21
Payer: COMMERCIAL

## 2024-05-21 NOTE — PROGRESS NOTES
Patient has no [facility] worker yet, this is a long confusion and I've reached out to [facility] for updates. I also arranged transportation for upcoming appointment with cardiology clinic.

## 2024-05-21 NOTE — PROGRESS NOTES
Clinic Care Coordination Contact  Follow Up Progress Note      Assessment: CCRN confirmed with spouse this morning that they were able to  only azithromycin and prednisone.     Azithromycin 500mg tab dispensed 3 tabs - instructed spouse to give patient 1 tab daily x 3 days.     Prednisone 20mg was dispensed 10 tabs - instructed spouse to give patient (2) tabs daily x 5 days.     Unable to  albuterol inhaler and B1. Still has miralax in home.     Plan: CCRN left a vm for [facility] pharmacy ( store not open until 9am) requesting to fill albuterol inhaler and B1. To call CCRN if additional information needed.

## 2024-05-22 NOTE — PROGRESS NOTES
CCRN spoke with [facility] pharmacy tech today to follow up on Ventolin inhaler. Was told that it's scheduled to be filled on 6/4/24. CCRN inquired when was the last time it was filled. Was told that hasn't been filled at [facility] pharmacy since script sent over on 5/20 but maybe patient filled it somewhere else. Per patient, he didn't filled it anywhere else. CCRN asked to run through insurance again which pharmacy tech and it went through. Instructed spouse to pick it up today or tomorrow. Spouse states Dr Magallanes showed her a video how to use the inhaler so she knows how to use it. Instructed patient/spouse to bring all prescriptions and inhaler to his appt with CCRN on 5/24/24.

## 2024-05-24 ENCOUNTER — ALLIED HEALTH/NURSE VISIT (OUTPATIENT)
Dept: NURSING | Facility: CLINIC | Age: 49
End: 2024-05-24
Payer: COMMERCIAL

## 2024-05-24 DIAGNOSIS — F02.818 DEMENTIA DUE TO MEDICAL CONDITION WITH BEHAVIORAL DISTURBANCE (H): ICD-10-CM

## 2024-05-24 DIAGNOSIS — F99 MENTAL HEALTH DISORDER: ICD-10-CM

## 2024-05-24 DIAGNOSIS — Z71.89 COMPLEX CARE COORDINATION: Primary | ICD-10-CM

## 2024-05-24 PROCEDURE — 99207 PR NO CHARGE LOS: CPT

## 2024-05-24 NOTE — PROGRESS NOTES
Clinic Care Coordination Medication Education Initial Visit    Patient presents for:  Medication education, Pill box teaching, Compliance monitoring, and Medication reconciliation    Language: [place]  : Yes    Communication: Literate in other languages    Accompanied by: Patient, Family (spouse)    Patient Living Situation:      Primary Care Provider:  Adrian Dyson    Barriers:  Financial, Language, Cultural, Poor insight into disease process, Inadequate support at home, Non-compliance of medications, Multiple uncontrolled disease states, Chronic persistent mental illness, and Memory deficits    Medication List (see cited below): Patient presents with all ordered medications    Current Outpatient Medications   Medication Sig Dispense Refill    albuterol (PROAIR HFA/PROVENTIL HFA/VENTOLIN HFA) 108 (90 Base) MCG/ACT inhaler Inhale 2 puffs into the lungs every 6 hours as needed for shortness of breath, wheezing or cough 18 g 0    azithromycin (ZITHROMAX) 500 MG tablet Take 1 tablet (500 mg) by mouth daily 3 tablet 0    divalproex sodium delayed-release (DEPAKOTE) 500 MG DR tablet Take 1 tablet (500 mg) by mouth 2 times daily 60 tablet 11    polyethylene glycol (MIRALAX) 17 GM/Dose powder Take 17 g by mouth daily for constipation. 510 g 0    predniSONE (DELTASONE) 20 MG tablet Take 2 tablets (40 mg) by mouth daily 10 tablet 0    thiamine (B-1) 100 MG tablet Take 1 tablet (100 mg) by mouth daily for vitamin supplement 30 tablet 0     No current facility-administered medications for this visit.       Equipment: Basic pill box- twice daily dosing and Basic pill box- four times daily dosing    Pill Box was set up by RN at visit  today    Medication Set Up: Dependent  Medication Administration:  Independent    Compliance:     Future Appointments   Date Time Provider Department Center   [facility]24[facility]20[facility]  [facility]   5/28/2024  3:30 PM Adrian Dyson MD DAFMOB [facility]   5/29/2024  9:50 AM  [facility] Hawk[facility]   5/29/2024 10:30 AM [facility] HEART FAILURE RN [facility]   6/3/2024  9:00 AM S[facility] CCC RN BOZENA [facility] S[facility]   6/6/2024  1:30 PM Tatiana Winkler, [facility] URMTMP [place]   6/25/2024  1:30 PM Mitchell Naik MD [facility]   12/2/2024  8:00 AM Dewayne Torres, PhD [facility]       Action Plan  RN Will:  1 month    Nursing Notes:  CCRN met with patient and spouse today. CCRN demonstrated to spouse how to set up medication weekly. Spouse was able to set it up correctly but plan to ask 20 yrs old English speaking son who's the PCA will set up meds weekly. Provided patient with 1 week med box.     Azithromycin - reports he completed the course of 3 days this morning. Started on 5/22/2024.     Prednisone - last dose will be on 5/26/24    B1 (1) tab daily AM    Depakote 500mg (1) tab AM and HS    Albuterol inhaler - CCRN demonstrated to patient and spouse how to adm the inhaler correctly. Spouse will be adm to patient [id number].    Hold off on home care referral at this time because patient will be taking only 2 oral meds after completed prednisone. Will re-consider home care referral in more prescriptions added in the future or getting complex with me set up.     Citizenship   States failed citizenship interview x2. Someone from from the community assisted him with the process. Will request a new referral for medical waiver to re-apply. Will referral to [facility]. Message sent to PCP today.     Psychiatry   Agreed to psychiatry referral. Spouse reports aggressive verbally and threatening family members. Message sent to PCP to place the referral today.     Legal guardianship   Spouse was talking about legal guardianship. States a  singed a form for her to be patient's legal guardianship. Will consult CCSW on this.

## 2024-05-24 NOTE — PROGRESS NOTES
Orders placed for [facility] and Psychiatry. Please look at [facility] referral to make sure I did it correctly.

## 2024-05-28 ENCOUNTER — OFFICE VISIT (OUTPATIENT)
Dept: FAMILY MEDICINE | Facility: CLINIC | Age: 49
End: 2024-05-28
Payer: COMMERCIAL

## 2024-05-28 VITALS
HEIGHT: 61 IN | WEIGHT: 113 LBS | DIASTOLIC BLOOD PRESSURE: 68 MMHG | SYSTOLIC BLOOD PRESSURE: 108 MMHG | TEMPERATURE: 98.2 F | OXYGEN SATURATION: 96 % | HEART RATE: 80 BPM | RESPIRATION RATE: 16 BRPM | BODY MASS INDEX: 21.34 KG/M2

## 2024-05-28 DIAGNOSIS — J96.01 ACUTE HYPOXEMIC RESPIRATORY FAILURE (H): ICD-10-CM

## 2024-05-28 DIAGNOSIS — F10.21 ALCOHOL USE DISORDER, SEVERE, IN SUSTAINED REMISSION (H): ICD-10-CM

## 2024-05-28 DIAGNOSIS — Z09 HOSPITAL DISCHARGE FOLLOW-UP: Primary | ICD-10-CM

## 2024-05-28 DIAGNOSIS — R06.00 DYSPNEA, UNSPECIFIED TYPE: ICD-10-CM

## 2024-05-28 DIAGNOSIS — G40.219 PARTIAL EPILEPSY WITH LOSS OF CONSCIOUSNESS, INTRACTABLE (H): ICD-10-CM

## 2024-05-28 DIAGNOSIS — J20.9 ACUTE BRONCHITIS, UNSPECIFIED ORGANISM: ICD-10-CM

## 2024-05-28 DIAGNOSIS — F02.818 DEMENTIA DUE TO MEDICAL CONDITION WITH BEHAVIORAL DISTURBANCE (H): ICD-10-CM

## 2024-05-28 PROCEDURE — G2211 COMPLEX E/M VISIT ADD ON: HCPCS | Performed by: FAMILY MEDICINE

## 2024-05-28 PROCEDURE — 99214 OFFICE O/P EST MOD 30 MIN: CPT | Performed by: FAMILY MEDICINE

## 2024-05-28 RX ORDER — LANOLIN ALCOHOL/MO/W.PET/CERES
100 CREAM (GRAM) TOPICAL DAILY
Qty: 90 TABLET | Refills: 3 | Status: SHIPPED | OUTPATIENT
Start: 2024-05-28

## 2024-05-28 RX ORDER — ALBUTEROL SULFATE 90 UG/1
2 AEROSOL, METERED RESPIRATORY (INHALATION) EVERY 6 HOURS PRN
Qty: 18 G | Refills: 0 | Status: SHIPPED | OUTPATIENT
Start: 2024-05-28

## 2024-05-28 RX ORDER — POLYETHYLENE GLYCOL 3350 17 G/17G
17 POWDER, FOR SOLUTION ORAL DAILY PRN
Qty: 510 G | Refills: 11 | Status: SHIPPED | OUTPATIENT
Start: 2024-05-28
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
## 2024-05-28 NOTE — PATIENT INSTRUCTIONS
-Thank you for choosing the [facility].  -It was a pleasure to see you today.  -Please take a look at the information below for more specific details regarding the treatment plan and recommendations.  -In this after visit summary is a list of your medications and specific instructions.  Please review this carefully as there may be changes made to your medication list.  -If there are any particular questions or concerns, please feel free to reach out to Dr. Dyson.  -If any labs have been completed, we will reach out to you about results.  If the results are normal or not concerning, a letter or [facility] message will be sent to you.  If any follow-up is needed, either Dr. Dyson or the nurse will give you a call.  If you have not heard regarding results after 2 weeks, please reach out to the clinic.    Patient Instructions:    -Continue medications as prescribed.   -Continue to avoid alcohol.  -You are recommended to see the gastroenterologist (liver specialist) for ongoing cares.    Please seek immediate medical attention (go to the emergency room or urgent care) for the following reasons: worsening symptoms, or any concerning changes.      --------------------------------------------------------------------------------------------------------------------    -We are always looking for ways to improve.  You may be selected to receive a survey regarding your visit today.  We encourage you to complete the survey and provide specific, constructive feedback to help us improve our processes.  Thank you for your time!  -Please review the contact information listed on the after visit summary and in the electronic chart.  Below is the phone number that we have on file.  If there are any changes that are needed to be made, please reach out to the clinic.  [phone] (home)

## 2024-05-29 ENCOUNTER — PATIENT OUTREACH (OUTPATIENT)
Dept: CARE COORDINATION | Facility: CLINIC | Age: 49
End: 2024-05-29

## 2024-05-29 NOTE — TELEPHONE ENCOUNTER
Per chart review, it was recommended during his hospitalization and appt was scheduled while he was in the hospital but if Heart clinic decided not needed then okay to complete the goal.

## 2024-05-29 NOTE — PROGRESS NOTES
Clinic Care Coordination Contact  Community Health Worker Follow Up    Care Gaps:   Health Maintenance Due   Topic Date Due    HF ACTION PLAN  Never done    COVID-19 Vaccine (1) Never done    COLORECTAL CANCER SCREENING  Never done    HEPATITIS A IMMUNIZATION (1 of 2 - Risk 2-dose series) Never done     Care gaps due and provider to address.     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       Start Date: 5/20/2024 Expected End Date: 5/31/2025    This Visit's Progress: 20% Recent Progress: 10%    Note:     Barriers: language barrier, low literacy, noncompliance, and lack of knowledge how to navigate complex health care system  Strengths: motivated to attend appt  Patient expressed understanding of goal: Yes    Action steps to achieve this goal:  1. I will attend my 3 month follow up neurology appointment as scheduled on 6/25/2204 at 1:15pm. [phone].  3. My wife will answer the phone when  calls for .  4. My PCA will accommodate the appointment.      Mitchell Naik MD   [facility] NEUROLOGY - 3rd Floor   3H    Department Address: [address] [place] [address] 3rd [facility] [address]   Department Phone: [phone]                               Care Plan: Cardiology       Problem: heart failure       Goal: Patient will attend cardiology clinic appointment in the next 12 months.       Start Date: 5/20/2024 Expected End Date: 5/31/2025    This Visit's Progress: 20% Recent Progress: 10%    Note:     Barriers: language barrier, low literacy, noncompliance, and lack of knowledge how to navigate complex health care system  Strengths: motivated to attend appt  Patient expressed understanding of goal: Yes    Action steps to achieve this goal:  1. I will see cardiologist when scheduled and if  recommended.  2. I will answer the phone when I am contacted by cardiologist clinic.  3. I will follow through recommendations if provided.                             Intervention and Education during outreach:  -CHW arranged transportation for all upcoming appointments with [facility] and reminded patient and caregiver.  -Per chart review, patient's cardiology appointment was cancelled on 5/28/2024 and per notes that patient does not have heart failure per Susan.  -CHW routed this outreach encounter to CCC RN to further review and advise.     CHW Next Outreach: In one month.

## 2024-05-29 NOTE — PROGRESS NOTES
Neuropsychological evaluation:  Tomy Ernandez,    We just open Dr. Peña schedule for the rest of the year.  I can schedule this client for 7/1 at 11am. Let me know if this works.     Tomas

## 2024-05-29 NOTE — TELEPHONE ENCOUNTER
"On the appointment said, \"[facility] \" if this not for psychiatry then I will try to call and schedule patient for psychiatry.       Thanks,  Misbah  "

## 2024-06-03 ENCOUNTER — PATIENT OUTREACH (OUTPATIENT)
Dept: NURSING | Facility: CLINIC | Age: 49
End: 2024-06-03
Payer: COMMERCIAL

## 2024-06-03 NOTE — PROGRESS NOTES
Clinic Care Coordination Contact  Follow Up Progress Note      Assessment: consulted with [facility] for the referral but [facility] declined the referral at this time due to patient only on 2 meds and no major medical diagnosis.     Plan: CCRN notified spouse and plan to refer back to home care if there's changes in medication or health condition.

## 2024-06-07 ENCOUNTER — VIRTUAL VISIT (OUTPATIENT)
Dept: PHARMACY | Facility: CLINIC | Age: 49
End: 2024-06-07
Attending: INTERNAL MEDICINE
Payer: COMMERCIAL

## 2024-06-07 DIAGNOSIS — G40.219 PARTIAL EPILEPSY WITH LOSS OF CONSCIOUSNESS, INTRACTABLE (H): Primary | ICD-10-CM

## 2024-06-07 DIAGNOSIS — R05.9 COUGH, UNSPECIFIED TYPE: ICD-10-CM

## 2024-06-07 DIAGNOSIS — F02.818 DEMENTIA DUE TO MEDICAL CONDITION WITH BEHAVIORAL DISTURBANCE (H): ICD-10-CM

## 2024-06-07 PROCEDURE — 99605 MTMS BY PHARM NP 15 MIN: CPT | Mod: 93 | Performed by: PHARMACIST

## 2024-06-07 NOTE — Clinical Note
Dr Anabelle Joe was referred to MTM services.  We attenpted to complete a Med rec over the phone but due to language and medical literacy this was difficult to accomplish.  I recommend that the patient bring in medications for future in person visits.  Regards, Luis Blackwell, Pharm.D.

## 2024-06-07 NOTE — PROGRESS NOTES
Medication Therapy Management (MTM) Encounter    ASSESSMENT:                            Medication Adherence/Access: some confusion related to meds. Please see comments below.  Language and literacy are significant barriers to medication management for Tin.      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  Will continue on this therapy, but some concern that the patient is not taking folic acid.    Cough:   Controlled      Seizure history :  Patient appears to be taking Depakote.      Constipation :   Controlled      PLAN:                            Continue on current medications.  May recommend to bring medications at future visits due to poor communication.  Medication list updated.    Follow-up: Follow-up with primary care provider    SUBJECTIVE/OBJECTIVE:                          Heath Misbah is a 49 year old male called for an initial visit. He was referred to me from Dr. Lovett. Patient was accompanied by Spouse Maine..   Wife reports that Heath is available in the room with her, but is not verbal and cannot communicate by phone.        Reason for visit: Medication review .    Allergies/ADRs: Reviewed in chart  Past Medical History: Reviewed in chart  Tobacco: He reports that he has quit smoking. His smoking use included cigarettes. He has been exposed to tobacco smoke. He has quit using smokeless tobacco.  Alcohol: history of alcohol dependence    Medication Adherence/Access: Patient takes medications directly from bottles and has assistance with medication administration: spouse and son.  Patient takes medications 2 time(s) per day.     The telephone visit today was limited as Maine (spouse) is unable to read English and cannot identify numbers.  Phone  was helpful in assisting the patient identify numbers, but with low confidence in accuracy.      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       The patient states that he was able to finish the azithromycin and prednisone medications. Shortness of breath has been resolved. He is doing good now. No concerns endorsed from patient/wife.       Cough:   Doing better.   Albuterol inhaler: has not been used often.  Over the past week it has not been used.        Seizure history :  Depakote:  unclear if taking.    Maine describes an oval tablet, pink, and with markings.  This may be the Depakote tablet.  Heath is taking this twice a day and report that this med was present before the most recent hospitalization.      Post hospital visit:   No Abx remaining.      Constipation :   Has been using miralax at home as needed.      ----------------  Post Discharge Medication Reconciliation Status: discharge medications reconciled and changed, per note/orders.    I spent 30 minutes with this patient today. All changes were made via collaborative practice agreement with Adrian Dyson MD. A copy of the visit note was provided to the patient's provider(s).    A summary of these recommendations was declined by the patient.    Luis Blackwell, Pharm.D.    Telemedicine Visit Details  Type of service:  Telephone visit  Start Time:  1:20 PM  End Time: 1:50 PM     Medication Therapy Recommendations  No medication therapy recommendations to display           Pharmacy Fill history 06/07/24   Albuterol Sulfate     Dispensed Days Supply Quantity Provider Pharmacy   VENTOLIN HFA 90 MCG INHALER 05/22/2024 25 18 g Zita Magallanes MD [facility]...      Azithromycin     Dispensed Days Supply Quantity Provider Pharmacy   AZITHROMYCIN 500 MG TABS 05/20/2024 3 3 Units Zita Magallanes MD [facility]...      Divalproex Sodium      Dispensed Days Supply Quantity Provider Pharmacy   DIVALPROEX SOD  MG TA 03/12/2024 30 60 Units Mitchell Naik MD [facility]...   DIVALPROEX SOD  MG TA 12/21/2023 30 60 Units Mitchell Naik MD [facility]...   DIVALPROEX SOD  MG TA 10/04/2023 30 60 Units Mitchell Naik MD [facility]...      hydrOXYzine HCl     Dispensed Days Supply Quantity Provider Pharmacy   HYDROXYZINE HCL 25 MG TABS 12/21/2023 30 180 Units Adrian Dyson MD [facility]...   HYDROXYZINE HCL 25 MG TABS 09/15/2023 30 180 Units Adrian Dyson MD [facility]...      Polyethylene Glycol 3350     Dispensed Days Supply Quantity Provider Pharmacy   CLEARLAX POWD 05/31/2024 30 510 g Adrian Dyson MD [facility]...   POLYETH GLYC POW 3350 NF 05/08/2024 30 510 g [facility]...      predniSONE     Dispensed Days Supply Quantity Provider Pharmacy   PREDNISONE 20 MG TABS 05/20/2024 5 10 Units Zita Magallanes MD [facility]...      Thiamine HCl     Dispensed Days Supply Quantity Provider Pharmacy   VITAMIN B-1 100 MG TABS 05/31/2024 90 90 Units Adrian Dyson MD [facility]...   VITAMIN B1   TAB 100MG 05/08/2024 30 30 Units [facility]

## 2024-06-19 ENCOUNTER — PATIENT OUTREACH (OUTPATIENT)
Dept: NURSING | Facility: CLINIC | Age: 49
End: 2024-06-19
Payer: COMMERCIAL

## 2024-06-19 NOTE — PROGRESS NOTES
Clinic Care Coordination Contact  Follow Up Progress Note      Assessment: CCRN reminded patient and spouse of his upcoming neurology appointment on 6/25/2024. Spouse states patient is taking his meds as prescribed and no concerns. States no refills concerns.     Plan: Patient will attend his neurology appt on 6/25/2024.

## 2024-06-25 ENCOUNTER — OFFICE VISIT (OUTPATIENT)
Dept: NEUROLOGY | Facility: CLINIC | Age: 49
End: 2024-06-25
Payer: COMMERCIAL

## 2024-06-25 ENCOUNTER — PATIENT OUTREACH (OUTPATIENT)
Dept: CARE COORDINATION | Facility: CLINIC | Age: 49
End: 2024-06-25

## 2024-06-25 VITALS
DIASTOLIC BLOOD PRESSURE: 82 MMHG | OXYGEN SATURATION: 96 % | HEART RATE: 84 BPM | BODY MASS INDEX: 22.19 KG/M2 | WEIGHT: 113 LBS | SYSTOLIC BLOOD PRESSURE: 114 MMHG | HEIGHT: 60 IN

## 2024-06-25 DIAGNOSIS — G40.219 PARTIAL EPILEPSY WITH LOSS OF CONSCIOUSNESS, INTRACTABLE (H): ICD-10-CM

## 2024-06-25 PROCEDURE — 99215 OFFICE O/P EST HI 40 MIN: CPT | Performed by: PSYCHIATRY & NEUROLOGY

## 2024-06-25 RX ORDER — DIVALPROEX SODIUM 500 MG/1
500 TABLET, DELAYED RELEASE ORAL 2 TIMES DAILY
Qty: 60 TABLET | Refills: 11 | Status: SHIPPED | OUTPATIENT
Start: 2024-06-25

## 2024-06-25 ASSESSMENT — PAIN SCALES - GENERAL: PAINLEVEL: NO PAIN (0)
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
## 2024-06-25 NOTE — PROGRESS NOTES
Clinic Care Coordination Contact  Community Health Worker Follow Up    Care Gaps:   Health Maintenance Due   Topic Date Due    HF ACTION PLAN  Never done    COVID-19 Vaccine (1) Never done    COLORECTAL CANCER SCREENING  Never done    HEPATITIS A IMMUNIZATION (1 of 2 - Risk 2-dose series) Never done     Care Gaps Last addressed on 5/28/2024 at PCP follow up visit.    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       Start Date: 5/20/2024 Expected End Date: 5/31/2025    This Visit's Progress: 30% Recent Progress: 20%    Note:     Barriers: language barrier, low literacy, noncompliance, and lack of knowledge how to navigate complex health care system  Strengths: motivated to attend appt  Patient expressed understanding of goal: Yes    Action steps to achieve this goal:  1. I will attend my 3 months follow up with neurologist today, 6/25/2204 at 1:15pm. Transportation requested to [facility]: [phone].  3. My wife will answer the phone when  calls for .  4. My PCA will accommodate the appointment.      Mitchell Naik MD   [facility] [place] 3rd [facility]   3H    Department Address: [address] [place] [address] [address] [facility] [address]   Department Phone: [phone]                               Care Plan: Cardiology       Problem: heart failure       Goal: Patient will attend cardiology clinic appointment in the next 12 months.       Start Date: 5/20/2024 Expected End Date: 5/31/2025    This Visit's Progress: 30% Recent Progress: 20%    Note:     Barriers: language barrier, low literacy, noncompliance, and lack of knowledge how to navigate complex health care system  Strengths: motivated to attend appt  Patient expressed understanding of goal: Yes    Action steps to achieve this goal:  1. I will see cardiologist when scheduled and  if recommended.  2. I will answer the phone when I am contacted by cardiologist clinic.  3. I will follow through recommendations if provided.                           Intervention and Education during outreach:  -Reminded patient of neurologist appointment today and transportation is confirmed.   -Cardiology goal has been deleted as per cardiology clinic that patient does not have heart failure. Patient's appointments at cardiology on 5/29/2024 were cancelled.  -Patient was informed to bring all medication bottles for review.  -Patient was informed to call with questions or concerns.     CH[facility] Next Outreach: In one month.

## 2024-06-26 ENCOUNTER — OFFICE VISIT (OUTPATIENT)
Dept: FAMILY MEDICINE | Facility: CLINIC | Age: 49
End: 2024-06-26
Payer: COMMERCIAL

## 2024-06-26 VITALS
BODY MASS INDEX: 22.4 KG/M2 | TEMPERATURE: 97.4 F | HEIGHT: 60 IN | HEART RATE: 64 BPM | RESPIRATION RATE: 16 BRPM | OXYGEN SATURATION: 100 % | SYSTOLIC BLOOD PRESSURE: 124 MMHG | WEIGHT: 114.08 LBS | DIASTOLIC BLOOD PRESSURE: 85 MMHG

## 2024-06-26 DIAGNOSIS — G40.219 PARTIAL EPILEPSY WITH LOSS OF CONSCIOUSNESS, INTRACTABLE (H): ICD-10-CM

## 2024-06-26 DIAGNOSIS — F69 BEHAVIOR PROBLEM, ADULT: ICD-10-CM

## 2024-06-26 DIAGNOSIS — F10.21 ALCOHOL USE DISORDER, SEVERE, IN SUSTAINED REMISSION (H): ICD-10-CM

## 2024-06-26 DIAGNOSIS — F02.818 DEMENTIA DUE TO MEDICAL CONDITION WITH BEHAVIORAL DISTURBANCE (H): Primary | ICD-10-CM

## 2024-06-26 PROCEDURE — G2211 COMPLEX E/M VISIT ADD ON: HCPCS | Performed by: FAMILY MEDICINE

## 2024-06-26 PROCEDURE — 99213 OFFICE O/P EST LOW 20 MIN: CPT | Performed by: FAMILY MEDICINE

## 2024-06-26 NOTE — PATIENT INSTRUCTIONS
-Thank you for choosing the [facility].  -It was a pleasure to see you today.  -Please take a look at the information below for more specific details regarding the treatment plan and recommendations.  -In this after visit summary is a list of your medications and specific instructions.  Please review this carefully as there may be changes made to your medication list.  -If there are any particular questions or concerns, please feel free to reach out to Dr. Dyson.  -If any labs have been completed, we will reach out to you about results.  If the results are normal or not concerning, a letter or Kinooshart message will be sent to you.  If any follow-up is needed, either Dr. Dyson or the nurse will give you a call.  If you have not heard regarding results after 2 weeks, please reach out to the clinic.    Patient Instructions:    -Continue medications as prescribed.  -Continue to avoid alcohol use.  -Continue taking the thiamine as prescribed.  -Follow a balanced diet.    Please seek immediate medical attention (go to the emergency room or urgent care) for the following reasons: worsening symptoms, or any concerning changes.      --------------------------------------------------------------------------------------------------------------------    -We are always looking for ways to improve.  You may be selected to receive a survey regarding your visit today.  We encourage you to complete the survey and provide specific, constructive feedback to help us improve our processes.  Thank you for your time!  -Please review the contact information listed on the after visit summary and in the electronic chart.  Below is the phone number that we have on file.  If there are any changes that are needed to be made, please reach out to the clinic.  [phone] (home)

## 2024-06-26 NOTE — PROGRESS NOTES
OFFICE VISIT    Assessment/Plan:     Patient Instructions:    -Continue medications as prescribed.  -Continue to avoid alcohol use.  -Continue taking the thiamine as prescribed.  -Follow a balanced diet.    Please seek immediate medical attention (go to the emergency room or urgent care) for the following reasons: worsening symptoms, or any concerning changes.      Heath was seen today for hospital f/u.  Diagnoses and all orders for this visit:    Dementia due to medical condition with behavioral disturbance (H)  Alcohol use disorder  Behavior problem, adult  Partial epilepsy with loss of consciousness, intractable (H): Patient reports continue to be stable and son is in agreement.  Medication seems to be working well.  Patient continues to do well when he remains off of alcohol.  Encouragement and recommendations were provided.  Patient to continue following up with specialist.  Discussed available refills for patient.        Return in about 6 months (around 12/26/2024) for Medication follow up.    The diagnoses, treatment options, risk, benefits, and recommendations were reviewed with patient/guardian.  Questions were answered to patient's/guardian satisfaction.  Red flag signs were reviewed.  Patient/guardian is in agreement with above plan.    Disability certificate form for parking certificate completed and given to patient.    Subjective: 49 year old male with history of dementia with behavioral disturbance, alcohol use disorder, seizure disorder, hepatitis C infection previously on Mavyret (Neg Hep C RNA test on 3/11/2024), amputation of right upper extremity who presents to clinic for the following complaints:   Patient presents with:  Hospital F/U    Patient was last seen on 5/28/2024 after discharge from an almost 2-month hospital stay.  At that last visit, patient reported doing well and symptoms were stable.  He had reported taking medications as prescribed.  He denied any issues or side effects and was  recommended to continue the medications.    Patient was scheduled to see neurology yesterday (6/25/2024).  It is reported in the note that the son had informed the neurologist that patient had not used any alcohol since discharge home from the hospital and that he had to remain calm and cooperative.  Plans were to complete labs from that visit.  Patient was to continue on the divalproex at the current level and follow-up in 4 months.    On review of the chart, it does not appear that labs have been completed as ordered from 3/11/2024. They haven't heard to schedule a lab only appt yet.     Last dose of the depakote was today at 7-8 AM (a few hours ago).  Discussed appropriate timing of lab testing.    Patient has done well after the hospital visit in 05/14/2024. Diagnosed with bronchitis at that time.     Overall, patient continues to do well.  He remains stable.  Encouraged patient to continue to avoid alcohol use.    HM due was reviewed with patient/parent.  Recommendations, risk, benefits were reviewed.  Accepted recommendations were ordered.  Otherwise, patient/parent declined.    Health Maintenance Due   Topic Date Due    NICOTINE/TOBACCO CESSATION COUNSELING Q 1 YR  Never done    HF ACTION PLAN  Never done    COLORECTAL CANCER SCREENING  Never done    COVID-19 Vaccine (1 - 2023-24 season) Never done    ANNUAL REVIEW OF HM ORDERS  12/27/2023     Immunity to Hep A from 3/11/24    Immunization History   Administered Date(s) Administered    Influenza Vaccine >6 months,quad, PF 01/24/2023    Pneumococcal 20 valent Conjugate (Prevnar 20) 01/24/2023    TDAP (Adacel,Boostrix) 01/24/2023       Patient presents with son.   A professional [employer] telephone  was utilized for the office visit.     The 10 point review of system is negative except as stated in the HPI.    Allergies were reviewed and updated.    Objective:   /85   Pulse 64   Temp 97.4  F (36.3  C) (Oral)   Resp 16   Ht 1.523 m (4'  "11.96\")   Wt 51.7 kg (114 lb 1.3 oz)   SpO2 100%   BMI 22.31 kg/m    General: Active, alert, nontoxic-appearing.  No acute distress.  HEENT: Normocephalic, atraumatic.  Pupils are equal and round.  Sclera is clear.  Normal external ears. Nares patent.  Moist mucous membranes.    Cardiac: RRR.  S1, S2 present.  No murmurs, rubs, or gallops.  Respiratory/chest: Clear to auscultation bilaterally.  No wheezes, rales, rhonchi.  Breathing is not labored.  No accessory muscle usage.  Extremities: Right upper extremity amputation.  Otherwise, voluntary movements intact.  Integumentary: No concerning rash or skin changes appreciated.        Adrian Dyson MD  [facility] [place] [address]  Phone: [phone]  Fax: [phone]    6/27/2024  1:51 PM            Current Outpatient Medications   Medication Sig Dispense Refill    divalproex sodium delayed-release (DEPAKOTE) 500 MG DR tablet Take 1 tablet (500 mg) by mouth 2 times daily 60 tablet 11    thiamine (B-1) 100 MG tablet Take 1 tablet (100 mg) by mouth daily for vitamin supplement 90 tablet 3    albuterol (PROAIR HFA/PROVENTIL HFA/VENTOLIN HFA) 108 (90 Base) MCG/ACT inhaler Inhale 2 puffs into the lungs every 6 hours as needed for shortness of breath, wheezing or cough (Patient not taking: Reported on 6/26/2024) 18 g 0    polyethylene glycol (MIRALAX) 17 GM/Dose powder Take 17 g by mouth daily as needed for constipation for constipation. (Patient not taking: Reported on 6/26/2024) 510 g 11     No current facility-administered medications for this visit.       No Known Allergies    Patient Active Problem List    Diagnosis Date Noted    Agitation 05/14/2024     Priority: Medium    Acute hypoxemic respiratory failure (H) 05/14/2024     Priority: Medium    Dyspnea, unspecified type 05/14/2024     Priority: Medium    Violent behavior 03/12/2024     Priority: Medium    Unable to care for self 03/12/2024     Priority: Medium    Partial epilepsy " with loss of consciousness, intractable (H) 03/11/2024     Priority: Medium    Alcohol abuse 03/11/2024     Priority: Medium    Behavior problem, adult 03/11/2024     Priority: Medium    Alcohol use disorder 01/02/2024     Priority: Medium    Insomnia, unspecified type 04/25/2023     Priority: Medium    Hepatitis C virus infection, unspecified chronicity 02/27/2023     Priority: Medium     Treated with Mavyret.  Hep C RNA test was negative on 3/11/2024.      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  Added automatically from request for surgery [id number]         Family History   Problem Relation Age of Onset    Coronary Artery Disease No family hx of     Diabetes No family hx of     Hypertension No family hx of        No past surgical history on file.     Social History     Socioeconomic History    Marital status: Single     Spouse name: Not on file    Number of children: Not on file    Years of education: Not on file    Highest education level: Not on file   Occupational History    Not on file   Tobacco Use    Smoking status: Every Day     Types: Cigarettes     Passive exposure: Current    Smokeless tobacco: Former    Tobacco comments:     Son smokes outside   Vaping Use    Vaping status: Never Used   Substance and Sexual Activity    Alcohol use: Yes     Comment: Patient drinks all day per patients wife [place]    Drug use: Yes     Types: Marijuana, Other    Sexual activity: Not on file   Other Topics Concern    Not on file   Social History Narrative    Not on file     Social Determinants of Health     Financial Resource Strain: Low Risk  (1/2/2024)    Financial Resource  Strain     Within the past 12 months, have you or your family members you live with been unable to get utilities (heat, electricity) when it was really needed?: No   Food Insecurity: Low Risk  (1/2/2024)    Food Insecurity     Within the past 12 months, did you worry that your food would run out before you got money to buy more?: No     Within the past 12 months, did the food you bought just not last and you didn t have money to get more?: No   Transportation Needs: High Risk (1/2/2024)    Transportation Needs     Within the past 12 months, has lack of transportation kept you from medical appointments, getting your medicines, non-medical meetings or appointments, work, or from getting things that you need?: Yes   Physical Activity: Not on file   Stress: Not on file   Social Connections: Not on file   Interpersonal Safety: Not on file   Housing Stability: Low Risk  (1/2/2024)    Housing Stability     Do you have housing? : Yes     Are you worried about losing your housing?: No

## 2024-07-02 ENCOUNTER — PATIENT OUTREACH (OUTPATIENT)
Dept: NURSING | Facility: CLINIC | Age: 49
End: 2024-07-02
Payer: COMMERCIAL

## 2024-07-02 NOTE — PROGRESS NOTES
Clinic Care Coordination Contact  Follow Up Progress Note      Assessment: Patient and spouse agreed to be referred to [facility] for armhs worker to assist with citizenship process.     Citizenship waiver   Medical waiver appointment scheduled on 9/5/24 at 1:00pm. Spouse is requesting same day appt as well. Message sent to Ruam.     Plan: [facility] staff will visit patient in home for initial intake on 7/15/2024 at 9am.

## 2024-07-02 NOTE — LETTER
[facility]  Patient Centered Plan of Care  About Me:        Patient Name:  Heath Crawley    YOB: 1975  Age:         49 year old   Jayesh MRN:    [id number] Telephone Information:  Home Phone [phone]   Mobile [phone]       Address:  [address]  [place] [address] Email address:  No e-mail address on record      Emergency Contact(s)    Name Relationship Lgl Grd Work Phone Home Phone Mobile Phone   Lorie. JEANNE HAYWOOD Spouse    [phone]           Primary language:  [place]     needed? Yes   [facility] [employer]:  [phone] op. 1  Other communication barriers:Language barrier; Physical impairment; Lack of coping    Preferred Method of Communication:     Current living arrangement: I live in a private home with family    Mobility Status/ Medical Equipment: Dependent/Assisted by Another        Health Maintenance  Health Maintenance Reviewed: Due/Overdue       My Access Plan  Medical Emergency 911   Primary Clinic Line [facility] [phone]   24 Hour Appointment Line [phone] or  [phone]) (toll-free)   24 Hour Nurse Line [phone] (toll-free)   Preferred Urgent Care [facility] [phone]     [facility]  [phone]     Preferred Pharmacy [id number] - [place], [place] - [address] AT [facility]     Behavioral Health Crisis Line The National Suicide Prevention Lifeline at 1-866.602.2477 or Text/Call 558           My Care Team Members  Patient Care Team         Relationship Specialty Notifications Start End    Adrian Dyson MD PCP - General Family Medicine All results, Admissions 4/25/23     Phone: [phone] Fax: [phone]         [address] [facility] [address]    Diana Arceo MD MD Family Medicine  12/30/22     Phone: [phone] Fax: [phone]         [address] [place]  [address]    Kaleigh Emery PA-C Physician Assistant Gastroenterology  12/30/22     Phone: [phone] Fax: [phone]         [address] [facility] [address]    Leventhal, Thomas Michael, MD MD Gastroenterology  2/13/23     Phone: [phone] Fax: [phone]         [address] [place] [address]    [place] Worker   3/14/23     [employer]    Phone: [phone]         Leventhal, Thomas Michael, MD Assigned Gastroenterology Provider   4/15/23     Phone: [phone] Fax: [phone]         [address] [facility] [address]    Misbah Mullins CHW Community Health Worker Primary Care - [facility] Admissions 4/28/23     Phone: [phone] Fax: [phone]         [address] [place] [address]    Oma Chaparro RN Lead Care Coordinator Primary Care - [facility] Admissions 4/28/23     Phone: [phone]         Mitchell Naik MD Assigned Neuroscience Provider   5/27/23     Phone: [phone] Fax: [phone]         [address] [place] [address]    [facility] Personal Care Attendant PCA   8/4/23     Son/PCA: 6.5 hours per day.    Phone: [phone]         Dewayne Torres, PhD LP MD Neuropsychology  5/21/24     Phone: [phone] Fax: [phone]         [address] [place] [address]    Luis Blackwell RP Pharmacist Pharmacist  6/7/24     Phone: [phone] Fax: [phone]         [address] [place] [address]    Adrian Dyson MD Assigned PCP   6/23/24     Phone: [phone] Fax: [phone]         [address] [place] [address]    Luis Blackwell RPH Assigned MTM Pharmacist   6/23/24     Phone: [phone] Fax: [phone]         [address] [facility] [address]                My Care Plans  Self Management and Treatment Plan    Care Plan  Care Plan: Neurology       Problem: Dementia due to medical condition with behavioral disturbance       Goal: Patient will attend his follow up neurology clinic in the next 12  months.       Start Date: 5/20/2024 Expected End Date: 5/31/2025    This Visit's Progress: 40% Recent Progress: 30%    Note:     Barriers: language barrier, low literacy, noncompliance, and lack of knowledge how to navigate complex health care system  Strengths: motivated to attend appt  Patient expressed understanding of goal: Yes    Action steps to achieve this goal:  1. I will attend my 3 months follow up with neurologist today, 6/25/2204 at 1:15pm. Completed.  3. I will attend 4 months follow up with neurologist as scheduled on 10/14/2024 at 2:15 PM.  4. My PCA will accommodate the appointment.      Mitchell Naik MD   [facility] NEUROLOGY - 3rd Floor   3H    Department Address: [address] [place] [address] 3rd [facility] [address]   Department Phone: [phone]                               Care Plan: Citizenship waiver       Problem: neurospych eval       Goal: Patient will attend medical waiver appointment in Sept, 2024.       Start Date: 7/2/2024 Expected End Date: 9/30/2024    This Visit's Progress: 30% Recent Progress: 20%    Note:     Barriers: language barrier, low literacy, noncompliance, and lack of knowledge how to navigate complex health care system  Strengths: motivated to attend appt  Patient expressed understanding of goal: Yes    Action steps to achieve this goal:  1. I will answer my phone when I am contacted to schedule my appointment.  2. I will attend my medical waiver appointment as scheduled on 9/5/24 at 1:00pm with [employer]iz Solis will provider ride.  3. I will schedule a follow up appointment with my provider if it is recommended to do so while I am at the clinic.  4. I will follow up with [facility] regarding this goal at each outreach until it is completed.                               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    Care Coordination Start Date: 2/20/2023   Frequency of Care Coordination: monthly, more frequently as needed     Form Last Updated: 08/19/2024

## 2024-07-10 ENCOUNTER — TELEPHONE (OUTPATIENT)
Dept: NEUROLOGY | Facility: CLINIC | Age: 49
End: 2024-07-10
Payer: COMMERCIAL

## 2024-07-12 NOTE — TELEPHONE ENCOUNTER
Patient confirmed scheduled appointment:  Date: 10/14  Time: 2:30PM  Visit type: New Seizure  Provider: Rita  [place] [facility]  Testing/imaging: NA  Additional notes: Patient's family sites he normally gets ride with [employer] or through the  - patient's son expresses he understands now we do not set up rides and will be reaching out to their company for this visit's transport.    Elsy Gonsalez on 7/12/2024 at 9:57 AM

## 2024-07-25 ENCOUNTER — PATIENT OUTREACH (OUTPATIENT)
Dept: CARE COORDINATION | Facility: CLINIC | Age: 49
End: 2024-07-25
Payer: COMMERCIAL

## 2024-07-25 NOTE — PROGRESS NOTES
Clinic Care Coordination Contact  Community Health Worker Follow Up    Care Gaps:   Health Maintenance Due   Topic Date Due    NICOTINE/TOBACCO CESSATION COUNSELING Q 1 YR  Never done    HF ACTION PLAN  Never done    COLORECTAL CANCER SCREENING  Never done    COVID-19 Vaccine (1 - 2023-24 season) Never done     Care Gaps Last addressed on 7/02/2024.    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       Start Date: 5/20/2024 Expected End Date: 5/31/2025    This Visit's Progress: 40% Recent Progress: 30%    Note:     Barriers: language barrier, low literacy, noncompliance, and lack of knowledge how to navigate complex health care system  Strengths: motivated to attend appt  Patient expressed understanding of goal: Yes    Action steps to achieve this goal:  1. I will attend my 3 months follow up with neurologist today, 6/25/2204 at 1:15pm. Completed.  3. I will attend 4 months follow up with neurologist as scheduled on 10/14/2024 at 2:15 PM.  4. My PCA will accommodate the appointment.      Mitchell Naik MD   [facility] NEUROLOGY - 3rd Floor   3H    Department Address: [address] [place] [address] 3rd [facility] [address]   Department Phone: [phone]                               Care Plan: Citizenship waiver       Problem: neurospych eval       Goal: Patient will attend medical waiver appointment in Sept, 2024.       Start Date: 7/2/2024 Expected End Date: 9/30/2024    This Visit's Progress: 30% Recent Progress: 20%    Note:     Barriers: language barrier, low literacy, noncompliance, and lack of knowledge how to navigate complex health care system  Strengths: motivated to attend appt  Patient expressed understanding of goal: Yes    Action steps to achieve this goal:  1. I will answer my phone when I am contacted to schedule my appointment.  2. I will attend my  medical waiver appointment as scheduled on 9/5/24 at 1:00pm with [facility]. Thida will provider ride.  3. I will schedule a follow up appointment with my provider if it is recommended to do so while I am at the clinic.  4. I will follow up with [facility] regarding this goal at each outreach until it is completed.                           Intervention and Education during outreach:   -[employer] is assisting patient with citizenship process and medical waiver for citizenship is scheduled at [facility] on 9/05/2024.  -CHW reminded patient of lab appointment and neurologist follow up appointment is scheduled in October 2024.   -Patient was informed to call with questions or concerns.       CHW Next Outreach: In one month.

## 2024-07-26 ENCOUNTER — LAB (OUTPATIENT)
Dept: LAB | Facility: CLINIC | Age: 49
End: 2024-07-26
Payer: COMMERCIAL

## 2024-07-26 DIAGNOSIS — G40.219 PARTIAL EPILEPSY WITH LOSS OF CONSCIOUSNESS, INTRACTABLE (H): ICD-10-CM

## 2024-08-26 NOTE — TELEPHONE ENCOUNTER
RECORDS RECEIVED FROM: Internal    REASON FOR VISIT: Transferring care from Dr Naik, last seen June 2024   PROVIDER: Lilly Salinas MD   DATE OF APPT: 10/14/24 @ 2:30 pm    NOTES (FOR ALL VISITS) STATUS DETAILS   OFFICE NOTE from referring provider Internal 6/25/24, 3/11/24, 10/3/23 [facility]    See Additional Encounters   OFFICE NOTE from other specialist Internal 6/26/24, 5/28/24, 1/2/24 [facility]    6/7/24 Luis Blackwell, [facility]    5/20/24 Zita Magallanes MD @[facility]    See Additional Encounters   DISCHARGE SUMMARY from hospital Internal 3/11/24-5/8/24 John Novak DO @[facility]     EEG Internal [facility]  5/16/23 EEG Video 2-12 Hrs Continuous Monitoring     MEDICATION LIST Internal    IMAGING  (FOR ALL VISITS)     CT (HEAD, NECK, SPINE) Internal [facility]  3/11/24 CT Head    [facility]  11/2/22 CT Head

## 2024-08-28 ENCOUNTER — PATIENT OUTREACH (OUTPATIENT)
Dept: CARE COORDINATION | Facility: CLINIC | Age: 49
End: 2024-08-28
Payer: COMMERCIAL

## 2024-08-28 NOTE — PROGRESS NOTES
Clinic Care Coordination Contact  Care Coordination Clinician Chart Review    Situation: Patient chart reviewed by Care Coordinator.       Background: Care Coordination Program started: 2/20/2023. Initial assessment completed and patient-centered care plan(s) were developed with participation from patient. Lead CC handed patient off to CHW for continued outreaches.       Assessment: Per chart review, patient outreach completed by CC CHW on 7/23/24.  Patient is actively working to accomplish goal(s). Patient's goal(s) appropriate and relevant at this time. Patient is not due for updated Plan of Care.  Assessments will be completed annually or as needed/with change of patient status.      Care Plan: Neurology       Problem: Dementia due to medical condition with behavioral disturbance       Goal: Patient will attend his follow up neurology clinic in the next 12 months.       Start Date: 5/20/2024 Expected End Date: 5/31/2025    This Visit's Progress: 40% Recent Progress: 30%    Note:     Barriers: language barrier, low literacy, noncompliance, and lack of knowledge how to navigate complex health care system  Strengths: motivated to attend appt  Patient expressed understanding of goal: Yes    Action steps to achieve this goal:  1. I will attend my 3 months follow up with neurologist today, 6/25/2204 at 1:15pm. Completed.  3. I will attend 4 months follow up with neurologist as scheduled on 10/14/2024 at 2:15 PM.  4. My PCA will accommodate the appointment.      Mitchell Naik MD   [facility] [place] [facility] - 3rd Floor   3H    Department Address: [address] [place] [address]   Department Phone: [phone]                               Care Plan: Citizenship waiver       Problem: neurospych eval       Goal: Patient will attend medical waiver appointment in Sept, 2024.       Start Date: 7/2/2024 Expected End Date: 9/30/2024    This Visit's Progress: 30% Recent Progress: 20%    Note:     Barriers:  language barrier, low literacy, noncompliance, and lack of knowledge how to navigate complex health care system  Strengths: motivated to attend appt  Patient expressed understanding of goal: Yes    Action steps to achieve this goal:  1. I will answer my phone when I am contacted to schedule my appointment.  2. I will attend my medical waiver appointment as scheduled on 9/5/24 at 1:00pm with Ruiz Cabrera Irma will provider ride.  3. I will schedule a follow up appointment with my provider if it is recommended to do so while I am at the clinic.  4. I will follow up with CCC regarding this goal at each outreach until it is completed.                                  Plan/Recommendations: The patient will continue working with Care Coordination to achieve goal(s) as above. CHW will continue outreaches at minimum every 30 days and will involve Lead CC as needed or if patient is ready to move to Maintenance. Lead CC will continue to monitor CHW outreaches and patient's progress to goal(s) every 6 weeks.     Plan of Care updated and sent to patient: No

## 2024-09-03 ENCOUNTER — PATIENT OUTREACH (OUTPATIENT)
Dept: CARE COORDINATION | Facility: CLINIC | Age: 49
End: 2024-09-03
Payer: COMMERCIAL

## 2024-09-03 NOTE — PROGRESS NOTES
Clinic Care Coordination Contact  Community Health Worker Follow Up    Care Gaps:   Health Maintenance Due   Topic Date Due    NICOTINE/TOBACCO CESSATION COUNSELING Q 1 YR  Never done    HF ACTION PLAN  Never done    COLORECTAL CANCER SCREENING  Never done    INFLUENZA VACCINE (1) 09/01/2024    COVID-19 Vaccine (1 - 2023-24 season) Never done     PCP to address other medical care gaps at the next follow up visit.    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       Start Date: 5/20/2024 Expected End Date: 5/31/2025    This Visit's Progress: 50% Recent Progress: 40%    Note:     Barriers: language barrier, low literacy, noncompliance, and lack of knowledge how to navigate complex health care system  Strengths: motivated to attend appt  Patient expressed understanding of goal: Yes    Action steps to achieve this goal:  1. I will attend my 3 months follow up with neurologist today, 6/25/2204 at 1:15pm. Completed.  3. I will attend 4 months follow up with neurologist as scheduled on 10/14/2024 at 2:15 PM.  4. My PCA will accommodate the appointment.      Mitchell Naik MD   [facility] [place] [facility] - 3rd Floor   3H    Department Address: [address] [place] [address] 3rd [facility] [address]   Department Phone: [phone]                               Care Plan: Citizenship waiver       Problem: neurospych eval       Goal: Patient will attend medical waiver appointment in Sept, 2024.       Start Date: 7/2/2024 Expected End Date: 9/30/2024    This Visit's Progress: 40% Recent Progress: 30%    Note:     Barriers: language barrier, low literacy, noncompliance, and lack of knowledge how to navigate complex health care system  Strengths: motivated to attend appt  Patient expressed understanding of goal: Yes    Action steps to achieve this goal:  1. I will answer my phone when  I am contacted to schedule my appointment. Completed.  2. I will attend my medical waiver appointment as scheduled on 9/5/24 at 1:00pm with Ruiz Roy. Transportation with [employer].  3. I will schedule a follow up appointment with my provider if it is recommended to do so while I am at the clinic.  4. I will follow up with CCC regarding this goal at each outreach until it is completed.     Reminded patient.                           Intervention and Education during outreach:  -CHW reminded patient of medical waiver appointment as scheduled above.  -Patient was informed to call with questions or concerns.     CHW Next Outreach: In one month.

## 2024-10-07 ENCOUNTER — PATIENT OUTREACH (OUTPATIENT)
Dept: CARE COORDINATION | Facility: CLINIC | Age: 49
End: 2024-10-07
Payer: COMMERCIAL

## 2024-10-07 NOTE — PROGRESS NOTES
Clinic Care Coordination Contact  Community Health Worker Follow Up    Care Gaps:   Health Maintenance Due   Topic Date Due    NICOTINE/TOBACCO CESSATION COUNSELING Q 1 YR  Never done    HF ACTION PLAN  Never done    COLORECTAL CANCER SCREENING  Never done    INFLUENZA VACCINE (1) 09/01/2024    COVID-19 Vaccine (1 - 2024-25 season) Never done     PCP to discuss other medical care gaps with patient at the next follow up visit.    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       Start Date: 5/20/2024 Expected End Date: 5/31/2025    This Visit's Progress: 60% Recent Progress: 50%    Note:     Barriers: language barrier, low literacy, noncompliance, and lack of knowledge how to navigate complex health care system  Strengths: motivated to attend appt  Patient expressed understanding of goal: Yes    Action steps to achieve this goal:  1. I will attend my 3 months follow up with neurologist today, 6/25/2204 at 1:15pm. Completed.  3. I will attend 4 months follow up with neurologist as scheduled on 10/14/2024 at 2:15 PM. Reminded and ride arranged with [employer].  4. My PCA will accommodate the appointment.      Mitchell Naik MD   [facility] [place] [facility] - 3rd Floor       Department Address: [address] [place] [address]   Department Phone: [phone]                               Care Plan: Citizenship waiver       Problem: neurospych eval                 Intervention and Education during outreach:  -Martina from [employer] will assist with citizenship process.  -Referral made to FRW under spouse to assist with [place] benefits.  -Transportation arranged for upcoming appointments.   -Patient was informed to call with questions or concerns.     CHW Next Outreach: In one month.

## 2024-10-14 ENCOUNTER — PRE VISIT (OUTPATIENT)
Dept: NEUROLOGY | Facility: CLINIC | Age: 49
End: 2024-10-14

## 2024-10-16 ENCOUNTER — PATIENT OUTREACH (OUTPATIENT)
Dept: CARE COORDINATION | Facility: CLINIC | Age: 49
End: 2024-10-16
Payer: COMMERCIAL

## 2024-10-16 NOTE — PROGRESS NOTES
Clinic Care Coordination Contact  Care Coordination Clinician Chart Review    Situation: Patient chart reviewed by Care Coordinator.       Background: Care Coordination Program started: 2/20/2023. Initial assessment completed and patient-centered care plan(s) were developed with participation from patient. Lead CC handed patient off to CHW for continued outreaches.       Assessment: Per chart review, patient outreach completed by CC CHW on 10/7/24.  Patient is actively working to accomplish goal(s). Patient's goal(s) appropriate and relevant at this time. Patient is not due for updated Plan of Care.  Assessments will be completed annually or as needed/with change of patient status.    Neurology   Per chart review, patient no showed his 4 months follow up appointment on 10/14/24 even though transportation was set up. CCRN rescheduled neurology appointment on 1/6/24 at 10:45 am. Goal updated.     Psychiatry   Patient is scheduled to see a psychiatrist on 11/7/2024 at 12:30pm. Goal created.     Neuro-psych evaluation - memory loss, dementia.   Per chart review. Patient is scheduled on 12/2/2024 at 7:45 am.     Lab appointment   No showed on 8/1/24. Reschduled on 11/18/24 same day as spouse appointment to minimize no show.       Care Plan: Neurology       Problem: Dementia due to medical condition with behavioral disturbance       Goal: Patient will attend his follow up neurology clinic in the next 12 months.       Start Date: 5/20/2024 Expected End Date: 5/31/2025    Recent Progress: 60%    Note:     Barriers: language barrier, low literacy, noncompliance, and lack of knowledge how to navigate complex health care system  Strengths: motivated to attend appt  Patient expressed understanding of goal: Yes    Action steps to achieve this goal:  1. I will attend my 3 months follow up with neurologist today, 6/25/2204 at 1:15pm. Completed.  3. I will attend 4 months follow up with neurologist as scheduled on 10/14/2024 at  2:15 PM. Reminded and ride arranged with [employer]. No showed.   4. I will attend my scheduled 4 months follow up with a neurologist on 1/6/2024 at 10:45 am with Dr Salinas.  5. My PCA will accommodate the appointment.      Mitchell Naik MD   [facility] [place] [facility] - 3rd Floor   3H    Department Address: [address] [place] [address] 3rd [facility] [address]   Department Phone: [phone]                               Care Plan: Psychiatry       Problem: Dementia due to medical condition with behavioral disturbance       Goal: Patient will attend his psychiatry appointment in the next 12 months.       Start Date: 10/16/2024 Expected End Date: 10/31/2025    This Visit's Progress: 10%    Note:     Barriers: language barrier, low literacy, noncompliance, and lack of knowledge how to navigate complex health care system  Strengths: motivated to attend appt  Patient expressed understanding of goal: Yes    Action steps to achieve this goal:  1. I will answer my phone when I am contacted to schedule my appointment.  2. I will attend my follow-up psychiatry appointment as scheduled on 11/7/24 at 12:30 pm.   3. I will schedule a follow up appointment with my psychiatrist if it is recommended to do so while I am at the clinic.  4. I will follow up with [facility] regarding this goal at each outreach until it is completed.                               Care Plan: neuropsychological evaluation       Problem: dementia       Goal: Patient will attend his neuropsych eval appointment in December, 24.       Start Date: 10/16/2024 Expected End Date: 12/31/2024    This Visit's Progress: 30%    Note:     Barriers: language barrier, low literacy, noncompliance, and lack of knowledge how to navigate complex health care system  Strengths: motivated to attend appt  Patient expressed understanding of goal: Yes    Action steps to achieve this goal:  1. I will answer my phone when I am contacted to schedule my appointment.  2. I will attend my  neuro-psych eval appointment as scheduled on 12/2/2024 at 7:45 am.   3. I will schedule a follow up appointment with my PCP if it is recommended to do so while I am at the clinic.  4. I will follow up with CCC regarding this goal at each outreach until it is completed.                                    Plan/Recommendations: The patient will continue working with Care Coordination to achieve goal(s) as above. CHW will continue outreaches at minimum every 30 days and will involve Lead CC as needed or if patient is ready to move to Maintenance. Lead CC will continue to monitor CHW outreaches and patient's progress to goal(s) every 6 weeks.     Plan of Care updated and sent to patient: No

## 2024-10-16 NOTE — LETTER
[facility]  Patient Centered Plan of Care  About Me:        Patient Name:  Heath Crawley    YOB: 1975  Age:         49 year old   Jayesh MRN:    [id number] Telephone Information:  Home Phone [phone]   Mobile [phone]       Address:  [address]  [place] [address] Email address:  No e-mail address on record      Emergency Contact(s)    Name Relationship Lgl Grd Work Phone Home Phone Mobile Phone   Lorie. JEANNE HAYWOOD Spouse    [phone]           Primary language:  [place]     needed? Yes   [facility] [employer] [facility]:  [phone] op. 1  Other communication barriers:Language barrier; Physical impairment; Lack of coping    Preferred Method of Communication:     Current living arrangement: I live in a private home with family    Mobility Status/ Medical Equipment: Dependent/Assisted by Another        Health Maintenance  Health Maintenance Reviewed: Due/Overdue       My Access Plan  Medical Emergency 911   Primary Clinic Line [facility] [phone]   24 Hour Appointment Line [phone] or  [phone]) (toll-free)   24 Hour Nurse Line [phone] (toll-free)   Preferred Urgent Care [facility] [phone]     [facility]  [phone]     Preferred Pharmacy [id number] [place], [place] - [address] AT [facility]     Behavioral Health Crisis Line The National Suicide Prevention Lifeline at 1-396.524.1201 or Text/Call 088           My Care Team Members  Patient Care Team         Relationship Specialty Notifications Start End    Adrian Dyson MD PCP - General Family Medicine All results, Admissions 4/25/23     Phone: [phone] Fax: [phone]         [address] [facility] [address]    Diana Arceo MD MD Family Medicine  12/30/22     Phone: [phone] Fax: [phone]         [address] [place]  [address]    Kaleigh Emery PA-C Physician Assistant Gastroenterology  12/30/22     Phone: [phone] Fax: [phone]         [address] [facility] [address]    Leventhal, Thomas Michael, MD MD Gastroenterology  2/13/23     Phone: [phone] Fax: [phone]         [address] [place] [address]    Stacie Denisse [place] Worker   3/14/23     [employer]    Phone: [phone]         Misbah Mullins CHW Community Health Worker Primary Care - CC Admissions 4/28/23     Phone: [phone] Fax: [phone]         [address] [place] [address]    Oma Chaparro, RN Lead Care Coordinator Primary Care - CC Admissions 4/28/23     Phone: [phone]         Mitchell Naik MD Assigned Neuroscience Provider   5/27/23     Phone: [phone] Fax: [phone]         [address] [place] [address]    [facility] Personal Care Attendant PCA   8/4/23     Son/PCA: 6.5 hours per day.    Phone: [phone]         Dewayne Torres, PhD LP MD Neuropsychology  5/21/24     Phone: [phone] Fax: [phone]         [address] [place] [address]    Luis Blackwell [facility] Pharmacist Pharmacist  6/7/24     Phone: [phone] Fax: [phone]         [address] [address]    Adrian Dyson MD Assigned PCP   6/23/24     Phone: [phone] Fax: [phone]         [address] [place] [address]    Luis Blackwell [facility] Assigned MTM Pharmacist   6/23/24     Phone: [phone] Fax: [phone]         [address] [address]                My Care Plans  Self Management and Treatment Plan    Care Plan  Care Plan: Neurology       Problem: Dementia due to medical condition with behavioral disturbance       Goal: Patient will attend his follow up neurology clinic in the next 12 months.       Start Date: 5/20/2024 Expected End Date: 5/31/2025    This Visit's Progress: 70% Recent Progress: 60%    Note:     Barriers: language barrier, low  literacy, noncompliance, and lack of knowledge how to navigate complex health care system  Strengths: motivated to attend appt  Patient expressed understanding of goal: Yes    Action steps to achieve this goal:  1. I will attend my 3 months follow up with neurologist today, 6/25/2204 at 1:15pm. Completed.  3. I will attend 4 months follow up with neurologist as scheduled on 10/14/2024 at 2:15 PM. Reminded and ride arranged with [facility]. No showed.   4. I will attend my scheduled 4 months follow up with a neurologist on 1/6/2024 at 10:45 am with Dr Salinas.  5. My PCA will accommodate the appointment.      Mitchell Naik MD   [facility] [place] [facility] - 3rd Floor       Department Address: [address] [place] [address]   Department Phone: [phone]                               Care Plan: Psychiatry       Problem: Dementia due to medical condition with behavioral disturbance       Goal: Patient will attend his psychiatry appointment in the next 12 months.       Start Date: 10/16/2024 Expected End Date: 10/31/2025    This Visit's Progress: 20% Recent Progress: 10%    Note:     Barriers: language barrier, low literacy, noncompliance, and lack of knowledge how to navigate complex health care system  Strengths: motivated to attend appt  Patient expressed understanding of goal: Yes    Action steps to achieve this goal:  1. I will answer my phone when I am contacted to schedule my appointment.  2. I will attend my follow-up psychiatry appointment as scheduled on 11/7/24 at 12:30 pm. Reminded patient and ride is set up.   3. I will schedule a follow up appointment with my psychiatrist if it is recommended to do so while I am at the clinic.  4. I will follow up with [facility] regarding this goal at each outreach until it is completed.                               Care Plan: neuropsychological evaluation       Problem: dementia       Goal: Patient will attend his neuropsych eval appointment in  December, 24.       Start Date: 10/16/2024 Expected End Date: 12/31/2024    This Visit's Progress: 40% Recent Progress: 30%    Note:     Barriers: language barrier, low literacy, noncompliance, and lack of knowledge how to navigate complex health care system  Strengths: motivated to attend appt  Patient expressed understanding of goal: Yes    Action steps to achieve this goal:  1. I will answer my phone when I am contacted to schedule my appointment.  2. I will attend my neuro-psych eval appointment as scheduled on 12/2/2024 at 7:45 am. Ride is requested with [employer].  3. I will schedule a follow up appointment with my PCP if it is recommended to do so while I am at the clinic.  4. I will follow up with CCC regarding this goal at each outreach until it is completed.                                 Action Plans on File:                       Advance Care Plans/Directives:   Advanced Care Plan/Directives on file: No    Discussed with patient/caregiver(s): No data recorded           My Medical and Care Information  Problem List   Patient Active Problem List   Diagnosis    Left displaced femoral neck fracture (H)    Non-verbal learning disorder    Dementia due to medical condition with behavioral disturbance (H)    Amputation of right upper extremity, subsequent encounter    Hepatitis C virus infection, unspecified chronicity    Insomnia, unspecified type    Alcohol use disorder    Partial epilepsy with loss of consciousness, intractable (H)    Alcohol abuse    Behavior problem, adult    Violent behavior    Unable to care for self    Agitation    Acute hypoxemic respiratory failure (H)    Dyspnea, unspecified type      Current Medications:  Please refer to the most recent medication list provided to you by your medical team and reach out to your provider with any questions or to make any corrections.    Care Coordination Start Date: 2/20/2023   Frequency of Care Coordination: monthly, more frequently as needed      Form Last Updated: 11/15/2024

## 2024-11-06 ENCOUNTER — PATIENT OUTREACH (OUTPATIENT)
Dept: CARE COORDINATION | Facility: CLINIC | Age: 49
End: 2024-11-06
Payer: COMMERCIAL

## 2024-11-06 NOTE — PROGRESS NOTES
Clinic Care Coordination Contact  Community Health Worker Follow Up    Care Gaps:   Health Maintenance Due   Topic Date Due    NICOTINE/TOBACCO CESSATION COUNSELING Q 1 YR  Never done    HF ACTION PLAN  Never done    COLORECTAL CANCER SCREENING  Never done    INFLUENZA VACCINE (1) 09/01/2024    COVID-19 Vaccine (1 - 2024-25 season) Never done    BMP  11/15/2024     PCP to address other medical care gaps with patient at the next follow up visit.    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       Start Date: 5/20/2024 Expected End Date: 5/31/2025    This Visit's Progress: 70% Recent Progress: 60%    Note:     Barriers: language barrier, low literacy, noncompliance, and lack of knowledge how to navigate complex health care system  Strengths: motivated to attend appt  Patient expressed understanding of goal: Yes    Action steps to achieve this goal:  1. I will attend my 3 months follow up with neurologist today, 6/25/2204 at 1:15pm. Completed.  3. I will attend 4 months follow up with neurologist as scheduled on 10/14/2024 at 2:15 PM. Reminded and ride arranged with [employer]. No showed.   4. I will attend my scheduled 4 months follow up with a neurologist on 1/6/2024 at 10:45 am with Dr Salinas.  5. My PCA will accommodate the appointment.      Mitchell Naik MD   [facility] NEUROLOGY - 3rd Floor   3H    Department Address: [address] [place] [id number]   Department Phone: [phone]                               Care Plan: Psychiatry       Problem: Dementia due to medical condition with behavioral disturbance       Goal: Patient will attend his psychiatry appointment in the next 12 months.       Start Date: 10/16/2024 Expected End Date: 10/31/2025    This Visit's Progress: 20% Recent Progress: 10%    Note:     Barriers: language  barrier, low literacy, noncompliance, and lack of knowledge how to navigate complex health care system  Strengths: motivated to attend appt  Patient expressed understanding of goal: Yes    Action steps to achieve this goal:  1. I will answer my phone when I am contacted to schedule my appointment.  2. I will attend my follow-up psychiatry appointment as scheduled on 11/7/24 at 12:30 pm. Reminded patient and ride is set up.   3. I will schedule a follow up appointment with my psychiatrist if it is recommended to do so while I am at the clinic.  4. I will follow up with CCC regarding this goal at each outreach until it is completed.                               Care Plan: neuropsychological evaluation       Problem: dementia       Goal: Patient will attend his neuropsych eval appointment in December, 24.       Start Date: 10/16/2024 Expected End Date: 12/31/2024    This Visit's Progress: 40% Recent Progress: 30%    Note:     Barriers: language barrier, low literacy, noncompliance, and lack of knowledge how to navigate complex health care system  Strengths: motivated to attend appt  Patient expressed understanding of goal: Yes    Action steps to achieve this goal:  1. I will answer my phone when I am contacted to schedule my appointment.  2. I will attend my neuro-psych eval appointment as scheduled on 12/2/2024 at 7:45 am. Ride is requested with [employer].  3. I will schedule a follow up appointment with my PCP if it is recommended to do so while I am at the clinic.  4. I will follow up with CCC regarding this goal at each outreach until it is completed.                             Intervention and Education during outreach:   -CHW reminded patient of upcoming appointment with psychiatrist and transportation is requested.   -Patient was informed to call with questions or concerns.     CHW Next Outreach: In one month.

## 2024-11-07 ENCOUNTER — OFFICE VISIT (OUTPATIENT)
Dept: PSYCHIATRY | Facility: CLINIC | Age: 49
End: 2024-11-07
Attending: FAMILY MEDICINE
Payer: COMMERCIAL

## 2024-11-07 VITALS
HEIGHT: 61 IN | WEIGHT: 117 LBS | BODY MASS INDEX: 22.09 KG/M2 | OXYGEN SATURATION: 96 % | DIASTOLIC BLOOD PRESSURE: 77 MMHG | HEART RATE: 77 BPM | SYSTOLIC BLOOD PRESSURE: 107 MMHG

## 2024-11-07 DIAGNOSIS — F99 MENTAL HEALTH DISORDER: ICD-10-CM

## 2024-11-07 DIAGNOSIS — F02.818 DEMENTIA DUE TO MEDICAL CONDITION WITH BEHAVIORAL DISTURBANCE (H): Primary | ICD-10-CM

## 2024-11-07 PROCEDURE — 99417 PROLNG OP E/M EACH 15 MIN: CPT | Mod: 95 | Performed by: NURSE PRACTITIONER

## 2024-11-07 PROCEDURE — 99215 OFFICE O/P EST HI 40 MIN: CPT | Mod: 95 | Performed by: NURSE PRACTITIONER

## 2024-11-07 RX ORDER — OLANZAPINE 2.5 MG/1
2.5 TABLET, FILM COATED ORAL 2 TIMES DAILY
Qty: 60 TABLET | Refills: 0 | Status: SHIPPED | OUTPATIENT
Start: 2024-11-07

## 2024-11-07 NOTE — PROGRESS NOTES
Mental Health and Collaborative Care Psychiatry Service Rooming Note      Most pressing mental health concern at this time:   Pt is here accompanied by his son.  All info obtained from son, pt did not talk during rooming    Unable to complete PROMIS - pt non verbal, son is answering for him    Any new physical health conditions or diagnoses affecting you that we should be aware of: caught waking him up lately      Side effects related to medications patient would like to discuss with the provider:  No  Right arm below elbow amputee    Are you taking your medications as prescribed?  Son denies        Do you need refills of any of the medications?  Will discuss with provider        Are you taking any recreational substances? Son denies      Add attendance guidelines .phrase here   Care team has reviewed attendance agreement with patient. Patient advised that two failed appointments within 6 months may lead to termination of current episode of care.             Vanessa John LPN  November 7, 2024  12:50 PM

## 2024-11-07 NOTE — PATIENT INSTRUCTIONS
"The Panel Psychiatry Program  What to Expect  Here's what to expect in the Panel Psychiatry Program.   About the program  You'll be meeting with a psychiatric doctor to check your mental health. A psychiatric doctor helps you deal with troubling thoughts and feelings by giving you medicine. They'll make sure you know the plan for your care. You may see them for a long time. When you're feeling better, they may refer you back to seeing your family doctor.   If you have any questions, we'll be glad to talk to you.  About visits  Be open  At your visits, please talk openly about your problems. It may feel hard, but it's the best way for us to help you.  Cancelling visits  If you can't come to your visit, please call us right away at [phone]. If you don't cancel at least 24 hours (1 full day) before your visit, that's \"late cancellation.\"  Not showing up for your visits  Being very late is the same as not showing up. You'll be a \"no show\" if:  You're more than 15 minutes late for a 30-minute (half hour) visit.  You're more than 30 minutes late for a 60-minute (full hour) visit.  If you cancel late or don't show up 2 times within 6 months, we may end your care.  Getting help between visits  If you need help between visits, you can call us Monday to Friday from 8 a.m. to 4:30 p.m. at [phone].  Emergency care  Call 911 or go to the nearest emergency department if your life or someone else's life is in danger.  Call 988 anytime to reach the national Suicide and Crisis hotline.  Medicine refills  To refill your medicine, call your pharmacy. You can also call [facility]'s [facility] at [phone], Monday to Friday, 8 a.m. to 4:30 p.m. It can take 1 to 3 business days to get a refill.   Forms, letters, and tests  You may have papers to fill out, like FMLA, short-term disability, and workability. We can help you with these forms at your visits, but you must have an appointment. You may need more " than 1 visit for this, to be in an intensive therapy program, or both.  Before we can give you medicine for ADHD, we may refer you to get tested for it or confirm it another way.  We may not be able to give you an emotional support animal letter.  We don't do mental health checks ordered by the court.   We don't do mental health testing, but we can refer you to get tested.   Thank you for choosing us for your care.  For informational purposes only. Not to replace the advice of your health care provider. Copyright   2022 [facility]. All rights reserved. [employer] [id number] - 12/22      After Visit Summary   Continue medications as prescribed  Have your pharmacy contact us for a refill if you are running low on medications (We may ask you to come into clinic to get a refill from the nurse  No Alcohol or drug use  No driving if sedated  Call the clinic with any questions or concerns   Reach out for help if you feel like hurting yourself or others ([facility] [phone]: [address] [facility], [address] or [facility] Suicide Hotline   [phone] , call 911 or go to nearest Emergency room     Crisis Resources:    Present to the Emergency Department as needed or call after hours crisis line at [phone] or [phone].   [place] Crisis Text Line: Text [place] to 818048.  Suicide LifeLine Chat: suicidepreventionlifeline.org/chat/.  National Suicide Prevention Lifeline: 669.467.6406 (TTY: 381.919.2769). Call anytime for help.  (www.suicidepreventionlifeline.org)  National [employer] Mental Illness (www.nixon.org): 297.369.1654 or 609-300-4236.  Mental Health Association (www.mentalhealth.org): 358.695.5224 or 346-913-3157.       Follow up as directed, for your appointments, per your After Visit Summary Form.
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
## 2024-11-18 ENCOUNTER — ALLIED HEALTH/NURSE VISIT (OUTPATIENT)
Dept: NURSING | Facility: CLINIC | Age: 49
End: 2024-11-18
Payer: COMMERCIAL

## 2024-11-18 DIAGNOSIS — Z71.89 COMPLEX CARE COORDINATION: Primary | ICD-10-CM

## 2024-11-18 PROCEDURE — 99207 PR NO CHARGE LOS: CPT

## 2024-11-18 NOTE — PROGRESS NOTES
Clinic Care Coordination Contact  Follow Up Progress Note      Assessment: CCRN met with patient and spouse today in clinic. Patient had a lab appointment but patient was resistive to lab draw. Patient has dementia and spouse reports they always have hard time getting blood draw. Consulted with eric Payan today and was told they will try it again when patient comes to see PCP on 12/26. Added lab information to to appt note.    Citizenship   Patient submitted citizenship application with support from Irma care coordinator at [place] [facility] on 10/22/24. Irma will continue to assist patient and spouse follow up on application status. No further assist needed from care coordination.     Neurology   Per chart review, patient no showed his follow up neurology appointment on 10/14/24. Appointment rescheduled on 1/6/25 at 10:45 pm with Dr Brian. Goal up to date.     Psychiatry  Per chart review, patient attended his psychiatry appointment on 11/7/24 and follow up appointment scheduled on 12/12/24. Goal updated today.     Neuropsychological evaluation  Per chart review, patient is scheduled for neuropsych eval on 12/2/24 at 7:45 am. Goal up to date.     Care Gaps:    Health Maintenance Due   Topic Date Due    NICOTINE/TOBACCO CESSATION COUNSELING Q 1 YR  Never done    HF ACTION PLAN  Never done    COLORECTAL CANCER SCREENING  Never done    INFLUENZA VACCINE (1) 09/01/2024    COVID-19 Vaccine (1 - 2024-25 season) Never done    BMP  11/15/2024       Patient will address overdue care gaps with PCP on 12/26/24.    Care Plans  Care Plan: Neurology       Problem: Dementia due to medical condition with behavioral disturbance       Goal: Patient will attend his follow up neurology clinic in the next 12 months.       Start Date: 5/20/2024 Expected End Date: 5/31/2025    Recent Progress: 70%    Note:     Barriers: language barrier, low literacy, noncompliance, and lack of knowledge how to navigate complex health care  system  Strengths: motivated to attend appt  Patient expressed understanding of goal: Yes    Action steps to achieve this goal:  1. I will attend my 3 months follow up with neurologist today, 6/25/2204 at 1:15pm. Completed.  3. I will attend 4 months follow up with neurologist as scheduled on 10/14/2024 at 2:15 PM. Reminded and ride arranged with [facility] [employer]. No showed.   4. I will attend my scheduled 4 months follow up with a neurologist on 1/6/2025 at 10:45 am with Dr Salinas.  5. My PCA will accommodate the appointment.      Mitchell Naik MD   [facility] - 3rd Floor   3H    Department Address: [address] [place] [address] 3rd [facility] [address]   Department Phone: [phone]                               Care Plan: Psychiatry       Problem: Dementia due to medical condition with behavioral disturbance       Goal: Patient will attend his psychiatry appointment in the next 12 months.       Start Date: 10/16/2024 Expected End Date: 10/31/2025    Recent Progress: 20%    Note:     Barriers: language barrier, low literacy, noncompliance, and lack of knowledge how to navigate complex health care system  Strengths: motivated to attend appt  Patient expressed understanding of goal: Yes    Action steps to achieve this goal:  1. I will answer my phone when I am contacted to schedule my appointment.  2. I will attend my follow-up psychiatry appointment as scheduled on 11/7/24 at 12:30 pm. Completed.   3. I will attend my follow up psychiatry appointment on 12/12/24 at 10:45 am with Annabella Jurado NP.  4. I will schedule a follow up appointment with my psychiatrist if it is recommended to do so while I am at the clinic.  5. I will follow up with [facility] regarding this goal at each outreach until it is completed.                               Care Plan: neuropsychological evaluation       Problem: dementia       Goal: Patient will attend his neuropsych eval appointment in December, 24.       Start Date:  10/16/2024 Expected End Date: 12/31/2024    This Visit's Progress: 40% Recent Progress: 30%    Note:     Barriers: language barrier, low literacy, noncompliance, and lack of knowledge how to navigate complex health care system  Strengths: motivated to attend appt  Patient expressed understanding of goal: Yes    Action steps to achieve this goal:  1. I will answer my phone when I am contacted to schedule my appointment.  2. I will attend my neuro-psych eval appointment as scheduled on 12/2/2024 at 7:45 am. Ride is requested with [employer].  3. I will schedule a follow up appointment with my PCP if it is recommended to do so while I am at the clinic.  4. I will follow up with [facility] regarding this goal at each outreach until it is completed.                                   Plan: Patient will attend upcoming appts. [facility] to continue to set up ride.

## 2024-12-02 ENCOUNTER — OFFICE VISIT (OUTPATIENT)
Dept: NEUROPSYCHOLOGY | Facility: CLINIC | Age: 49
End: 2024-12-02
Attending: FAMILY MEDICINE
Payer: COMMERCIAL

## 2024-12-02 DIAGNOSIS — Z86.73 HISTORY OF STROKE: ICD-10-CM

## 2024-12-02 DIAGNOSIS — F03.90 SENILE DEMENTIA, UNCOMPLICATED (H): Primary | ICD-10-CM

## 2024-12-02 DIAGNOSIS — R41.3 MEMORY LOSS: ICD-10-CM

## 2024-12-02 DIAGNOSIS — R56.9 SEIZURE (H): ICD-10-CM

## 2024-12-02 PROCEDURE — 96133 NRPSYC TST EVAL PHYS/QHP EA: CPT | Performed by: CLINICAL NEUROPSYCHOLOGIST

## 2024-12-02 PROCEDURE — 96132 NRPSYC TST EVAL PHYS/QHP 1ST: CPT | Performed by: CLINICAL NEUROPSYCHOLOGIST

## 2024-12-02 PROCEDURE — 96138 PSYCL/NRPSYC TECH 1ST: CPT | Performed by: CLINICAL NEUROPSYCHOLOGIST

## 2024-12-02 PROCEDURE — 96139 PSYCL/NRPSYC TST TECH EA: CPT | Performed by: CLINICAL NEUROPSYCHOLOGIST

## 2024-12-02 PROCEDURE — 90791 PSYCH DIAGNOSTIC EVALUATION: CPT | Performed by: CLINICAL NEUROPSYCHOLOGIST
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
## 2024-12-03 ENCOUNTER — PATIENT OUTREACH (OUTPATIENT)
Dept: CARE COORDINATION | Facility: CLINIC | Age: 49
End: 2024-12-03
Payer: COMMERCIAL

## 2024-12-04 NOTE — NURSING NOTE
The patient was seen for neuropsychological evaluation at the request of Dr. Zita Magallanes, for the purposes of diagnostic clarification and treatment planning. 65 minutes of test administration and scoring were provided by this writer, Dusty Barrett. Please see Dr. Dewayne Torres's report for a full interpretation of the findings.

## 2024-12-05 ENCOUNTER — PATIENT OUTREACH (OUTPATIENT)
Dept: CARE COORDINATION | Facility: CLINIC | Age: 49
End: 2024-12-05
Payer: COMMERCIAL

## 2024-12-05 NOTE — PROGRESS NOTES
NAME  Heath Crawley     MRN  [id number]      [birth year]     AGE  49     SEX  Male     HANDEDNESS Right     EDUCATION 0     DAIGLE  24     PROVIDER  EW     TECH  KB     STATION  OP            TRACEY TOKEN TEST      Raw             PHQ-9       Raw 0      Interp. Minimal             PABLO-7       Raw 0      Interp. Minimal             MOCA       Raw

## 2024-12-05 NOTE — TELEPHONE ENCOUNTER
Thank you for the update.  Patient has appointments with psychiatry on 12/12/2024 and neurology on 1/6/2025 already scheduled.  Patient also had an appointment with this provider on 12/26/2024.  Care coronation team has been helping patient already with social/community resources.    No further action is needed at this time unless patient/family has any questions or concerns.    Adrian Dyson MD  [facility] [place] [address]  Phone: [phone]  Fax: [phone]    12/5/2024  1:15 PM

## 2024-12-05 NOTE — PROGRESS NOTES
Clinic Care Coordination Contact  Community Health Worker Follow Up    Care Gaps:   Health Maintenance Due   Topic Date Due    NICOTINE/TOBACCO CESSATION COUNSELING Q 1 YR  Never done    HF ACTION PLAN  Never done    COLORECTAL CANCER SCREENING  Never done    INFLUENZA VACCINE (1) 09/01/2024    COVID-19 Vaccine (1 - 2024-25 season) Never done    BMP  11/15/2024    YEARLY PREVENTIVE VISIT  01/02/2025     Scheduled in February 2025 for preventive care visit.      Care Plan:   Care Plan: Neurology Completed 5/20/2024      Problem: Seizure episodes  Resolved 5/20/2024      Goal: Patient will attend his neurology appointment in the next 12 months.  Completed 5/20/2024      Start Date: 4/28/2023 Expected End Date: 4/28/2024    This Visit's Progress: 100% Recent Progress: 60%    Note:     Barriers: language barrier, low literacy, noncompliance, and lack of knowledge how to navigate complex health care system  Strengths: motivated to attend appt  Patient expressed understanding of goal: Yes    Action steps to achieve this goal:  1. I will attend my follow up appointment with neurologist as rescheduled on 10/03/2023 at 4:15 PM. (Transportation arranged with [employer]). COMPLETED  2. I will attend my 3 months neurology appointment on 1/9/2023 at 3:45pm with Dr Mitchell Naik. Patient went but was told no appt and assisted patient rescheduled on 3/11/2024. Reminded  3. I will attend my follow up neurology appt on 3/11/2024. Completed.   3. My wife will answer the phone when  calls for .  4. I will update CCC at outreach.     Mitchell Naik MD   [facility] [place] [place] [facility] [place]       Department Address: [address] [place] [id number]   Department Phone: [phone]                               Care Plan: [employer] Assessment Completed 8/30/2023      Problem: Imparied mobility  Resolved 8/30/2023      Goal: I would like to explore if I could qualify for PCA service,  CADI, SMRT in the next 6 months.  Completed 8/8/2023      Start Date: 4/28/2023 Expected End Date: 10/28/2023    This Visit's Progress: 100% Recent Progress: 30%    Note:     Barriers: lanaguage  Strengths: Accepting of support  Patient expressed understanding of goal: Yes    Action steps to achieve this goal:  1. I will answer my phone when I am contacted by the PCA  to schedule my assessment.  2. I will request my family and supportive friends to be present for my assessment.  3. I will follow up with CCC in the next month regarding the progress made on this goal.  4. Dr Dyson provided a support letter for PCA assessment on 6/6/2023.     Note: [employer] completed and approved for 6 hours per day.                             Care Plan: Neurology       Problem: Dementia due to medical condition with behavioral disturbance       Goal: Patient will attend his follow up neurology clinic in the next 12 months.       Start Date: 5/20/2024 Expected End Date: 5/31/2025    This Visit's Progress: 80% Recent Progress: 70%    Note:     Barriers: language barrier, low literacy, noncompliance, and lack of knowledge how to navigate complex health care system  Strengths: motivated to attend appt  Patient expressed understanding of goal: Yes    Action steps to achieve this goal:  1. I will attend my 3 months follow up with neurologist today, 6/25/2204 at 1:15pm. Completed.  3. I will attend 4 months follow up with neurologist as scheduled on 10/14/2024 at 2:15 PM. Reminded and ride arranged with [employer]. No showed.   4. I will attend my scheduled 4 months follow up with a neurologist on 1/6/2025 at 10:45 am with Dr Salinas. Updated patient of this appointment.  5. My PCA will accommodate the appointment.      Mitchell Naik MD   [facility] [place] - 3rd Floor   3H    Department Address: [address] [place] [address] 3rd [facility] [address]   Department Phone: [phone]                               Care Plan:  Citizenship waiver Completed 10/16/2024      Problem: neurospych eval  Resolved 10/16/2024      Goal: Patient will attend medical waiver appointment in Sept, 2024.  Completed 10/7/2024      Start Date: 7/2/2024 Expected End Date: 9/30/2024    This Visit's Progress: 100% Recent Progress: 40%    Note:     Barriers: language barrier, low literacy, noncompliance, and lack of knowledge how to navigate complex health care system  Strengths: motivated to attend appt  Patient expressed understanding of goal: Yes    Action steps to achieve this goal:  1. I will answer my phone when I am contacted to schedule my appointment. Completed.  2. I will attend my medical waiver appointment as scheduled on 9/5/24 at 1:00pm with Ruiz Roy. Completed and received waiver.  3. I will schedule a follow up appointment with my provider if it is recommended to do so while I am at the clinic.  4. I will follow up with CCC regarding this goal at each outreach until it is completed.     Reminded patient.                             Care Plan: Psychiatry       Problem: Dementia due to medical condition with behavioral disturbance       Goal: Patient will attend his psychiatry appointment in the next 12 months.       Start Date: 10/16/2024 Expected End Date: 10/31/2025    This Visit's Progress: 30% Recent Progress: 20%    Note:     Barriers: language barrier, low literacy, noncompliance, and lack of knowledge how to navigate complex health care system  Strengths: motivated to attend appt  Patient expressed understanding of goal: Yes    Action steps to achieve this goal:  1. I will answer my phone when I am contacted to schedule my appointment. Completed.  2. I will attend my follow-up psychiatry appointment as scheduled on 11/7/24 at 12:30 pm. Completed.   3. I will attend my follow up psychiatry appointment on 12/12/24 at 10:45 am with Annabella Jurado NP. Updated/reminded patient of the appointment and transportation is arranged with  [employer].  4. I will schedule a follow up appointment with my psychiatrist if it is recommended to do so while I am at the clinic.  5. I will follow up with CCC regarding this goal at each outreach until it is completed.                             Care Plan: neuropsychological evaluation       Problem: dementia       Goal: Patient will attend his neuropsych eval appointment in December, 24.  Completed 12/5/2024      Start Date: 10/16/2024 Expected End Date: 12/31/2024    This Visit's Progress: 100% Recent Progress: 40%    Note:     Barriers: language barrier, low literacy, noncompliance, and lack of knowledge how to navigate complex health care system  Strengths: motivated to attend appt  Patient expressed understanding of goal: Yes    Action steps to achieve this goal:  1. I will answer my phone when I am contacted to schedule my appointment.  2. I will attend my neuro-psych eval appointment as scheduled on 12/2/2024 at 7:45 am. Attended.  3. I will schedule a follow up appointment with my PCP if it is recommended to do so while I am at the clinic.  4. I will follow up with CCC regarding this goal at each outreach until it is completed.                           Intervention and Education during outreach:   -Patient continues receiving PCA services with no change.  -CHW set up transportation and reminded of upcoming appointments.   -Patient was informed to call with questions or concerns.     CHW Next Outreach: In one month.

## 2024-12-11 ENCOUNTER — PATIENT OUTREACH (OUTPATIENT)
Dept: CARE COORDINATION | Facility: CLINIC | Age: 49
End: 2024-12-11
Payer: COMMERCIAL

## 2024-12-11 NOTE — PROGRESS NOTES
Clinic Care Coordination Contact      Direct care provided in primary language, no  needed.     Follow Up Progress Note      Assessment: CCRN reminded patient and spouse of his psychiatry follow up appointment tomorrow at 10:45 am.    Transportation: [employer]: [phone]     Plan: Patient will attend appointment tomorrow.

## 2024-12-18 ENCOUNTER — PATIENT OUTREACH (OUTPATIENT)
Dept: CARE COORDINATION | Facility: CLINIC | Age: 49
End: 2024-12-18
Payer: COMMERCIAL

## 2024-12-26 ENCOUNTER — OFFICE VISIT (OUTPATIENT)
Dept: FAMILY MEDICINE | Facility: CLINIC | Age: 49
End: 2024-12-26
Payer: COMMERCIAL

## 2024-12-26 VITALS
HEIGHT: 60 IN | HEART RATE: 72 BPM | TEMPERATURE: 98.7 F | OXYGEN SATURATION: 96 % | WEIGHT: 120.12 LBS | BODY MASS INDEX: 23.58 KG/M2 | DIASTOLIC BLOOD PRESSURE: 84 MMHG | RESPIRATION RATE: 16 BRPM | SYSTOLIC BLOOD PRESSURE: 125 MMHG

## 2024-12-26 DIAGNOSIS — F02.818 DEMENTIA DUE TO MEDICAL CONDITION WITH BEHAVIORAL DISTURBANCE (H): Primary | ICD-10-CM

## 2024-12-26 DIAGNOSIS — G40.219 PARTIAL EPILEPSY WITH LOSS OF CONSCIOUSNESS, INTRACTABLE (H): ICD-10-CM

## 2024-12-26 DIAGNOSIS — Z78.9 UNABLE TO CARE FOR SELF: ICD-10-CM

## 2024-12-26 DIAGNOSIS — F10.21 ALCOHOL USE DISORDER, SEVERE, IN SUSTAINED REMISSION (H): ICD-10-CM

## 2024-12-26 PROCEDURE — G2211 COMPLEX E/M VISIT ADD ON: HCPCS | Performed by: FAMILY MEDICINE

## 2024-12-26 PROCEDURE — 99214 OFFICE O/P EST MOD 30 MIN: CPT | Performed by: FAMILY MEDICINE

## 2024-12-26 RX ORDER — LANOLIN ALCOHOL/MO/W.PET/CERES
100 CREAM (GRAM) TOPICAL DAILY
Qty: 90 TABLET | Refills: 3 | Status: SHIPPED | OUTPATIENT
Start: 2024-12-26

## 2024-12-26 NOTE — PATIENT INSTRUCTIONS
-Thank you for choosing the [facility].  -It was a pleasure to see you today.  -Please take a look at the information below for more specific details regarding the treatment plan and recommendations.  -In this after visit summary is a list of your medications and specific instructions.  Please review this carefully as there may be changes made to your medication list.  -If there are any particular questions or concerns, please feel free to reach out to Dr. Dyson.  -If any labs have been completed, we will reach out to you about results.  If the results are normal or not concerning, a letter or [employer] message will be sent to you.  If any follow-up is needed, either Dr. Dyson or the nurse will give you a call.  If you have not heard regarding results after 2 weeks, please reach out to the clinic.    Patient Instructions:    -Continue medication as as prescribed.   -See the specialists as scheduled.       Please seek immediate medical attention (go to the emergency room or urgent care) for the following reasons: worsening symptoms, or any concerning changes.      --------------------------------------------------------------------------------------------------------------------    -We are always looking for ways to improve.  You may be selected to receive a survey regarding your visit today.  We encourage you to complete the survey and provide specific, constructive feedback to help us improve our processes.  Thank you for your time!  -Please review the contact information listed on the after visit summary and in the electronic chart.  Below is the phone number that we have on file.  If there are any changes that are needed to be made, please reach out to the clinic.  [phone] (home)

## 2024-12-26 NOTE — PROGRESS NOTES
OFFICE VISIT    Assessment/Plan:     Patient Instructions:    -Continue medication as as prescribed.   -See the specialists as scheduled.       Please seek immediate medical attention (go to the emergency room or urgent care) for the following reasons: worsening symptoms, or any concerning changes.      Heath was seen today for recheck medication.  Diagnoses and all orders for this visit:    Dementia due to medical condition with behavioral disturbance (H)  Partial epilepsy with loss of consciousness, intractable (H)  Alcohol use disorder  Unable to care for self: Stable.  Continue medications as prescribed.  Discussed importance of labs and vaccinations and given recommendations, though patient declined.  Patient to follow-up with neurology and psychiatry as scheduled.  Reported that patient is scared of needles and he declines both laboratory and vaccination recommendations.  Provider did review importance of completion of these.  -     thiamine (B-1) 100 MG tablet; Take 1 tablet (100 mg) by mouth daily. for vitamin supplement          Return in about 6 months (around 6/26/2025) for Annual Physical, Medication follow up.    The diagnoses, treatment options, risk, benefits, and recommendations were reviewed with patient/guardian.  Questions were answered to patient's/guardian satisfaction.  Red flag signs were reviewed.  Patient/guardian is in agreement with above plan.      Subjective: 49 year old male with history of dementia with behavioral disturbance, alcohol use disorder, seizure disorder, hepatitis C infection previously on Mavyret (Neg Hep C RNA test on 3/11/2024), amputation of right upper extremity who presents to clinic for the following complaints:   Patient presents with:  Recheck Medication    Answers submitted by the patient for this visit:  General Questionnaire (Submitted on 12/26/2024)  Chief Complaint: Chronic problems general questions HPI Form  What is the reason for your visit today? : recheck  medication  How many servings of fruits and vegetables do you eat daily?: 2-3  On average, how many sweetened beverages do you drink each day (Examples: soda, juice, sweet tea, etc.  Do NOT count diet or artificially sweetened beverages)?: 1  How many minutes a day do you exercise enough to make your heart beat faster?: 20 to 29  How many days a week do you exercise enough to make your heart beat faster?: 3 or less  How many days per week do you miss taking your medication?: 0  Questionnaire about: Chronic problems general questions HPI Form (Submitted on 12/26/2024)  Chief Complaint: Chronic problems general questions HPI Form        Medications reviewed.  Patient denies any significant issues or side effects on any of the medications.  Refills given as needed.    Seeing the neurologist and psychiatrist. Has upcoming appointments. Recommended labs, though patient is scared to do the labs and eclines labs today. Discussed importance of labs.     Constipation has not been a problem recently.  Discussed continue medications as prescribed. Uses the miralax as needed and it helps.     He has not been drinking any alcohol recently, per family.  Patient does not really answer questions.  Encouragement provided.     Completed medical opinion form and given to patient/son.       Patient last seen by neurology on 6/25/2024.  Plan was for labs at that time, though they were never completed.  Reviewed recommendations with patient.    HM due was reviewed with patient/parent.  Recommendations, risk, benefits were reviewed.  Accepted recommendations were ordered.  Otherwise, patient/parent declined.    Health Maintenance Due   Topic Date Due    NICOTINE/TOBACCO CESSATION COUNSELING Q 1 YR  Never done    HF ACTION PLAN  Never done    COLORECTAL CANCER SCREENING  Never done    ANNUAL REVIEW OF HM ORDERS  12/27/2023    INFLUENZA VACCINE (1) 09/01/2024    COVID-19 Vaccine (1 - 2024-25 season) Never done    BMP  11/15/2024    ZOSTER  "IMMUNIZATION (1 of 2) 01/01/2025    YEARLY PREVENTIVE VISIT  01/02/2025         Immunization History   Administered Date(s) Administered    Influenza Vaccine >6 months,quad, PF 01/24/2023    Pneumococcal 20 valent Conjugate (Prevnar 20) 01/24/2023    TDAP (Adacel,Boostrix) 01/24/2023     Presents with son. Patient doesn't communicate much.   A professional [employer] telephone  was utilized for the office visit.     The 10 point review of system is negative except as stated in the HPI.    Allergies were reviewed and updated.    Objective:   /84 (BP Location: Left arm, Patient Position: Left side, Cuff Size: Adult Regular)   Pulse 72   Temp 98.7  F (37.1  C) (Oral)   Resp 16   Ht 1.532 m (5' 0.32\")   Wt 54.5 kg (120 lb 1.9 oz)   SpO2 96%   BMI 23.21 kg/m    General: Active, alert, nontoxic-appearing.  No acute distress.  Patient does not really answer questions.  HEENT: Normocephalic, atraumatic.  Pupils are equal and round.  Sclera is clear.  Normal external ears. Nares patent.  Moist mucous membranes.    Cardiac: RRR.  S1, S2 present.  No murmurs, rubs, or gallops.  Respiratory/chest: Clear to auscultation bilaterally.  No wheezes, rales, rhonchi.  Breathing is not labored.  No accessory muscle usage.  Extremities: Amputated right upper extremity.  Otherwise, voluntary movements intact.  Integumentary: No concerning rash or skin changes appreciated.        Adrian Dyson MD  [facility] [place] [address]  Phone: [phone]  Fax: [phone]    12/30/2024  8:48 AM            Current Outpatient Medications   Medication Sig Dispense Refill    albuterol (PROAIR HFA/PROVENTIL HFA/VENTOLIN HFA) 108 (90 Base) MCG/ACT inhaler Inhale 2 puffs into the lungs every 6 hours as needed for shortness of breath, wheezing or cough 18 g 0    divalproex sodium delayed-release (DEPAKOTE) 500 MG DR tablet Take 1 tablet (500 mg) by mouth 2 times daily 60 tablet 11    thiamine " (B-1) 100 MG tablet Take 1 tablet (100 mg) by mouth daily. for vitamin supplement 90 tablet 3    OLANZapine (ZYPREXA) 2.5 MG tablet Take 1 tablet (2.5 mg) by mouth 2 times daily. 60 tablet 0    polyethylene glycol (MIRALAX) 17 GM/Dose powder Take 17 g by mouth daily as needed for constipation for constipation. (Patient not taking: Reported on 12/26/2024) 510 g 11     No current facility-administered medications for this visit.       No Known Allergies    Patient Active Problem List    Diagnosis Date Noted    Agitation 05/14/2024     Priority: Medium    Acute hypoxemic respiratory failure (H) 05/14/2024     Priority: Medium    Dyspnea, unspecified type 05/14/2024     Priority: Medium    Violent behavior 03/12/2024     Priority: Medium    Unable to care for self 03/12/2024     Priority: Medium    Partial epilepsy with loss of consciousness, intractable (H) 03/11/2024     Priority: Medium    Alcohol abuse 03/11/2024     Priority: Medium    Behavior problem, adult 03/11/2024     Priority: Medium    Alcohol use disorder 01/02/2024     Priority: Medium    Insomnia, unspecified type 04/25/2023     Priority: Medium    Hepatitis C virus infection, unspecified chronicity 02/27/2023     Priority: Medium     Treated with Mavyret.  Hep C RNA test was negative on 3/11/2024.      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  Added automatically from request for surgery [id number]         Family History   Problem Relation Age of Onset    Coronary Artery Disease No family hx of     Diabetes No family hx of     Hypertension No family hx of        No past surgical history on file.     Social  History     Socioeconomic History    Marital status: Single     Spouse name: Not on file    Number of children: Not on file    Years of education: Not on file    Highest education level: Not on file   Occupational History    Not on file   Tobacco Use    Smoking status: Every Day     Types: Cigarettes     Passive exposure: Current    Smokeless tobacco: Former    Tobacco comments:     Son smokes outside   Vaping Use    Vaping status: Never Used   Substance and Sexual Activity    Alcohol use: Yes     Comment: Patient drinks all day per patients wife [place]    Drug use: Yes     Types: Marijuana, Other    Sexual activity: Not on file   Other Topics Concern    Not on file   Social History Narrative    Not on file     Social Drivers of Health     Financial Resource Strain: Low Risk  (1/2/2024)    Financial Resource Strain     Within the past 12 months, have you or your family members you live with been unable to get utilities (heat, electricity) when it was really needed?: No   Food Insecurity: Low Risk  (1/2/2024)    Food Insecurity     Within the past 12 months, did you worry that your food would run out before you got money to buy more?: No     Within the past 12 months, did the food you bought just not last and you didn t have money to get more?: No   Transportation Needs: High Risk (1/2/2024)    Transportation Needs     Within the past 12 months, has lack of transportation kept you from medical appointments, getting your medicines, non-medical meetings or appointments, work, or from getting things that you need?: Yes   Physical Activity: Not on file   Stress: Not on file   Social Connections: Not on file   Interpersonal Safety: Not on file   Housing Stability: Low Risk  (1/2/2024)    Housing Stability     Do you have housing? : Yes     Are you worried about losing your housing?: No

## 2024-12-30 ENCOUNTER — PATIENT OUTREACH (OUTPATIENT)
Dept: CARE COORDINATION | Facility: CLINIC | Age: 49
End: 2024-12-30
Payer: COMMERCIAL

## 2024-12-30 NOTE — PROGRESS NOTES
Clinic Care Coordination Contact  Follow Up Progress Note      Assessment: Patient's spouse updated CCRN with patient's [facility]/2025 at 10:25 am at [address] [place] [id number]     Plan: Patient's arm worker will assist patient with citizenshp process.  
Patient requests all Lab, Cardiology, and Radiology Results on their Discharge Instructions

## 2025-01-02 ENCOUNTER — PATIENT OUTREACH (OUTPATIENT)
Dept: CARE COORDINATION | Facility: CLINIC | Age: 50
End: 2025-01-02
Payer: COMMERCIAL

## 2025-01-02 NOTE — PROGRESS NOTES
Clinic Care Coordination Contact  Community Health Worker Follow Up    Care Gaps:   Health Maintenance Due   Topic Date Due    NICOTINE/TOBACCO CESSATION COUNSELING Q 1 YR  Never done    HF ACTION PLAN  Never done    COLORECTAL CANCER SCREENING  Never done    INFLUENZA VACCINE (1) 09/01/2024    COVID-19 Vaccine (1 - 2024-25 season) Never done    BMP  11/15/2024    PHQ-2 (once per calendar year)  01/01/2025    ZOSTER IMMUNIZATION (1 of 2) 01/01/2025    YEARLY PREVENTIVE VISIT  01/02/2025     Care Gaps Last addressed on 12/26/2024 and preventive care visit is scheduled on 6/25/2025.    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       Start Date: 5/20/2024 Expected End Date: 5/31/2025    This Visit's Progress: 80% Recent Progress: 80%    Note:     Barriers: language barrier, low literacy, noncompliance, and lack of knowledge how to navigate complex health care system  Strengths: motivated to attend appt  Patient expressed understanding of goal: Yes    Action steps to achieve this goal:  1. I will attend my 3 months follow up with neurologist today, 6/25/2204 at 1:15pm. Completed.  3. I will attend 4 months follow up with neurologist as scheduled on 10/14/2024 at 2:15 PM. Reminded and ride arranged with [employer]. No showed.   4. I will attend my scheduled 4 months follow up with a neurologist on 1/6/2025 at 10:45 am with Dr Salinas. Updated patient of this appointment and transportation is arranged with [facility].  5. My PCA will accommodate the appointment.      Mitchell Naik MD   [facility] [place] [facility] - 3rd Floor   3H    Department Address: [address] [place] [address] 3rd [facility] [address]   Department Phone: [phone]                               Care Plan: Psychiatry       Problem: Dementia due to medical condition with behavioral disturbance        Goal: Patient will attend his psychiatry appointment in the next 12 months.       Start Date: 10/16/2024 Expected End Date: 10/31/2025    This Visit's Progress: 40% Recent Progress: 30%    Note:     Barriers: language barrier, low literacy, noncompliance, and lack of knowledge how to navigate complex health care system  Strengths: motivated to attend appt  Patient expressed understanding of goal: Yes    Action steps to achieve this goal:  1. I will answer my phone when I am contacted to schedule my appointment. Completed.  2. I will attend my follow-up psychiatry appointment as scheduled on 11/7/24 at 12:30 pm. Completed.   3. I will attend my follow up psychiatry appointment on 1/30/2025 at 2:45 PM with Annabella Jurado NP. Transportation is requested with [employer].  4. I will schedule a follow up appointment with my psychiatrist if it is recommended to do so while I am at the clinic.  5. I will follow up with CCC regarding this goal at each outreach until it is completed.                           Intervention and Education during outreach:  -Reminded patient of upcoming appointments, transportation is arranged as appropriated and patient was informed to call with questions or concerns.     CHW Next Outreach: In one month.

## 2025-01-06 ENCOUNTER — VIRTUAL VISIT (OUTPATIENT)
Dept: NEUROLOGY | Facility: CLINIC | Age: 50
End: 2025-01-06
Payer: COMMERCIAL

## 2025-01-06 ENCOUNTER — LAB (OUTPATIENT)
Dept: LAB | Facility: CLINIC | Age: 50
End: 2025-01-06
Payer: COMMERCIAL

## 2025-01-06 DIAGNOSIS — G40.219 PARTIAL EPILEPSY WITH LOSS OF CONSCIOUSNESS, INTRACTABLE (H): Primary | ICD-10-CM

## 2025-01-06 DIAGNOSIS — Z00.00 ENCOUNTER FOR ANNUAL PHYSICAL EXAM: ICD-10-CM

## 2025-01-06 DIAGNOSIS — B18.2 CHRONIC HEPATITIS C WITHOUT HEPATIC COMA (H): ICD-10-CM

## 2025-01-06 DIAGNOSIS — B19.20 HEPATITIS C VIRUS INFECTION WITHOUT HEPATIC COMA, UNSPECIFIED CHRONICITY: ICD-10-CM

## 2025-01-06 PROCEDURE — 98007 SYNCH AUDIO-VIDEO EST HI 40: CPT | Performed by: INTERNAL MEDICINE

## 2025-01-06 RX ORDER — DIVALPROEX SODIUM 500 MG/1
500 TABLET, DELAYED RELEASE ORAL 2 TIMES DAILY
Qty: 60 TABLET | Refills: 11 | Status: SHIPPED | OUTPATIENT
Start: 2025-01-06 | End: 2025-01-06

## 2025-01-06 RX ORDER — DIVALPROEX SODIUM 500 MG/1
500 TABLET, DELAYED RELEASE ORAL 2 TIMES DAILY
Qty: 180 TABLET | Refills: 4 | Status: SHIPPED | OUTPATIENT
Start: 2025-01-06 | End: 2026-04-01
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
## 2025-01-07 ENCOUNTER — PATIENT OUTREACH (OUTPATIENT)
Dept: CARE COORDINATION | Facility: CLINIC | Age: 50
End: 2025-01-07
Payer: COMMERCIAL

## 2025-01-07 NOTE — PROGRESS NOTES
Clinic Care Coordination Contact  Care Coordination Clinician Chart Review    Situation: Patient chart reviewed by Care Coordinator.       Background: Care Coordination Program started: 2/20/2023. Initial assessment completed and patient-centered care plan(s) were developed with participation from patient. Lead CC handed patient off to CHW for continued outreaches.       Assessment: Per chart review, patient outreach completed by CC CHW on 1/2/25.  Patient is actively working to accomplish goal(s). Patient's goal(s) appropriate and relevant at this time. Patient is not due for updated Plan of Care.  Assessments will be completed annually or as needed/with change of patient status.    Neurology  Per chart review, patient attended neurology appt on 1/6/25 and to return in 1 yr. ( Around 1/6/26). Patient needs to schedule lab only appt. CHW to assist. Goal completed.     Psychiatry   Patient is scheduled to follow up with his psychiatrist on 1/30/25. Goal up to date.       Care Plan: Neurology Completed 5/20/2024      Problem: Seizure episodes  Resolved 5/20/2024      Goal: Patient will attend his neurology appointment in the next 12 months.  Completed 5/20/2024      Start Date: 4/28/2023 Expected End Date: 4/28/2024    This Visit's Progress: 100% Recent Progress: 60%    Note:     Barriers: language barrier, low literacy, noncompliance, and lack of knowledge how to navigate complex health care system  Strengths: motivated to attend appt  Patient expressed understanding of goal: Yes    Action steps to achieve this goal:  1. I will attend my follow up appointment with neurologist as rescheduled on 10/03/2023 at 4:15 PM. (Transportation arranged with [employer]). COMPLETED  2. I will attend my 3 months neurology appointment on 1/9/2023 at 3:45pm with Dr Mitchell Naik. Patient went but was told no appt and assisted patient rescheduled on 3/11/2024. Reminded  3. I will attend my follow up neurology appt on 3/11/2024.  Completed.   3. My wife will answer the phone when  calls for .  4. I will update CCC at outreach.     Mitchell Naik MD   [facility] [place] [place] 3[facility] [place]   3H    Department Address: [address] [place] [address] [address] [facility] [address]   Department Phone: [phone]                               Care Plan: [employer] Assessment Completed 8/30/2023      Problem: Imparied mobility  Resolved 8/30/2023      Goal: I would like to explore if I could qualify for PCA service, CADI, SMRT in the next 6 months.  Completed 8/8/2023      Start Date: 4/28/2023 Expected End Date: 10/28/2023    This Visit's Progress: 100% Recent Progress: 30%    Note:     Barriers: lanaguage  Strengths: Accepting of support  Patient expressed understanding of goal: Yes    Action steps to achieve this goal:  1. I will answer my phone when I am contacted by the PCA  to schedule my assessment.  2. I will request my family and supportive friends to be present for my assessment.  3. I will follow up with CCC in the next month regarding the progress made on this goal.  4. Dr Dyson provided a support letter for PCA assessment on 6/6/2023.     Note: [employer] completed and approved for 6 hours per day.                             Care Plan: Neurology Completed 1/7/2025      Problem: Dementia due to medical condition with behavioral disturbance  Resolved 1/7/2025      Goal: Patient will attend his follow up neurology clinic in the next 12 months.  Completed 1/7/2025      Start Date: 5/20/2024 Expected End Date: 5/31/2025    This Visit's Progress: 100% Recent Progress: 80%    Note:     Barriers: language barrier, low literacy, noncompliance, and lack of knowledge how to navigate complex health care system  Strengths: motivated to attend appt  Patient expressed understanding of goal: Yes    Action steps to achieve this goal:  1. I will attend my 3 months follow up with neurologist today, 6/25/2204 at 1:15pm.  Completed.  3. I will attend 4 months follow up with neurologist as scheduled on 10/14/2024 at 2:15 PM. Reminded and ride arranged with [employer]. No showed.   4. I will attend my scheduled 4 months follow up with a neurologist on 1/6/2025 at 10:45 am with Dr Salinas. Completed and to return in 1 yr  5. My PCA will accommodate the appointment.      Mitchell Naik MD   [facility] [place] - 3rd Floor   3H    Department Address: [address] [place] [id number]   Department Phone: [phone]                               Care Plan: Citizenship waiver Completed 10/16/2024      Problem: neurospych eval  Resolved 10/16/2024      Goal: Patient will attend medical waiver appointment in Sept, 2024.  Completed 10/7/2024      Start Date: 7/2/2024 Expected End Date: 9/30/2024    This Visit's Progress: 100% Recent Progress: 40%    Note:     Barriers: language barrier, low literacy, noncompliance, and lack of knowledge how to navigate complex health care system  Strengths: motivated to attend appt  Patient expressed understanding of goal: Yes    Action steps to achieve this goal:  1. I will answer my phone when I am contacted to schedule my appointment. Completed.  2. I will attend my medical waiver appointment as scheduled on 9/5/24 at 1:00pm with Ruiz Roy. Completed and received waiver.  3. I will schedule a follow up appointment with my provider if it is recommended to do so while I am at the clinic.  4. I will follow up with CCC regarding this goal at each outreach until it is completed.     Reminded patient.                             Care Plan: Psychiatry       Problem: Dementia due to medical condition with behavioral disturbance       Goal: Patient will attend his psychiatry appointment in the next 12 months.       Start Date: 10/16/2024 Expected End Date: 10/31/2025    This Visit's Progress: 40% Recent Progress: 30%    Note:     Barriers: language barrier, low literacy,  noncompliance, and lack of knowledge how to navigate complex health care system  Strengths: motivated to attend appt  Patient expressed understanding of goal: Yes    Action steps to achieve this goal:  1. I will answer my phone when I am contacted to schedule my appointment. Completed.  2. I will attend my follow-up psychiatry appointment as scheduled on 11/7/24 at 12:30 pm. Completed.   3. I will attend my follow up psychiatry appointment on 1/30/2025 at 2:45 PM with Annabella Jurado NP. Transportation is requested with [employer].  4. I will schedule a follow up appointment with my psychiatrist if it is recommended to do so while I am at the clinic.  5. I will follow up with CCC regarding this goal at each outreach until it is completed.                             Care Plan: neuropsychological evaluation Completed 12/30/2024      Problem: dementia  Resolved 12/30/2024      Goal: Patient will attend his neuropsych eval appointment in December, 24.  Completed 12/5/2024      Start Date: 10/16/2024 Expected End Date: 12/31/2024    This Visit's Progress: 100% Recent Progress: 40%    Note:     Barriers: language barrier, low literacy, noncompliance, and lack of knowledge how to navigate complex health care system  Strengths: motivated to attend appt  Patient expressed understanding of goal: Yes    Action steps to achieve this goal:  1. I will answer my phone when I am contacted to schedule my appointment.  2. I will attend my neuro-psych eval appointment as scheduled on 12/2/2024 at 7:45 am. Attended.  3. I will schedule a follow up appointment with my PCP if it is recommended to do so while I am at the clinic.  4. I will follow up with CCC regarding this goal at each outreach until it is completed.                                  Plan/Recommendations: The patient will continue working with Care Coordination to achieve goal(s) as above. CHW will continue outreaches at minimum every 30 days and will involve Lead CC as  needed or if patient is ready to move to Maintenance. Lead CC will continue to monitor CHW outreaches and patient's progress to goal(s) every 6 weeks.     Plan of Care updated and sent to patient: No

## 2025-01-29 ENCOUNTER — PATIENT OUTREACH (OUTPATIENT)
Dept: CARE COORDINATION | Facility: CLINIC | Age: 50
End: 2025-01-29
Payer: COMMERCIAL

## 2025-01-29 NOTE — PROGRESS NOTES
Clinic Care Coordination Contact  Community Health Worker Follow Up    Care Gaps:   Health Maintenance Due   Topic Date Due    NICOTINE/TOBACCO CESSATION COUNSELING Q 1 YR  Never done    HF ACTION PLAN  Never done    COLORECTAL CANCER SCREENING  Never done    INFLUENZA VACCINE (1) 09/01/2024    COVID-19 Vaccine (1 - 2024-25 season) Never done    BMP  11/15/2024    PHQ-2 (once per calendar year)  01/01/2025    ZOSTER IMMUNIZATION (1 of 2) 01/01/2025    YEARLY PREVENTIVE VISIT  01/02/2025     Scheduled on 6/25/2025 for preventive care      Care Plan:   Care Plan: Psychiatry       Problem: Dementia due to medical condition with behavioral disturbance       Goal: Patient will attend his psychiatry appointment in the next 12 months.       Start Date: 10/16/2024 Expected End Date: 10/31/2025    This Visit's Progress: 50% Recent Progress: 40%    Note:     Barriers: language barrier, low literacy, noncompliance, and lack of knowledge how to navigate complex health care system  Strengths: motivated to attend appt  Patient expressed understanding of goal: Yes    Action steps to achieve this goal:  1. I will answer my phone when I am contacted to schedule my appointment. Completed.  2. I will attend my follow-up psychiatry appointment as scheduled on 11/7/24 at 12:30 pm. Completed.   3. I will attend my follow up psychiatry appointment on 1/30/2025 at 2:45 PM with Annabella Jurado NP. Transportation is requested with [employer]. Reminded patient.   4. I will schedule a follow up appointment with my psychiatrist if it is recommended to do so while I am at the clinic.  5. I will follow up with [facility] regarding this goal at each outreach until it is completed.                           Intervention and Education during outreach:   -Patient will attend his citizenship interview on 2/04/2025 and has someone who will take him to the appointment.   -CHW rescheduled patient's MRI appointment to 2/13/2025 and requested ride with [employer].  -Patient lab done on 1/06/2025 after neurologist appointment.   -Patient was informed to call with questions or concerns.     CHW Next Outreach: In one month.

## 2025-01-30 ENCOUNTER — OFFICE VISIT (OUTPATIENT)
Dept: PSYCHIATRY | Facility: CLINIC | Age: 50
End: 2025-01-30
Payer: COMMERCIAL

## 2025-01-30 VITALS
SYSTOLIC BLOOD PRESSURE: 123 MMHG | WEIGHT: 127.04 LBS | OXYGEN SATURATION: 97 % | HEART RATE: 73 BPM | BODY MASS INDEX: 23.38 KG/M2 | DIASTOLIC BLOOD PRESSURE: 83 MMHG | HEIGHT: 62 IN

## 2025-01-30 DIAGNOSIS — F02.818 DEMENTIA DUE TO MEDICAL CONDITION WITH BEHAVIORAL DISTURBANCE (H): ICD-10-CM

## 2025-01-30 DIAGNOSIS — F99 MENTAL HEALTH DISORDER: Primary | ICD-10-CM

## 2025-01-30 RX ORDER — OLANZAPINE 2.5 MG/1
2.5 TABLET, FILM COATED ORAL 2 TIMES DAILY
Qty: 90 TABLET | Refills: 0 | Status: SHIPPED | OUTPATIENT
Start: 2025-01-30

## 2025-01-30 NOTE — PATIENT INSTRUCTIONS
"The Panel Psychiatry Program  What to Expect  Here's what to expect in the Panel Psychiatry Program.   About the program  You'll be meeting with a psychiatric doctor to check your mental health. A psychiatric doctor helps you deal with troubling thoughts and feelings by giving you medicine. They'll make sure you know the plan for your care. You may see them for a long time. When you're feeling better, they may refer you back to seeing your family doctor.   If you have any questions, we'll be glad to talk to you.  About visits  Be open  At your visits, please talk openly about your problems. It may feel hard, but it's the best way for us to help you.  Cancelling visits  If you can't come to your visit, please call us right away at [phone]. If you don't cancel at least 24 hours (1 full day) before your visit, that's \"late cancellation.\"  Not showing up for your visits  Being very late is the same as not showing up. You'll be a \"no show\" if:  You're more than 15 minutes late for a 30-minute (half hour) visit.  You're more than 30 minutes late for a 60-minute (full hour) visit.  If you cancel late or don't show up 2 times within 6 months, we may end your care.  Getting help between visits  If you need help between visits, you can call us Monday to Friday from 8 a.m. to 4:30 p.m. at [phone].  Emergency care  Call 911 or go to the nearest emergency department if your life or someone else's life is in danger.  Call 988 anytime to reach the national Suicide and Crisis hotline.  Medicine refills  To refill your medicine, call your pharmacy. You can also call [facility] at [phone], Monday to Friday, 8 a.m. to 4:30 p.m. It can take 1 to 3 business days to get a refill.   Forms, letters, and tests  You may have papers to fill out, like FMLA, short-term disability, and workability. We can help you with these forms at your visits, but you must have an appointment. You may need more " than 1 visit for this, to be in an intensive therapy program, or both.  Before we can give you medicine for ADHD, we may refer you to get tested for it or confirm it another way.  We may not be able to give you an emotional support animal letter.  We don't do mental health checks ordered by the court.   We don't do mental health testing, but we can refer you to get tested.   Thank you for choosing us for your care.  For informational purposes only. Not to replace the advice of your health care provider. Copyright   2022 [facility]. All rights reserved. [employer] [id number] - 12/22      After Visit Summary   Continue medications as prescribed  Have your pharmacy contact us for a refill if you are running low on medications (We may ask you to come into clinic to get a refill from the nurse  No Alcohol or drug use  No driving if sedated  Call the clinic with any questions or concerns   Reach out for help if you feel like hurting yourself or others ([facility] [place] [place] [facility] [phone]: [address] [facility], [address] or [facility] Suicide Hotline   [phone] , call 911 or go to nearest Emergency room     Crisis Resources:    Present to the Emergency Department as needed or call after hours crisis line at [phone] or [phone].   [place] Crisis Text Line: Text [place] to 886827.  Suicide LifeLine Chat: suicidepreventionlifeline.org/chat/.  National Suicide Prevention Lifeline: 814.128.9656 (TTY: 561.651.2539). Call anytime for help.  (www.suicidepreventionlifeline.org)  National [place] on Mental Illness (www.[place]ixon.org): 963.442.5829 or 704-861-6862.  Mental Health Association (www.mentalhealth.org): 554.415.2907 or 829-630-4506.       Follow up as directed, for your appointments, per your After Visit Summary Form.

## 2025-01-30 NOTE — PROGRESS NOTES
Psychiatric  Out- Patient  Follow Up Progress Note  Date of visit:1/30/2025           Discussion of Care and Treatment Recommendations:   This is a 50 year old male with  probable focal epilepsy and secondarily generalized tonic-clonic seizures.   His son is present and answeing questions on behalf of patient . Pt decined   Patient does not speak English, therefore,  services were used to address the complexities of language interpretation in this meeting.      Last visit 11/07/2024.  Recommendation at last visit .  1.Psychosis /agitation/aggressive Beh : Start Olanzapine 2.5 twice daily   2.Seizures: Defer to neurology   3.RTC : 4 weeks   4: Highly recommend abstain from all mood altering substances        Patient and I reviewed diagnosis and treatment plan and patient agrees with following recommendations:  Ongoing education given regarding diagnostic and treatment options with adequate verbalization of understanding.  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    Patient Active Problem List   Diagnosis    Left displaced femoral neck fracture (H)    Non-verbal learning disorder    Dementia due to medical condition with behavioral disturbance (H)    Amputation of right upper extremity, subsequent encounter    Hepatitis C virus infection, unspecified chronicity    Insomnia, unspecified type    Alcohol use disorder    Partial epilepsy with loss of consciousness, intractable (H)    Alcohol abuse    Behavior problem, adult    Violent behavior    Unable to care for self     Agitation    Acute hypoxemic respiratory failure (H)    Dyspnea, unspecified type             Chief Complaint / Subjective:    Chief complaint: Patient remains mute    History of Present Illness:   Devan presents to the clinic today accompanied by her son.  He has remained mute.  He did not answer to any of my questions.  Poor eye contact and sat on the chair facing down.  The son reports that patient presents this way each time outside the home.  However when he is with his family members and people who are most familiar with him he is able to have a normal conversation but generally he is a reserved person.  The son tells me that patient has been taking the medications I prescribed during our last visit.  His sleep and appetite are remarkable.  Occasionally he will appear agitated but the son states medications have been helpful.  Sons tells me that they help him with all his ADLs including cooking and laundry for patient.  He requires supervision all the time as he has history of wandering.  His son is his personal PCA      Patient will continue with current medications.  He will continue to follow neurologist for all his neurological issues including seizure disorder.  Medical issues will be deferred to primary care provider.  No other concerns today.  Patient to return to the clinic in approximately 8 weeks for follow-up appointment.  Mental Status Examination:   Appearance: Well groomed, poor eye contact, appears older stated age   Orientation: IRMA - Pt remained Mute   Reliability:  Patient appears poor historian.    Behavior: Remained calm , sitting on the chair   Speech:IRMA - Pt remained Mute IRMA - Pt remained Mute   Mood: IRMA - Pt remained Mute .    Affect: IRMA - Pt remained Mute   Judgement: IRMA - Pt remained Mute   Insight: IRMA - Pt remained Mute   Gait and station: IRMA - Pt remained Mute   Thought process: IRMA - Pt remained Mute   Thought content: IRMA - Pt remained Mute   Hallucinations :Reports psychosis  "  Thought content: IRMA - Pt remained Mute   Suicidal /Homical Ideations:  IRMA - Pt remained Mute   Associations: IRMA - Pt remained Mute   Fund of knowledge:  IRMA - Pt remained Mute   Attention / Concentration:   IRMA - Pt remained Mute   Short Term Memory: IRMA - Pt remained Mute   Long Term Memory: IRMA - Pt remained Mute   Motor Status:  IRMA - Pt remained Mute     Drug/treatment history and current pattern of use:   Patient has a history of alcohol use disorder but son currently states patient does not use alcohol     Medication changes: See Above   Medication adherence: compliant  Medication side effects: absent  Information about medications: Side effects, benefits and alternative treatments discussed and patient agrees .    Psychotherapy: Supportive therapy day-to-day living    Education: Diet, exercise, abstinence from drugs and alcohol, patient will not drive if sedated and medications or  under influence of any substance    Lab Results:   Personally reviewed and discussed with the patient    Lab Results   Component Value Date    WBC 7.4 05/15/2024    HGB 13.6 05/15/2024    HCT 41.3 05/15/2024     05/15/2024    CHOL 147 03/11/2024    TRIG 104 03/11/2024    HDL 48 03/11/2024    ALT 10 05/14/2024    AST 18 05/14/2024     (L) 05/15/2024    BUN 12.0 05/15/2024    CO2 23 05/15/2024    TSH 1.49 03/11/2024    INR 1.21 (H) 02/09/2023       Vital signs:  /83 (BP Location: Left arm, Patient Position: Sitting, Cuff Size: Adult Regular)   Pulse 73   Ht 1.575 m (5' 2\")   Wt 57.6 kg (127 lb 0.6 oz)   SpO2 97%   BMI 23.24 kg/m    Telemedicine visit-no vital signs completed  Allergies: Patient has no known allergies.         Medications:       No current facility-administered medications for this visit.     No current facility-administered medications for this visit.         Medication adherence: Reviewed risk/benefits of medication , Patient able to verbalize understanding of side effects and Patient " verbally consents to taking medications      PSYCHOEDUCATION:  Medication side effects and alternatives reviewed. Health promotion activities recommended and reviewed today. All questions addressed. Education and counseling completed regarding risks and benefits of medications and psychotherapy options.  Consent provided by patient/guardian  Call the psychiatric nurse line with medication questions or concerns at [phone].  Birthday [place] may be used to communicate with your provider, but this is not intended to be used for emergencies.  SEROTONIN SYNDROME:  Discussed risks of Serotonin syndrome (ie, serotonin toxicity) which is a potentially life-threatening condition associated with increased serotonergic activity in the central nervous system (CNS). It is seen with therapeutic medication use, inadvertent interactions between drugs, and intentional self-poisoning. Serotonin syndrome may involve a spectrum of clinical findings, which often include mental status changes, autonomic hyperactivity, and neuromuscular abnormalities.    STIMULANT THERAPY: Side effects discussed including but not limited to cardiac (including HTN, tachycardia, sudden death), motor/tic, appetite/growth, mood lability and sleep disruption. This is a controlled substance with risk for abuse, need to keep in a safe keep place and cannot replace lost scripts  HARM REDUCTION:  Discussions regarding effects of mood altering substances, alcohol and cannabis, on mood and that approach is harm reduction, will continue to prescribe meds as they work to cut back use.    SAFETY:  We all care about your loved one's safety. To reduce the risk of self-harm, remove access to all:  Firearms, Medicines (both prescribed and over-the-counter), Knives and other sharp objects, Ropes and like materials, and Alcohol  SLEEP HYGIENE: establish a sleep routine, limit screen time 1 hour prior to bed, use bed for sleep only, take sleep/medications on time (including  sleepy time tea, trazadone or herbal treatments such as melatonin), aroma therapy, limit caffeine/sugar, yoga, guided imagery, stretch, meditation, limit naps to 20 minutes, make a temperature change in the room, white noise, be mindful of slowing down breathing, take a warm bath/shower, frequently wash sheets, and journaling.   Medlineplus.gov is information for patients.  It is run by the [employer] and it contains information about all disorders, diseases and all medications.              Review of Systems:      ROS:    Subjective Data Only- Tele-Health Visit    10 point ROS was negative except for the items listed in HPI.      Crisis Resources:    Present to the Emergency Department as needed or call after hours crisis line at [phone] or [phone].   [place] Crisis Text Line: Text [place] to 068181.  Suicide LifeLine Chat: suicidepreEnerveeline.org/chat/.  National Suicide Prevention Lifeline: 664.879.8167 (TTY: 985.945.9621). Call anytime for help.  (www.suicidepreventionlifeline.org)  National [place] on Mental Illness (www.[place].org): 497.684.5201 or 089-362-7774.  Mental Health Association (www.mentalhealth.org): 444.502.3647 or 287-959-3582.      Coordination of Care:   More than 50% of time spent on coordination of care including: Educating patient about diagnosis, prognosis, side effects and benefits of medications, diet, exercise.  Time also spent providing supportive therapy regarding above issues.      Disclaimer: This note consists of symbols derived from keyboarding, dictation and/or voice recognition software. As a result, there may be errors in the script that have gone undetected. Please consider this when interpreting information found in this chart.    Start Time : 1500  End time : 1530

## 2025-01-30 NOTE — PROGRESS NOTES
Mental Health and Collaborative Care Psychiatry Service Rooming Note      Most pressing mental health concern at this time: depression      Any new physical health conditions or diagnoses affecting you that we should be aware of: no      Side effects related to medications patient would like to discuss with the provider:  No      Are you taking your medications as prescribed?  yes  If not, why? N/a      Do you need refills of any of the medications?  yes  If so, which ones? See pended medications      Are you taking any recreational substances? no          Dawn Vega MA  January 30, 2025  3:07 PM        Opt out

## 2025-02-03 ENCOUNTER — PATIENT OUTREACH (OUTPATIENT)
Dept: CARE COORDINATION | Facility: CLINIC | Age: 50
End: 2025-02-03
Payer: COMMERCIAL

## 2025-02-03 NOTE — PROGRESS NOTES
Clinic Care Coordination Contact      Direct care provided in primary language, no  needed.     Follow Up Progress Note      Assessment: CCRN reminded patient/caregiver of citizenship interview scheduled on  2/4/25 at 10:25 am at [address] [place] [address]. Patient's arm worker will take patient to his appointment. New goals created today for MRI and citizenship.     Transportation: Own ride.    Plan: Patient will attend appointment tomorrow with [employer].

## 2025-02-03 NOTE — LETTER
[facility]  Patient Centered Plan of Care  About Me:        Patient Name:  Heath Crawley    YOB: 1975  Age:         50 year old   Jayesh MRN:    [id number] Telephone Information:  Home Phone [phone]   Mobile [phone]       Address:  [address]  [place] [address] Email address:  No e-mail address on record      Emergency Contact(s)    Name Relationship Lgl Grd Work Phone Home Phone Mobile Phone   Lorie. JEANNE HAYWOOD Spouse    [phone]           Primary language:  [place]     needed? Yes   [place] Language [employer]:  [phone] op. 1  Other communication barriers:Language barrier; Physical impairment; Lack of coping    Preferred Method of Communication:     Current living arrangement: I live in a private home with family    Mobility Status/ Medical Equipment: Dependent/Assisted by Another        Health Maintenance  Health Maintenance Reviewed: Due/Overdue       My Access Plan  Medical Emergency 911   Primary Clinic Line [facility] [phone]   24 Hour Appointment Line [phone] or  [phone] (toll-free)   24 Hour Nurse Line [phone] (toll-free)   Preferred Urgent Care [facility] [phone]     Preferred Hospital [facility]  [phone]     Preferred Pharmacy [id number] - [place], [place] - [address] AT [facility]     Behavioral Health Crisis Line The National Suicide Prevention Lifeline at 1-870.630.6869 or Text/Call 838           My Care Team Members  Patient Care Team         Relationship Specialty Notifications Start End    Adrian Dyson MD PCP - General Family Medicine All results, Admissions 4/25/23     Phone: [phone] Fax: [phone]         [address] [place] [address]    Diana Arceo MD MD Family Medicine  12/30/22     Phone: [phone] Fax: [phone]         [address] [place]  [facility] [address]    Kaleigh Emery PA-C Physician Assistant Gastroenterology  12/30/22     Phone: [phone] Fax: [phone]         [address] [facility] [address]    Leventhal, Thomas Michael, MD MD Gastroenterology  2/13/23     Phone: [phone] Fax: [phone]         [address] [facility] [address]    [place] Worker   3/14/23     [employer] [employer]    Phone: [phone]         Misbah Mullins CHW Community Health Worker Primary Care - CC Admissions 4/28/23     Phone: [phone] Fax: [phone]         [address] [place] [address]    Oma Chaparro, RN Lead Care Coordinator Primary Care - CC Admissions 4/28/23     Phone: [phone]         Mitchell Naik MD Assigned Neuroscience Provider   5/27/23     Phone: [phone] Fax: [phone]         [address] [place] [address]    ru Say Personal Care Attendant PCA   8/4/23     Son/PCA: 6.5 hours per day.    Phone: [phone]         Dewayne Torres, PhD LP MD Neuropsychology  5/21/24     Phone: [phone] Fax: [phone]         [address] [place] [address]    Luis Blackwell RP Pharmacist Pharmacist  6/7/24     Phone: [phone] Fax: [phone]         [address] [place] [address]    Adrian Dyson MD Assigned PCP   6/23/24     Phone: [phone] Fax: [phone]         [address] [place] [address]    Luis Blackwell RPH Assigned MTM Pharmacist   6/23/24     Phone: [phone] Fax: [phone]         [address] [place] [address]    Dewayne Torres, PhD LP Assigned Behavioral Health Provider   12/23/24     Phone: [phone] Fax: [phone] [address] [facility] [address]                My Care Plans  Self Management and Treatment Plan    Care Plan  Care Plan: Psychiatry       Problem: Dementia due to medical condition with behavioral disturbance       Goal: Patient will attend his psychiatry appointment  in the next 12 months.       Start Date: 10/16/2024 Expected End Date: 10/31/2025    Recent Progress: 50%    Note:     Barriers: language barrier, low literacy, noncompliance, and lack of knowledge how to navigate complex health care system  Strengths: motivated to attend appt  Patient expressed understanding of goal: Yes    Action steps to achieve this goal:  1. I will answer my phone when I am contacted to schedule my appointment. Completed.  2. I will attend my follow-up psychiatry appointment as scheduled on 11/7/24 at 12:30 pm. Completed.   3. I will attend my follow up psychiatry appointment on 1/30/2025 at 2:45 PM with Annabella Jurado NP. Completed.   4. I will schedule a follow up appointment with my psychiatrist if it is recommended to do so while I am at the clinic.  5. I will follow up with [facility] regarding this goal at each outreach until it is completed.                             Care Plan: Imaging       Problem: MRI       Goal: Patient will attend his MRI appointment as scheduled in the next 30 days.       Start Date: 2/3/2025 Expected End Date: 2/28/2025    This Visit's Progress: 50%    Note:     Barriers: language barrier, low literacy, noncompliance, and lack of knowledge how to navigate complex health care system  Strengths: motivated to attend appt  Patient expressed understanding of goal: Yes    Action steps to achieve this goal:  1. I will answer my phone when I am contacted to schedule my appointment.  2. I will attend my MRI appointment as scheduled on 2/13/25 at 9:30 am.   3. I will schedule a follow up appointment with my neurologist/PCP if it is recommended to do so while I am at the clinic.  4. I will follow up with [facility] regarding this goal at each outreach until it is completed.                               Care Plan: Citizenship interview       Problem: Citizenship       Goal: Patient will attend his citizenship interview with [employer] as scheduled on 2/4/25.       Start Date: 2/3/2025  Expected End Date: 2/28/2025    This Visit's Progress: 60%    Note:     Barriers: language barrier, low literacy, noncompliance, and lack of knowledge how to navigate complex health care system  Strengths: motivated to attend appt  Patient expressed understanding of goal: Yes    Action steps to achieve this goal:  1. I will attend my citizenship interview appointment with [employer] as scheduled on 2/4/25 at 10:25 am at [address] [facility] [address].  2. I will follow up with [facility] regarding this goal at each outreach until it is completed.                                 Action Plans on File:                       Advance Care Plans/Directives:   Advanced Care Plan/Directives on file: No    Discussed with patient/caregiver(s): No data recorded           My Medical and Care Information  Problem List   Patient Active Problem List   Diagnosis    Left displaced femoral neck fracture (H)    Non-verbal learning disorder    Dementia due to medical condition with behavioral disturbance (H)    Amputation of right upper extremity, subsequent encounter    Hepatitis C virus infection, unspecified chronicity    Insomnia, unspecified type    Alcohol use disorder    Partial epilepsy with loss of consciousness, intractable (H)    Alcohol abuse    Behavior problem, adult    Violent behavior    Unable to care for self    Agitation    Acute hypoxemic respiratory failure (H)    Dyspnea, unspecified type      Current Medications:  Please refer to the most recent medication list provided to you by your medical team and reach out to your provider with any questions or to make any corrections.    Care Coordination Start Date: 2/20/2023   Frequency of Care Coordination: 6 weeks, more frequently as needed     Form Last Updated: 02/03/2025

## 2025-02-12 ENCOUNTER — PATIENT OUTREACH (OUTPATIENT)
Dept: CARE COORDINATION | Facility: CLINIC | Age: 50
End: 2025-02-12
Payer: COMMERCIAL

## 2025-02-12 NOTE — LETTER
[facility]  Patient Centered Plan of Care  About Me:        Patient Name:  Heath Crawley    YOB: 1975  Age:         50 year old   Jayesh MRN:    [id number] Telephone Information:  Home Phone [phone]   Mobile [phone]       Address:  [address]  [place] [address] Email address:  No e-mail address on record      Emergency Contact(s)    Name Relationship Lgl Grd Work Phone Home Phone Mobile Phone   Lorie. JEANNE HAYWOOD. Spouse    [phone]           Primary language:  [place]     needed? Yes   [place] [employer]:  [phone] op. 1  Other communication barriers:Language barrier; Physical impairment; Lack of coping; Cognitive impairment    Preferred Method of Communication:     Current living arrangement: I live in a private home with family    Mobility Status/ Medical Equipment: Dependent/Assisted by Another        Health Maintenance  Health Maintenance Reviewed: Due/Overdue       My Access Plan  Medical Emergency 911   Primary Clinic Line [facility] [phone]   24 Hour Appointment Line [phone] or  [phone]) (toll-free)   24 Hour Nurse Line [phone] (toll-free)   Preferred Urgent Care [facility] [phone]     [facility]  [phone]     Preferred Pharmacy [facility] #[id number] - [place], [place] - [address] AT [facility]     Behavioral Health Crisis Line The National Suicide Prevention Lifeline at 1-505.505.3889 or Text/Call 158           My Care Team Members  Patient Care Team         Relationship Specialty Notifications Start End    Adrian Dyson MD PCP - General Family Medicine All results, Admissions 4/25/23     Phone: [phone] Fax: [phone]         [address] [place] [address]    Diana Arceo MD MD Family Medicine  12/30/22     Phone: [phone] Fax: [phone]          [address] [place] [address]    Kaleigh Emery PA-C Physician Assistant Gastroenterology  12/30/22     Phone: [phone] Fax: [phone]         [address] [facility] [address]    Leventhal, Thomas Michael, MD MD Gastroenterology  2/13/23     Phone: [phone] Fax: [phone]         [address] [place] [address]    [place] Worker   3/14/23     [facility] [employer]    Phone: [phone]         Misbah Mullins CHW Community Health Worker Primary Care - CC Admissions 4/28/23     Phone: [phone] Fax: [phone]         [address] [place] [address]    Oma Chaparro, RN Lead Care Coordinator Primary Care - CC Admissions 4/28/23     Phone: [phone]         Mitchell Naik MD Assigned Neuroscience Provider   5/27/23     Phone: [phone] Fax: [phone]         [address] [place] [address]    ru Say Personal Care Attendant PCA   8/4/23     Son/PCA: 6.5 hours per day.    Phone: [phone]         Dewayne Torres, PhD LP MD Neuropsychology  5/21/24     Phone: [phone] Fax: [phone]         [address] [place] [address]    Luis Blackwell RP Pharmacist Pharmacist  6/7/24     Phone: [phone] Fax: [phone]         [address] [place] [address]    Adrian Dyson MD Assigned PCP   6/23/24     Phone: [phone] Fax: [phone]         [address] [place] [address]    Luis Blackwell RPH Assigned MTM Pharmacist   6/23/24     Phone: [phone] Fax: [phone]         [address] [place] [address]    Dewayne Torres, PhD LP Assigned Behavioral Health Provider   12/23/24     Phone: [phone] Fax: [phone]         [address] [facility] [address]                My Care Plans  Self Management and Treatment Plan    Care Plan  Care Plan: Psychiatry       Problem: Dementia due to medical condition with behavioral disturbance       Goal: Patient will attend his  psychiatry appointment in the next 12 months.       Start Date: 10/16/2024 Expected End Date: 10/31/2025    Recent Progress: 50%    Note:     Barriers: language barrier, low literacy, noncompliance, and lack of knowledge how to navigate complex health care system  Strengths: motivated to attend appt  Patient expressed understanding of goal: Yes    Action steps to achieve this goal:  1. I will answer my phone when I am contacted to schedule my appointment. Completed.  2. I will attend my follow-up psychiatry appointment as scheduled on 11/7/24 at 12:30 pm. Completed.   3. I will attend my follow up psychiatry appointment on 1/30/2025 at 2:45 PM with Annabella Jurado NP. Completed.   4. I will schedule a follow up appointment with my psychiatrist if it is recommended to do so while I am at the clinic.  5. I will follow up with [facility] regarding this goal at each outreach until it is completed.                             Care Plan: Imaging       Problem: MRI       Goal: Patient will attend his MRI appointment as scheduled in the next 30 days.       Start Date: 2/3/2025 Expected End Date: 2/28/2025    This Visit's Progress: 50%    Note:     Barriers: language barrier, low literacy, noncompliance, and lack of knowledge how to navigate complex health care system  Strengths: motivated to attend appt  Patient expressed understanding of goal: Yes    Action steps to achieve this goal:  1. I will answer my phone when I am contacted to schedule my appointment.  2. I will attend my MRI appointment as scheduled on 2/13/25 at 9:30 am.   3. I will schedule a follow up appointment with my neurologist/PCP if it is recommended to do so while I am at the clinic.  4. I will follow up with [facility] regarding this goal at each outreach until it is completed.                               Care Plan: Citizenship interview       Problem: Citizenship       Goal: Patient will attend his citizenship interview with USC[employer] as scheduled on 2/4/25.        Start Date: 2/3/2025 Expected End Date: 2/28/2025    This Visit's Progress: 60%    Note:     Barriers: language barrier, low literacy, noncompliance, and lack of knowledge how to navigate complex health care system  Strengths: motivated to attend appt  Patient expressed understanding of goal: Yes    Action steps to achieve this goal:  1. I will attend my citizenship interview appointment with [employer] as scheduled on 2/4/25 at 10:25 am at [address] [facility] [address].  2. I will follow up with [facility] regarding this goal at each outreach until it is completed.                                 Action Plans on File:                       Advance Care Plans/Directives:   Advanced Care Plan/Directives on file: No    Discussed with patient/caregiver(s): Declined Further Information             My Medical and Care Information  Problem List   Patient Active Problem List   Diagnosis    Left displaced femoral neck fracture (H)    Non-verbal learning disorder    Dementia due to medical condition with behavioral disturbance (H)    Amputation of right upper extremity, subsequent encounter    Hepatitis C virus infection, unspecified chronicity    Insomnia, unspecified type    Alcohol use disorder    Partial epilepsy with loss of consciousness, intractable (H)    Alcohol abuse    Behavior problem, adult    Violent behavior    Unable to care for self    Agitation    Acute hypoxemic respiratory failure (H)    Dyspnea, unspecified type      Current Medications:  Please refer to the most recent medication list provided to you by your medical team and reach out to your provider with any questions or to make any corrections.    Care Coordination Start Date: 2/20/2023   Frequency of Care Coordination: 6 weeks, more frequently as needed     Form Last Updated: 02/12/2025

## 2025-02-12 NOTE — LETTER
[facility]  Patient Centered Plan of Care  About Me:        Patient Name:  Heath Crawley    YOB: 1975  Age:         50 year old   Jayesh MRN:    [id number] Telephone Information:  Home Phone [phone]   Mobile [phone]       Address:  [address]  [place] [address] Email address:  No e-mail address on record      Emergency Contact(s)    Name Relationship Lgl Grd Work Phone Home Phone Mobile Phone   Lorie. JEANNE HAYWOOD. Spouse    [phone]           Primary language:  [place]     needed? Yes   [place] [employer]:  [phone] op. 1  Other communication barriers:Language barrier; Physical impairment; Lack of coping; Cognitive impairment    Preferred Method of Communication:     Current living arrangement: I live in a private home with family    Mobility Status/ Medical Equipment: Dependent/Assisted by Another        Health Maintenance  Health Maintenance Reviewed: Due/Overdue       My Access Plan  Medical Emergency 911   Primary Clinic Line [facility] [phone]   24 Hour Appointment Line [phone] or  [phone]) (toll-free)   24 Hour Nurse Line [phone] (toll-free)   Preferred Urgent Care [facility] [phone]     [facility]  [phone]     Preferred Pharmacy [facility] #[id number] - [place], [place] - [address] AT [facility]     Behavioral Health Crisis Line The National Suicide Prevention Lifeline at 1-508.444.3026 or Text/Call 618           My Care Team Members  Patient Care Team         Relationship Specialty Notifications Start End    Adrian Dyson MD PCP - General Family Medicine All results, Admissions 4/25/23     Phone: [phone] Fax: [phone]         [address] [place] [address]    Diana Arceo MD MD Family Medicine  12/30/22     Phone: [phone] Fax: [phone]          [address] [place] [address]    Kaleigh Emery PA-C Physician Assistant Gastroenterology  12/30/22     Phone: [phone] Fax: [phone]         [address] [facility] [address]    Leventhal, Thomas Michael, MD MD Gastroenterology  2/13/23     Phone: [phone] Fax: [phone]         [address] [place] [address]    [place] Worker   3/14/23     [employer]    Phone: [phone]         Misbah Mullins CHW Community Health Worker Primary Care - CC Admissions 4/28/23     Phone: [phone] Fax: [phone]         [address] [place] [address]    Oma Chaparro, RN Lead Care Coordinator Primary Care - CC Admissions 4/28/23     Phone: [phone]         Mitchell Naik MD Assigned Neuroscience Provider   5/27/23     Phone: [phone] Fax: [phone]         [address] [place] [address]    ru Say Personal Care Attendant PCA   8/4/23     Son/PCA: 6.5 hours per day.    Phone: [phone]         Dewayne Torres, PhD LP MD Neuropsychology  5/21/24     Phone: [phone] Fax: [phone]         [address] [place] [address]    Luis Blackwell RP Pharmacist Pharmacist  6/7/24     Phone: [phone] Fax: [phone]         [address] [place] [address]    Adrian Dyson MD Assigned PCP   6/23/24     Phone: [phone] Fax: [phone]         [address] [place] [address]    Luis Blackwell RPH Assigned MTM Pharmacist   6/23/24     Phone: [phone] Fax: [phone]         [address] [place] [address]    Dewayne Torres, PhD LP Assigned Behavioral Health Provider   12/23/24     Phone: [phone] Fax: [phone]         [address] [facility] [address]                My Care Plans  Self Management and Treatment Plan    Care Plan  Care Plan: Psychiatry       Problem: Dementia due to medical condition with behavioral disturbance       Goal: Patient will attend his  psychiatry appointment in the next 12 months.       Start Date: 10/16/2024 Expected End Date: 10/31/2025    Recent Progress: 50%    Note:     Barriers: language barrier, low literacy, noncompliance, and lack of knowledge how to navigate complex health care system  Strengths: motivated to attend appt  Patient expressed understanding of goal: Yes    Action steps to achieve this goal:  1. I will answer my phone when I am contacted to schedule my appointment. Completed.  2. I will attend my follow-up psychiatry appointment as scheduled on 11/7/24 at 12:30 pm. Completed.   3. I will attend my follow up psychiatry appointment on 1/30/2025 at 2:45 PM with Annabella Jurado NP. Completed.   4. I will attend my follow-up psychiatry appointment on 3/6/25 at 3:45 pm with Annabella Jurado NP.  5. I will schedule a follow up appointment with my psychiatrist if it is recommended to do so while I am at the clinic.  6. I will follow up with [facility] regarding this goal at each outreach until it is completed.                             Care Plan: Citizenship interview       Problem: Citizenship       Goal: Patient will attend his citizenship interview with USC[employer] as scheduled on 2/4/25.       Start Date: 2/3/2025 Expected End Date: 2/28/2025    This Visit's Progress: 80% Recent Progress: 60%    Note:     Barriers: language barrier, low literacy, noncompliance, and lack of knowledge how to navigate complex health care system  Strengths: motivated to attend appt  Patient expressed understanding of goal: Yes    Action steps to achieve this goal:  1. I will attend my citizenship interview appointment with USCIS as scheduled on 2/4/25 at 10:25 am at [address] \[facility]"" [address]. Completed and passed the interview.   2. I will attend the citizenship ceremony on 2/27/25 at 1:00pm. [facility] worker will accompany patient.   3. I will follow up with [facility] regarding this goal at each outreach until it is completed.                                  Action Plans on File:                       Advance Care Plans/Directives:   Advanced Care Plan/Directives on file: No    Discussed with patient/caregiver(s): Declined Further Information             My Medical and Care Information  Problem List   Patient Active Problem List   Diagnosis    Left displaced femoral neck fracture (H)    Non-verbal learning disorder    Dementia due to medical condition with behavioral disturbance (H)    Amputation of right upper extremity, subsequent encounter    Hepatitis C virus infection, unspecified chronicity    Insomnia, unspecified type    Alcohol use disorder    Partial epilepsy with loss of consciousness, intractable (H)    Alcohol abuse    Behavior problem, adult    Violent behavior    Unable to care for self    Agitation    Acute hypoxemic respiratory failure (H)    Dyspnea, unspecified type      Current Medications:  Please refer to the most recent medication list provided to you by your medical team and reach out to your provider with any questions or to make any corrections.    Care Coordination Start Date: 2/20/2023   Frequency of Care Coordination: 6 weeks, more frequently as needed     Form Last Updated: 02/24/2025

## 2025-02-12 NOTE — PROGRESS NOTES
Clinic Care Coordination Contact  Clinic Care Coordination Contact  OUTREACH    Referral Information:  Referral Source: PCP    Primary Diagnosis: Neurological Disorders    Chief Complaint   Patient presents with    Clinic Care Coordination - Follow-up     Annual re-assessment     Clinic Utilization  Difficulty keeping appointments:: Yes  Compliance Concerns: Yes  No-Show Concerns: Yes  No PCP office visit in Past Year: No  Utilization      No Show Count (past year)  42             ED Visits  2             Hospital Admissions  2                    Current as of: 2/12/2025  9:40 AM            Clinical Concerns:  CCRN completed annual re-assessment today via phone with spouse ( has C2C on file). Patient wasn't able to answer most questions secondary to dementia. Patient is actively working on goals.     Psychiatry  Per chart review, patient has been attending his psychiatry appointment as scheduled. Last seen by his psychiatrist on 1/30/25. Follow-up appointment scheduled on 3/6/25 at 3:45 pm with Annabella Jurado NP. Goal updated.     Citizenship  Per chart review, patient attended his citizenship interview on 2/4/25. Patient passed the citizenship interview. Per spouse, patient is scheduled to attend citizenship ceremony on 2/27/25 at 1:00 pm. Goal updated today.     MRI  Per chart review, patient attended his MRI appointment on 2/13/25. Goal completed today.     Impression: Mild motion artifact degrades image quality.   1. No acute intracranial abnormality.  2. Moderate diffuse cerebral volume loss most notably within the  temporal region, is greater than expected for age.    Pain  Pain (GOAL):: No  Health Maintenance Reviewed: Due/Overdue   Clinical Pathway: None    Medication Management:  Medication review status: Medications reviewed and no changes reported per patient.           Functional Status:  Dependent ADLs:: Bathing, Dressing, Grooming, Toileting, Transfers  Dependent IADLs:: Cleaning, Cooking, Laundry,  Shopping, Meal Preparation, Medication Management, Money Management, Transportation, Incontinence  Bed or wheelchair confined:: No  Mobility Status: Dependent/Assisted by Another  Fallen 2 or more times in the past year?: No  Any fall with injury in the past year?: No    Living Situation:  Current living arrangement:: I live in a private home with family  Type of residence:: Apartment    Lifestyle & Psychosocial Needs:    Social Drivers of Health     Food Insecurity: Low Risk  (1/2/2024)    Food Insecurity     Within the past 12 months, did you worry that your food would run out before you got money to buy more?: No     Within the past 12 months, did the food you bought just not last and you didn t have money to get more?: No   Depression: Not at risk (5/20/2024)    PHQ-2     PHQ-2 Score: 0   Housing Stability: Low Risk  (1/2/2024)    Housing Stability     Do you have housing? : Yes     Are you worried about losing your housing?: No   Tobacco Use: High Risk (1/30/2025)    Patient History     Smoking Tobacco Use: Every Day     Smokeless Tobacco Use: Former     Passive Exposure: Current   Financial Resource Strain: Low Risk  (1/2/2024)    Financial Resource Strain     Within the past 12 months, have you or your family members you live with been unable to get utilities (heat, electricity) when it was really needed?: No   Alcohol Use: Not on file   Transportation Needs: High Risk (1/2/2024)    Transportation Needs     Within the past 12 months, has lack of transportation kept you from medical appointments, getting your medicines, non-medical meetings or appointments, work, or from getting things that you need?: Yes   Physical Activity: Not on file   Interpersonal Safety: Not on file   Stress: Not on file   Social Connections: Not on file   Health Literacy: Not on file     Diet:: Regular  Inadequate nutrition (GOAL):: No  Tube Feeding: No  Inadequate activity/exercise (GOAL):: Yes  Significant changes in sleep pattern  (GOAL): No  Transportation means:: Medical transport, Regular car     Hoahaoism or spiritual beliefs that impact treatment:: No  Mental health DX:: No  Mental health management concern (GOAL):: No  Chemical Dependency Status: No Current Concerns  Informal Support system:: Mera based, Family, Spouse, Friends, Children      Resources and Interventions:  Current Resources:      Community Resources: County Programs, County Worker, DME, [facility], PCA, Transportation Services  Supplies Currently Used at Home: Incontinence Supplies  Equipment Currently Used at Home: cane, straight, walker, rolling  Employment Status: disabled     Advance Care Plan/Directive  Advanced Care Plans/Directives on file:: No  Discussed with patient/caregiver:: Declined Further Information    Referrals Placed: None    Care Plan:  Care Plan: Neurology Completed 5/20/2024      Problem: Seizure episodes  Resolved 5/20/2024      Goal: Patient will attend his neurology appointment in the next 12 months.  Completed 5/20/2024      Start Date: 4/28/2023 Expected End Date: 4/28/2024    This Visit's Progress: 100% Recent Progress: 60%    Note:     Barriers: language barrier, low literacy, noncompliance, and lack of knowledge how to navigate complex health care system  Strengths: motivated to attend appt  Patient expressed understanding of goal: Yes    Action steps to achieve this goal:  1. I will attend my follow up appointment with neurologist as rescheduled on 10/03/2023 at 4:15 PM. (Transportation arranged with [employer]). COMPLETED  2. I will attend my 3 months neurology appointment on 1/9/2023 at 3:45pm with Dr Mitchell Naik. Patient went but was told no appt and assisted patient rescheduled on 3/11/2024. Reminded  3. I will attend my follow up neurology appt on 3/11/2024. Completed.   3. My wife will answer the phone when  calls for .  4. I will update CCC at outreach.     Mitchell Naik MD   [facility]  Floor   3H    Department Address: [address] [facility] [address]rd [facility] [address]   Department Phone: [phone]                               Care Plan: [employer]ices Assessment Completed 8/30/2023      Problem: Imparied mobility  Resolved 8/30/2023      Goal: I would like to explore if I could qualify for PCA service, CADI, SMRT in the next 6 months.  Completed 8/8/2023      Start Date: 4/28/2023 Expected End Date: 10/28/2023    This Visit's Progress: 100% Recent Progress: 30%    Note:     Barriers: lanaguage  Strengths: Accepting of support  Patient expressed understanding of goal: Yes    Action steps to achieve this goal:  1. I will answer my phone when I am contacted by the PCA  to schedule my assessment.  2. I will request my family and supportive friends to be present for my assessment.  3. I will follow up with CCC in the next month regarding the progress made on this goal.  4. Dr Dyson provided a support letter for PCA assessment on 6/6/2023.     Note: MNChoices completed and approved for 6 hours per day.                             Care Plan: Neurology Completed 1/7/2025      Problem: Dementia due to medical condition with behavioral disturbance  Resolved 1/7/2025      Goal: Patient will attend his follow up neurology clinic in the next 12 months.  Completed 1/7/2025      Start Date: 5/20/2024 Expected End Date: 5/31/2025    This Visit's Progress: 100% Recent Progress: 80%    Note:     Barriers: language barrier, low literacy, noncompliance, and lack of knowledge how to navigate complex health care system  Strengths: motivated to attend appt  Patient expressed understanding of goal: Yes    Action steps to achieve this goal:  1. I will attend my 3 months follow up with neurologist today, 6/25/2204 at 1:15pm. Completed.  3. I will attend 4 months follow up with neurologist as scheduled on 10/14/2024 at 2:15 PM. Reminded and ride arranged with [employer]. No showed.   4. I will attend my  scheduled 4 months follow up with a neurologist on 1/6/2025 at 10:45 am with Dr Salinas. Completed and to return in 1 yr  5. My PCA will accommodate the appointment.      Mitchell Naik MD   [facility] [place] [facility] - 3rd Floor   3H    Department Address: [address] [place] [address] 3rd [facility] [address]   Department Phone: [phone]                               Care Plan: Citizenship waiver Completed 10/16/2024      Problem: neurospych eval  Resolved 10/16/2024      Goal: Patient will attend medical waiver appointment in Sept, 2024.  Completed 10/7/2024      Start Date: 7/2/2024 Expected End Date: 9/30/2024    This Visit's Progress: 100% Recent Progress: 40%    Note:     Barriers: language barrier, low literacy, noncompliance, and lack of knowledge how to navigate complex health care system  Strengths: motivated to attend appt  Patient expressed understanding of goal: Yes    Action steps to achieve this goal:  1. I will answer my phone when I am contacted to schedule my appointment. Completed.  2. I will attend my medical waiver appointment as scheduled on 9/5/24 at 1:00pm with Ruiz Roy. Completed and received waiver.  3. I will schedule a follow up appointment with my provider if it is recommended to do so while I am at the clinic.  4. I will follow up with CCC regarding this goal at each outreach until it is completed.     Reminded patient.                             Care Plan: Psychiatry       Problem: Dementia due to medical condition with behavioral disturbance       Goal: Patient will attend his psychiatry appointment in the next 12 months.       Start Date: 10/16/2024 Expected End Date: 10/31/2025    Recent Progress: 50%    Note:     Barriers: language barrier, low literacy, noncompliance, and lack of knowledge how to navigate complex health care system  Strengths: motivated to attend appt  Patient expressed understanding of goal: Yes    Action steps to achieve this goal:  1. I will answer  my phone when I am contacted to schedule my appointment. Completed.  2. I will attend my follow-up psychiatry appointment as scheduled on 11/7/24 at 12:30 pm. Completed.   3. I will attend my follow up psychiatry appointment on 1/30/2025 at 2:45 PM with Annabella Jurado NP. Completed.   4. I will attend my follow-up psychiatry appointment on 3/6/25 at 3:45 pm with Annabella Jurado NP.  5. I will schedule a follow up appointment with my psychiatrist if it is recommended to do so while I am at the clinic.  6. I will follow up with [facility] regarding this goal at each outreach until it is completed.                             Care Plan: neuropsychological evaluation Completed 12/30/2024      Problem: dementia  Resolved 12/30/2024      Goal: Patient will attend his neuropsych eval appointment in December, 24.  Completed 12/5/2024      Start Date: 10/16/2024 Expected End Date: 12/31/2024    This Visit's Progress: 100% Recent Progress: 40%    Note:     Barriers: language barrier, low literacy, noncompliance, and lack of knowledge how to navigate complex health care system  Strengths: motivated to attend appt  Patient expressed understanding of goal: Yes    Action steps to achieve this goal:  1. I will answer my phone when I am contacted to schedule my appointment.  2. I will attend my neuro-psych eval appointment as scheduled on 12/2/2024 at 7:45 am. Attended.  3. I will schedule a follow up appointment with my PCP if it is recommended to do so while I am at the clinic.  4. I will follow up with [facility] regarding this goal at each outreach until it is completed.                             Care Plan: Imaging Completed 2/24/2025      Problem: MRI  Resolved 2/24/2025      Goal: Patient will attend his MRI appointment as scheduled in the next 30 days.  Completed 2/24/2025      Start Date: 2/3/2025 Expected End Date: 2/28/2025    This Visit's Progress: 100% Recent Progress: 50%    Note:     Barriers: language barrier, low literacy,  noncompliance, and lack of knowledge how to navigate complex health care system  Strengths: motivated to attend appt  Patient expressed understanding of goal: Yes    Action steps to achieve this goal:  1. I will answer my phone when I am contacted to schedule my appointment.  2. I will attend my MRI appointment as scheduled on 2/13/25 at 9:30 am. Completed.   3. I will schedule a follow up appointment with my neurologist/PCP if it is recommended to do so while I am at the clinic.  4. I will follow up with [facility] regarding this goal at each outreach until it is completed.                               Care Plan: Citizenship interview       Problem: Citizenship       Goal: Patient will attend his citizenship interview with USCIS as scheduled on 2/4/25.       Start Date: 2/3/2025 Expected End Date: 2/28/2025    This Visit's Progress: 80% Recent Progress: 60%    Note:     Barriers: language barrier, low literacy, noncompliance, and lack of knowledge how to navigate complex health care system  Strengths: motivated to attend appt  Patient expressed understanding of goal: Yes    Action steps to achieve this goal:  1. I will attend my citizenship interview appointment with USCIS as scheduled on 2/4/25 at 10:25 am at [address] [facility] [address]. Completed and passed the interview.   2. I will attend the citizenship ceremony on 2/27/25 at 1:00pm. [facility] worker will accompany patient.   3. I will follow up with [facility] regarding this goal at each outreach until it is completed.                                 Outreach Frequency: 6 weeks, more frequently as needed  Future Appointments                Tomorrow [place] [employer]; [id number] [facility], [facility]    In 3 weeks Annabella Jurado NP [facility] [place], [facility]    In 4 months Adrian Dyson MD [facility] Nani CHING SPRO            Plan:   1) Patient will attend his  citizenship ceremony on 2/27/25.   2) Patient will attend his psychiatry appt on 3/6/25.

## 2025-02-27 ENCOUNTER — PATIENT OUTREACH (OUTPATIENT)
Dept: CARE COORDINATION | Facility: CLINIC | Age: 50
End: 2025-02-27
Payer: COMMERCIAL

## 2025-02-27 NOTE — PROGRESS NOTES
Clinic Care Coordination Contact  Community Health Worker Follow Up    Care Gaps:   Health Maintenance Due   Topic Date Due    NICOTINE/TOBACCO CESSATION COUNSELING Q 1 YR  Never done    HF ACTION PLAN  Never done    COLORECTAL CANCER SCREENING  Never done    INFLUENZA VACCINE (1) 09/01/2024    COVID-19 Vaccine (1 - 2024-25 season) Never done    BMP  11/15/2024    PHQ-2 (once per calendar year)  01/01/2025    YEARLY PREVENTIVE VISIT  01/02/2025    ZOSTER IMMUNIZATION (1 of 2) 01/01/2025    LIPID  03/11/2025     Scheduled on 6/25/2025 for preventive care visit.      Care Plan:   Care Plan: Neurology Completed 5/20/2024      Problem: Seizure episodes  Resolved 5/20/2024      Goal: Patient will attend his neurology appointment in the next 12 months.  Completed 5/20/2024      Start Date: 4/28/2023 Expected End Date: 4/28/2024    This Visit's Progress: 100% Recent Progress: 60%    Note:     Barriers: language barrier, low literacy, noncompliance, and lack of knowledge how to navigate complex health care system  Strengths: motivated to attend appt  Patient expressed understanding of goal: Yes    Action steps to achieve this goal:  1. I will attend my follow up appointment with neurologist as rescheduled on 10/03/2023 at 4:15 PM. (Transportation arranged with [employer]). COMPLETED  2. I will attend my 3 months neurology appointment on 1/9/2023 at 3:45pm with Dr Mitchell Naik. Patient went but was told no appt and assisted patient rescheduled on 3/11/2024. Reminded  3. I will attend my follow up neurology appt on 3/11/2024. Completed.   3. My wife will answer the phone when  calls for .  4. I will update CCC at outreach.     Mitchell Naik MD   [facility] [place] [facility] - 3rd Floor   3H    Department Address: [address] [place] [address] 3rd [facility] [address]   Department Phone: [phone]                               Care Plan: MnChoices Assessment Completed 8/30/2023      Problem:  Imparied mobility  Resolved 8/30/2023      Goal: I would like to explore if I could qualify for PCA service, CADI, SMRT in the next 6 months.  Completed 8/8/2023      Start Date: 4/28/2023 Expected End Date: 10/28/2023    This Visit's Progress: 100% Recent Progress: 30%    Note:     Barriers: lanaguage  Strengths: Accepting of support  Patient expressed understanding of goal: Yes    Action steps to achieve this goal:  1. I will answer my phone when I am contacted by the PCA  to schedule my assessment.  2. I will request my family and supportive friends to be present for my assessment.  3. I will follow up with CCC in the next month regarding the progress made on this goal.  4. Dr Dyson provided a support letter for PCA assessment on 6/6/2023.     Note: MNChoices completed and approved for 6 hours per day.                             Care Plan: Neurology Completed 1/7/2025      Problem: Dementia due to medical condition with behavioral disturbance  Resolved 1/7/2025      Goal: Patient will attend his follow up neurology clinic in the next 12 months.  Completed 1/7/2025      Start Date: 5/20/2024 Expected End Date: 5/31/2025    This Visit's Progress: 100% Recent Progress: 80%    Note:     Barriers: language barrier, low literacy, noncompliance, and lack of knowledge how to navigate complex health care system  Strengths: motivated to attend appt  Patient expressed understanding of goal: Yes    Action steps to achieve this goal:  1. I will attend my 3 months follow up with neurologist today, 6/25/2204 at 1:15pm. Completed.  3. I will attend 4 months follow up with neurologist as scheduled on 10/14/2024 at 2:15 PM. Reminded and ride arranged with [employer]. No showed.   4. I will attend my scheduled 4 months follow up with a neurologist on 1/6/2025 at 10:45 am with Dr Salinas. Completed and to return in 1 yr  5. My PCA will accommodate the appointment.      Mitchell Naik MD   [facility] NEUROLOGY - 3rd Floor    3H    Department Address: [address] [place] [address] [address]rd [facility] [address]   Department Phone: [phone]                               Care Plan: Citizenship waiver Completed 10/16/2024      Problem: neurospych eval  Resolved 10/16/2024      Goal: Patient will attend medical waiver appointment in Sept, 2024.  Completed 10/7/2024      Start Date: 7/2/2024 Expected End Date: 9/30/2024    This Visit's Progress: 100% Recent Progress: 40%    Note:     Barriers: language barrier, low literacy, noncompliance, and lack of knowledge how to navigate complex health care system  Strengths: motivated to attend appt  Patient expressed understanding of goal: Yes    Action steps to achieve this goal:  1. I will answer my phone when I am contacted to schedule my appointment. Completed.  2. I will attend my medical waiver appointment as scheduled on 9/5/24 at 1:00pm with Ruiz Roy. Completed and received waiver.  3. I will schedule a follow up appointment with my provider if it is recommended to do so while I am at the clinic.  4. I will follow up with CCC regarding this goal at each outreach until it is completed.     Reminded patient.                             Care Plan: Psychiatry       Problem: Dementia due to medical condition with behavioral disturbance       Goal: Patient will attend his psychiatry appointment in the next 12 months.       Start Date: 10/16/2024 Expected End Date: 10/31/2025    This Visit's Progress: 50% Recent Progress: 50%    Note:     Barriers: language barrier, low literacy, noncompliance, and lack of knowledge how to navigate complex health care system  Strengths: motivated to attend appt  Patient expressed understanding of goal: Yes    Action steps to achieve this goal:  1. I will answer my phone when I am contacted to schedule my appointment. Completed.  2. I will attend my follow-up psychiatry appointment as scheduled on 11/7/24 at 12:30 pm. Completed.   3. I will attend my follow  up psychiatry appointment on 1/30/2025 at 2:45 PM with Annabella Jurado NP. Completed.   4. I will attend my follow-up psychiatry appointment on 3/6/25 at 3:45 pm with Annabella Jurado NP. Reminded and transportation arranged.  5. I will schedule a follow up appointment with my psychiatrist if it is recommended to do so while I am at the clinic.  6. I will follow up with [facility] regarding this goal at each outreach until it is completed.                             Care Plan: neuropsychological evaluation Completed 12/30/2024      Problem: dementia  Resolved 12/30/2024      Goal: Patient will attend his neuropsych eval appointment in December, 24.  Completed 12/5/2024      Start Date: 10/16/2024 Expected End Date: 12/31/2024    This Visit's Progress: 100% Recent Progress: 40%    Note:     Barriers: language barrier, low literacy, noncompliance, and lack of knowledge how to navigate complex health care system  Strengths: motivated to attend appt  Patient expressed understanding of goal: Yes    Action steps to achieve this goal:  1. I will answer my phone when I am contacted to schedule my appointment.  2. I will attend my neuro-psych eval appointment as scheduled on 12/2/2024 at 7:45 am. Attended.  3. I will schedule a follow up appointment with my PCP if it is recommended to do so while I am at the clinic.  4. I will follow up with [facility] regarding this goal at each outreach until it is completed.                             Care Plan: Imaging Completed 2/24/2025      Problem: MRI  Resolved 2/24/2025      Goal: Patient will attend his MRI appointment as scheduled in the next 30 days.  Completed 2/24/2025      Start Date: 2/3/2025 Expected End Date: 2/28/2025    This Visit's Progress: 100% Recent Progress: 50%    Note:     Barriers: language barrier, low literacy, noncompliance, and lack of knowledge how to navigate complex health care system  Strengths: motivated to attend appt  Patient expressed understanding of goal:  Yes    Action steps to achieve this goal:  1. I will answer my phone when I am contacted to schedule my appointment.  2. I will attend my MRI appointment as scheduled on 2/13/25 at 9:30 am. Completed.   3. I will schedule a follow up appointment with my neurologist/PCP if it is recommended to do so while I am at the clinic.  4. I will follow up with CCC regarding this goal at each outreach until it is completed.                               Care Plan: Citizenship interview       Problem: Citizenship       Goal: Patient will attend his citizenship interview with USCIS as scheduled on 2/4/25.  Completed 2/27/2025      Start Date: 2/3/2025 Expected End Date: 2/28/2025    This Visit's Progress: 100% Recent Progress: 80%    Note:     Barriers: language barrier, low literacy, noncompliance, and lack of knowledge how to navigate complex health care system  Strengths: motivated to attend appt  Patient expressed understanding of goal: Yes    Action steps to achieve this goal:  1. I will attend my citizenship interview appointment with USCIS as scheduled on 2/4/25 at 10:25 am at [address] [facility] [address]. Completed and passed the interview.   2. I will attend the citizenship ceremony on 2/27/25 at 1:00pm. [facility] worker will accompany patient. Attended.  3. I will follow up with [facility] regarding this goal at each outreach until it is completed.                           Intervention and Education during outreach:  -Patient attended citizenship [place] ceremony and received certificate.  -CHW reminded patient's upcoming appointment for psychiatry and transportation arranged.  -Patient's ARM worker will assist applying for SSI and process.  -Patient continues receiving PCA services with no change.  -Patient was informed to call with questions or concerns.     CHW Next Outreach: In one month.

## 2025-03-06 ENCOUNTER — OFFICE VISIT (OUTPATIENT)
Dept: PSYCHIATRY | Facility: CLINIC | Age: 50
End: 2025-03-06
Payer: COMMERCIAL

## 2025-03-06 VITALS
HEART RATE: 92 BPM | WEIGHT: 129.6 LBS | DIASTOLIC BLOOD PRESSURE: 88 MMHG | BODY MASS INDEX: 23.7 KG/M2 | SYSTOLIC BLOOD PRESSURE: 114 MMHG | OXYGEN SATURATION: 96 %

## 2025-03-06 DIAGNOSIS — F02.818 DEMENTIA DUE TO MEDICAL CONDITION WITH BEHAVIORAL DISTURBANCE (H): ICD-10-CM

## 2025-03-06 RX ORDER — OLANZAPINE 2.5 MG/1
2.5 TABLET, FILM COATED ORAL 2 TIMES DAILY
Qty: 180 TABLET | Refills: 0 | Status: SHIPPED | OUTPATIENT
Start: 2025-03-06

## 2025-03-06 NOTE — PROGRESS NOTES
"  The patient has been notified of following:      \"This virtual  visit will be conducted via a call between you and your physician/provider. We have found that certain health care needs can be provided without the need for a physical exam.  This service lets us provide the care you need virtually/via video   If a prescription is necessary we can send it directly to your pharmacy.  If lab work is needed we can place an order for that and you can then stop by our lab to have the test done at a later time.     Virtual/Video visits are billed at different rates depending on your insurance coverage.Some insurers they may be billed the same as an in-person visit.  Please reach out to your insurance provider with any questions.    Patient has given verbal consent for virtual  visit : Yes      Ho w would you like to obtain your AVS? Mail a copy  If the video visit is dropped, the invitation should be resent by: Send to e-mail at: No e-mail address on record  Will anyone else be joining your video visit? 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     Patient does not speak English, therefore,  services were used to address the complexities of language interpretation in this meeting .      Last visit 01/30/2025.  Recommendation at last visit .  1.Psychosis /agitation/aggressive Beh : Start Olanzapine 2.5 twice daily   2.Seizures: Defer to neurology   3.RTC : 4 weeks   4: Highly recommend abstain from all mood altering substances  Patient and I reviewed diagnosis and treatment plan and patient agrees with following recommendations:  Ongoing education given regarding diagnostic and treatment options with adequate verbalization of " "understanding.  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  During the last visit he was mute  He reports compliance with current medications.  He reports that that hallucinations have disappeared since he has been taking olanzapine.  He denies any side effects from the medications.  Sleep  unremarkable  No new issues     Mental Status Examination:   Appearance: Well groomed, good eye contact   Orientation: Patient alert and oriented to person, place, time, and situation  Reliability:  Patient appears to be an adequate historian.    Behavior: cooperative   Speech: Speech is spontaneous and coherent, with a normal rate, rhythm and tone.    Language:There are no difficulties with expressive or receptive language as observed throughout the interview.    Mood: Described as \"an " "alright \".    Affect: congruent   Judgement: Able to make basic decision regarding safety.  Insight: Good awareness of physical and mental health conditions and aware of needs around care for these.  Gait and station: unable to assess  Thought process: Logical   Thought content: No evidence of delusions or paranoia.    Hallucinations : No evidence of any hallucination  Thought content: No evidence of delusions or paranoia.   Suicidal /Homical Ideations:  No thoughts of self harm or suicide. No thoughts of harming others.  Associations: Connected  Fund of knowledge: Average  Attention / Concentration: Able to remain focused during the interview with minimal distractibility or need for redirection.  Short Term Memory: Grossly intact as evidence by client recalling themes and ideas discussed.  Long Term Memory: Intact  Motor Status: unable to asse    Drug/treatment history and current pattern of use:   History of alcohol abuse - in remission     Medication changes: See Above   Medication adherence: compliant  Medication side effects: absent  Information about medications: Side effects, benefits and alternative treatments discussed and patient agrees .    Psychotherapy: Supportive therapy day-to-day living    Education: Diet, exercise, abstinence from drugs and alcohol, patient will not drive if sedated and medications or  under influence of any substance    Lab Results:   Personally reviewed and discussed with the patient    Lab Results   Component Value Date    WBC 7.4 05/15/2024    HGB 13.6 05/15/2024    HCT 41.3 05/15/2024     05/15/2024    CHOL 147 03/11/2024    TRIG 104 03/11/2024    HDL 48 03/11/2024    ALT 10 05/14/2024    AST 18 05/14/2024     (L) 05/15/2024    BUN 12.0 05/15/2024    CO2 23 05/15/2024    TSH 1.49 03/11/2024    INR 1.21 (H) 02/09/2023       Vital signs:  /88   Pulse 92   Wt 58.8 kg (129 lb 9.6 oz)   SpO2 96%   BMI 23.70 kg/m    Telemedicine visit-no vital signs " completed  Allergies: Patient has no known allergies.         Medications:     Current Outpatient Medications   Medication Sig Dispense Refill    albuterol (PROAIR HFA/PROVENTIL HFA/VENTOLIN HFA) 108 (90 Base) MCG/ACT inhaler Inhale 2 puffs into the lungs every 6 hours as needed for shortness of breath, wheezing or cough 18 g 0    divalproex sodium delayed-release (DEPAKOTE) 500 MG DR tablet Take 1 tablet (500 mg) by mouth 2 times daily. 180 tablet 4    OLANZapine (ZYPREXA) 2.5 MG tablet Take 1 tablet (2.5 mg) by mouth 2 times daily. 90 tablet 0    polyethylene glycol (MIRALAX) 17 GM/Dose powder Take 17 g by mouth daily as needed for constipation for constipation. (Patient not taking: Reported on 3/6/2025) 510 g 11    thiamine (B-1) 100 MG tablet Take 1 tablet (100 mg) by mouth daily. for vitamin supplement (Patient not taking: Reported on 3/6/2025) 90 tablet 3     No current facility-administered medications for this visit.         No current facility-administered medications for this visit.     No current facility-administered medications for this visit.         Medication adherence: Reviewed risk/benefits of medication , Patient able to verbalize understanding of side effects and Patient verbally consents to taking medications      PSYCHOEDUCATION:  Medication side effects and alternatives reviewed. Health promotion activities recommended and reviewed today. All questions addressed. Education and counseling completed regarding risks and benefits of medications and psychotherapy options.  Consent provided by patient/guardian  Call the psychiatric nurse line with medication questions or concerns at [phone].  MyChart may be used to communicate with your provider, but this is not intended to be used for emergencies.  SEROTONIN SYNDROME:  Discussed risks of Serotonin syndrome (ie, serotonin toxicity) which is a potentially life-threatening condition associated with increased serotonergic activity in the central  nervous system (CNS). It is seen with therapeutic medication use, inadvertent interactions between drugs, and intentional self-poisoning. Serotonin syndrome may involve a spectrum of clinical findings, which often include mental status changes, autonomic hyperactivity, and neuromuscular abnormalities.    STIMULANT THERAPY: Side effects discussed including but not limited to cardiac (including HTN, tachycardia, sudden death), motor/tic, appetite/growth, mood lability and sleep disruption. This is a controlled substance with risk for abuse, need to keep in a safe keep place and cannot replace lost scripts  HARM REDUCTION:  Discussions regarding effects of mood altering substances, alcohol and cannabis, on mood and that approach is harm reduction, will continue to prescribe meds as they work to cut back use.    SAFETY:  We all care about your loved one's safety. To reduce the risk of self-harm, remove access to all:  Firearms, Medicines (both prescribed and over-the-counter), Knives and other sharp objects, Ropes and like materials, and Alcohol  SLEEP HYGIENE: establish a sleep routine, limit screen time 1 hour prior to bed, use bed for sleep only, take sleep/medications on time (including sleepy time tea, trazadone or herbal treatments such as melatonin), aroma therapy, limit caffeine/sugar, yoga, guided imagery, stretch, meditation, limit naps to 20 minutes, make a temperature change in the room, white noise, be mindful of slowing down breathing, take a warm bath/shower, frequently wash sheets, and journaling.   Medlineplus.gov is information for patients.  It is run by the National Library of Medicine and it contains information about all disorders, diseases and all medications.              Review of Systems:      ROS:       10 point ROS was negative except for the items listed in HPI.      Crisis Resources:    Present to the Emergency Department as needed or call after hours crisis line at [phone] or  [phone].   [place] Crisis Text Line: Text [place] to 874746.  Suicide LifeLine Chat: suicidepreShipServline.org/chat/.  National Suicide Prevention Lifeline: 467.947.9833 (TTY: 615.509.9370). Call anytime for help.  (www.suicidepreventionlifeline.org)  National [place] on Mental Illness (www.n[place].org): 830.958.3654 or 573-190-2592.  Mental Health Association (www.mentalhealth.org): 704.672.9058 or 577-338-0569.      Coordination of Care:   More than 50% of time spent on coordination of care including: Educating patient about diagnosis, prognosis, side effects and benefits of medications, diet, exercise.  Time also spent providing supportive therapy regarding above issues.        Disclaimer: This note consists of symbols derived from keyboarding, dictation and/or voice recognition software. As a result, there may be errors in the script that have gone undetected. Please consider this when interpreting information found in this chart.    Start Time : 1600  End time : 0378

## 2025-03-19 ENCOUNTER — TELEPHONE (OUTPATIENT)
Dept: NEUROLOGY | Facility: CLINIC | Age: 50
End: 2025-03-19
Payer: COMMERCIAL

## 2025-03-19 NOTE — TELEPHONE ENCOUNTER
Patient Contacted for the patient to call back and schedule the following:    Appointment type: RETURN SEIZURE  Provider: MACY  Return date: 01/12/2026  Specialty phone number: [phone]  Additional appointment(s) needed: N/A  Additonal Notes: Called via [place] [facility]

## 2025-03-26 ENCOUNTER — PATIENT OUTREACH (OUTPATIENT)
Dept: CARE COORDINATION | Facility: CLINIC | Age: 50
End: 2025-03-26
Payer: COMMERCIAL

## 2025-03-26 NOTE — Clinical Note
FYI: I moved your outreach on 3/31/25 to 5/5/25. Will need ride set up for psychiatry appt on 5/7/25. Thanks.

## 2025-03-26 NOTE — PROGRESS NOTES
Clinic Care Coordination Contact  Care Coordination Clinician Chart Review    Situation: Patient chart reviewed by Care Coordinator.       Background: Care Coordination Program started: 2/20/2023. Initial assessment completed and patient-centered care plan(s) were developed with participation from patient. Lead CC handed patient off to CHW for continued outreaches.       Assessment: Per chart review, patient outreach completed by CC CHW on 2/27/25.  Patient is actively working to accomplish goal(s). Patient's goal(s) appropriate and relevant at this time. Patient is not due for updated Plan of Care.  Assessments will be completed annually or as needed/with change of patient status.      Care Plan: Psychiatry       Problem: Dementia due to medical condition with behavioral disturbance       Goal: Patient will attend his psychiatry appointment in the next 12 months.       Start Date: 10/16/2024 Expected End Date: 10/31/2025    Recent Progress: 50%    Note:     Barriers: language barrier, low literacy, noncompliance, and lack of knowledge how to navigate complex health care system  Strengths: motivated to attend appt  Patient expressed understanding of goal: Yes    Action steps to achieve this goal:  1. I will answer my phone when I am contacted to schedule my appointment. Completed.  2. I will attend my follow-up psychiatry appointment as scheduled on 11/7/24 at 12:30 pm. Completed.   3. I will attend my follow up psychiatry appointment on 1/30/2025 at 2:45 PM with Annabella Jurado NP. Completed.   4. I will attend my follow-up psychiatry appointment on 3/6/25 at 3:45 pm with Annabella Jurado NP. Completed.   5. I will attend my follow-up psychiatry appointment on 5/7/25 at 8:15 am with Annabella Jurado NP.  6. I will schedule a follow up appointment with my psychiatrist if it is recommended to do so while I am at the clinic.  7. I will follow up with [facility] regarding this goal at each outreach until it is completed.                                   Plan/Recommendations: The patient will continue working with Care Coordination to achieve goal(s) as above. CHW will continue outreaches at minimum every 30 days and will involve Lead CC as needed or if patient is ready to move to Maintenance. Lead CC will continue to monitor CHW outreaches and patient's progress to goal(s) every 6 weeks.     Plan of Care updated and sent to patient: No

## 2025-04-30 ENCOUNTER — PATIENT OUTREACH (OUTPATIENT)
Dept: CARE COORDINATION | Facility: CLINIC | Age: 50
End: 2025-04-30
Payer: COMMERCIAL

## 2025-04-30 NOTE — PROGRESS NOTES
Clinic Care Coordination Contact  Care Coordination Clinician Chart Review    Situation: Patient chart reviewed by Care Coordinator.       Background: Care Coordination Program started: 2/20/2023. Initial assessment completed and patient-centered care plan(s) were developed with participation from patient. Lead CC handed patient off to CHW for continued outreaches.       Assessment: Per chart review, patient outreach completed by CC CHW on 2/27/25.  Patient is actively working to accomplish goal(s). Patient's goal(s) appropriate and relevant at this time. Patient is not due for updated Plan of Care.  Assessments will be completed annually or as needed/with change of patient status.      Care Plan: Psychiatry       Problem: Dementia due to medical condition with behavioral disturbance       Goal: Patient will attend his psychiatry appointment in the next 12 months.       Start Date: 10/16/2024 Expected End Date: 10/31/2025    Recent Progress: 50%    Note:     Barriers: language barrier, low literacy, noncompliance, and lack of knowledge how to navigate complex health care system  Strengths: motivated to attend appt  Patient expressed understanding of goal: Yes    Action steps to achieve this goal:  1. I will answer my phone when I am contacted to schedule my appointment. Completed.  2. I will attend my follow-up psychiatry appointment as scheduled on 11/7/24 at 12:30 pm. Completed.   3. I will attend my follow up psychiatry appointment on 1/30/2025 at 2:45 PM with Annabella Jurado NP. Completed.   4. I will attend my follow-up psychiatry appointment on 3/6/25 at 3:45 pm with Annabella Jurado NP. Completed.   5. I will attend my follow-up psychiatry appointment on 5/7/25 at 8:15 am with Annabella Jurado NP.  6. I will schedule a follow up appointment with my psychiatrist if it is recommended to do so while I am at the clinic.  7. I will follow up with [facility] regarding this goal at each outreach until it is completed.                                   Plan/Recommendations: The patient will continue working with Care Coordination to achieve goal(s) as above. CHW will continue outreaches at minimum every 30 days and will involve Lead CC as needed or if patient is ready to move to Maintenance. Lead CC will continue to monitor CHW outreaches and patient's progress to goal(s) every 6 weeks.     Plan of Care updated and sent to patient: No

## 2025-05-05 ENCOUNTER — PATIENT OUTREACH (OUTPATIENT)
Dept: CARE COORDINATION | Facility: CLINIC | Age: 50
End: 2025-05-05
Payer: COMMERCIAL

## 2025-05-05 NOTE — PROGRESS NOTES
Clinic Care Coordination Contact  Community Health Worker Follow Up    Care Gaps:   Health Maintenance Due   Topic Date Due    NICOTINE/TOBACCO CESSATION COUNSELING Q 1 YR  Never done    HF ACTION PLAN  Never done    COLORECTAL CANCER SCREENING  Never done    COVID-19 Vaccine (1 - 2024-25 season) Never done    BMP  11/15/2024    PHQ-2 (once per calendar year)  01/01/2025    YEARLY PREVENTIVE VISIT  01/02/2025    LIPID  03/11/2025    ZOSTER IMMUNIZATION (1 of 2) 01/01/2025    ALT  05/14/2025    CBC  05/15/2025     Scheduled on 6/25/2025.      Care Plan:   Care Plan: Psychiatry       Problem: Dementia due to medical condition with behavioral disturbance       Goal: Patient will attend his psychiatry appointment in the next 12 months.       Start Date: 10/16/2024 Expected End Date: 10/31/2025    This Visit's Progress: 60% Recent Progress: 50%    Note:     Barriers: language barrier, low literacy, noncompliance, and lack of knowledge how to navigate complex health care system  Strengths: motivated to attend appt  Patient expressed understanding of goal: Yes    Action steps to achieve this goal:  1. I will answer my phone when I am contacted to schedule my appointment. Completed.  2. I will attend my follow-up psychiatry appointment as scheduled on 11/7/24 at 12:30 pm. Completed.   3. I will attend my follow up psychiatry appointment on 1/30/2025 at 2:45 PM with Annabella Jurado NP. Completed.   4. I will attend my follow-up psychiatry appointment on 3/6/25 at 3:45 pm with Annabella Jurado NP. Completed.   5. I will attend my follow-up psychiatry appointment on 5/7/25 at 8:15 am with Annabella Jurado NP. Reminded and transportation is set up.  6. I will schedule a follow up appointment with my psychiatrist if it is recommended to do so while I am at the clinic.  7. I will follow up with [facility] regarding this goal at each outreach until it is completed.                           Intervention and Education during  outreach:  -CHW reminded patient of upcoming appointment and transportation is requested with [employer].  -Patient was informed to call with questions or concerns.    CHW Next Outreach: In one month.

## 2025-06-05 NOTE — PROGRESS NOTES
Chronic cough  Patient's wife reports a chronic cough, usually dry cough.  Vital stable, no fevers, however wife does report that patient sweats a lot at night.  Unclear if this is secondary to temperature.  We will check for COVID flu, TB, x-ray.  - Symptomatic COVID-19 Virus (Coronavirus) by PCR Nose  - Influenza A/B antigen  - XR Chest 2 Views  - Quantiferon TB Gold Plus     Primary insomnia  Chronic, wife reports that patient was on a previous medication, however did not recall the name.  Patient was started on trazodone 50 mg nightly, however wife reports that they were not able to  medication/likely went to the wrong pharmacy.  We will represcribe trazodone and clarified which pharmacy would be going to.  Wife reports understanding.  - traZODone (DESYREL) 50 MG tablet  Dispense: 90 tablet; Refill: 3     Screen for colon cancer  - [employer])     Hepatitis C virus infection, unspecified chronicity  Detected at recent visit.  Patient was referred to GI, however no-show to appointment.  Discussed with wife importance of going to appointment, wife was agreeable.  - Follow-up with GI  Answers for HPI/ROS submitted by the patient on 4/3/2023  What is the reason for your visit today? : follow up on cough and isomnia  How many servings of fruits and vegetables do you eat daily?: 2-3  On average, how many sweetened beverages do you drink each day (Examples: soda, juice, sweet tea, etc.  Do NOT count diet or artificially sweetened beverages)?: 0  How many minutes a day do you exercise enough to make your heart beat faster?: 9 or less  How many days a week do you exercise enough to make your heart beat faster?: 3 or less  How many days per week do you miss taking your medication?: 0       Rash

## 2025-06-10 ENCOUNTER — PATIENT OUTREACH (OUTPATIENT)
Dept: CARE COORDINATION | Facility: CLINIC | Age: 50
End: 2025-06-10
Payer: COMMERCIAL

## 2025-06-10 NOTE — PROGRESS NOTES
Clinic Care Coordination Contact  Community Health Worker Follow Up    Care Gaps:   Health Maintenance Due   Topic Date Due    NICOTINE/TOBACCO CESSATION COUNSELING Q 1 YR  Never done    HF ACTION PLAN  Never done    COLORECTAL CANCER SCREENING  Never done    COVID-19 VACCINE (1 - 2024-25 season) Never done    BMP  11/15/2024    PHQ-2 (once per calendar year)  01/01/2025    YEARLY PREVENTIVE VISIT  01/02/2025    LIPID  03/11/2025    ALT  05/14/2025    CBC  05/15/2025    ZOSTER VACCINE (1 of 2) 01/01/2025     Scheduled 7/01/2025.      Care Plan:   Care Plan: Psychiatry       Problem: Dementia due to medical condition with behavioral disturbance       Goal: Patient will attend his psychiatry appointment in the next 12 months.       Start Date: 10/16/2024 Expected End Date: 10/31/2025    This Visit's Progress: 70% Recent Progress: 60%    Note:     Barriers: language barrier, low literacy, noncompliance, and lack of knowledge how to navigate complex health care system  Strengths: motivated to attend appt  Patient expressed understanding of goal: Yes    Action steps to achieve this goal:  1. I will answer my phone when I am contacted to schedule my appointment. Completed.  2. I will attend my follow-up psychiatry appointment as scheduled on 11/7/24 at 12:30 pm. Completed.   3. I will attend my follow up psychiatry appointment on 1/30/2025 at 2:45 PM with Annabella Jurado NP. Completed.   4. I will attend my follow-up psychiatry appointment on 3/6/25 at 3:45 pm with Annabella Jurado NP. Completed.   5. I will attend my follow-up psychiatry appointment on 5/7/25 at 8:15 am with Annabella Jurado NP. Canceled due to no transportation  6. Patient will attend his rescheduled psychiatry appointment on 6/12/25 at 8;15 am. Reminded patient and transportation requested.  7. I will schedule a follow up appointment with my psychiatrist if it is recommended to do so while I am at the clinic.  8. I will follow up with [facility] regarding this  goal at each outreach until it is completed.                           Intervention and Education during outreach:  -CHW reminded patient of upcoming appointment and transportation is arranged with [employer].  -Patient was informed to call with questions or concerns.    CHW Next Outreach: In one month.

## 2025-07-01 ENCOUNTER — OFFICE VISIT (OUTPATIENT)
Dept: FAMILY MEDICINE | Facility: CLINIC | Age: 50
End: 2025-07-01
Payer: COMMERCIAL

## 2025-07-01 VITALS
HEART RATE: 74 BPM | HEIGHT: 60 IN | DIASTOLIC BLOOD PRESSURE: 87 MMHG | OXYGEN SATURATION: 97 % | SYSTOLIC BLOOD PRESSURE: 114 MMHG | WEIGHT: 123.25 LBS | RESPIRATION RATE: 22 BRPM | TEMPERATURE: 97.6 F | BODY MASS INDEX: 24.2 KG/M2

## 2025-07-01 DIAGNOSIS — Z00.00 ROUTINE GENERAL MEDICAL EXAMINATION AT A HEALTH CARE FACILITY: Primary | ICD-10-CM

## 2025-07-01 DIAGNOSIS — F10.21 ALCOHOL USE DISORDER, SEVERE, IN SUSTAINED REMISSION (H): ICD-10-CM

## 2025-07-01 DIAGNOSIS — B19.20 HEPATITIS C VIRUS INFECTION, UNSPECIFIED CHRONICITY: ICD-10-CM

## 2025-07-01 DIAGNOSIS — F02.818 DEMENTIA DUE TO MEDICAL CONDITION WITH BEHAVIORAL DISTURBANCE (H): ICD-10-CM

## 2025-07-01 DIAGNOSIS — S48.911D AMPUTATION OF RIGHT UPPER EXTREMITY, SUBSEQUENT ENCOUNTER: ICD-10-CM

## 2025-07-01 PROCEDURE — 3074F SYST BP LT 130 MM HG: CPT | Performed by: FAMILY MEDICINE

## 2025-07-01 PROCEDURE — 3079F DIAST BP 80-89 MM HG: CPT | Performed by: FAMILY MEDICINE

## 2025-07-01 PROCEDURE — 99396 PREV VISIT EST AGE 40-64: CPT | Performed by: FAMILY MEDICINE

## 2025-07-01 RX ORDER — LANOLIN ALCOHOL/MO/W.PET/CERES
100 CREAM (GRAM) TOPICAL DAILY
Qty: 90 TABLET | Refills: 3 | Status: SHIPPED | OUTPATIENT
Start: 2025-07-01

## 2025-07-01 ASSESSMENT — PATIENT HEALTH QUESTIONNAIRE - PHQ9
10. IF YOU CHECKED OFF ANY PROBLEMS, HOW DIFFICULT HAVE THESE PROBLEMS MADE IT FOR YOU TO DO YOUR WORK, TAKE CARE OF THINGS AT HOME, OR GET ALONG WITH OTHER PEOPLE: SOMEWHAT DIFFICULT
SUM OF ALL RESPONSES TO PHQ QUESTIONS 1-9: 14
SUM OF ALL RESPONSES TO PHQ QUESTIONS 1-9: 14

## 2025-07-01 NOTE — PROGRESS NOTES
Preventive Care Visit  [facility], Family Medicine  Jul 1, 2025      Assessment & Plan     Routine general medical examination at a health care facility    Recheck in 4 months.      Dementia due to medical condition with behavioral disturbance (H)    - thiamine (B-1) 100 MG tablet  Dispense: 90 tablet; Refill: 3  - PRIMARY CARE FOLLOW-UP SCHEDULING    Alcohol use disorder    - thiamine (B-1) 100 MG tablet  Dispense: 90 tablet; Refill: 3      Hepatitis C virus infection, unspecified chronicity      Amputation of right upper extremity, subsequent encounter          Nicotine/Tobacco Cessation  He reports that he has been smoking cigarettes. He has been exposed to tobacco smoke. He has quit using smokeless tobacco.        Counseling  Appropriate preventive services were addressed with this patient via screening, questionnaire, or discussion as appropriate for fall prevention, nutrition, physical activity, Tobacco-use cessation, social engagement, weight loss and cognition.  Checklist reviewing preventive services available has been given to the patient.  The patient's PHQ-9 score is consistent with moderate depression. He was provided with information regarding depression.           Michel Joe is a 50 year old, presenting for the following:  Establish Care and Physical        7/1/2025     9:06 AM   Additional Questions   Roomed by Ivonne WILSON   Accompanied by Son         7/1/2025   Forms   Any forms needing to be completed Yes          HPI  Fasting.    Divalproate from 1/7/25, has half of 180.                       7/1/2025   General Health   How would you rate your overall physical health? (!) POOR         7/1/2025   Nutrition   Three or more servings of calcium each day? Yes   Diet: Regular (no restrictions)   How many servings of fruit and vegetables per day? (!) 2-3   How many sweetened beverages each day? (!) 2         1/2/2024   Exercise   Frequency of exercise: None          7/1/2025   Social Factors   Worry food won't last until get money to buy more No   Food not last or not have enough money for food? No   Do you have housing? (Housing is defined as stable permanent housing and does not include staying outside in a car, in a tent, in an abandoned building, in an overnight shelter, or couch-surfing.) Yes   Are you worried about losing your housing? No   Lack of transportation? Yes   Unable to get utilities (heat,electricity)? No    (!) TRANSPORTATION CONCERN PRESENT      7/1/2025   Fall Risk   Fallen 2 or more times in the past year? No   Trouble with walking or balance? No          7/1/2025   Dental   Dentist two times every year? Yes       Today's PHQ-9 Score:       7/1/2025     9:24 AM   PHQ-9 SCORE   PHQ-9 Total Score MyChart 14 (Moderate depression)   PHQ-9 Total Score 14        Proxy-reported         7/1/2025   Substance Use   Alcohol more than 3/day or more than 7/wk No   Do you use any other substances recreationally? No     Social History     Tobacco Use    Smoking status: Every Day     Types: Cigarettes     Passive exposure: Current    Smokeless tobacco: Former    Tobacco comments:     Son smokes outside   Vaping Use    Vaping status: Never Used   Substance Use Topics    Alcohol use: Yes     Comment: Patient drinks all day per patients wife [place]    Drug use: Yes     Types: Marijuana, Other           7/1/2025   STI Screening   New sexual partner(s) since last STI/HIV test? (!) DECLINE   ASCVD Risk   The 10-year ASCVD risk score (Xu LOPEZ, et al., 2019) is: 4.3%    Values used to calculate the score:      Age: 50 years      Sex: Male      Is Non- : No      Diabetic: No      Tobacco smoker: Yes      Systolic Blood Pressure: 114 mmHg      Is BP treated: No      HDL Cholesterol: 48 mg/dL      Total Cholesterol: 147 mg/dL            7/1/2025   Contraception/Family Planning   Questions about contraception or family planning (!) DECLINE       "  Reviewed and updated as needed this visit by Provider                      Current Outpatient Medications   Medication Sig Dispense Refill    albuterol (PROAIR HFA/PROVENTIL HFA/VENTOLIN HFA) 108 (90 Base) MCG/ACT inhaler Inhale 2 puffs into the lungs every 6 hours as needed for shortness of breath, wheezing or cough 18 g 0    divalproex sodium delayed-release (DEPAKOTE) 500 MG DR tablet Take 1 tablet (500 mg) by mouth 2 times daily. 180 tablet 4    thiamine (B-1) 100 MG tablet Take 1 tablet (100 mg) by mouth daily. for vitamin supplement 90 tablet 3    OLANZapine (ZYPREXA) 2.5 MG tablet Take 1 tablet (2.5 mg) by mouth 2 times daily. 180 tablet 0    polyethylene glycol (MIRALAX) 17 GM/Dose powder Take 17 g by mouth daily as needed for constipation for constipation. 510 g 11          Objective    Exam  /87   Pulse 74   Temp 97.6  F (36.4  C) (Oral)   Resp 22   Ht 1.535 m (5' 0.43\")   Wt 55.9 kg (123 lb 4 oz)   SpO2 97%   BMI 23.73 kg/m     Estimated body mass index is 23.73 kg/m  as calculated from the following:    Height as of this encounter: 1.535 m (5' 0.43\").    Weight as of this encounter: 55.9 kg (123 lb 4 oz).    Physical Exam    Eyes: EOM full, pupils normal, conjunctivae normal  Ears: TM's and canals normal  Oropharynx: normal  Neck: supple without adenopathy or thyromegaly  Lungs: normal  Heart: regular rhythm, normal rate, no murmur  Abdomen: no HSM, mass or tenderness  Extremities: FROM, normal strength and sensation        Signed Electronically by: Jean Carlos Alvarez MD    .undefined[^^  "

## 2025-07-03 ENCOUNTER — OFFICE VISIT (OUTPATIENT)
Dept: PSYCHIATRY | Facility: CLINIC | Age: 50
End: 2025-07-03
Payer: COMMERCIAL

## 2025-07-03 VITALS
BODY MASS INDEX: 23.99 KG/M2 | OXYGEN SATURATION: 96 % | HEART RATE: 72 BPM | DIASTOLIC BLOOD PRESSURE: 71 MMHG | SYSTOLIC BLOOD PRESSURE: 113 MMHG | WEIGHT: 124.6 LBS

## 2025-07-03 DIAGNOSIS — F02.818 DEMENTIA DUE TO MEDICAL CONDITION WITH BEHAVIORAL DISTURBANCE (H): ICD-10-CM

## 2025-07-03 RX ORDER — OLANZAPINE 2.5 MG/1
2.5 TABLET, FILM COATED ORAL 2 TIMES DAILY
Qty: 180 TABLET | Refills: 0 | Status: SHIPPED | OUTPATIENT
Start: 2025-07-03

## 2025-07-03 NOTE — PATIENT INSTRUCTIONS
"The Panel Psychiatry Program  What to Expect  Here's what to expect in the Panel Psychiatry Program.   About the program  You'll be meeting with a psychiatric doctor to check your mental health. A psychiatric doctor helps you deal with troubling thoughts and feelings by giving you medicine. They'll make sure you know the plan for your care. You may see them for a long time. When you're feeling better, they may refer you back to seeing your family doctor.   If you have any questions, we'll be glad to talk to you.  About visits  Be open  At your visits, please talk openly about your problems. It may feel hard, but it's the best way for us to help you.  Cancelling visits  If you can't come to your visit, please call us right away at [phone]. If you don't cancel at least 24 hours (1 full day) before your visit, that's \"late cancellation.\"  Not showing up for your visits  Being very late is the same as not showing up. You'll be a \"no show\" if:  You're more than 15 minutes late for a 30-minute (half hour) visit.  You're more than 30 minutes late for a 60-minute (full hour) visit.  If you cancel late or don't show up 2 times within 6 months, we may end your care.  Getting help between visits  If you need help between visits, you can call us Monday to Friday from 8 a.m. to 4:30 p.m. at [phone].  Emergency care  Call 911 or go to the nearest emergency department if your life or someone else's life is in danger.  Call 988 anytime to reach the national Suicide and Crisis hotline.  Medicine refills  To refill your medicine, call your pharmacy. You can also call [facility]'s [facility] at [phone], Monday to Friday, 8 a.m. to 4:30 p.m. It can take 1 to 3 business days to get a refill.   Forms, letters, and tests  You may have papers to fill out, like FMLA, short-term disability, and workability. We can help you with these forms at your visits, but you must have an appointment. You may need more " than 1 visit for this, to be in an intensive therapy program, or both.  Before we can give you medicine for ADHD, we may refer you to get tested for it or confirm it another way.  We may not be able to give you an emotional support animal letter.  We don't do mental health checks ordered by the court.   We don't do mental health testing, but we can refer you to get tested.   Thank you for choosing us for your care.  For informational purposes only. Not to replace the advice of your health care provider. Copyright   2022 [facility]. All rights reserved. Affle [id number] - 12/22      After Visit Summary   Continue medications as prescribed  Have your pharmacy contact us for a refill if you are running low on medications (We may ask you to come into clinic to get a refill from the nurse  No Alcohol or drug use  No driving if sedated  Call the clinic with any questions or concerns   Reach out for help if you feel like hurting yourself or others ([facility] [phone]: [address] [facility], [address] or [facility] Suicide Hotline   [phone] , call 911 or go to nearest Emergency room     Crisis Resources:    Present to the Emergency Department as needed or call after hours crisis line at [phone] or [phone].   [place] Crisis Text Line: Text [place] to 495208.  Suicide LifeLine Chat: suicidepreventionlifeline.org/chat/.  National Suicide Prevention Lifeline: 380.138.7466 (TTY: 780.773.9058). Call anytime for help.  (www.suicidepreventionlifeline.org)  [employer] [place] [employer]ness (www.nixon.org): 498.129.6237 or 572-087-7410.  Mental Health Association (www.mentalhealth.org): 407.558.9237 or 140-305-6290.       Follow up as directed, for your appointments, per your After Visit Summary Form.

## 2025-07-03 NOTE — PROGRESS NOTES
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      Patient does not speak English, therefore,  services were used to address the complexities of language interpretation in this meeting ..      Last visit 01/30/2025.  Recommendation at last visit .  1.Psychosis /agitation/aggressive Beh : Continue Olanzapine 2.5 twice daily   2.Seizures: Defer to neurology   3.RTC : 4 weeks   4: Highly recommend abstain from all mood altering substanc  Patient and I reviewed diagnosis and treatment plan and patient agrees with following recommendations:  Ongoing education given regarding diagnostic and treatment options with adequate verbalization of understanding.  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    Patient Active Problem List   Diagnosis    Left displaced femoral neck fracture (H)    Non-verbal learning disorder    Dementia due to medical condition with behavioral disturbance (H)    Amputation of right upper extremity, subsequent encounter    Hepatitis C virus infection, unspecified chronicity    Insomnia, unspecified type    Alcohol use disorder    Partial epilepsy with loss of  consciousness, intractable (H)    Alcohol abuse    Behavior problem, adult    Violent behavior    Unable to care for self    Agitation    Acute hypoxemic respiratory failure (H)    Dyspnea, unspecified type             Chief Complaint / Subjective:    Chief complaint: Dementia with behavioral disturbances    History of Present Illness:   Patient presented to the clinic accompanied by family member.  Patient remained mute for the entire interview.  Per family member patient does get aggressive physically especially in the evening.  They have been giving just 2.5 mg of olanzapine once a day.  The order states twice daily.  I did discuss all the instructions again with patient's family member who is also caregiver.  It appears that patient may be sundowning in the evening I did encourage them to ensure that they have a consistent schedule for giving the medication.  Patient's appetite is remarkable.  Family members are supportive and provide care and administer medications.  No other concerns.  Patient return to the clinic approximately 4 weeks for follow-up appointment and call in between visits any questions or concerns    Mental Status Examination:   General: Adequate hygiene, cooperative  Speech:  patient remains mute  Language: Patient remained mute  Thought process: Unable to assess as patient remained mute  Thought content:Unable to assess as patient remained mute                           Auditory hallucination                          Visual Hallucinations - Unable to assess as patient remained mute                         Delusions :Unable to assess as patient remained mute                          Loose Associations: Unable to assess as patient remained mute                         Suicidal thoughts: nUnable to assess as patient remained mute                          Homicidal thoughts:Denies                      Unable to assess as patient remained mute  Affect: Neutral  Mood: Flat   Intellectual  functioning: Within normal limits  Memory: History of demential with beh disturbances   Fund of knowledge: Unable to assess as patient remained mute  Attention and concentration: Unable to assess as patient remained mute  Gait: Steady  Psychomotor activity: Calm, no agitation  Muscles: No atrophy, no abnormal movements  InSight and judgment: Unable to assess as patient remained mute      Drug/treatment history and current pattern of use:   None     Medication changes: See Above   Medication adherence: compliant  Medication side effects: absent  Information about medications: Side effects, benefits and alternative treatments discussed and patient agrees .    Psychotherapy: Supportive therapy day-to-day living    Education: Diet, exercise, abstinence from drugs and alcohol, patient will not drive if sedated and medications or  under influence of any substance    Lab Results:   Personally reviewed and discussed with the patient    Lab Results   Component Value Date    WBC 7.4 05/15/2024    HGB 13.6 05/15/2024    HCT 41.3 05/15/2024     05/15/2024    CHOL 147 03/11/2024    TRIG 104 03/11/2024    HDL 48 03/11/2024    ALT 10 05/14/2024    AST 18 05/14/2024     (L) 05/15/2024    BUN 12.0 05/15/2024    CO2 23 05/15/2024    TSH 1.49 03/11/2024    INR 1.21 (H) 02/09/2023       Vital signs:  /71   Pulse 72   Wt 56.5 kg (124 lb 9.6 oz)   SpO2 96%   BMI 23.99 kg/m      Allergies: Patient has no known allergies.         Medications:     Current Outpatient Medications   Medication Sig Dispense Refill    albuterol (PROAIR HFA/PROVENTIL HFA/VENTOLIN HFA) 108 (90 Base) MCG/ACT inhaler Inhale 2 puffs into the lungs every 6 hours as needed for shortness of breath, wheezing or cough 18 g 0    divalproex sodium delayed-release (DEPAKOTE) 500 MG DR tablet Take 1 tablet (500 mg) by mouth 2 times daily. 180 tablet 4    OLANZapine (ZYPREXA) 2.5 MG tablet Take 1 tablet (2.5 mg) by mouth 2 times daily. 180 tablet 0     polyethylene glycol (MIRALAX) 17 GM/Dose powder Take 17 g by mouth daily as needed for constipation for constipation. 510 g 11    thiamine (B-1) 100 MG tablet Take 1 tablet (100 mg) by mouth daily. for vitamin supplement 90 tablet 3     No current facility-administered medications for this visit.         No current facility-administered medications for this visit.     No current facility-administered medications for this visit.            Review of Systems:      ROS:       10 point ROS was negative except for the items listed in HPI.      Suicide Risk Assessment:   Feels safe in home: Yes   Suicidal ideation: Denies  History of suicide attempts:  No   Hx of impulsivity: No Impetuous and self-damaging behavior is common and can take many forms. Patients abuse substances, binge eat, engage in unsafe sex, spend money irresponsibly, and drive recklessly. In addition, patients can suddenly quit a job that they need or end a relationship that has the potential to last, thereby sabotaging their own success. Impulsivity can also manifest with immature and regressive behavior and often takes the form of sexually acting out.  Hope for the future: present   Hx of Command hallucinations or current psychosis: No  History of Self-injurious behaviors: No Current:  No  Family member  by suicide:  No   A thorough assessment of risk factors related to suicide and self-harm have been reviewed and are noted above. The patient convincingly denies acute suicidality on several occasions. Local community safety resources reviewed and printed for patient to use if needed. There was no deceit detected, and the patient presented in a manner that was believable       Coordination of Care:   More  than 50% of time spent on coordination of care including: Educating patient about diagnosis, prognosis, side effects and benefits of medications, diet, exercise.  Time also spent providing supportive therapy regarding above issues.    Disclaimer:  This note consists of symbols derived from keyboarding, dictation and/or voice recognition software. As a result, there may be errors in the script that have gone undetected. Please consider this when interpreting information found in this chart.   Start Time : 1000  End time : 1040

## 2025-07-12 PROBLEM — J96.01 ACUTE HYPOXEMIC RESPIRATORY FAILURE (H): Status: RESOLVED | Noted: 2024-05-14 | Resolved: 2025-07-12

## 2025-07-14 ENCOUNTER — PATIENT OUTREACH (OUTPATIENT)
Dept: CARE COORDINATION | Facility: CLINIC | Age: 50
End: 2025-07-14
Payer: COMMERCIAL

## 2025-07-14 NOTE — PROGRESS NOTES
Clinic Care Coordination Contact  Community Health Worker Follow Up    Care Gaps:   Health Maintenance Due   Topic Date Due    HF ACTION PLAN  Never done    COLORECTAL CANCER SCREENING  Never done    COVID-19 VACCINE (1 - 2024-25 season) Never done    BMP  11/15/2024    LIPID  03/11/2025    ALT  05/14/2025    CBC  05/15/2025    ZOSTER VACCINE (1 of 2) 01/01/2025     Care Gaps Last addressed on 7/01/2025.    Care Plan:   Care Plan: Psychiatry       Problem: Dementia due to medical condition with behavioral disturbance       Goal: Patient will attend his psychiatry appointment in the next 12 months.       Start Date: 10/16/2024 Expected End Date: 10/31/2025    This Visit's Progress: 80% Recent Progress: 70%    Note:     Barriers: language barrier, low literacy, noncompliance, and lack of knowledge how to navigate complex health care system  Strengths: motivated to attend appt  Patient expressed understanding of goal: Yes    Action steps to achieve this goal:  1. I will answer my phone when I am contacted to schedule my appointment. Completed.  2. I will attend my follow-up psychiatry appointment as scheduled on 11/7/24 at 12:30 pm. Completed.   3. I will attend my follow up psychiatry appointment on 1/30/2025 at 2:45 PM with Annabella Jurado NP. Completed.   4. I will attend my follow-up psychiatry appointment on 3/6/25 at 3:45 pm with Annabella Jurado NP. Completed.   5. I will attend my follow-up psychiatry appointment on 5/7/25 at 8:15 am with Annabella Jurado NP. Canceled due to no transportation  6. Patient will attend his rescheduled psychiatry appointment on 6/12/25 at 8;15 am. Cancelled, rescheduled on 7/03/2025 at 10:30 AM and attended.  7. I will follow up with my psychiatrist again as scheduled on 9/11/2025 at 8:15 AM.  8. I will follow up with [facility] regarding this goal at each outreach until it is completed.                           Intervention and Education during outreach:  -Patient continues receiving PCA  services and [place] benefits as qualified.  -SSI is in the process and pending.  -Patient was informed to call with questions or concerns.     CHW Next Outreach: In one month.

## 2025-07-17 ENCOUNTER — PATIENT OUTREACH (OUTPATIENT)
Dept: CARE COORDINATION | Facility: CLINIC | Age: 50
End: 2025-07-17
Payer: COMMERCIAL

## 2025-07-17 NOTE — PROGRESS NOTES
Clinic Care Coordination Contact  Care Coordination Clinician Chart Review    Situation: Patient chart reviewed by Care Coordinator.       Background: Care Coordination Program started: 2/20/2023. Initial assessment completed and patient-centered care plan(s) were developed with participation from patient. Lead CC handed patient off to CHW for continued outreaches.       Assessment: Per chart review, patient outreach completed by CC CHW on 7/14/25.  Patient is actively working to accomplish goal(s). Patient's goal(s) appropriate and relevant at this time. Patient is not due for updated Plan of Care.  Assessments will be completed annually or as needed/with change of patient status.      Care Plan: Psychiatry       Problem: Dementia due to medical condition with behavioral disturbance       Goal: Patient will attend his psychiatry appointment in the next 12 months.       Start Date: 10/16/2024 Expected End Date: 10/31/2025    Recent Progress: 80%    Note:     Barriers: language barrier, low literacy, noncompliance, and lack of knowledge how to navigate complex health care system  Strengths: motivated to attend appt  Patient expressed understanding of goal: Yes    Action steps to achieve this goal:  1. I will answer my phone when I am contacted to schedule my appointment. Completed.  2. I will attend my follow-up psychiatry appointment as scheduled on 11/7/24 at 12:30 pm. Completed.   3. I will attend my follow up psychiatry appointment on 1/30/2025 at 2:45 PM with Annabella Jurado NP. Completed.   4. I will attend my follow-up psychiatry appointment on 3/6/25 at 3:45 pm with Annabella Jurado NP. Completed.   5. I will attend my follow-up psychiatry appointment on 5/7/25 at 8:15 am with Annabella Jurado NP. Canceled due to no transportation  6. Patient will attend his rescheduled psychiatry appointment on 6/12/25 at 8;15 am. Cancelled, rescheduled on 7/03/2025 at 10:30 AM and attended.  7. I will follow up with my  psychiatrist again as scheduled on 9/11/2025 at 8:15 AM.  8. I will follow up with CCC regarding this goal at each outreach until it is completed.                                  Plan/Recommendations: The patient will continue working with Care Coordination to achieve goal(s) as above. CHW will continue outreaches at minimum every 30 days and will involve Lead CC as needed or if patient is ready to move to Maintenance. Lead CC will continue to monitor CHW outreaches and patient's progress to goal(s) every 6 weeks.     Plan of Care updated and sent to patient: No

## 2025-08-14 ENCOUNTER — PATIENT OUTREACH (OUTPATIENT)
Dept: CARE COORDINATION | Facility: CLINIC | Age: 50
End: 2025-08-14
Payer: COMMERCIAL